# Patient Record
Sex: FEMALE | Race: WHITE | NOT HISPANIC OR LATINO | Employment: FULL TIME | URBAN - METROPOLITAN AREA
[De-identification: names, ages, dates, MRNs, and addresses within clinical notes are randomized per-mention and may not be internally consistent; named-entity substitution may affect disease eponyms.]

---

## 2020-01-29 PROBLEM — R10.2 PELVIC PAIN: Status: ACTIVE | Noted: 2020-01-29

## 2020-01-29 PROBLEM — M62.838 MUSCLE SPASM: Status: ACTIVE | Noted: 2020-01-29

## 2020-01-30 ENCOUNTER — CLINICAL SUPPORT (OUTPATIENT)
Dept: REHABILITATION | Facility: HOSPITAL | Age: 35
End: 2020-01-30
Payer: COMMERCIAL

## 2020-01-30 DIAGNOSIS — M62.838 MUSCLE SPASM: ICD-10-CM

## 2020-01-30 DIAGNOSIS — R10.2 PELVIC PAIN: ICD-10-CM

## 2020-01-30 PROCEDURE — 97140 MANUAL THERAPY 1/> REGIONS: CPT | Mod: PO

## 2020-01-30 PROCEDURE — 97161 PT EVAL LOW COMPLEX 20 MIN: CPT | Mod: PO

## 2020-01-30 PROCEDURE — 97530 THERAPEUTIC ACTIVITIES: CPT | Mod: PO,59

## 2020-01-30 NOTE — PLAN OF CARE
OUTPATIENT PHYSICAL THERAPY EVALUATION        Name: Divya Salazar  Clinic Number: [id number]    Therapy Diagnosis:   Encounter Diagnoses   Name Primary?    Pelvic pain     Muscle spasm      Physician: Aga Arredondo MD    Physician Orders: PT Eval and Treat   Medical Diagnosis: pelvic pain   Evaluation Date: 2020  Authorization: 2020  Plan of Care Certification Period: 2020 to 2020    Today's Date: 2020  Visit #:   Time In: 1400  Time Out: 1500  Total Billable Time: 60 minutes    Precautions: universal      HISTORY      Divya is a 34 y.o. female evaluated on 2020    Physician:  Aga Arredondo MD   Diagnosis:   Encounter Diagnoses   Name Primary?    Pelvic pain     Muscle spasm       Treatment ordered: Physical Therapy  Medical History: No past medical history on file.   Surgical History: No past surgical history on file.   Medications:   No current outpatient medications on file.     No current facility-administered medications for this visit.        Allergies: Review of patient's allergies indicates:  No Known Allergies     OB/GYN History:         SUBJECTIVE     Date of onset: 4-5 years  History of current complaint: Pt reports she was on vacation about 4-5 years ago and she noticed this sensation of needing to have a BM but she was not able.  Then she started noticing that orgasms also gave her the same symptoms of needing to have a BM.  She continues to have abdominal cramping and pain.  She reports the pain wakes her up in the middle of the night.  She reports the pain starts in her LLQ and radiates around to the pubic bone and posteriorly to the SI joint.  She notices the pain the most after an orgasm.  She also reports pain with vaginal exams and intercourse.  She also reports issues with urinary incontinence with coughing or sneezing.  History of complete femoral ACL tear in  that is not repaired.      Activities that cause symptoms: with  cough/sneeze/laugh/yell, sexual activity or vaginal exam  and orgasm    Previous treatment included none reported    Reproductive Symptoms  Sexually active: Yes  Pain: Yes  Dysfunction: Yes     Bladder/Bowel History: urinary incontinence  Frequency of urination:   Daytime: 3-4           Nighttime: 0  Difficulty initiating urine stream: No  Urine stream: strong  Complete emptying: Yes  Bladder leakage: Yes  Frequency of incidents: only with cough/laugh  Amount leaked (urine): teaspoon(s)  Urinary Urgency: No      Frequency of bowel movements: 1 to 2 times a day  Difficulty initiating BM: Yes  Quality/Shape of BM: [place] Stool Chart 4-6 (reports it's usually more flattened)  Colon leakage: No  Bowel Urgency: No    Form of protection: pad (only if she is sick)    Pain:  Location: low back/Left SIJ/Pelvic Floor   Current 2/10, worst 8/10, best 2/10 (with 0 being the lowest and 10 being the highest)  Pain description:Grabbing and Sharp  Aggravating Factors: Prolonged standing and Lifting      Social History  Previous treatment included medical management. No PT this calendar year.      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  Pt has a fiance.      PLOF: Pt was independent with all ADLs without reports of incontinence or pain.    Pts goals: Decrease pelvic pain.  Have pain-free orgasms.        OBJECTIVE     ORTHO SCREEN  Posture: flexed posturing  Pelvic alignment: pelvic obliquity noted  Pubic symphysis: WNL    ABDOMINALS  Palpation: moderate restriction noted in musculature   Abdominal strength: Rectus: 4-/5     TA: 4-/5     Chaperone: refused  Consent signed: Yes    VAGINAL PELVIC FLOOR EXAM    EXTERNAL ASSESSMENT  Introitus: WNL  Skin condition: WNL  Scarring: none   Sensation: WNL   Pain:  none  Pelvic Clock Assessment: negative   Voluntary contraction: visible lift  Quality of contraction: slow rise    Specificity: patient contracts: gluts and abdominal wall    Voluntary relaxation: visible drop  Involuntary contraction: bulge  Bearing down: visible drop      INTERNAL ASSESSMENT  Pain: tender areas noted as follows: levator ani and OI Ori    Sensation: able to localize pressure appropriately  Vaginal vault: WNL   Muscle Bulk: hypertonus   Muscle Power: 2/5  Muscle Endurance: 3 sec  # Reps To Fatigue: 4    # Fast Contractions in 10 seconds: 4     Quality of contraction: WNL   Specificity: WNL   Coordination: tends to hold breath during PFM contration       TREATMENT    Treatment Time In: 230  Treatment Time Out: 300  Total Treatment time separate from Evaluation: 30 minutes    Manual Therapy to develop flexibility and extensibility for 10 minutes including:  Bowel massage performed to help with gut motility. Pt edu in self-bowel massage technique to help with gut motility needed to have a comfortable BM.      Therapeutic Activity Patient participated in dynamic functional therapeutic activities to improve functional performance for 15 minutes. Including: Education as described below.     Education provided:   - Instructed on general anatomy/physiology  - Role of therapy in multi-disciplinary team  - Instructed in purpose of physical therapy and the benefits/risks of treatment  - Instructed in alternative methods of treatment  - Risks of refusing treatment  - POC and goals for therapy  - Instructed on general anatomy/physiology of urinary/bowel system     Also educated in: anatomy/physiology of pelvic floor    Patient/guardian agreed to treatment plan.     No spiritual or educational barriers to learning provided    Written Home Exercises Provided:   Pt was provided with a written copy of exercises to perform at home. Divya demonstrated good  understanding of the education provided.     See EMR under Patient Instructions for exercises provided 1/30/2020.    ASSESSMENT      This is a 34 y.o. female referred to  "outpatient physical therapy and presents with a medical diagnosis of pelvic pain.  Pt  reports that the pain is chronic and continues to worsen which is impacting ADL participation.  She also reports issues with occasional urinary incontinence and constipation.  Examination reveals lumbopelvic dysfunction and pelvic floor coordination deficits and increased muscular tone with soft tissue restrictions. The is expected to benefit from skilled intervention to work towards improving lumbopelvic dysfunction, pelvic floor relaxation and coordination as well as improving overall strength and ROM in order to return towards prior level of function and ADL participation.     Educational/Spiritual/Cultural needs: none  Pt's spiritual, cultural and educational needs considered and pt agreeable to plan of care and goals as stated below:     Abuse/Neglect: no signs  Nutritional Status: WDWN   Fall Risk: No    Anticipated Barriers for therapy: none    Medical Necessity is demonstrated by the following  History  Co-morbidities and personal factors that may impact the plan of care Co-morbidities:   chronic constipation    Personal Factors:   no deficits     low   Examination  Body Structures and Functions, activity limitations and participation restrictions that may impact the plan of care Body Regions/Systems/Functions:  pelvic floor tenderness, perineal descent and decreased pelvic muscle strength     Activity Limitations:  intercourse/vaginal exam/tampon use without pain, incontinence with ADLs and Pain with ADLs    Participation Restrictions:  all ADLs/iADLs uninterrupted by urinary incontinence/urgency/frequency, social activities with friends/family, relationship with spouse/partner, well woman's exam and ADL participation affected by pain         low   Clinical Presentation stable and uncomplicated low   Decision Making/ Complexity Score: low     Short Term Goals: 4 weeks   Pt to perform "the knack" prior to coughing, " laughing or sneezing to decrease risk of incontinence.  Pt to report being able to sneeze without incontinence demonstrating improved pelvic floor strength and coordination to improve confidence in social situations  Pt to demonstrate proper diaphragmatic breathing to help with calming the nervous system in order to decrease pain and to improve abdominal wall musculature extensibility.   Pt to report minimal pain with palpation of abdominal wall due to improvement in soft tissue mobility from moderate to minimal restrictions.  Pt will report minimal to no pain with single digit pelvic assessment and display relaxation during this assessment in order to progress to dilator use.  Pt to demonstrate proper positioning on commode with breathing techniques to decrease strain with BM to enable pt to feel empty after BM.   Pt to demonstrate independence with performing bowel massage to help with gut motility.       Long Term Goals: 12 weeks   Pt to be discharged with home plan for carry over after discharge.    Pt to report elimination of incontinence with ADLs to demonstrate improved pelvic floor muscle strength and coordination.  Pt to report being able to have a comfortable vaginal exam without significant increase in pelvic pain.   Pt to report a decrease in pelvic pain to no more than 3/10 at its worst with use of pelvic floor muscles.            PLAN     Certification Period: 1/30/2020 to 4-    Outpatient Physical Therapy 1-2 time(s) every week(s) for 12 weeks.     Physical therapy will include: therapeutic exercises, therapeutic activity, neuromuscular re-education, gait training, manual therapy, modalities PRN and dry needling to achieve established goals.     At next visit: abdominal wall mobilization, assess hips and low back, sEMG, diaphragmatic breathing, dry needling        Therapist: Valerie Palacios, PT  1/30/2020

## 2020-01-30 NOTE — PATIENT INSTRUCTIONS
"Home Exercise Program: 01/30/2020     ABDOMINAL WALL MOBILIZATION  - Gently massage your abdominal wall muscles in all directions both superficially and deeply.   - Apply as much pressure as you are able to tolerate without more than a slight increase in discomfort.    - While applying pressure you can perform circular motions or you can "scoop and pull" your tissue up for a sustained stretch.      Do for 5  minutes every day.         "

## 2020-02-27 ENCOUNTER — CLINICAL SUPPORT (OUTPATIENT)
Dept: REHABILITATION | Facility: HOSPITAL | Age: 35
End: 2020-02-27
Payer: COMMERCIAL

## 2020-02-27 DIAGNOSIS — M62.838 MUSCLE SPASM: ICD-10-CM

## 2020-02-27 DIAGNOSIS — R10.2 PELVIC PAIN: ICD-10-CM

## 2020-02-27 PROCEDURE — 97140 MANUAL THERAPY 1/> REGIONS: CPT | Mod: PO

## 2020-02-27 PROCEDURE — 97110 THERAPEUTIC EXERCISES: CPT | Mod: PO

## 2020-02-27 NOTE — PROGRESS NOTES
Physical Therapy Daily Treatment Note     Name: Divya Salazar  Clinic Number: [id number]    Therapy Diagnosis:   Encounter Diagnoses   Name Primary?    Pelvic pain     Muscle spasm      Physician: Aga Arredondo MD    Visit Date: 2/27/2020    Physician Orders: PT Eval and Treat  Medical Diagnosis: pelvic pain   Evaluation Date: 1-  Authorization Period Expiration: 12-  Plan of Care Certification Period: 4-  Visit #/Visits authorized: 2/20     Time In: 705  Time Out: 755  Total Billable Time: 50 minutes    Precautions: Standard    Subjective     Pt reports: She has had slightly less pain since her last first visit.  She was compliant with home exercise program.  Response to previous treatment: no issues reported  Functional change: no issues reported    Pain: 4/10  Location: Left lower quadrant and pelvic floor     Objective     Therapeutic Exercise to develop  flexibility for 10 minutes including:   Groin stretch 3x30 sec   Supine HS stretch 3x30 sec Ori  Supine piriformis stretch 3x30 sec Ori      Manual Therapy to develop flexibility and extensibility for 40 minutes including: soft tissue mobilization of abdominal wall musculature and visceral mobilization of large instestines    Intravaginal pelvic floor muscle STM focusing on the left OI and levator ani.        Therapeutic Activity Patient participated in dynamic functional therapeutic activities to improve functional performance for 0 minutes. Including: Education as described below.    Home Exercises Provided and Patient Education Provided     Education provided:   anatomy/physiology of pelvic floor  Discussed progression of plan of care with patient; educated pt in activity modification; reviewed HEP with pt. Pt demonstrated and verbalized understanding of all instruction and was provided with a handout of HEP (see Patient Instructions).    Written Home Exercises Provided: yes.  Exercises were reviewed and Divya was able to demonstrate  "them prior to the end of the session.  Divya demonstrated good  understanding of the education provided.     See EMR under Patient Instructions for exercises provided 2/27/2020.    Assessment     Worked on addressing restrictions in abdominal wall musculature and connective tissue as well as intravaginal pelvic floor muscles focusing on the left OI and levator ani.   Pt will continue to benefit from skilled outpatient physical therapy to address the deficits listed in the problem list box on initial evaluation, provide pt/family education and to maximize pt's level of independence in the home and community environment.       Divya is progressing well towards her goals.   Pt prognosis is Excellent.     Medical necessity demonstrated by: pelvic and abdominal pain with ADLs   Anticipated barriers to physical therapy: none    Progress towards goals:  excellent  Goals:   Short Term Goals: 4 weeks   Pt to perform "the knack" prior to coughing, laughing or sneezing to decrease risk of incontinence.  Pt to report being able to sneeze without incontinence demonstrating improved pelvic floor strength and coordination to improve confidence in social situations  Pt to demonstrate proper diaphragmatic breathing to help with calming the nervous system in order to decrease pain and to improve abdominal wall musculature extensibility.   Pt to report minimal pain with palpation of abdominal wall due to improvement in soft tissue mobility from moderate to minimal restrictions.  Pt will report minimal to no pain with single digit pelvic assessment and display relaxation during this assessment in order to progress to dilator use.  Pt to demonstrate proper positioning on commode with breathing techniques to decrease strain with BM to enable pt to feel empty after BM.   Pt to demonstrate independence with performing bowel massage to help with gut motility.         Long Term Goals: 12 weeks   Pt to be discharged with home plan for carry " over after discharge.    Pt to report elimination of incontinence with ADLs to demonstrate improved pelvic floor muscle strength and coordination.  Pt to report being able to have a comfortable vaginal exam without significant increase in pelvic pain.   Pt to report a decrease in pelvic pain to no more than 3/10 at its worst with use of pelvic floor muscles.      New/Revised goals:  Continue with current established goals at this time.      Pt's spiritual, cultural and educational needs considered and pt agreeable to plan of care and goals.    Plan     Continue with established Plan of Care, working toward established PT goals.    At next visit: abdominal wall mobilization, assess hips and low back, sEMG, diaphragmatic breathing, dry needling    Valerie Palacios, PT   2/27/2020

## 2020-02-27 NOTE — PATIENT INSTRUCTIONS
Home Exercise Program: 02/27/2020    Hip and Pelvic Stretching Program

## 2020-03-05 ENCOUNTER — CLINICAL SUPPORT (OUTPATIENT)
Dept: REHABILITATION | Facility: HOSPITAL | Age: 35
End: 2020-03-05
Payer: COMMERCIAL

## 2020-03-05 DIAGNOSIS — M62.838 MUSCLE SPASM: ICD-10-CM

## 2020-03-05 DIAGNOSIS — R10.2 PELVIC PAIN: ICD-10-CM

## 2020-03-05 PROCEDURE — 97110 THERAPEUTIC EXERCISES: CPT | Mod: PO

## 2020-03-05 PROCEDURE — 97140 MANUAL THERAPY 1/> REGIONS: CPT | Mod: PO

## 2020-03-05 NOTE — PROGRESS NOTES
Physical Therapy Daily Treatment Note     Name: Dviya Salazar  Clinic Number: [id number]    Therapy Diagnosis:   Encounter Diagnoses   Name Primary?    Pelvic pain     Muscle spasm      Physician: Aga Arredondo MD    Visit Date: 3/5/2020    Physician Orders: PT Eval and Treat  Medical Diagnosis: pelvic pain   Evaluation Date: 1-  Authorization Period Expiration: 12-  Plan of Care Certification Period: 4-  Visit #/Visits authorized: 3/20     Time In: 1006  Time Out: 1101  Total Billable Time: 55 minutes    Precautions: Standard    Subjective     Pt reports: Pt reports she has noticed increased cramping vaginally this week as well as low back/sacral pain.  She describes the pain as her familiar pain but more intense.  Pain started a couple of days after her last appointment.  She says the pain might coincide with her ovulation.     She was compliant with home exercise program.  Response to previous treatment: increased pain   Functional change: no issues reported    Pain: 5/10  Location: Left lower quadrant and pelvic floor     Objective     Therapeutic Exercise to develop  flexibility for 23 minutes including:   Groin stretch 3x30 sec   Supine HS stretch 3x30 sec Ori  Supine piriformis stretch 3x30 sec Ori  Child's pose 3x30 sec   LTR 3 min     Manual Therapy to develop flexibility and extensibility for 30 minutes including:  Sacral PA gr III  L2-5 UPA gr III  Pubic clearing x 3 bouts.  Left posterior rotated ilium correction x 3 bouts.  Backwards sacral torsion correction x 3 bouts.       Not performed today:   soft tissue mobilization of abdominal wall musculature and visceral mobilization of large instestines    Intravaginal pelvic floor muscle STM focusing on the left OI and levator ani.        Therapeutic Activity Patient participated in dynamic functional therapeutic activities to improve functional performance for 0 minutes. Including: Education as described below.    Home Exercises  "Provided and Patient Education Provided     Education provided:   anatomy/physiology of pelvic floor  Discussed progression of plan of care with patient; educated pt in activity modification; reviewed HEP with pt. Pt demonstrated and verbalized understanding of all instruction and was provided with a handout of HEP (see Patient Instructions).    Written Home Exercises Provided: yes.  Exercises were reviewed and Divya was able to demonstrate them prior to the end of the session.  Divya demonstrated good  understanding of the education provided.     See EMR under Patient Instructions for exercises provided 2/27/2020.    Assessment     Pt noted to have segmental hypomobility in her lumbar segments along with SI joint dysfunction.  Treatment initiated to improve lumbopelvic joint mechanics to help decrease strain to pelvic structures. Progressed hip and pelvic floor stretching exercises.    Pt will continue to benefit from skilled outpatient physical therapy to address the deficits listed in the problem list box on initial evaluation, provide pt/family education and to maximize pt's level of independence in the home and community environment.       Divya is progressing well towards her goals.   Pt prognosis is Excellent.     Medical necessity demonstrated by: pelvic and abdominal pain with ADLs   Anticipated barriers to physical therapy: none    Progress towards goals:  excellent  Goals:   Short Term Goals: 4 weeks   Pt to perform "the knack" prior to coughing, laughing or sneezing to decrease risk of incontinence.  Pt to report being able to sneeze without incontinence demonstrating improved pelvic floor strength and coordination to improve confidence in social situations  Pt to demonstrate proper diaphragmatic breathing to help with calming the nervous system in order to decrease pain and to improve abdominal wall musculature extensibility.   Pt to report minimal pain with palpation of abdominal wall due to " improvement in soft tissue mobility from moderate to minimal restrictions.  Pt will report minimal to no pain with single digit pelvic assessment and display relaxation during this assessment in order to progress to dilator use.  Pt to demonstrate proper positioning on commode with breathing techniques to decrease strain with BM to enable pt to feel empty after BM.   Pt to demonstrate independence with performing bowel massage to help with gut motility.         Long Term Goals: 12 weeks   Pt to be discharged with home plan for carry over after discharge.    Pt to report elimination of incontinence with ADLs to demonstrate improved pelvic floor muscle strength and coordination.  Pt to report being able to have a comfortable vaginal exam without significant increase in pelvic pain.   Pt to report a decrease in pelvic pain to no more than 3/10 at its worst with use of pelvic floor muscles.      New/Revised goals:  Continue with current established goals at this time.      Pt's spiritual, cultural and educational needs considered and pt agreeable to plan of care and goals.    Plan     Continue with established Plan of Care, working toward established PT goals.    At next visit: abdominal wall mobilization, assess hips and low back, sEMG, diaphragmatic breathing, dry needling    Valerie Palacios, PT   3/5/2020

## 2020-03-12 ENCOUNTER — CLINICAL SUPPORT (OUTPATIENT)
Dept: REHABILITATION | Facility: HOSPITAL | Age: 35
End: 2020-03-12
Payer: COMMERCIAL

## 2020-03-12 DIAGNOSIS — M62.838 MUSCLE SPASM: ICD-10-CM

## 2020-03-12 DIAGNOSIS — R10.2 PELVIC PAIN: ICD-10-CM

## 2020-03-12 PROCEDURE — 97110 THERAPEUTIC EXERCISES: CPT | Mod: PO

## 2020-03-12 PROCEDURE — 97140 MANUAL THERAPY 1/> REGIONS: CPT | Mod: PO

## 2020-03-12 NOTE — PROGRESS NOTES
Physical Therapy Daily Treatment Note     Name: Divya Salazar  Clinic Number: [id number]    Therapy Diagnosis:   Encounter Diagnoses   Name Primary?    Pelvic pain     Muscle spasm      Physician: Aga Arredondo MD    Visit Date: 3/12/2020    Physician Orders: PT Eval and Treat  Medical Diagnosis: pelvic pain   Evaluation Date: 1-  Authorization Period Expiration: 12-  Plan of Care Certification Period: 4-  Visit #/Visits authorized: 4/20     Time In: 1008  Time Out: 1101  Total Billable Time: 53 minutes    Precautions: Standard    Subjective     Pt reports: Pt reports she has noticed increased cramping vaginally this week as well as low back/sacral pain.  She describes the pain as her familiar pain but more intense.  Pain started a couple of days after her last appointment.  She says the pain might coincide with her ovulation.     She was compliant with home exercise program.  Response to previous treatment: increased pain   Functional change: no issues reported    Pain: 5/10  Location: Left lower quadrant and pelvic floor     Objective     Therapeutic Exercise to develop  flexibility for 10 minutes including:   Groin stretch 3x30 sec   Supine HS stretch 3x30 sec Ori  Supine piriformis stretch 3x30 sec Ori  Child's pose 3x30 sec     Manual Therapy to develop flexibility and extensibility for 40 minutes including:  Sacral PA gr III  L2-5 UPA gr III  Pubic clearing x 3 bouts.  Left posterior rotated ilium correction x 3 bouts.  Backwards sacral torsion correction x 3 bouts.   Intravaginal pelvic floor muscle STM focusing on the left OI and levator ani.  Pelvic floor muscle stretching at the introitus also performed.      Not performed today:   soft tissue mobilization of abdominal wall musculature and visceral mobilization of large instestines          Therapeutic Activity Patient participated in dynamic functional therapeutic activities to improve functional performance for 0 minutes.  "Including: Education as described below.    Home Exercises Provided and Patient Education Provided     Education provided:   anatomy/physiology of pelvic floor  Discussed progression of plan of care with patient; educated pt in activity modification; reviewed HEP with pt. Pt demonstrated and verbalized understanding of all instruction and was provided with a handout of HEP (see Patient Instructions).    Written Home Exercises Provided: yes.  Exercises were reviewed and Divya was able to demonstrate them prior to the end of the session.  Divya demonstrated good  understanding of the education provided.     See EMR under Patient Instructions for exercises provided 2/27/2020.    Assessment     Worked on addressing segmental hypomobility in her lumbar segments along with SI joint dysfunction. Continued hip and pelvic floor stretching exercises.  Also worked on addressing restrictions in pelvic floor muscles intravaginally.  Pt reports less pain today with treatment.    Pt will continue to benefit from skilled outpatient physical therapy to address the deficits listed in the problem list box on initial evaluation, provide pt/family education and to maximize pt's level of independence in the home and community environment.       Divya is progressing well towards her goals.   Pt prognosis is Excellent.     Medical necessity demonstrated by: pelvic and abdominal pain with ADLs   Anticipated barriers to physical therapy: none    Progress towards goals:  excellent  Goals:   Short Term Goals: 4 weeks   Pt to perform "the knack" prior to coughing, laughing or sneezing to decrease risk of incontinence.  Pt to report being able to sneeze without incontinence demonstrating improved pelvic floor strength and coordination to improve confidence in social situations  Pt to demonstrate proper diaphragmatic breathing to help with calming the nervous system in order to decrease pain and to improve abdominal wall musculature " extensibility.   Pt to report minimal pain with palpation of abdominal wall due to improvement in soft tissue mobility from moderate to minimal restrictions.  Pt will report minimal to no pain with single digit pelvic assessment and display relaxation during this assessment in order to progress to dilator use.  Pt to demonstrate proper positioning on commode with breathing techniques to decrease strain with BM to enable pt to feel empty after BM.   Pt to demonstrate independence with performing bowel massage to help with gut motility.         Long Term Goals: 12 weeks   Pt to be discharged with home plan for carry over after discharge.    Pt to report elimination of incontinence with ADLs to demonstrate improved pelvic floor muscle strength and coordination.  Pt to report being able to have a comfortable vaginal exam without significant increase in pelvic pain.   Pt to report a decrease in pelvic pain to no more than 3/10 at its worst with use of pelvic floor muscles.      New/Revised goals:  Continue with current established goals at this time.      Pt's spiritual, cultural and educational needs considered and pt agreeable to plan of care and goals.    Plan     Continue with established Plan of Care, working toward established PT goals.    At next visit: abdominal wall mobilization, assess hips and low back, sEMG, diaphragmatic breathing, dry needling    Valerie Palacios, PT   3/12/2020

## 2020-03-18 ENCOUNTER — TELEPHONE (OUTPATIENT)
Dept: REHABILITATION | Facility: HOSPITAL | Age: 35
End: 2020-03-18

## 2020-03-18 NOTE — TELEPHONE ENCOUNTER
Called and left message requesting a call back regarding the temporary suspension of pelvic health services due to COVID-19

## 2020-03-19 ENCOUNTER — TELEPHONE (OUTPATIENT)
Dept: REHABILITATION | Facility: HOSPITAL | Age: 35
End: 2020-03-19

## 2020-03-19 NOTE — TELEPHONE ENCOUNTER
Attempted to contact pt again regarding suspension of pelvic health PT services temporarily.  LVM requesting a call back.

## 2020-03-31 ENCOUNTER — PATIENT MESSAGE (OUTPATIENT)
Dept: REHABILITATION | Facility: HOSPITAL | Age: 35
End: 2020-03-31

## 2020-04-01 ENCOUNTER — PATIENT MESSAGE (OUTPATIENT)
Dept: REHABILITATION | Facility: HOSPITAL | Age: 35
End: 2020-04-01

## 2020-04-01 ENCOUNTER — TELEPHONE (OUTPATIENT)
Dept: REHABILITATION | Facility: HOSPITAL | Age: 35
End: 2020-04-01

## 2020-04-01 NOTE — PATIENT INSTRUCTIONS
"Home Exercise Program: 04/01/2020    DIAPHRAGMATIC BREATHING     The diaphragm is a dome shaped muscle that forms the floor of the rib cage. It is the most efficient muscle for breathing and relaxation, although most people are not used to using the diaphragm. Diaphragmatic or belly breathing is an important technique to learn because it helps settle down or relax the autonomic nervous system. The correct use of diaphragmatic breathing can help to quiet brain activity resulting in the relaxation of all the muscles and organs of the body. This is accomplished by slow rhythmic breathing concentrated in the diaphragm muscle rather than the chest.    How to do proper relaxation breathing:     Start by lying on your back or reclining in a chair in a relaxed position. Place one hand on your chest and the other on your abdomen.   Relax your jaw by placing your tongue on the roof of your mouth and keeping your teeth slightly apart.    Take a deep breath in, letting the abdomen expand and rise while you keep your upper chest, neck and shoulders relaxed.    As you breathe out, allow your abdomen and chest to fall. Exhale completely.   It doesn't matter if you breathe in/out through your nose and/or mouth. Do whichever feels comfortable.   Remember to breathe slowly.  Do not force your breathing. Do not hold your breath.   Repeat for 5 minutes every day.        Home Exercise Program: 04/01/2020    Hip and Pelvic Stretching Program                                   Home Exercise Program: 04/01/2020     ABDOMINAL WALL MOBILIZATION  - Gently massage your abdominal wall muscles in all directions both superficially and deeply.   - Apply as much pressure as you are able to tolerate without more than a slight increase in discomfort.    - While applying pressure you can perform circular motions or you can "scoop and pull" your tissue up for a sustained stretch.      Do for 5 minutes every day.       Bowel massage performed to " help with gut motility.

## 2020-04-14 ENCOUNTER — CLINICAL SUPPORT (OUTPATIENT)
Dept: REHABILITATION | Facility: HOSPITAL | Age: 35
End: 2020-04-14
Payer: COMMERCIAL

## 2020-04-14 DIAGNOSIS — M62.838 MUSCLE SPASM: ICD-10-CM

## 2020-04-14 DIAGNOSIS — R10.2 PELVIC PAIN: ICD-10-CM

## 2020-04-14 PROCEDURE — 97530 THERAPEUTIC ACTIVITIES: CPT | Mod: 95,PO

## 2020-04-14 NOTE — PATIENT INSTRUCTIONS
Use a tennis ball placed against the wall to help release tight muscles in your low back and gluteal area.      Use a raquette ball for an external pelvic floor release.  Place the ball on a softer surface and sit down on it to apply pressure to your muscles.      Perform each for 1-2 minutes.        Self-SI Joint Correct    Thread a dowel griffin between your knees either in a seated position or lying down.  The top leg will push down and the bottom leg will push up.  Hold for 5 secs x 3 reps.  Switch legs and then perform again.

## 2020-04-14 NOTE — PROGRESS NOTES
Physical Therapy Daily Treatment Note     Name: Divya Salazar  Clinic Number: [id number]    Patient unsafe for in-person office visit due to high risk co-morbidities, probability of infection, to minimize exposure, due to pt expressing symptoms, and/or due to government mandated quarantine.  This patient: (1) was informed that telehealth visits are now available congruent with guidelines set by state practice acts & CMS; (2) initiated scheduling of this type of visit; and (3) gave verbal consent to participate in such.  The patient & provider used [employer] using both video & audio synchronous services to complete the visit.      Therapy Diagnosis:   Encounter Diagnoses   Name Primary?    Pelvic pain     Muscle spasm      Physician: Aga Arredondo MD    Visit Date: 4/14/2020  Patient Location:  Visit type: Virtual visit with synchronous audio and video through [employer]    Physician Orders: PT Eval and Treat  Medical Diagnosis: pelvic pain   Evaluation Date: 1-  Authorization Period Expiration: 12-  Plan of Care Certification Period: 4-  Visit #/Visits authorized: 5/20     Time Connection Initiated: 1104  Time Connection Terminated: 1130  Total Billable Time: 26 minutes    Precautions: Standard    Subjective     Pt reports: She has been running (doing couch to 5K)  She reports she is still having pain.  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  The cramping and pain is worse when she is ovulating.  With prolonged standing the pin on the left increases.  She feels like her SI joint might be out of alignment as it clikcs and pops a lot.     She was compliant with home exercise program.  Response to previous treatment: No issues reported  Functional change: no changes reported    Pain: 3/10  Location: Left lower quadrant and pelvic floor     Objective       Therapeutic Exercise to develop  flexibility for 3 minutes including:   Supine piriformis stretch 3x30 sec Ori in  sitting     Not performed today:  Groin stretch 3x30 sec   Supine HS stretch 3x30 sec Ori  Child's pose 3x30 sec     Therapeutic Activity Patient participated in dynamic functional therapeutic activities to improve functional performance for 23 minutes. Including: Education as described below.    Pt edu in self-SI joint mobilization techniques (sitting and supine) to use at home in order  tolerate more functional activities.   Pt also received edu on use of a tennis ball for self-TrP release of posterior pelvic muscles such as gluts and piriformis and use of a raquette ball for release of posterior pelvic floor muscles.   Therapist demonstrated technique and patient provided a return demonstration.  Modifications to technique made as necessary for correct and  technique performance.      Home Exercises Provided and Patient Education Provided     Education provided:   anatomy/physiology of pelvic floor  Discussed progression of plan of care with patient; educated pt in activity modification; reviewed HEP with pt. Pt demonstrated and verbalized understanding of all instruction and was provided with a handout of HEP (see Patient Instructions).    Written Home Exercises Provided: yes.  Exercises were reviewed and Divya was able to demonstrate them prior to the end of the session.  Divya demonstrated good  understanding of the education provided.     See EMR under Patient Instructions for exercises provided 4/14/2020.    Assessment     Patient provided edu on self-TrP of posterior pelvic floor muscles, gluts and piriformis muscles.  She was also edu on self-SIJ mobilizations techniques.  Therapist demonstrated technique and patient provided a return demonstration.  Modifications to technique made as necessary for correct and  technique performance.    Hands-on treatments are not available at this time due to constrains of telehealth visits.  Treatment decisions made based on subjective history data collection.   "  Divya is progressing well towards her goals.   Pt prognosis is Good.     Pt will continue to benefit from skilled outpatient physical therapy to address the deficits listed in the problem list box on initial evaluation, provide pt/family education and to maximize pt's level of independence in the home and community environment.     Pt's spiritual, cultural and educational needs considered and pt agreeable to plan of care and goals.     Anticipated barriers to physical therapy: none     Goals: Short Term Goals: 4 weeks   Pt to perform "the knack" prior to coughing, laughing or sneezing to decrease risk of incontinence.  Pt to report being able to sneeze without incontinence demonstrating improved pelvic floor strength and coordination to improve confidence in social situations  Pt to demonstrate proper diaphragmatic breathing to help with calming the nervous system in order to decrease pain and to improve abdominal wall musculature extensibility.   Pt to demonstrate proper positioning on commode with breathing techniques to decrease strain with BM to enable pt to feel empty after BM.   Pt to demonstrate independence with performing bowel massage to help with gut motility.         Long Term Goals: 12 weeks   Pt to be discharged with home plan for carry over after discharge.    Pt to report elimination of incontinence with ADLs to demonstrate improved pelvic floor muscle strength and coordination.  Pt to report being able to have a comfortable vaginal exam without significant increase in pelvic pain.   Pt to report a decrease in pelvic pain to no more than 3/10 at its worst with use of pelvic floor muscles.      Goals on hold:  Pt to report minimal pain with palpation of abdominal wall due to improvement in soft tissue mobility from moderate to minimal restrictions.  Pt will report minimal to no pain with single digit pelvic assessment and display relaxation during this assessment in order to progress to dilator " use.      Plan     Continue with established Plan of Care, working toward established PT goals.    Valerie Palacios, PT

## 2020-04-22 ENCOUNTER — PATIENT MESSAGE (OUTPATIENT)
Dept: REHABILITATION | Facility: HOSPITAL | Age: 35
End: 2020-04-22

## 2020-04-28 ENCOUNTER — CLINICAL SUPPORT (OUTPATIENT)
Dept: REHABILITATION | Facility: HOSPITAL | Age: 35
End: 2020-04-28
Payer: COMMERCIAL

## 2020-04-28 DIAGNOSIS — R10.2 PELVIC PAIN: ICD-10-CM

## 2020-04-28 DIAGNOSIS — M62.838 MUSCLE SPASM: ICD-10-CM

## 2020-04-28 PROCEDURE — 97140 MANUAL THERAPY 1/> REGIONS: CPT | Mod: PO

## 2020-04-28 PROCEDURE — 97110 THERAPEUTIC EXERCISES: CPT | Mod: PO

## 2020-04-28 NOTE — PATIENT INSTRUCTIONS
Check out the coregeous ball and roll model balls by Netta Denton.         What To Expect After Functional Dry Needling ® Treatments           How will I feel after a session of FDN?     You may feel some soreness immediately after treatment in the area of the body you were treated. This does not always occur but should be expected and is considered normal. It can also take up to a few hours, or even until the next day, to feel an onset of soreness. The soreness may vary from person to person and based on the area of the body that was treated, but it typically feels like you had an intense workout at the gym. Soreness typically lasts 24-48 hours. Make sure to indicate to your provider at a follow-up appointment how long the soreness lasted.   Bruising from the treatment is possible, somehow uncommon, but it is not of concern. Some areas are more likely to bruise than others, including the shoulders, chest, face, and portions of the extremities. Large bruising rarely occurs, but is possible. Use ice to help decrease the bruising and if you feel concern, please call your provider.    It is common to feel tired/fatigued, energized, emotional, giggly, or out of it after treatment. This is a normal response that can last up to an hour or two after treatment. If this lasts beyond a day, contact your provider as a precaution.   There are times when treatment may actually exacerbate your symptoms. This is normal and may indicate that you need to follow up sooner with your practitioner to continue treatment. If this continues past the 24-48 hour window, keep note of it, as this can help your provider adjust your treatment plan if needed based on your report. This does not mean that FDN cannot help your condition.    What should I do after my treatment and what is recommended?    We highly recommend increasing your water intake for the next 24 hours after treatment to help avoid or reduce soreness. We also recommend  soaking in a hot bath or hot tub to help relieve post treatment soreness and to soften the symptoms associated with the treatment you received. After dry needling treatment, you may do the following based on your comfort level. Please note that if it hurts or exacerbates your symptoms, then discontinuing the activity is best.     Work out and/or stretch.   Participate in normal physical activity.   Massage the area.   Use heat or ice as preferred for post treatment soreness.   If you have prescription medicines, continue to take them as prescribed.    What should I avoid after treatment?     Unfamiliar physical activities or sports.   Doing more than you normally do.   Excessive alcohol intake.    If you are feeling light headed, or experience difficulty breathing, chest pain, or any other concerning symptoms after treatment, call us immediately. If you are unable to get a hold of us, please call your physician.

## 2020-04-28 NOTE — PROGRESS NOTES
Physical Therapy Recertification and Daily Treatment Note     Name: Divya Salazar  Clinic Number: [id number]    Therapy Diagnosis:   Encounter Diagnoses   Name Primary?    Pelvic pain     Muscle spasm      Physician: Aga Arredondo MD    Visit Date: 4/28/2020    Physician Orders: PT Eval and Treat  Medical Diagnosis: pelvic pain   Evaluation Date: 1-  Authorization Period Expiration: 12-  Plan of Care Certification Period: 7-  Visit #/Visits authorized: 5/20   Cancelled Visits: 0  No Show Visits: 0    Time In: 1150  Time Out: 1230  Total Billable Time: 40 minutes    Precautions: Standard    Subjective     Pt reports: Pt reports she continues to have pain after orgasm but the pain did not start immediately.  Pain started to slowly increase after 5-10 minutes.    She was compliant with home exercise program.  Response to previous treatment: increased pain   Functional change: no issues reported    Pain: 5/10  Location: Left lower quadrant and pelvic floor     Objective     Therapeutic Exercise to develop  flexibility for 8 minutes including:   LTR 10 sec x 10 reps Ori   Child's pose 3x30 sec     Manual Therapy to develop flexibility and extensibility for 30 minutes including:  Sacral PA gr III  L2-5 UPA gr III    Soft Tissue Palpation Assessment to determine the necessity for Functional Dry Needling.  Discussed the purpose, mechanism, indications, and risks of dry needling with pt. Pt was cleared of all precautions and contraindications, and pt signed consent form for dry needling performance today by a qualified dry needling PT.     Functional Dry Needling performed to L3-5 multifidi and posterior iliac crests using 50 mm needles inserted to a depth of 45 mm.       Patient demonstrated appropriate response to Functional Dry Needling. Patient with improved overall tissue mobility with decreased tissue tension and trigger points upon palpation of treated muscle groups after Functional Dry  Needling.    Instructed pt to avoid ice for next 4-6 hours to allow positive effects to take place from needling. Also encouraged pt to drink extra water today to dilute metabolic bi-product cumulation. Pt verbalized understanding to all instruction.     Patient then educated on response to FDN with handout issued      Not performed today:  Pubic clearing x 3 bouts.  Left posterior rotated ilium correction x 3 bouts.  Backwards sacral torsion correction x 3 bouts.   Intravaginal pelvic floor muscle STM focusing on the left OI and levator ani.  Pelvic floor muscle stretching at the introitus also performed.      Not performed today:   soft tissue mobilization of abdominal wall musculature and visceral mobilization of large instestines          Therapeutic Activity Patient participated in dynamic functional therapeutic activities to improve functional performance for 0 minutes. Including: Education as described below.    Home Exercises Provided and Patient Education Provided     Education provided:   anatomy/physiology of pelvic floor  Discussed progression of plan of care with patient; educated pt in activity modification; reviewed HEP with pt. Pt demonstrated and verbalized understanding of all instruction and was provided with a handout of HEP (see Patient Instructions).    Written Home Exercises Provided: yes.  Exercises were reviewed and Divya was able to demonstrate them prior to the end of the session.  Divya demonstrated good  understanding of the education provided.     See EMR under Patient Instructions for exercises provided 2/27/2020.    Assessment     Soft Tissue Palpation Assessment to determine the necessity for Functional Dry Needling.  Functional Dry Needling performed to L3-5 multifidi and posterior iliac crests using 50 mm needles inserted to a depth of 45 mm.   Worked on addressing segmental hypomobility in her lumbar segments.  Performed lumbar stretching exercises to help with low back pain.  "    Pt continues to require skilled intervention to address her abdominal, pelvic and low back pain that is affecting ADL participation.  Progress has been slower than expected due to restrictions on in-person visits during the COVID-19 pandemic.  Pt will continue to benefit from skilled outpatient physical therapy to address the deficits listed in the problem list box on initial evaluation, provide pt/family education and to maximize pt's level of independence in the home and community environment.       Divya is progressing well towards her goals.   Pt prognosis is Excellent.     Medical necessity demonstrated by: pelvic and abdominal pain with ADLs     Anticipated barriers to physical therapy: Progress may be more limited as pt not able to attend in-person visits regularly due to COVID-19 concerns.     Progress towards goals:  excellent  Goals:   Short Term Goals: 4 weeks   Pt to perform "the knack" prior to coughing, laughing or sneezing to decrease risk of incontinence.  Pt to report being able to sneeze without incontinence demonstrating improved pelvic floor strength and coordination to improve confidence in social situations  Pt to demonstrate proper diaphragmatic breathing to help with calming the nervous system in order to decrease pain and to improve abdominal wall musculature extensibility.   Pt to report minimal pain with palpation of abdominal wall due to improvement in soft tissue mobility from moderate to minimal restrictions.  Pt will report minimal to no pain with single digit pelvic assessment and display relaxation during this assessment in order to progress to dilator use.  Pt to demonstrate proper positioning on commode with breathing techniques to decrease strain with BM to enable pt to feel empty after BM.   Pt to demonstrate independence with performing bowel massage to help with gut motility.         Long Term Goals: 12 weeks   Pt to be discharged with home plan for carry over after " discharge.    Pt to report elimination of incontinence with ADLs to demonstrate improved pelvic floor muscle strength and coordination.  Pt to report being able to have a comfortable vaginal exam without significant increase in pelvic pain.   Pt to report a decrease in pelvic pain to no more than 3/10 at its worst with use of pelvic floor muscles.      New/Revised goals:  Continue with current established goals at this time.      Pt's spiritual, cultural and educational needs considered and pt agreeable to plan of care and goals.    Plan     Continue with established Plan of Care, working toward established PT goals.    Certification Period:  4- to 7-     Outpatient Physical Therapy 1-2 time(s) every week(s) for 12 weeks.      Physical therapy will include: therapeutic exercises, therapeutic activity, neuromuscular re-education, gait training, manual therapy, modalities PRN and dry needling to achieve established goals.     At next visit: abdominal wall mobilization, assess hips and low back, sEMG, diaphragmatic breathing, dry needling    Valerie Palacios, PT   4/28/2020

## 2020-05-05 ENCOUNTER — CLINICAL SUPPORT (OUTPATIENT)
Dept: REHABILITATION | Facility: HOSPITAL | Age: 35
End: 2020-05-05
Payer: MEDICAID

## 2020-05-05 DIAGNOSIS — M62.838 MUSCLE SPASM: ICD-10-CM

## 2020-05-05 DIAGNOSIS — R10.2 PELVIC PAIN: ICD-10-CM

## 2020-05-05 PROCEDURE — 97110 THERAPEUTIC EXERCISES: CPT | Mod: 95,PO

## 2020-05-05 PROCEDURE — 97530 THERAPEUTIC ACTIVITIES: CPT | Mod: 95,PO

## 2020-05-05 NOTE — PROGRESS NOTES
"  Physical Therapy Daily Treatment Note     Name: Divya Salazar  Clinic Number: [id number]    Patient unsafe for in-person office visit due to high risk co-morbidities, probability of infection, to minimize exposure, due to pt expressing symptoms, and/or due to government mandated quarantine.  This patient: (1) was informed that telehealth visits are now available congruent with guidelines set by state practice acts & CMS; (2) initiated scheduling of this type of visit; and (3) gave verbal consent to participate in such.  The patient & provider used [employer] using both video & audio synchronous services to complete the visit.      Therapy Diagnosis:   Encounter Diagnoses   Name Primary?    Pelvic pain     Muscle spasm      Physician: Aga Arredondo MD    Visit Date: 5/5/2020  Patient Location:  Visit type: Virtual visit with synchronous audio and video through [employer]    Physician Orders: PT Eval and Treat  Medical Diagnosis: pelvic pain   Evaluation Date: 1-  Authorization Period Expiration: 12-  Plan of Care Certification Period: 7-  Visit #/Visits authorized: 7/20     Time Connection Initiated: 1402  Time Connection Terminated: 2:28  Total Billable Time:  26 minutes    Precautions: Standard    Subjective     Pt reports:  She reports she was sore for 24 hours after being dry needled.  She used the trigger point ball afterwards to help with muscular tightness.  "I have not been as crampy up by the sacral joint. When I run I feel a deep crampy sensation in my left lower quadrant."  Pt reports anterior hip joint pain with exercise and stretching.    She was compliant with home exercise program.  Response to previous treatment: No issues reported  Functional change: no changes reported    Pain: 3/10  Location: Left lower quadrant and pelvic floor     Objective       Therapeutic Exercise to develop  flexibility for 10 minutes including:   Standing hamstring stretch edu   Tall kneeling hip " flexor stretch edu  Lucian test hip flexor stretch edu      Therapist demonstrated technique and patient provided a return demonstration.  Modifications to technique made as necessary for correct and  technique performance.        Therapeutic Activity Patient participated in dynamic functional therapeutic activities to improve functional performance for 18 minutes. Including: Education as described below.    Edu on use of therawand for internal pelvic floor internal trigger point release.  Used pelvic model and a demo wand to demonstrate.  Reviewed pelvic anatomy during edu on use of therawand.          Home Exercises Provided and Patient Education Provided     Education provided:   anatomy/physiology of pelvic floor  Discussed progression of plan of care with patient; educated pt in activity modification; reviewed HEP with pt. Pt demonstrated and verbalized understanding of all instruction and was provided with a handout of HEP (see Patient Instructions).    Written Home Exercises Provided: yes.  Exercises were reviewed and Divya was able to demonstrate them prior to the end of the session.  Divya demonstrated good  understanding of the education provided.     See EMR under Patient Instructions for exercises provided today.      Assessment      Progressed hip stretching exercises as pt reports increased discomfort in her anterior hip joints during exercise.  Edu provided on use of therawand for internal pelvic floor trigger point release.  Hands-on treatments are not available at this time due to constrains of telehealth visits.  Treatment decisions made based on subjective history data collection.      Divya is progressing well towards her goals.   Pt prognosis is Good.     Pt will continue to benefit from skilled outpatient physical therapy to address the deficits listed in the problem list box on initial evaluation, provide pt/family education and to maximize pt's level of independence in the home and  "community environment.     Pt's spiritual, cultural and educational needs considered and pt agreeable to plan of care and goals.     Anticipated barriers to physical therapy: none     Goals: Short Term Goals: 4 weeks   Pt to perform "the knack" prior to coughing, laughing or sneezing to decrease risk of incontinence.  Pt to report being able to sneeze without incontinence demonstrating improved pelvic floor strength and coordination to improve confidence in social situations  Pt to demonstrate proper diaphragmatic breathing to help with calming the nervous system in order to decrease pain and to improve abdominal wall musculature extensibility.   Pt to demonstrate proper positioning on commode with breathing techniques to decrease strain with BM to enable pt to feel empty after BM.   Pt to demonstrate independence with performing bowel massage to help with gut motility.         Long Term Goals: 12 weeks   Pt to be discharged with home plan for carry over after discharge.    Pt to report elimination of incontinence with ADLs to demonstrate improved pelvic floor muscle strength and coordination.  Pt to report being able to have a comfortable vaginal exam without significant increase in pelvic pain.   Pt to report a decrease in pelvic pain to no more than 3/10 at its worst with use of pelvic floor muscles.      Goals on hold during telehealth visits:  Pt to report minimal pain with palpation of abdominal wall due to improvement in soft tissue mobility from moderate to minimal restrictions.  Pt will report minimal to no pain with single digit pelvic assessment and display relaxation during this assessment in order to progress to dilator use.      Plan   Certification Period: 4- to 7-     Outpatient Physical Therapy 1-2 time(s) every week(s) for 12 weeks.      Physical therapy will include: therapeutic exercises, therapeutic activity, neuromuscular re-education,  to achieve established goals.        Valerie" Cathleen Palacios, PT

## 2020-05-05 NOTE — PATIENT INSTRUCTIONS
[employer]:     Go to [employer]. Select the pelvic wand and add to cart.  Add the promo code LISSY for a $5 discount.      Home Exercise Program: 05/05/2020    Hip and Pelvic Stretching Program             Hold 30 seconds, repeat 3 times.  Pull your knee close enough to your chest to keep your back flat.

## 2020-05-07 ENCOUNTER — PATIENT MESSAGE (OUTPATIENT)
Dept: REHABILITATION | Facility: HOSPITAL | Age: 35
End: 2020-05-07

## 2020-05-18 NOTE — PROGRESS NOTES
"  Physical Therapy Daily Treatment Note     Name: Divya Salazar  Clinic Number: [id number]    Therapy Diagnosis:   Encounter Diagnoses   Name Primary?    Pelvic pain     Muscle spasm      Physician: Aga Arredondo MD    Visit Date: 5/19/2020    Physician Orders: PT Eval and Treat  Medical Diagnosis: pelvic pain   Evaluation Date: 1-  Authorization Period Expiration: 12-  Plan of Care Certification Period: 7-  Visit #/Visits authorized: 7/20   Cancelled Visits: 0  No Show Visits: 0    Time In: 905  Time Out: 1000  Total Billable Time: 55 minutes    Precautions: Standard    Subjective     Pt reports: She has returned back to work. She reports the pain has decreased in her low back.  She continues to have some pain with orgasm although it "seems to be decreasing."       She was compliant with home exercise program.  Response to previous treatment: increased pain   Functional change: no issues reported    Pain: 2/10   Location: Left lower quadrant and pelvic floor     Objective     Therapeutic Exercise to develop  flexibility for 25 minutes including:   LTR 10 sec x 10 reps Ori   Child's pose 3x30 sec   Supine piriformis stretch 3x30 sec ori   Groin stretch 3x30 sec     Manual Therapy to develop flexibility and extensibility for 30 minutes including:  Sacral PA gr III  L2-5 UPA gr III    Soft Tissue Palpation Assessment to determine the necessity for Functional Dry Needling.  Discussed the purpose, mechanism, indications, and risks of dry needling with pt. Pt was cleared of all precautions and contraindications, and pt signed consent form for dry needling performance today by a qualified dry needling PT.     Functional Dry Needling performed to L5 multifidi using 50 mm needles inserted to a depth of 45 mm and left piriformis and glut med using 75mm needles to depth of 65mm.      Patient demonstrated appropriate response to Functional Dry Needling. Patient with improved overall tissue mobility with " decreased tissue tension and trigger points upon palpation of treated muscle groups after Functional Dry Needling.    Instructed pt to avoid ice for next 4-6 hours to allow positive effects to take place from needling. Also encouraged pt to drink extra water today to dilute metabolic bi-product cumulation. Pt verbalized understanding to all instruction.     Patient then educated on response to FDN with handout issued      Not performed today:  Pubic clearing x 3 bouts.  Left posterior rotated ilium correction x 3 bouts.  Backwards sacral torsion correction x 3 bouts.   Intravaginal pelvic floor muscle STM focusing on the left OI and levator ani.  Pelvic floor muscle stretching at the introitus also performed.      Not performed today:   soft tissue mobilization of abdominal wall musculature and visceral mobilization of large instestines          Therapeutic Activity Patient participated in dynamic functional therapeutic activities to improve functional performance for 0 minutes. Including: Education as described below.    Home Exercises Provided and Patient Education Provided     Education provided:   anatomy/physiology of pelvic floor  Discussed progression of plan of care with patient; educated pt in activity modification; reviewed HEP with pt. Pt demonstrated and verbalized understanding of all instruction and was provided with a handout of HEP (see Patient Instructions).    Written Home Exercises Provided: yes.  Exercises were reviewed and Divya was able to demonstrate them prior to the end of the session.  Divya demonstrated good  understanding of the education provided.     See EMR under Patient Instructions for exercises provided 2/27/2020.    Assessment    Functional Dry Needling performed to L5 multifidi using 50 mm needles inserted to a depth of 45 mm and left piriformis and glut med using 75mm needles to depth of 65mm.      Patient demonstrated appropriate response to Functional Dry Needling. Patient  "with improved overall tissue mobility with decreased tissue tension and trigger points upon palpation of treated muscle groups after Functional Dry Needling.   Worked on addressing segmental hypomobility in her lumbar segments.  Performed lumbar stretching exercises to help with low back pain.     Pt continues to require skilled intervention to address her abdominal, pelvic and low back pain that is affecting ADL participation.  Progress has been slower than expected due to restrictions on in-person visits during the COVID-19 pandemic.  Pt will continue to benefit from skilled outpatient physical therapy to address the deficits listed in the problem list box on initial evaluation, provide pt/family education and to maximize pt's level of independence in the home and community environment.       Divya is progressing well towards her goals.   Pt prognosis is Excellent.     Medical necessity demonstrated by: pelvic and abdominal pain with ADLs     Anticipated barriers to physical therapy: Progress may be more limited as pt not able to attend in-person visits regularly due to COVID-19 concerns.     Progress towards goals:  excellent  Goals:   Short Term Goals: 4 weeks   Pt to perform "the knack" prior to coughing, laughing or sneezing to decrease risk of incontinence.  Pt to report being able to sneeze without incontinence demonstrating improved pelvic floor strength and coordination to improve confidence in social situations  Pt to demonstrate proper diaphragmatic breathing to help with calming the nervous system in order to decrease pain and to improve abdominal wall musculature extensibility.   Pt to report minimal pain with palpation of abdominal wall due to improvement in soft tissue mobility from moderate to minimal restrictions.  Pt will report minimal to no pain with single digit pelvic assessment and display relaxation during this assessment in order to progress to dilator use.  Pt to demonstrate proper " positioning on commode with breathing techniques to decrease strain with BM to enable pt to feel empty after BM.   Pt to demonstrate independence with performing bowel massage to help with gut motility.         Long Term Goals: 12 weeks   Pt to be discharged with home plan for carry over after discharge.    Pt to report elimination of incontinence with ADLs to demonstrate improved pelvic floor muscle strength and coordination.  Pt to report being able to have a comfortable vaginal exam without significant increase in pelvic pain.   Pt to report a decrease in pelvic pain to no more than 3/10 at its worst with use of pelvic floor muscles.      New/Revised goals:  Continue with current established goals at this time.      Pt's spiritual, cultural and educational needs considered and pt agreeable to plan of care and goals.    Plan     Continue with established Plan of Care, working toward established PT goals.    Certification Period:  4- to 7-     Outpatient Physical Therapy 1-2 time(s) every week(s) for 12 weeks.      Physical therapy will include: therapeutic exercises, therapeutic activity, neuromuscular re-education, gait training, manual therapy, modalities PRN and dry needling to achieve established goals.     At next visit: abdominal wall mobilization, assess hips and low back, sEMG, diaphragmatic breathing, dry needling    Valerie Palacios, PT   5/18/2020

## 2020-05-19 ENCOUNTER — CLINICAL SUPPORT (OUTPATIENT)
Dept: REHABILITATION | Facility: HOSPITAL | Age: 35
End: 2020-05-19
Payer: MEDICAID

## 2020-05-19 DIAGNOSIS — R10.2 PELVIC PAIN: ICD-10-CM

## 2020-05-19 DIAGNOSIS — M62.838 MUSCLE SPASM: ICD-10-CM

## 2020-05-19 PROCEDURE — 97140 MANUAL THERAPY 1/> REGIONS: CPT | Mod: PO

## 2020-05-19 PROCEDURE — 97110 THERAPEUTIC EXERCISES: CPT | Mod: PO

## 2020-06-02 ENCOUNTER — CLINICAL SUPPORT (OUTPATIENT)
Dept: REHABILITATION | Facility: HOSPITAL | Age: 35
End: 2020-06-02
Payer: MEDICAID

## 2020-06-02 DIAGNOSIS — M62.838 MUSCLE SPASM: ICD-10-CM

## 2020-06-02 DIAGNOSIS — R10.2 PELVIC PAIN: ICD-10-CM

## 2020-06-02 PROCEDURE — 97110 THERAPEUTIC EXERCISES: CPT | Mod: PO

## 2020-06-02 PROCEDURE — 97140 MANUAL THERAPY 1/> REGIONS: CPT | Mod: PO

## 2020-06-02 NOTE — PROGRESS NOTES
"  Physical Therapy Daily Treatment Note     Name: Divya Salazar  Clinic Number: [id number]    Therapy Diagnosis:   Encounter Diagnoses   Name Primary?    Pelvic pain     Muscle spasm      Physician: Aga Arredondo MD    Visit Date: 6/2/2020    Physician Orders: PT Eval and Treat  Medical Diagnosis: pelvic pain   Evaluation Date: 1-  Authorization Period Expiration: 12-  Plan of Care Certification Period: 7-  Visit #/Visits authorized: 7/20   Cancelled Visits: 0  No Show Visits: 0    Time In: 905  Time Out: 1000  Total Billable Time: 55 minutes    Precautions: Standard    Subjective     Pt reports: Pt reports her pain is consistently worse during ovulation.  She has returned back to work. She reports the pain has decreased in her low back.  She continues to have some pain with orgasm although it "seems to be decreasing."       She was compliant with home exercise program.  Response to previous treatment: increased pain   Functional change: no issues reported    Pain: 2/10   Location: Left lower quadrant and pelvic floor     Objective   Neuromuscular Re-education to develop Coordination, Control and Down training for 10 minutes including: diaphragmatic breathing.  Patient was instructed in and practice diaphragmatic breathing as a method to help control pain, decrease anxiety and help w/ sleep. Pt was provided w / handout w/ instructions for practice. Practiced  with mod verbal and tactile instruction.         Therapeutic Exercise to develop  flexibility for 15 minutes including:   LTR 10 sec x 10 reps Ori   Supine piriformis stretch 3x30 sec ori   Groin stretch 3x30 sec     Manual Therapy to develop flexibility and extensibility for 40 minutes including:  Soft Tissue Palpation Assessment to determine the necessity for Functional Dry Needling.  Discussed the purpose, mechanism, indications, and risks of dry needling with pt. Pt was cleared of all precautions and contraindications, and pt signed " consent form for dry needling performance today by a qualified dry needling PT.     Functional Dry Needling performed to left iliac crest laterally  50 mm needles inserted to a depth of 45 mm  2 50 mm needles used at distal rectus abdominus attachment and iliacus using 50 mm needle inserted to a bony backdrop.      Patient demonstrated appropriate response to Functional Dry Needling. Patient with improved overall tissue mobility with decreased tissue tension and trigger points upon palpation of treated muscle groups after Functional Dry Needling.    Instructed pt to avoid ice for next 4-6 hours to allow positive effects to take place from needling. Also encouraged pt to drink extra water today to dilute metabolic bi-product cumulation. Pt verbalized understanding to all instruction.     Patient then educated on response to FDN with handout issued    Intravaginal pelvic floor muscle STM focusing on the left OI and levator ani.  Not performed today:  Pubic clearing x 3 bouts.  Left posterior rotated ilium correction x 3 bouts.  Backwards sacral torsion correction x 3 bouts.   Sacral PA gr III  L2-5 UPA gr III     soft tissue mobilization of abdominal wall musculature and visceral mobilization of large instestines          Therapeutic Activity Patient participated in dynamic functional therapeutic activities to improve functional performance for 0 minutes. Including: Education as described below.    Home Exercises Provided and Patient Education Provided     Education provided:   anatomy/physiology of pelvic floor  Discussed progression of plan of care with patient; educated pt in activity modification; reviewed HEP with pt. Pt demonstrated and verbalized understanding of all instruction and was provided with a handout of HEP (see Patient Instructions).    Written Home Exercises Provided: yes.  Exercises were reviewed and Divya was able to demonstrate them prior to the end of the session.  Divya demonstrated good   "understanding of the education provided.     See EMR under Patient Instructions for exercises provided 2/27/2020.    Assessment    Functional Dry Needling performed  Patient demonstrated appropriate response to Functional Dry Needling. Patient with improved overall tissue mobility with decreased tissue tension and trigger points upon palpation of treated muscle groups after Functional Dry Needling. Resumed intravaginal pelvic floor treatment today focusing on the left OI and levator ani muscles.  Able to replicate pt's pain today with dry needling and internal pelvic floor treatments.          Pt continues to require skilled intervention to address her abdominal, pelvic and low back pain that is affecting ADL participation.  Progress has been slower than expected due to restrictions on in-person visits during the COVID-19 pandemic.  Pt will continue to benefit from skilled outpatient physical therapy to address the deficits listed in the problem list box on initial evaluation, provide pt/family education and to maximize pt's level of independence in the home and community environment.       Divya is progressing well towards her goals.   Pt prognosis is Excellent.     Medical necessity demonstrated by: pelvic and abdominal pain with ADLs     Anticipated barriers to physical therapy: Progress may be more limited as pt not able to attend in-person visits regularly due to COVID-19 concerns.     Progress towards goals:  excellent  Goals:   Short Term Goals: 4 weeks   Pt to perform "the knack" prior to coughing, laughing or sneezing to decrease risk of incontinence.  Pt to report being able to sneeze without incontinence demonstrating improved pelvic floor strength and coordination to improve confidence in social situations  Pt to demonstrate proper diaphragmatic breathing to help with calming the nervous system in order to decrease pain and to improve abdominal wall musculature extensibility.   Pt to report minimal pain " with palpation of abdominal wall due to improvement in soft tissue mobility from moderate to minimal restrictions.  Pt will report minimal to no pain with single digit pelvic assessment and display relaxation during this assessment in order to progress to dilator use.  Pt to demonstrate proper positioning on commode with breathing techniques to decrease strain with BM to enable pt to feel empty after BM.   Pt to demonstrate independence with performing bowel massage to help with gut motility.         Long Term Goals: 12 weeks   Pt to be discharged with home plan for carry over after discharge.    Pt to report elimination of incontinence with ADLs to demonstrate improved pelvic floor muscle strength and coordination.  Pt to report being able to have a comfortable vaginal exam without significant increase in pelvic pain.   Pt to report a decrease in pelvic pain to no more than 3/10 at its worst with use of pelvic floor muscles.      New/Revised goals:  Continue with current established goals at this time.      Pt's spiritual, cultural and educational needs considered and pt agreeable to plan of care and goals.    Plan     Continue with established Plan of Care, working toward established PT goals.    Certification Period:  4- to 7-     Outpatient Physical Therapy 1-2 time(s) every week(s) for 12 weeks.      Physical therapy will include: therapeutic exercises, therapeutic activity, neuromuscular re-education, gait training, manual therapy, modalities PRN and dry needling to achieve established goals.     At next visit: abdominal wall mobilization, assess hips and low back, sEMG, diaphragmatic breathing, dry needling    Valerie Plaacios, PT   6/2/2020

## 2020-06-02 NOTE — PATIENT INSTRUCTIONS
Home Exercise Program: 06/02/2020    DIAPHRAGMATIC BREATHING     The diaphragm is a dome shaped muscle that forms the floor of the rib cage. It is the most efficient muscle for breathing and relaxation, although most people are not used to using the diaphragm. Diaphragmatic or belly breathing is an important technique to learn because it helps settle down or relax the autonomic nervous system. The correct use of diaphragmatic breathing can help to quiet brain activity resulting in the relaxation of all the muscles and organs of the body. This is accomplished by slow rhythmic breathing concentrated in the diaphragm muscle rather than the chest.    How to do proper relaxation breathing:     Start by lying on your back or reclining in a chair in a relaxed position. Place one hand on your chest and the other on your abdomen.   Relax your jaw by placing your tongue on the roof of your mouth and keeping your teeth slightly apart.    Take a deep breath in, letting the abdomen expand and rise while you keep your upper chest, neck and shoulders relaxed.    As you breathe out, allow your abdomen and chest to fall. Exhale completely.   It doesn't matter if you breathe in/out through your nose and/or mouth. Do whichever feels comfortable.   Remember to breathe slowly.  Do not force your breathing. Do not hold your breath.   Repeat for 2-5 minutes every day.

## 2020-06-08 NOTE — PROGRESS NOTES
Physical Therapy Daily Treatment Note     Name: Divya Salazar  Clinic Number: [id number]    Therapy Diagnosis:   Encounter Diagnoses   Name Primary?    Pelvic pain     Muscle spasm      Physician: Aga Arredondo MD    Visit Date: 6/9/2020    Physician Orders: PT Eval and Treat  Medical Diagnosis: pelvic pain   Evaluation Date: 1-  Authorization Period Expiration: 12-  Plan of Care Certification Period: 7-  Visit #/Visits authorized: 8/20   Cancelled Visits: 0  No Show Visits: 0    Time In: 1240  Time Out: 1330  Total Billable Time: 50 minutes    Precautions: Standard    Subjective     Pt reports: Pt reports her cramping pain has worsened a bit over the past week.  It's been waking her up at night.  The pain will be pass within 30 minutes if she sits up or tries to have a BM.    She was compliant with home exercise program.  Response to previous treatment: increased pain   Functional change: no issues reported    Pain: 4/10   Location: Left lower quadrant and pelvic floor     Objective   Neuromuscular Re-education to develop Coordination, Control and Down training for 00 minutes including: diaphragmatic breathing.  Patient was instructed in and practice diaphragmatic breathing as a method to help control pain, decrease anxiety and help w/ sleep. Pt was provided w / handout w/ instructions for practice. Practiced  with mod verbal and tactile instruction.       Therapeutic Exercise to develop  flexibility for 8 minutes including:   Supine piriformis stretch 3x30 sec hector     Child's pose 3x30 sec     Manual Therapy to develop flexibility and extensibility for 40 minutes including:  Soft Tissue Palpation Assessment to determine the necessity for Functional Dry Needling.  Discussed the purpose, mechanism, indications, and risks of dry needling with pt. Pt was cleared of all precautions and contraindications, and pt signed consent form for dry needling performance today by a qualified dry needling  PT.     Functional Dry Needling performed to left piriformis and gluteal  muscle using 75 mm needles inserted to a depth of 70 mm.    Patient demonstrated appropriate response to Functional Dry Needling. Patient with improved overall tissue mobility with decreased tissue tension and trigger points upon palpation of treated muscle groups after Functional Dry Needling.    Instructed pt to avoid ice for next 4-6 hours to allow positive effects to take place from needling. Also encouraged pt to drink extra water today to dilute metabolic bi-product cumulation. Pt verbalized understanding to all instruction.       Intravaginal pelvic floor muscle STM focusing on the left OI and levator ani.    Not performed today:  Pubic clearing x 3 bouts.  Left posterior rotated ilium correction x 3 bouts.  Backwards sacral torsion correction x 3 bouts.   Sacral PA gr III  L2-5 UPA gr III     soft tissue mobilization of abdominal wall musculature and visceral mobilization of large instestines          Therapeutic Activity Patient participated in dynamic functional therapeutic activities to improve functional performance for 0 minutes. Including: Education as described below.    Home Exercises Provided and Patient Education Provided     Education provided:   anatomy/physiology of pelvic floor  Discussed progression of plan of care with patient; educated pt in activity modification; reviewed HEP with pt. Pt demonstrated and verbalized understanding of all instruction and was provided with a handout of HEP (see Patient Instructions).    Written Home Exercises Provided: yes.  Exercises were reviewed and Diyva was able to demonstrate them prior to the end of the session.  Divya demonstrated good  understanding of the education provided.     See EMR under Patient Instructions for exercises provided 2/27/2020.    Assessment   Functional Dry Needling performed  Patient demonstrated appropriate response to Functional Dry Needling. Patient  "with improved overall tissue mobility with decreased tissue tension and trigger points upon palpation of treated muscle groups after Functional Dry Needling. Continued intravaginal pelvic floor treatment today focusing on the left OI and levator ani muscles.  Able to replicate pt's pain today with dry needling and internal pelvic floor treatments.          Pt continues to require skilled intervention to address her abdominal, pelvic and low back pain that is affecting ADL participation.  Progress has been slower than expected due to restrictions on in-person visits during the COVID-19 pandemic.  Pt will continue to benefit from skilled outpatient physical therapy to address the deficits listed in the problem list box on initial evaluation, provide pt/family education and to maximize pt's level of independence in the home and community environment.       Divya is progressing well towards her goals.   Pt prognosis is Excellent.     Medical necessity demonstrated by: pelvic and abdominal pain with ADLs     Anticipated barriers to physical therapy: Progress may be more limited as pt not able to attend in-person visits regularly due to COVID-19 concerns.     Progress towards goals:  excellent  Goals:   Short Term Goals: 4 weeks   Pt to perform "the knack" prior to coughing, laughing or sneezing to decrease risk of incontinence.  Pt to report being able to sneeze without incontinence demonstrating improved pelvic floor strength and coordination to improve confidence in social situations  Pt to demonstrate proper diaphragmatic breathing to help with calming the nervous system in order to decrease pain and to improve abdominal wall musculature extensibility.   Pt to report minimal pain with palpation of abdominal wall due to improvement in soft tissue mobility from moderate to minimal restrictions.  Pt will report minimal to no pain with single digit pelvic assessment and display relaxation during this assessment in order " to progress to dilator use.  Pt to demonstrate proper positioning on commode with breathing techniques to decrease strain with BM to enable pt to feel empty after BM.   Pt to demonstrate independence with performing bowel massage to help with gut motility.         Long Term Goals: 12 weeks   Pt to be discharged with home plan for carry over after discharge.    Pt to report elimination of incontinence with ADLs to demonstrate improved pelvic floor muscle strength and coordination.  Pt to report being able to have a comfortable vaginal exam without significant increase in pelvic pain.   Pt to report a decrease in pelvic pain to no more than 3/10 at its worst with use of pelvic floor muscles.      New/Revised goals:  Continue with current established goals at this time.      Pt's spiritual, cultural and educational needs considered and pt agreeable to plan of care and goals.    Plan     Continue with established Plan of Care, working toward established PT goals.    Certification Period:  4- to 7-     Outpatient Physical Therapy 1-2 time(s) every week(s) for 12 weeks.      Physical therapy will include: therapeutic exercises, therapeutic activity, neuromuscular re-education, gait training, manual therapy, modalities PRN and dry needling to achieve established goals.     At next visit: abdominal wall mobilization, assess hips and low back, sEMG, diaphragmatic breathing, dry needling    Valerie Palacios, PT   6/8/2020

## 2020-06-09 ENCOUNTER — CLINICAL SUPPORT (OUTPATIENT)
Dept: REHABILITATION | Facility: HOSPITAL | Age: 35
End: 2020-06-09
Payer: MEDICAID

## 2020-06-09 DIAGNOSIS — M62.838 MUSCLE SPASM: ICD-10-CM

## 2020-06-09 DIAGNOSIS — R10.2 PELVIC PAIN: ICD-10-CM

## 2020-06-09 PROCEDURE — 97110 THERAPEUTIC EXERCISES: CPT | Mod: PO

## 2020-06-09 PROCEDURE — 97140 MANUAL THERAPY 1/> REGIONS: CPT | Mod: PO

## 2020-11-20 ENCOUNTER — CLINICAL SUPPORT (OUTPATIENT)
Dept: REHABILITATION | Facility: HOSPITAL | Age: 35
End: 2020-11-20
Payer: MEDICAID

## 2020-11-20 DIAGNOSIS — M62.838 MUSCLE SPASM: ICD-10-CM

## 2020-11-20 DIAGNOSIS — R10.2 PELVIC PAIN: Primary | ICD-10-CM

## 2020-11-20 PROCEDURE — 97162 PT EVAL MOD COMPLEX 30 MIN: CPT | Mod: PO

## 2020-11-20 PROCEDURE — 97112 NEUROMUSCULAR REEDUCATION: CPT | Mod: PO

## 2020-11-20 NOTE — PATIENT INSTRUCTIONS
Home Program: 11/20/2020    1. When voiding, tilt your pelvis forward a little to ensure that your bladder empties fully.  Belly breathing while voiding will help this too.      2. During intercourse- work to find a position that you can keep your left hip extended and right hip flexed.  Keep sleeping with your left hip straight.      3. Work on Diaphragmatic breathing for at least 5 minutes every day.  We want to make a connection between the diaphragm and the pelvic floor (pelvic floor relaxes when you inhale).

## 2020-11-20 NOTE — PROGRESS NOTES
[facility]  Pelvic Health Physical Therapy Reassessment    Date: 2020   Name: Divya Salazar  Clinic Number: [id number]  Therapy Diagnosis:   Encounter Diagnoses   Name Primary?   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      History of current condition - Divya reports: that she had a lap procedure for endometriosis.  She reports that the pain that is deep in her abdomen is still there.  She is getting an ortho workup for a possible L hip labral tear.  Has to have 4 weeks of PT before getting an MRI.  She reports clicking in her L hip from time to time.  Hip flexion increases the pain.  She notes that if she sleeps with her hip extended her symptoms are decreased.  Her pain radiates down inner thigh, and wraps around the posterior hip.  She has had dry needling in the past for this with some relief.  Started hip PT at [facility] this week.  She also reports stress urinary leakage, also thin caliber stools and rectal discomfort relieved by stool passage.  Golfball in the rectum sensation on occasion.     OB/GYN History: ;   Sexually active? Yes  Pain with vaginal exams, intercourse or tampon use? Yes- penetration and orgasm are not painful in themselves but her symptoms start 2-5 minutes after climax.      Bladder/Bowel History: urinary incontinence and constipation/straining for movement   Frequency of urination:   Daytime: at work, twice; at home 3-4.             Nighttime: 1 (cramping wakes her)   Difficulty initiating  urine stream: No   Urine stream: strong   Complete emptying: Yes   Bladder leakage: Yes   Frequency of incidents: about twice per week   Amount leaked (urine): few drops   Urinary Urgency: No, Able to delay the urge for at least 30 minute(s).   Frequency of bowel movements: once a day   Difficulty initiating BM: No   Quality/Shape of BM: skinny but formed stools   Does Patient Feel Empty after BM? Yes   Fiber Supplements or Laxative Use? Yes- has tried fiber supplements in the past   Colon leakage: No   Form of protection: none   Number of pads required in 24 hours: 0    Pain:  Location: L hip, groin, and buttock   Current 2/10, worst 10/10, best 2/10   Description: Aching  Aggravating Factors/Activities that cause symptoms: orgasm, hip flexion   Easing Factors: lying down and rest     Medical History: Divya  has no past medical history on file.     Surgical History: Divya Salazar  has no past surgical history on file.    Medications: Divya currently has no medications in their medication list.    Allergies: Review of patient's allergies indicates:  No Known Allergies     Prior Therapy/Previous treatment included: pelvic PT with Valerie Connolly, currently in PT for her hip  Social History:  lives with their spouse    Current exercise: was running until surgery; doesn't have gym membership; has PT home program  Occupation: Pt works as a  and job-related duties include standing and occasional putting off voiding for about an hour.  Prior Level of Function: pain free sex  Current Level of Function:  Pain with intercourse and ADL's    Types of fluid intake: water, coffee and tea  Diet: TAD   Habitus: overweight  Abuse/Neglect: No     Pts goals: to be able to perform ADL's without leakage, and to have intercourse/orgasm without pain    OBJECTIVE     See EMR under MEDIA for written consent provided 11/20/2020  Chaperone: declined    ORTHO SCREEN  Posture in sitting: slouched  and sits asymmetrically with  weight shifted off of left ischial tuberosity   Posture in standing: WNL  Pelvic alignment: no sign of deviations noted in supine     ABDOMINAL WALL ASSESSMENT  Palpation: L psoas and iliacus tender to palpation  Abdominal strength: Rectus abdominus: 3/5     Transverse abdominus: poorly isolated but palpable contraction  Scarring: portal scars healed  Diastasis: absent       BREATHING MECHANICS ASSESSMENT   Thorax Assessment During Quiet Respiration: Decreased excursion of abdominal wall   Thorax Assessment During Deep Respiration: Decreased excursion of abdominal wall     VAGINAL PELVIC FLOOR EXAM    EXTERNAL ASSESSMENT  Introitus: WNL  Skin condition: WNL  Scarring: none   Sensation: WNL   Pain: none    Voluntary contraction: visible lift  Voluntary relaxation: visible drop  Involuntary contraction: visible lift  Bearing down: bulge with anterior vaginal wall descent  Perineal descent: absent      INTERNAL ASSESSMENT  Pain: tender areas noted as follows: L OI tender with resisted palpation   Sensation: able to localized pressure appropriately   Vaginal vault: WNL   Muscle Bulk: mild hypertonus   Muscle Power: 2+/5  Muscle Endurance: 4 sec      Quality of contraction: decreased hold and slow relaxation   Specificity: patient contracts: gluts   Coordination: tends to hold breath during PFM contraction   Prolapse check: anterior vaginal wall weakness noted      RECTAL PELVIC FLOOR EXAM- deferred to next session      TREATMENT     Treatment Time In: 9:55  Treatment Time Out: 10:05  Total Treatment time (time-based codes) separate from Evaluation: 10 minutes    Neuromuscular Re-education to develop Down training for 10 minutes including:   diaphragmatic breathing    Patient Education provided:   general anatomy/physiology of urinary/ bowel  system, benefits of treatment, risks of treatment and alternative methods of treatment were discussed with the pt. Additionally, posture/body mechanices, intercourse positions and  double voiding techniques were reviewed.     Home Exercises provided:  Written Home Exercises provided: yes.  Exercises were reviewed and Divya was able to demonstrate them prior to the end of the session.    Divya demonstrated good  understanding of the education provided.     See EMR under Patient Instructions for exercises provided 11/20/2020.    Assessment     Divya is a 35 y.o. female referred to outpatient Physical Therapy with a medical diagnosis of L hip pain; levator syndrome. Pt presents with poor knowledge of body mechanics and posture, pelvic floor tenderness, decreased pelvic muscle strength, decreased endurance of the pelvic muscles, increased tension of the pelvic muscles and poor coordination of pelvic floor muscles during ADL's leading to urinary or fecal leakage.       Pt prognosis is Good.   Pt will benefit from skilled outpatient Physical Therapy to address the deficits stated above and in the chart below, provide pt/family education, and to maximize pt's level of independence.     Plan of care discussed with patient: Yes  Pt's spiritual, cultural and educational needs considered and patient is agreeable to the plan of care and goals as stated below:     Anticipated Barriers for therapy: none    Medical Necessity is demonstrated by the following:    History  Co-morbidities and personal factors that may impact the plan of care Co-morbidities   endometriosis    Personal Factors  no deficits     moderate   Examination  Body structures and functions, activity limitations and participation restrictions that may impact the plan of care Body Regions/Systems/Functions:  poor knowledge of body mechanics and posture, pelvic floor tenderness, decreased pelvic muscle strength, decreased endurance of the pelvic muscles and increased tension of the pelvic muscles     Activity Limitations:  intercourse/vaginal exam/tampon use without pain and incontinence with ADLs    Participation Restrictions:  all  ADLs/iADLs uninterrupted by urinary incontinence/urgency/frequency, relationship with spouse/partner, Sleep restrictions and exercise restrictions due to pain     Activity limitations:   Learning and applying knowledge  no deficits    General Tasks and Commands  no deficits    Communication  no deficits    Mobility  no deficits    Self care  no deficits    Domestic Life  no deficits    Interactions/Relationships  no deficits    Life Areas  no deficits    Community and Social Life  no deficits       moderate   Clinical Presentation evolving clinical presentation with changing clinical characteristics moderate   Decision Making/ Complexity Score: moderate       Goals:  Long Term Goals: 12 weeks   Pt will report improved ability to cough/sneeze/laugh/yell with little (drops) to no urinary leakage 7/7 days per week.   Pt will urinate without crede/Valsalva to prevent adverse effects to adjacent structures.  Pt will be independent with double voiding techniques 100% of the time to ensure full bladder emptying and decrease pt's risk of infection.   Pt will report successfully having intercourse with < or = 2/10 pain for an improvement in activity tolerance.   Pt/family will be independent with HEP for continued self-management of symptoms.  Sleep not disturbed by hip pain    Plan     Plan of care Certification: 11/20/2020 to 2/20/2020.    Outpatient Physical Therapy 1 times per 1 week(s)  for 12 weeks to include the following interventions: therapeutic exercises, neuromuscular re-education, manual therapy, modalities PRN, patient/family education, dry needling and self care/home management    Glenna Millan, PT, BCB-PMD

## 2020-12-10 ENCOUNTER — CLINICAL SUPPORT (OUTPATIENT)
Dept: REHABILITATION | Facility: HOSPITAL | Age: 35
End: 2020-12-10
Payer: MEDICAID

## 2020-12-10 DIAGNOSIS — R10.2 PELVIC PAIN: Primary | ICD-10-CM

## 2020-12-10 DIAGNOSIS — M62.838 MUSCLE SPASM: ICD-10-CM

## 2020-12-10 PROCEDURE — 97112 NEUROMUSCULAR REEDUCATION: CPT

## 2020-12-10 PROCEDURE — 97140 MANUAL THERAPY 1/> REGIONS: CPT

## 2020-12-10 NOTE — PROGRESS NOTES
Pelvic Health Physical Therapy   Treatment Note     Name: Divya Salazar  Clinic Number: [id number]    Therapy Diagnosis:   Encounter Diagnoses   Name Primary?   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  She reports that she is getting PT once per week for her hip pain- she notes that she feels like there is glass in her hip.  She is walking with a limp today.    She was compliant with home exercise program.  Response to previous treatment: none noted yet  Functional change: I with HEP    Pain: 7/10  Location: left buttocks , groin  and hip      Objective     Divya participated in neuromuscular re-education activities to develop Coordination for 15 minutes including: Kegels with assist of SEMG  We worked in supine with external lead wires.  She demonstrated normal baseline resting, good WR rise, and good holding in 2 and 10 sec Kegels, with no verbal cues to refrain from breath holding.  Derecruitment was complete and timely.      Pt received the following manual therapy techniques: to develop connective tissue mobility and muscle lengthening for 45 minutes including: trigger point/myofascial release of L OI both internally and externally and passive stretching of EAS, L levator ani, and anococcygeal raphe (intrarectally with pt's verbal consent)  Pt was noted to utilize DB very well throughout manual therapy today to eccentrically lengthen the pelvic floor muscles.  We discussed use of   for MFR to her abdominal fascial restrictions (advised her to be very light and careful with this if she wants to try it prior to next session).      Home Exercises Provided and Patient Education Provided     Education provided:   - posture/body mechanices  Discussed progression of plan of care with patient; educated pt in activity modification; reviewed HEP with pt. Pt demonstrated and verbalized understanding of all instruction and was provided with a handout of HEP (see Patient Instructions).    Written Home Exercises Provided: yes.  Exercises were reviewed and Divya was able to demonstrate them prior to the end of the session.  Divya demonstrated good  understanding of the education provided.     See EMR under Patient Instructions for exercises provided 12/10/2020.    Assessment     Will look at rectal dilator work (with and without BF), stick rolling, and SCS to abdominal and anterior lumbar tender points at next session.  OI on L not as accessible rectally as I thought- may work in supine hooklying again as well.  She walked out with less of a limp, and was able to sit symmetrically after treatment today.      Divya is progressing well towards her goals.   Pt prognosis is Good.     Pt will continue to benefit from skilled outpatient physical therapy to address the deficits listed in the problem list box on initial evaluation, provide pt/family education and to maximize pt's level of independence in the home and community environment.     Pt's spiritual, cultural and educational needs considered and pt agreeable to plan of care and goals.     Anticipated barriers to physical therapy: none    Goals: Pt will report improved ability to cough/sneeze/laugh/yell with little (drops) to no urinary leakage 7/7 days per week.   Pt will urinate without crede/Valsalva to prevent adverse effects to adjacent structures.  Pt will be independent with double voiding techniques 100% of the time to ensure full  bladder emptying and decrease pt's risk of infection.   Pt will report successfully having intercourse with < or = 2/10 pain for an improvement in activity tolerance.   Pt/family will be independent with HEP for continued self-management of symptoms.  Sleep not disturbed by hip pain  ALL PROGRESSING    Plan     Outpatient Physical Therapy 1 times per 1 week(s)  for 12 weeks to include the following interventions: therapeutic exercises, neuromuscular re-education, manual therapy, modalities PRN, patient/family education, dry needling and self care/home management    Glenna Millan, PT, BCB-PMD

## 2020-12-10 NOTE — PATIENT INSTRUCTIONS
Home Program: 12/10/2020    Keep diaphragmatic breathing.  You drop your pelvic floor well with this strategy.      For now- keep sleeping with your L hip straight (extended).  We will look at positional release activities next session.      Try stick rolling for your belly (I will have a  to [facility])    [email or web address] for rectal dilator.

## 2021-03-03 ENCOUNTER — OFFICE VISIT (OUTPATIENT)
Dept: ORTHOPEDICS | Facility: CLINIC | Age: 36
End: 2021-03-03
Payer: MEDICAID

## 2021-03-03 ENCOUNTER — HOSPITAL ENCOUNTER (OUTPATIENT)
Dept: RADIOLOGY | Facility: HOSPITAL | Age: 36
Discharge: HOME | End: 2021-03-03
Attending: ORTHOPAEDIC SURGERY
Payer: MEDICAID

## 2021-03-03 VITALS
BODY MASS INDEX: 38.2 KG/M2 | DIASTOLIC BLOOD PRESSURE: 84 MMHG | WEIGHT: 257.94 LBS | SYSTOLIC BLOOD PRESSURE: 138 MMHG | HEIGHT: 69 IN | HEART RATE: 80 BPM

## 2021-03-03 DIAGNOSIS — M25.559 HIP PAIN: ICD-10-CM

## 2021-03-03 DIAGNOSIS — T14.90XA INJURY: ICD-10-CM

## 2021-03-03 DIAGNOSIS — R10.2 PELVIC PAIN: ICD-10-CM

## 2021-03-03 DIAGNOSIS — G89.29 HIP PAIN, CHRONIC, LEFT: ICD-10-CM

## 2021-03-03 DIAGNOSIS — S73.192A TEAR OF LEFT ACETABULAR LABRUM, INITIAL ENCOUNTER: Primary | ICD-10-CM

## 2021-03-03 DIAGNOSIS — S83.512A RUPTURE OF ANTERIOR CRUCIATE LIGAMENT OF LEFT KNEE, INITIAL ENCOUNTER: ICD-10-CM

## 2021-03-03 DIAGNOSIS — Z01.818 PRE-OP TESTING: ICD-10-CM

## 2021-03-03 DIAGNOSIS — M25.552 HIP PAIN, CHRONIC, LEFT: ICD-10-CM

## 2021-03-03 PROCEDURE — 73502 X-RAY EXAM HIP UNI 2-3 VIEWS: CPT | Mod: TC,FY,LT

## 2021-03-03 PROCEDURE — 99213 OFFICE O/P EST LOW 20 MIN: CPT | Mod: PBBFAC,25,PN | Performed by: ORTHOPAEDIC SURGERY

## 2021-03-03 PROCEDURE — 99204 PR OFFICE/OUTPT VISIT, NEW, LEVL IV, 45-59 MIN: ICD-10-PCS | Mod: S$PBB,,, | Performed by: ORTHOPAEDIC SURGERY

## 2021-03-03 PROCEDURE — 99999 PR PBB SHADOW E&M-EST. PATIENT-LVL III: ICD-10-PCS | Mod: PBBFAC,,, | Performed by: ORTHOPAEDIC SURGERY

## 2021-03-03 PROCEDURE — 73502 X-RAY EXAM HIP UNI 2-3 VIEWS: CPT | Mod: 26,LT,, | Performed by: RADIOLOGY

## 2021-03-03 PROCEDURE — 73502 XR HIP 2 VIEW LEFT: ICD-10-PCS | Mod: 26,LT,, | Performed by: RADIOLOGY

## 2021-03-03 PROCEDURE — 99999 PR PBB SHADOW E&M-EST. PATIENT-LVL III: CPT | Mod: PBBFAC,,, | Performed by: ORTHOPAEDIC SURGERY

## 2021-03-03 PROCEDURE — 99204 OFFICE O/P NEW MOD 45 MIN: CPT | Mod: S$PBB,,, | Performed by: ORTHOPAEDIC SURGERY

## 2021-03-03 RX ORDER — MUPIROCIN 20 MG/G
OINTMENT TOPICAL
Status: CANCELLED | OUTPATIENT
Start: 2021-03-03

## 2021-03-03 RX ORDER — GABAPENTIN 100 MG/1
100 CAPSULE ORAL 3 TIMES DAILY
COMMUNITY
Start: 2021-03-02

## 2021-03-03 RX ORDER — CEFAZOLIN SODIUM 2 G/50ML
2 SOLUTION INTRAVENOUS
Status: CANCELLED | OUTPATIENT
Start: 2021-03-03

## 2021-03-03 RX ORDER — SODIUM CHLORIDE 9 MG/ML
INJECTION, SOLUTION INTRAVENOUS CONTINUOUS
Status: CANCELLED | OUTPATIENT
Start: 2021-03-03

## 2021-03-11 ENCOUNTER — ANESTHESIA EVENT (OUTPATIENT)
Dept: SURGERY | Facility: HOSPITAL | Age: 36
End: 2021-03-11
Payer: MEDICAID

## 2021-03-11 ENCOUNTER — HOSPITAL ENCOUNTER (OUTPATIENT)
Dept: PREADMISSION TESTING | Facility: HOSPITAL | Age: 36
Discharge: HOME | End: 2021-03-11
Attending: ORTHOPAEDIC SURGERY
Payer: MEDICAID

## 2021-03-11 VITALS
RESPIRATION RATE: 16 BRPM | DIASTOLIC BLOOD PRESSURE: 87 MMHG | SYSTOLIC BLOOD PRESSURE: 168 MMHG | WEIGHT: 255 LBS | HEART RATE: 75 BPM | HEIGHT: 69 IN | BODY MASS INDEX: 37.77 KG/M2 | OXYGEN SATURATION: 98 %

## 2021-03-11 RX ORDER — SCOLOPAMINE TRANSDERMAL SYSTEM 1 MG/1
1 PATCH, EXTENDED RELEASE TRANSDERMAL
Status: CANCELLED | OUTPATIENT
Start: 2021-03-11

## 2021-03-11 RX ORDER — LIDOCAINE HYDROCHLORIDE 10 MG/ML
1 INJECTION, SOLUTION EPIDURAL; INFILTRATION; INTRACAUDAL; PERINEURAL ONCE
Status: CANCELLED | OUTPATIENT
Start: 2021-03-11 | End: 2021-03-11

## 2021-03-11 RX ORDER — SODIUM CHLORIDE, SODIUM LACTATE, POTASSIUM CHLORIDE, CALCIUM CHLORIDE 600; 310; 30; 20 MG/100ML; MG/100ML; MG/100ML; MG/100ML
INJECTION, SOLUTION INTRAVENOUS CONTINUOUS
Status: CANCELLED | OUTPATIENT
Start: 2021-03-11

## 2021-03-15 ENCOUNTER — LAB VISIT (OUTPATIENT)
Dept: INTERNAL MEDICINE | Facility: CLINIC | Age: 36
End: 2021-03-15
Payer: MEDICAID

## 2021-03-15 DIAGNOSIS — Z01.818 PRE-OP TESTING: ICD-10-CM

## 2021-03-15 PROCEDURE — U0003 INFECTIOUS AGENT DETECTION BY NUCLEIC ACID (DNA OR RNA); SEVERE ACUTE RESPIRATORY SYNDROME CORONAVIRUS 2 (SARS-COV-2) (CORONAVIRUS DISEASE [COVID-19]), AMPLIFIED PROBE TECHNIQUE, MAKING USE OF HIGH THROUGHPUT TECHNOLOGIES AS DESCRIBED BY CMS-2020-01-R: HCPCS | Performed by: ORTHOPAEDIC SURGERY

## 2021-03-15 PROCEDURE — U0005 INFEC AGEN DETEC AMPLI PROBE: HCPCS | Performed by: ORTHOPAEDIC SURGERY

## 2021-03-16 LAB — SARS-COV-2 RNA RESP QL NAA+PROBE: NOT DETECTED

## 2021-03-18 ENCOUNTER — ANESTHESIA (OUTPATIENT)
Dept: SURGERY | Facility: HOSPITAL | Age: 36
End: 2021-03-18
Payer: MEDICAID

## 2021-03-18 ENCOUNTER — HOSPITAL ENCOUNTER (OUTPATIENT)
Facility: HOSPITAL | Age: 36
Discharge: HOME | End: 2021-03-18
Attending: ORTHOPAEDIC SURGERY | Admitting: ORTHOPAEDIC SURGERY
Payer: MEDICAID

## 2021-03-18 VITALS
DIASTOLIC BLOOD PRESSURE: 70 MMHG | SYSTOLIC BLOOD PRESSURE: 137 MMHG | OXYGEN SATURATION: 96 % | TEMPERATURE: 98 F | RESPIRATION RATE: 17 BRPM | HEIGHT: 69 IN | BODY MASS INDEX: 37.77 KG/M2 | HEART RATE: 84 BPM | WEIGHT: 255 LBS

## 2021-03-18 DIAGNOSIS — S73.192A TEAR OF LEFT ACETABULAR LABRUM, INITIAL ENCOUNTER: ICD-10-CM

## 2021-03-18 DIAGNOSIS — S73.199A LABRAL TEAR OF HIP JOINT: ICD-10-CM

## 2021-03-18 LAB
B-HCG UR QL: NEGATIVE
CTP QC/QA: YES

## 2021-03-18 PROCEDURE — 01202 ANES ARTHROSCOPIC PX HIP JT: CPT | Performed by: ORTHOPAEDIC SURGERY

## 2021-03-18 PROCEDURE — 71000033 HC RECOVERY, INTIAL HOUR: Performed by: ORTHOPAEDIC SURGERY

## 2021-03-18 PROCEDURE — 63600175 PHARM REV CODE 636 W HCPCS: Performed by: ORTHOPAEDIC SURGERY

## 2021-03-18 PROCEDURE — 25000003 PHARM REV CODE 250: Performed by: STUDENT IN AN ORGANIZED HEALTH CARE EDUCATION/TRAINING PROGRAM

## 2021-03-18 PROCEDURE — 71000015 HC POSTOP RECOV 1ST HR: Performed by: ORTHOPAEDIC SURGERY

## 2021-03-18 PROCEDURE — 25000003 PHARM REV CODE 250: Performed by: NURSE PRACTITIONER

## 2021-03-18 PROCEDURE — 81025 URINE PREGNANCY TEST: CPT | Performed by: ORTHOPAEDIC SURGERY

## 2021-03-18 PROCEDURE — 29862 HIP ARTHR0 W/DEBRIDEMENT: CPT | Mod: LT,,, | Performed by: ORTHOPAEDIC SURGERY

## 2021-03-18 PROCEDURE — 37000009 HC ANESTHESIA EA ADD 15 MINS: Performed by: ORTHOPAEDIC SURGERY

## 2021-03-18 PROCEDURE — 71000016 HC POSTOP RECOV ADDL HR: Performed by: ORTHOPAEDIC SURGERY

## 2021-03-18 PROCEDURE — 29862 PR HIP SCOPE/REMV BODY,PLASTY/RESECTN: ICD-10-PCS | Mod: LT,,, | Performed by: ORTHOPAEDIC SURGERY

## 2021-03-18 PROCEDURE — 27201423 OPTIME MED/SURG SUP & DEVICES STERILE SUPPLY: Performed by: ORTHOPAEDIC SURGERY

## 2021-03-18 PROCEDURE — 36000710: Performed by: ORTHOPAEDIC SURGERY

## 2021-03-18 PROCEDURE — 25000003 PHARM REV CODE 250: Performed by: ORTHOPAEDIC SURGERY

## 2021-03-18 PROCEDURE — 63600175 PHARM REV CODE 636 W HCPCS: Performed by: STUDENT IN AN ORGANIZED HEALTH CARE EDUCATION/TRAINING PROGRAM

## 2021-03-18 PROCEDURE — 36000711: Performed by: ORTHOPAEDIC SURGERY

## 2021-03-18 PROCEDURE — 37000008 HC ANESTHESIA 1ST 15 MINUTES: Performed by: ORTHOPAEDIC SURGERY

## 2021-03-18 RX ORDER — HYDROMORPHONE HYDROCHLORIDE 2 MG/ML
0.5 INJECTION, SOLUTION INTRAMUSCULAR; INTRAVENOUS; SUBCUTANEOUS EVERY 5 MIN PRN
Status: DISCONTINUED | OUTPATIENT
Start: 2021-03-18 | End: 2021-03-18 | Stop reason: HOSPADM

## 2021-03-18 RX ORDER — OXYCODONE AND ACETAMINOPHEN 10; 325 MG/1; MG/1
1 TABLET ORAL EVERY 6 HOURS PRN
Status: DISCONTINUED | OUTPATIENT
Start: 2021-03-18 | End: 2021-03-18 | Stop reason: HOSPADM

## 2021-03-18 RX ORDER — DEXAMETHASONE SODIUM PHOSPHATE 4 MG/ML
INJECTION, SOLUTION INTRA-ARTICULAR; INTRALESIONAL; INTRAMUSCULAR; INTRAVENOUS; SOFT TISSUE
Status: DISCONTINUED | OUTPATIENT
Start: 2021-03-18 | End: 2021-03-18

## 2021-03-18 RX ORDER — ONDANSETRON 2 MG/ML
4 INJECTION INTRAMUSCULAR; INTRAVENOUS ONCE AS NEEDED
Status: DISCONTINUED | OUTPATIENT
Start: 2021-03-18 | End: 2021-03-18 | Stop reason: HOSPADM

## 2021-03-18 RX ORDER — LIDOCAINE HYDROCHLORIDE 10 MG/ML
1 INJECTION, SOLUTION EPIDURAL; INFILTRATION; INTRACAUDAL; PERINEURAL ONCE
Status: DISCONTINUED | OUTPATIENT
Start: 2021-03-18 | End: 2021-03-18 | Stop reason: HOSPADM

## 2021-03-18 RX ORDER — SCOLOPAMINE TRANSDERMAL SYSTEM 1 MG/1
1 PATCH, EXTENDED RELEASE TRANSDERMAL
Status: DISCONTINUED | OUTPATIENT
Start: 2021-03-18 | End: 2021-03-18 | Stop reason: HOSPADM

## 2021-03-18 RX ORDER — LABETALOL HYDROCHLORIDE 5 MG/ML
INJECTION, SOLUTION INTRAVENOUS
Status: DISCONTINUED
Start: 2021-03-18 | End: 2021-03-18 | Stop reason: HOSPADM

## 2021-03-18 RX ORDER — PROPOFOL 10 MG/ML
VIAL (ML) INTRAVENOUS
Status: DISCONTINUED | OUTPATIENT
Start: 2021-03-18 | End: 2021-03-18

## 2021-03-18 RX ORDER — HYDROCODONE BITARTRATE AND ACETAMINOPHEN 5; 325 MG/1; MG/1
1 TABLET ORAL EVERY 6 HOURS PRN
Qty: 20 TABLET | Refills: 0 | Status: ON HOLD | OUTPATIENT
Start: 2021-03-18 | End: 2021-07-15 | Stop reason: HOSPADM

## 2021-03-18 RX ORDER — SUCCINYLCHOLINE CHLORIDE 20 MG/ML
INJECTION INTRAMUSCULAR; INTRAVENOUS
Status: DISCONTINUED | OUTPATIENT
Start: 2021-03-18 | End: 2021-03-18

## 2021-03-18 RX ORDER — SODIUM CHLORIDE 9 MG/ML
INJECTION, SOLUTION INTRAVENOUS CONTINUOUS
Status: DISCONTINUED | OUTPATIENT
Start: 2021-03-18 | End: 2021-03-18 | Stop reason: HOSPADM

## 2021-03-18 RX ORDER — ROCURONIUM BROMIDE 10 MG/ML
INJECTION, SOLUTION INTRAVENOUS
Status: DISCONTINUED | OUTPATIENT
Start: 2021-03-18 | End: 2021-03-18

## 2021-03-18 RX ORDER — EPINEPHRINE 1 MG/ML
INJECTION, SOLUTION INTRACARDIAC; INTRAMUSCULAR; INTRAVENOUS; SUBCUTANEOUS
Status: DISCONTINUED | OUTPATIENT
Start: 2021-03-18 | End: 2021-03-18 | Stop reason: HOSPADM

## 2021-03-18 RX ORDER — PHENYLEPHRINE HYDROCHLORIDE 10 MG/ML
INJECTION INTRAVENOUS
Status: DISCONTINUED | OUTPATIENT
Start: 2021-03-18 | End: 2021-03-18

## 2021-03-18 RX ORDER — SODIUM CHLORIDE 0.9 % (FLUSH) 0.9 %
3 SYRINGE (ML) INJECTION EVERY 6 HOURS PRN
Status: DISCONTINUED | OUTPATIENT
Start: 2021-03-18 | End: 2021-03-18 | Stop reason: HOSPADM

## 2021-03-18 RX ORDER — SODIUM CHLORIDE, SODIUM LACTATE, POTASSIUM CHLORIDE, CALCIUM CHLORIDE 600; 310; 30; 20 MG/100ML; MG/100ML; MG/100ML; MG/100ML
INJECTION, SOLUTION INTRAVENOUS CONTINUOUS PRN
Status: DISCONTINUED | OUTPATIENT
Start: 2021-03-18 | End: 2021-03-18

## 2021-03-18 RX ORDER — HYDROMORPHONE HYDROCHLORIDE 2 MG/ML
0.2 INJECTION, SOLUTION INTRAMUSCULAR; INTRAVENOUS; SUBCUTANEOUS EVERY 5 MIN PRN
Status: DISCONTINUED | OUTPATIENT
Start: 2021-03-18 | End: 2021-03-18 | Stop reason: HOSPADM

## 2021-03-18 RX ORDER — FENTANYL CITRATE 50 UG/ML
INJECTION, SOLUTION INTRAMUSCULAR; INTRAVENOUS
Status: DISCONTINUED | OUTPATIENT
Start: 2021-03-18 | End: 2021-03-18

## 2021-03-18 RX ORDER — MIDAZOLAM HYDROCHLORIDE 1 MG/ML
INJECTION INTRAMUSCULAR; INTRAVENOUS
Status: DISCONTINUED | OUTPATIENT
Start: 2021-03-18 | End: 2021-03-18

## 2021-03-18 RX ORDER — CEFAZOLIN SODIUM 2 G/50ML
2 SOLUTION INTRAVENOUS
Status: DISCONTINUED | OUTPATIENT
Start: 2021-03-18 | End: 2021-03-18 | Stop reason: HOSPADM

## 2021-03-18 RX ORDER — HYDROCODONE BITARTRATE AND ACETAMINOPHEN 10; 325 MG/1; MG/1
1 TABLET ORAL ONCE AS NEEDED
Status: DISCONTINUED | OUTPATIENT
Start: 2021-03-18 | End: 2021-03-18 | Stop reason: HOSPADM

## 2021-03-18 RX ORDER — ONDANSETRON 2 MG/ML
INJECTION INTRAMUSCULAR; INTRAVENOUS
Status: DISCONTINUED | OUTPATIENT
Start: 2021-03-18 | End: 2021-03-18

## 2021-03-18 RX ORDER — SODIUM CHLORIDE, SODIUM LACTATE, POTASSIUM CHLORIDE, CALCIUM CHLORIDE 600; 310; 30; 20 MG/100ML; MG/100ML; MG/100ML; MG/100ML
INJECTION, SOLUTION INTRAVENOUS CONTINUOUS
Status: DISCONTINUED | OUTPATIENT
Start: 2021-03-18 | End: 2021-03-18 | Stop reason: HOSPADM

## 2021-03-18 RX ORDER — LIDOCAINE HYDROCHLORIDE 20 MG/ML
INJECTION INTRAVENOUS
Status: DISCONTINUED | OUTPATIENT
Start: 2021-03-18 | End: 2021-03-18

## 2021-03-18 RX ORDER — ONDANSETRON HYDROCHLORIDE 8 MG/1
8 TABLET, FILM COATED ORAL 2 TIMES DAILY
Qty: 10 TABLET | Refills: 0 | Status: ON HOLD | OUTPATIENT
Start: 2021-03-18 | End: 2021-07-15 | Stop reason: HOSPADM

## 2021-03-18 RX ORDER — NEOSTIGMINE METHYLSULFATE 1 MG/ML
INJECTION, SOLUTION INTRAVENOUS
Status: DISCONTINUED | OUTPATIENT
Start: 2021-03-18 | End: 2021-03-18

## 2021-03-18 RX ORDER — LABETALOL HYDROCHLORIDE 5 MG/ML
5 INJECTION, SOLUTION INTRAVENOUS ONCE
Status: COMPLETED | OUTPATIENT
Start: 2021-03-18 | End: 2021-03-18

## 2021-03-18 RX ORDER — HYDROMORPHONE HYDROCHLORIDE 2 MG/ML
INJECTION, SOLUTION INTRAMUSCULAR; INTRAVENOUS; SUBCUTANEOUS
Status: DISCONTINUED | OUTPATIENT
Start: 2021-03-18 | End: 2021-03-18

## 2021-03-18 RX ADMIN — HYDROMORPHONE HYDROCHLORIDE 0.8 MG: 2 INJECTION INTRAMUSCULAR; INTRAVENOUS; SUBCUTANEOUS at 09:03

## 2021-03-18 RX ADMIN — ROCURONIUM BROMIDE 25 MG: 10 INJECTION, SOLUTION INTRAVENOUS at 08:03

## 2021-03-18 RX ADMIN — SODIUM CHLORIDE, SODIUM LACTATE, POTASSIUM CHLORIDE, AND CALCIUM CHLORIDE: .6; .31; .03; .02 INJECTION, SOLUTION INTRAVENOUS at 07:03

## 2021-03-18 RX ADMIN — HYDROMORPHONE HYDROCHLORIDE 0.2 MG: 2 INJECTION INTRAMUSCULAR; INTRAVENOUS; SUBCUTANEOUS at 08:03

## 2021-03-18 RX ADMIN — HYDROMORPHONE HYDROCHLORIDE 0.5 MG: 2 INJECTION, SOLUTION INTRAMUSCULAR; INTRAVENOUS; SUBCUTANEOUS at 10:03

## 2021-03-18 RX ADMIN — PHENYLEPHRINE HYDROCHLORIDE 200 MCG: 10 INJECTION INTRAVENOUS at 07:03

## 2021-03-18 RX ADMIN — HYDROMORPHONE HYDROCHLORIDE 1 MG: 2 INJECTION INTRAMUSCULAR; INTRAVENOUS; SUBCUTANEOUS at 08:03

## 2021-03-18 RX ADMIN — DEXAMETHASONE SODIUM PHOSPHATE 8 MG: 4 INJECTION, SOLUTION INTRA-ARTICULAR; INTRALESIONAL; INTRAMUSCULAR; INTRAVENOUS; SOFT TISSUE at 07:03

## 2021-03-18 RX ADMIN — FENTANYL CITRATE 100 MCG: 50 INJECTION, SOLUTION INTRAMUSCULAR; INTRAVENOUS at 07:03

## 2021-03-18 RX ADMIN — SUCCINYLCHOLINE CHLORIDE 120 MG: 20 INJECTION, SOLUTION INTRAMUSCULAR; INTRAVENOUS at 07:03

## 2021-03-18 RX ADMIN — OXYCODONE HYDROCHLORIDE AND ACETAMINOPHEN 1 TABLET: 10; 325 TABLET ORAL at 11:03

## 2021-03-18 RX ADMIN — ROCURONIUM BROMIDE 5 MG: 10 INJECTION, SOLUTION INTRAVENOUS at 07:03

## 2021-03-18 RX ADMIN — PROPOFOL 150 MG: 10 INJECTION, EMULSION INTRAVENOUS at 07:03

## 2021-03-18 RX ADMIN — LIDOCAINE HYDROCHLORIDE 100 MG: 20 INJECTION, SOLUTION INTRAVENOUS at 07:03

## 2021-03-18 RX ADMIN — LABETALOL HYDROCHLORIDE 5 MG: 5 INJECTION, SOLUTION INTRAVENOUS at 10:03

## 2021-03-18 RX ADMIN — MIDAZOLAM HYDROCHLORIDE 2 MG: 1 INJECTION, SOLUTION INTRAMUSCULAR; INTRAVENOUS at 07:03

## 2021-03-18 RX ADMIN — PROPOFOL 50 MG: 10 INJECTION, EMULSION INTRAVENOUS at 07:03

## 2021-03-18 RX ADMIN — ROCURONIUM BROMIDE 20 MG: 10 INJECTION, SOLUTION INTRAVENOUS at 09:03

## 2021-03-18 RX ADMIN — SCOPALAMINE 1 PATCH: 1 PATCH, EXTENDED RELEASE TRANSDERMAL at 06:03

## 2021-03-18 RX ADMIN — ONDANSETRON 4 MG: 2 INJECTION, SOLUTION INTRAMUSCULAR; INTRAVENOUS at 09:03

## 2021-03-18 RX ADMIN — SUCCINYLCHOLINE CHLORIDE 20 MG: 20 INJECTION, SOLUTION INTRAMUSCULAR; INTRAVENOUS at 07:03

## 2021-03-18 RX ADMIN — NEOSTIGMINE METHYLSULFATE 5 MG: 1 INJECTION INTRAVENOUS at 09:03

## 2021-03-18 RX ADMIN — PHENYLEPHRINE HYDROCHLORIDE 100 MCG: 10 INJECTION INTRAVENOUS at 08:03

## 2021-03-18 RX ADMIN — GLYCOPYRROLATE 0.8 MG: 0.2 INJECTION, SOLUTION INTRAMUSCULAR; INTRAVITREAL at 09:03

## 2021-03-29 ENCOUNTER — PATIENT MESSAGE (OUTPATIENT)
Dept: ORTHOPEDICS | Facility: CLINIC | Age: 36
End: 2021-03-29

## 2021-03-29 DIAGNOSIS — S73.192A TEAR OF LEFT ACETABULAR LABRUM, INITIAL ENCOUNTER: Primary | ICD-10-CM

## 2021-04-06 ENCOUNTER — OFFICE VISIT (OUTPATIENT)
Dept: ORTHOPEDICS | Facility: CLINIC | Age: 36
End: 2021-04-06
Payer: MEDICAID

## 2021-04-06 VITALS
SYSTOLIC BLOOD PRESSURE: 150 MMHG | HEART RATE: 66 BPM | BODY MASS INDEX: 37.93 KG/M2 | WEIGHT: 256.06 LBS | HEIGHT: 69 IN | DIASTOLIC BLOOD PRESSURE: 82 MMHG

## 2021-04-06 DIAGNOSIS — Z98.890 S/P HIP ARTHROSCOPY: Primary | ICD-10-CM

## 2021-04-06 PROCEDURE — 99024 PR POST-OP FOLLOW-UP VISIT: ICD-10-PCS | Mod: ,,, | Performed by: ORTHOPAEDIC SURGERY

## 2021-04-06 PROCEDURE — 99213 OFFICE O/P EST LOW 20 MIN: CPT | Mod: PBBFAC,PN | Performed by: ORTHOPAEDIC SURGERY

## 2021-04-06 PROCEDURE — 99024 POSTOP FOLLOW-UP VISIT: CPT | Mod: ,,, | Performed by: ORTHOPAEDIC SURGERY

## 2021-04-06 PROCEDURE — 99999 PR PBB SHADOW E&M-EST. PATIENT-LVL III: ICD-10-PCS | Mod: PBBFAC,,, | Performed by: ORTHOPAEDIC SURGERY

## 2021-04-06 PROCEDURE — 99999 PR PBB SHADOW E&M-EST. PATIENT-LVL III: CPT | Mod: PBBFAC,,, | Performed by: ORTHOPAEDIC SURGERY

## 2021-04-16 ENCOUNTER — PATIENT MESSAGE (OUTPATIENT)
Dept: RESEARCH | Facility: HOSPITAL | Age: 36
End: 2021-04-16

## 2021-05-07 ENCOUNTER — HOSPITAL ENCOUNTER (OUTPATIENT)
Dept: RADIOLOGY | Facility: HOSPITAL | Age: 36
Discharge: HOME | End: 2021-05-07
Attending: ORTHOPAEDIC SURGERY
Payer: MEDICAID

## 2021-05-07 ENCOUNTER — OFFICE VISIT (OUTPATIENT)
Dept: ORTHOPEDICS | Facility: CLINIC | Age: 36
End: 2021-05-07
Payer: MEDICAID

## 2021-05-07 VITALS
WEIGHT: 254.94 LBS | BODY MASS INDEX: 37.76 KG/M2 | DIASTOLIC BLOOD PRESSURE: 79 MMHG | HEART RATE: 73 BPM | SYSTOLIC BLOOD PRESSURE: 151 MMHG | HEIGHT: 69 IN

## 2021-05-07 DIAGNOSIS — M25.571 CHRONIC PAIN OF RIGHT ANKLE: ICD-10-CM

## 2021-05-07 DIAGNOSIS — G89.29 CHRONIC PAIN OF RIGHT ANKLE: ICD-10-CM

## 2021-05-07 DIAGNOSIS — S83.512D RUPTURE OF ANTERIOR CRUCIATE LIGAMENT OF LEFT KNEE, SUBSEQUENT ENCOUNTER: Primary | ICD-10-CM

## 2021-05-07 DIAGNOSIS — S83.512D RUPTURE OF ANTERIOR CRUCIATE LIGAMENT OF LEFT KNEE, SUBSEQUENT ENCOUNTER: ICD-10-CM

## 2021-05-07 PROCEDURE — 99213 OFFICE O/P EST LOW 20 MIN: CPT | Mod: PBBFAC,25,PN | Performed by: ORTHOPAEDIC SURGERY

## 2021-05-07 PROCEDURE — 73564 X-RAY EXAM KNEE 4 OR MORE: CPT | Mod: 26,LT,, | Performed by: RADIOLOGY

## 2021-05-07 PROCEDURE — 73610 X-RAY EXAM OF ANKLE: CPT | Mod: 26,RT,, | Performed by: RADIOLOGY

## 2021-05-07 PROCEDURE — 99999 PR PBB SHADOW E&M-EST. PATIENT-LVL III: CPT | Mod: PBBFAC,,, | Performed by: ORTHOPAEDIC SURGERY

## 2021-05-07 PROCEDURE — 73610 X-RAY EXAM OF ANKLE: CPT | Mod: TC,FY,RT

## 2021-05-07 PROCEDURE — 73564 X-RAY EXAM KNEE 4 OR MORE: CPT | Mod: TC,FY,LT

## 2021-05-07 PROCEDURE — 99214 PR OFFICE/OUTPT VISIT, EST, LEVL IV, 30-39 MIN: ICD-10-PCS | Mod: 24,S$PBB,, | Performed by: ORTHOPAEDIC SURGERY

## 2021-05-07 PROCEDURE — 99999 PR PBB SHADOW E&M-EST. PATIENT-LVL III: ICD-10-PCS | Mod: PBBFAC,,, | Performed by: ORTHOPAEDIC SURGERY

## 2021-05-07 PROCEDURE — 73564 XR KNEE COMP 4 OR MORE VIEWS LEFT: ICD-10-PCS | Mod: 26,LT,, | Performed by: RADIOLOGY

## 2021-05-07 PROCEDURE — 73610 XR ANKLE COMPLETE 3 VIEW RIGHT: ICD-10-PCS | Mod: 26,RT,, | Performed by: RADIOLOGY

## 2021-05-07 PROCEDURE — 99214 OFFICE O/P EST MOD 30 MIN: CPT | Mod: 24,S$PBB,, | Performed by: ORTHOPAEDIC SURGERY

## 2021-05-24 DIAGNOSIS — M23.52 CHRONIC INSTABILITY OF LEFT KNEE: ICD-10-CM

## 2021-05-26 ENCOUNTER — TELEPHONE (OUTPATIENT)
Dept: ORTHOPEDICS | Facility: CLINIC | Age: 36
End: 2021-05-26

## 2021-05-28 ENCOUNTER — DOCUMENTATION ONLY (OUTPATIENT)
Dept: REHABILITATION | Facility: HOSPITAL | Age: 36
End: 2021-05-28

## 2021-06-03 ENCOUNTER — TELEPHONE (OUTPATIENT)
Dept: ORTHOPEDICS | Facility: CLINIC | Age: 36
End: 2021-06-03

## 2021-06-03 DIAGNOSIS — S83.512A LEFT ACL TEAR: ICD-10-CM

## 2021-06-03 DIAGNOSIS — S83.512D RUPTURE OF ANTERIOR CRUCIATE LIGAMENT OF LEFT KNEE, SUBSEQUENT ENCOUNTER: Primary | ICD-10-CM

## 2021-06-03 RX ORDER — SODIUM CHLORIDE 9 MG/ML
INJECTION, SOLUTION INTRAVENOUS CONTINUOUS
Status: CANCELLED | OUTPATIENT
Start: 2021-06-03

## 2021-06-03 RX ORDER — MUPIROCIN 20 MG/G
OINTMENT TOPICAL
Status: CANCELLED | OUTPATIENT
Start: 2021-06-03

## 2021-07-09 ENCOUNTER — ANESTHESIA EVENT (OUTPATIENT)
Dept: SURGERY | Facility: HOSPITAL | Age: 36
End: 2021-07-09
Payer: MEDICAID

## 2021-07-09 ENCOUNTER — HOSPITAL ENCOUNTER (OUTPATIENT)
Dept: PREADMISSION TESTING | Facility: HOSPITAL | Age: 36
Discharge: HOME | End: 2021-07-09
Attending: ORTHOPAEDIC SURGERY
Payer: MEDICAID

## 2021-07-09 VITALS
HEART RATE: 72 BPM | SYSTOLIC BLOOD PRESSURE: 146 MMHG | HEIGHT: 69 IN | BODY MASS INDEX: 38.2 KG/M2 | WEIGHT: 257.94 LBS | DIASTOLIC BLOOD PRESSURE: 74 MMHG | OXYGEN SATURATION: 98 % | RESPIRATION RATE: 18 BRPM

## 2021-07-09 DIAGNOSIS — Z01.818 PREOP TESTING: Primary | ICD-10-CM

## 2021-07-09 RX ORDER — SCOLOPAMINE TRANSDERMAL SYSTEM 1 MG/1
1 PATCH, EXTENDED RELEASE TRANSDERMAL
Status: CANCELLED | OUTPATIENT
Start: 2021-07-09

## 2021-07-09 RX ORDER — LIDOCAINE HYDROCHLORIDE 10 MG/ML
1 INJECTION, SOLUTION EPIDURAL; INFILTRATION; INTRACAUDAL; PERINEURAL ONCE
Status: CANCELLED | OUTPATIENT
Start: 2021-07-09 | End: 2021-07-09

## 2021-07-09 RX ORDER — SODIUM CHLORIDE, SODIUM LACTATE, POTASSIUM CHLORIDE, CALCIUM CHLORIDE 600; 310; 30; 20 MG/100ML; MG/100ML; MG/100ML; MG/100ML
INJECTION, SOLUTION INTRAVENOUS CONTINUOUS
Status: CANCELLED | OUTPATIENT
Start: 2021-07-09

## 2021-07-15 ENCOUNTER — HOSPITAL ENCOUNTER (OUTPATIENT)
Facility: HOSPITAL | Age: 36
Discharge: HOME | End: 2021-07-15
Attending: ORTHOPAEDIC SURGERY | Admitting: ORTHOPAEDIC SURGERY
Payer: MEDICAID

## 2021-07-15 ENCOUNTER — ANESTHESIA (OUTPATIENT)
Dept: SURGERY | Facility: HOSPITAL | Age: 36
End: 2021-07-15
Payer: MEDICAID

## 2021-07-15 ENCOUNTER — PATIENT MESSAGE (OUTPATIENT)
Dept: ORTHOPEDICS | Facility: CLINIC | Age: 36
End: 2021-07-15

## 2021-07-15 VITALS
HEART RATE: 89 BPM | TEMPERATURE: 99 F | HEIGHT: 65 IN | SYSTOLIC BLOOD PRESSURE: 136 MMHG | RESPIRATION RATE: 16 BRPM | WEIGHT: 255 LBS | DIASTOLIC BLOOD PRESSURE: 73 MMHG | BODY MASS INDEX: 42.49 KG/M2 | OXYGEN SATURATION: 94 %

## 2021-07-15 DIAGNOSIS — S83.512A CHRONIC RUPTURE OF ACL OF LEFT KNEE: ICD-10-CM

## 2021-07-15 DIAGNOSIS — S83.512A LEFT ACL TEAR: ICD-10-CM

## 2021-07-15 DIAGNOSIS — S83.512D RUPTURE OF ANTERIOR CRUCIATE LIGAMENT OF LEFT KNEE, SUBSEQUENT ENCOUNTER: ICD-10-CM

## 2021-07-15 PROCEDURE — 37000008 HC ANESTHESIA 1ST 15 MINUTES: Performed by: ORTHOPAEDIC SURGERY

## 2021-07-15 PROCEDURE — 71000039 HC RECOVERY, EACH ADD'L HOUR: Performed by: ORTHOPAEDIC SURGERY

## 2021-07-15 PROCEDURE — 64447 NJX AA&/STRD FEMORAL NRV IMG: CPT | Mod: 59,LT | Performed by: STUDENT IN AN ORGANIZED HEALTH CARE EDUCATION/TRAINING PROGRAM

## 2021-07-15 PROCEDURE — 71000015 HC POSTOP RECOV 1ST HR: Performed by: ORTHOPAEDIC SURGERY

## 2021-07-15 PROCEDURE — 36000710: Performed by: ORTHOPAEDIC SURGERY

## 2021-07-15 PROCEDURE — 37000009 HC ANESTHESIA EA ADD 15 MINS: Performed by: ORTHOPAEDIC SURGERY

## 2021-07-15 PROCEDURE — 36415 COLL VENOUS BLD VENIPUNCTURE: CPT | Performed by: ORTHOPAEDIC SURGERY

## 2021-07-15 PROCEDURE — C1713 ANCHOR/SCREW BN/BN,TIS/BN: HCPCS | Performed by: ORTHOPAEDIC SURGERY

## 2021-07-15 PROCEDURE — 25000003 PHARM REV CODE 250: Performed by: NURSE PRACTITIONER

## 2021-07-15 PROCEDURE — 25000003 PHARM REV CODE 250: Performed by: STUDENT IN AN ORGANIZED HEALTH CARE EDUCATION/TRAINING PROGRAM

## 2021-07-15 PROCEDURE — 71000016 HC POSTOP RECOV ADDL HR: Performed by: ORTHOPAEDIC SURGERY

## 2021-07-15 PROCEDURE — 25000003 PHARM REV CODE 250: Performed by: ORTHOPAEDIC SURGERY

## 2021-07-15 PROCEDURE — 97161 PT EVAL LOW COMPLEX 20 MIN: CPT

## 2021-07-15 PROCEDURE — 29888 ARTHRS AID ACL RPR/AGMNTJ: CPT | Mod: LT,,, | Performed by: ORTHOPAEDIC SURGERY

## 2021-07-15 PROCEDURE — 29888 PR KNEE SCOPE,AID ANT CRUCIATE REPAIR: ICD-10-PCS | Mod: LT,,, | Performed by: ORTHOPAEDIC SURGERY

## 2021-07-15 PROCEDURE — 36000711: Performed by: ORTHOPAEDIC SURGERY

## 2021-07-15 PROCEDURE — 63600175 PHARM REV CODE 636 W HCPCS: Performed by: NURSE PRACTITIONER

## 2021-07-15 PROCEDURE — 01400 ANES OPN/ARTHRS KNEE JT NOS: CPT | Performed by: ORTHOPAEDIC SURGERY

## 2021-07-15 PROCEDURE — 63600175 PHARM REV CODE 636 W HCPCS: Performed by: ORTHOPAEDIC SURGERY

## 2021-07-15 PROCEDURE — 63600175 PHARM REV CODE 636 W HCPCS: Performed by: STUDENT IN AN ORGANIZED HEALTH CARE EDUCATION/TRAINING PROGRAM

## 2021-07-15 PROCEDURE — 97116 GAIT TRAINING THERAPY: CPT

## 2021-07-15 PROCEDURE — 76942 ECHO GUIDE FOR BIOPSY: CPT | Performed by: STUDENT IN AN ORGANIZED HEALTH CARE EDUCATION/TRAINING PROGRAM

## 2021-07-15 PROCEDURE — 63600175 PHARM REV CODE 636 W HCPCS: Performed by: ANESTHESIOLOGY

## 2021-07-15 PROCEDURE — C1762 CONN TISS, HUMAN(INC FASCIA): HCPCS | Performed by: ORTHOPAEDIC SURGERY

## 2021-07-15 PROCEDURE — 27201423 OPTIME MED/SURG SUP & DEVICES STERILE SUPPLY: Performed by: ORTHOPAEDIC SURGERY

## 2021-07-15 PROCEDURE — 71000033 HC RECOVERY, INTIAL HOUR: Performed by: ORTHOPAEDIC SURGERY

## 2021-07-15 DEVICE — SCREW FASTTHREAD INTFR 10X30MM: Type: IMPLANTABLE DEVICE | Site: KNEE | Status: FUNCTIONAL

## 2021-07-15 DEVICE — TENDON SEMITENDINOSIS <26CM: Type: IMPLANTABLE DEVICE | Site: KNEE | Status: FUNCTIONAL

## 2021-07-15 DEVICE — IMPLANTABLE DEVICE: Type: IMPLANTABLE DEVICE | Site: KNEE | Status: FUNCTIONAL

## 2021-07-15 RX ORDER — SUCCINYLCHOLINE CHLORIDE 20 MG/ML
INJECTION INTRAMUSCULAR; INTRAVENOUS
Status: DISCONTINUED | OUTPATIENT
Start: 2021-07-15 | End: 2021-07-15

## 2021-07-15 RX ORDER — NEOSTIGMINE METHYLSULFATE 1 MG/ML
INJECTION, SOLUTION INTRAVENOUS
Status: DISCONTINUED | OUTPATIENT
Start: 2021-07-15 | End: 2021-07-15

## 2021-07-15 RX ORDER — CEFAZOLIN SODIUM 1 G/3ML
INJECTION, POWDER, FOR SOLUTION INTRAMUSCULAR; INTRAVENOUS
Status: DISCONTINUED | OUTPATIENT
Start: 2021-07-15 | End: 2021-07-15

## 2021-07-15 RX ORDER — MUPIROCIN 20 MG/G
OINTMENT TOPICAL
Status: DISCONTINUED | OUTPATIENT
Start: 2021-07-15 | End: 2021-07-15 | Stop reason: HOSPADM

## 2021-07-15 RX ORDER — EPINEPHRINE 1 MG/ML
INJECTION, SOLUTION INTRACARDIAC; INTRAMUSCULAR; INTRAVENOUS; SUBCUTANEOUS
Status: DISCONTINUED | OUTPATIENT
Start: 2021-07-15 | End: 2021-07-15 | Stop reason: HOSPADM

## 2021-07-15 RX ORDER — VECURONIUM BROMIDE FOR INJECTION 1 MG/ML
INJECTION, POWDER, LYOPHILIZED, FOR SOLUTION INTRAVENOUS
Status: DISCONTINUED | OUTPATIENT
Start: 2021-07-15 | End: 2021-07-15

## 2021-07-15 RX ORDER — FENTANYL CITRATE 50 UG/ML
INJECTION, SOLUTION INTRAMUSCULAR; INTRAVENOUS
Status: DISCONTINUED | OUTPATIENT
Start: 2021-07-15 | End: 2021-07-15

## 2021-07-15 RX ORDER — ACETAMINOPHEN 10 MG/ML
INJECTION, SOLUTION INTRAVENOUS
Status: DISCONTINUED | OUTPATIENT
Start: 2021-07-15 | End: 2021-07-15

## 2021-07-15 RX ORDER — SODIUM CHLORIDE 9 MG/ML
INJECTION, SOLUTION INTRAVENOUS CONTINUOUS
Status: DISCONTINUED | OUTPATIENT
Start: 2021-07-15 | End: 2021-07-15 | Stop reason: HOSPADM

## 2021-07-15 RX ORDER — NALOXONE HCL 0.4 MG/ML
VIAL (ML) INJECTION
Status: DISCONTINUED | OUTPATIENT
Start: 2021-07-15 | End: 2021-07-15

## 2021-07-15 RX ORDER — BUPIVACAINE HYDROCHLORIDE AND EPINEPHRINE 2.5; 5 MG/ML; UG/ML
30 INJECTION, SOLUTION EPIDURAL; INFILTRATION; INTRACAUDAL; PERINEURAL ONCE
Status: DISCONTINUED | OUTPATIENT
Start: 2021-07-15 | End: 2021-07-15 | Stop reason: HOSPADM

## 2021-07-15 RX ORDER — ONDANSETRON HYDROCHLORIDE 8 MG/1
8 TABLET, FILM COATED ORAL EVERY 8 HOURS PRN
Qty: 20 TABLET | Refills: 0 | Status: SHIPPED | OUTPATIENT
Start: 2021-07-15 | End: 2022-02-04

## 2021-07-15 RX ORDER — CEFAZOLIN SODIUM 2 G/50ML
2 SOLUTION INTRAVENOUS
Status: DISCONTINUED | OUTPATIENT
Start: 2021-07-15 | End: 2021-07-15 | Stop reason: HOSPADM

## 2021-07-15 RX ORDER — HYDROMORPHONE HYDROCHLORIDE 2 MG/ML
INJECTION, SOLUTION INTRAMUSCULAR; INTRAVENOUS; SUBCUTANEOUS
Status: DISCONTINUED | OUTPATIENT
Start: 2021-07-15 | End: 2021-07-15

## 2021-07-15 RX ORDER — CEPHALEXIN 500 MG/1
500 CAPSULE ORAL EVERY 8 HOURS
Qty: 2 CAPSULE | Refills: 0 | Status: SHIPPED | OUTPATIENT
Start: 2021-07-15 | End: 2022-02-04

## 2021-07-15 RX ORDER — BUPIVACAINE HYDROCHLORIDE AND EPINEPHRINE 2.5; 5 MG/ML; UG/ML
INJECTION, SOLUTION EPIDURAL; INFILTRATION; INTRACAUDAL; PERINEURAL
Status: DISCONTINUED | OUTPATIENT
Start: 2021-07-15 | End: 2021-07-15 | Stop reason: HOSPADM

## 2021-07-15 RX ORDER — ROPIVACAINE HYDROCHLORIDE 2 MG/ML
INJECTION, SOLUTION EPIDURAL; INFILTRATION; PERINEURAL
Status: COMPLETED | OUTPATIENT
Start: 2021-07-15 | End: 2021-07-15

## 2021-07-15 RX ORDER — ONDANSETRON 2 MG/ML
INJECTION INTRAMUSCULAR; INTRAVENOUS
Status: DISCONTINUED | OUTPATIENT
Start: 2021-07-15 | End: 2021-07-15

## 2021-07-15 RX ORDER — SODIUM CHLORIDE, SODIUM LACTATE, POTASSIUM CHLORIDE, CALCIUM CHLORIDE 600; 310; 30; 20 MG/100ML; MG/100ML; MG/100ML; MG/100ML
INJECTION, SOLUTION INTRAVENOUS CONTINUOUS PRN
Status: DISCONTINUED | OUTPATIENT
Start: 2021-07-15 | End: 2021-07-15

## 2021-07-15 RX ORDER — SODIUM CHLORIDE, SODIUM LACTATE, POTASSIUM CHLORIDE, CALCIUM CHLORIDE 600; 310; 30; 20 MG/100ML; MG/100ML; MG/100ML; MG/100ML
INJECTION, SOLUTION INTRAVENOUS CONTINUOUS
Status: DISCONTINUED | OUTPATIENT
Start: 2021-07-15 | End: 2021-07-15 | Stop reason: HOSPADM

## 2021-07-15 RX ORDER — OXYCODONE HYDROCHLORIDE 5 MG/1
5 TABLET ORAL
Status: DISCONTINUED | OUTPATIENT
Start: 2021-07-15 | End: 2021-07-15 | Stop reason: HOSPADM

## 2021-07-15 RX ORDER — DEXAMETHASONE SODIUM PHOSPHATE 4 MG/ML
INJECTION, SOLUTION INTRA-ARTICULAR; INTRALESIONAL; INTRAMUSCULAR; INTRAVENOUS; SOFT TISSUE
Status: DISCONTINUED | OUTPATIENT
Start: 2021-07-15 | End: 2021-07-15

## 2021-07-15 RX ORDER — LIDOCAINE HYDROCHLORIDE 10 MG/ML
1 INJECTION, SOLUTION EPIDURAL; INFILTRATION; INTRACAUDAL; PERINEURAL ONCE
Status: DISCONTINUED | OUTPATIENT
Start: 2021-07-15 | End: 2021-07-15 | Stop reason: HOSPADM

## 2021-07-15 RX ORDER — HYDROCODONE BITARTRATE AND ACETAMINOPHEN 5; 325 MG/1; MG/1
1 TABLET ORAL EVERY 6 HOURS PRN
Qty: 28 TABLET | Refills: 0 | Status: SHIPPED | OUTPATIENT
Start: 2021-07-15 | End: 2022-02-04

## 2021-07-15 RX ORDER — PROPOFOL 10 MG/ML
VIAL (ML) INTRAVENOUS
Status: DISCONTINUED | OUTPATIENT
Start: 2021-07-15 | End: 2021-07-15

## 2021-07-15 RX ORDER — MIDAZOLAM HYDROCHLORIDE 1 MG/ML
INJECTION INTRAMUSCULAR; INTRAVENOUS
Status: DISCONTINUED | OUTPATIENT
Start: 2021-07-15 | End: 2021-07-15

## 2021-07-15 RX ORDER — HYDROMORPHONE HYDROCHLORIDE 2 MG/ML
0.5 INJECTION, SOLUTION INTRAMUSCULAR; INTRAVENOUS; SUBCUTANEOUS EVERY 5 MIN PRN
Status: DISCONTINUED | OUTPATIENT
Start: 2021-07-15 | End: 2021-07-15 | Stop reason: HOSPADM

## 2021-07-15 RX ORDER — LIDOCAINE HYDROCHLORIDE 20 MG/ML
INJECTION, SOLUTION EPIDURAL; INFILTRATION; INTRACAUDAL; PERINEURAL
Status: DISCONTINUED | OUTPATIENT
Start: 2021-07-15 | End: 2021-07-15

## 2021-07-15 RX ORDER — ONDANSETRON 2 MG/ML
4 INJECTION INTRAMUSCULAR; INTRAVENOUS DAILY PRN
Status: DISCONTINUED | OUTPATIENT
Start: 2021-07-15 | End: 2021-07-15 | Stop reason: HOSPADM

## 2021-07-15 RX ORDER — ROCURONIUM BROMIDE 10 MG/ML
INJECTION, SOLUTION INTRAVENOUS
Status: DISCONTINUED | OUTPATIENT
Start: 2021-07-15 | End: 2021-07-15

## 2021-07-15 RX ORDER — SCOLOPAMINE TRANSDERMAL SYSTEM 1 MG/1
1 PATCH, EXTENDED RELEASE TRANSDERMAL
Status: DISCONTINUED | OUTPATIENT
Start: 2021-07-15 | End: 2021-07-15 | Stop reason: HOSPADM

## 2021-07-15 RX ADMIN — HYDROMORPHONE HYDROCHLORIDE 0.6 MG: 2 INJECTION INTRAMUSCULAR; INTRAVENOUS; SUBCUTANEOUS at 08:07

## 2021-07-15 RX ADMIN — VECURONIUM BROMIDE 2 MG: 10 INJECTION, POWDER, LYOPHILIZED, FOR SOLUTION INTRAVENOUS at 08:07

## 2021-07-15 RX ADMIN — MIDAZOLAM HYDROCHLORIDE 5 MG: 1 INJECTION, SOLUTION INTRAMUSCULAR; INTRAVENOUS at 06:07

## 2021-07-15 RX ADMIN — HYDROMORPHONE HYDROCHLORIDE 0.4 MG: 2 INJECTION INTRAMUSCULAR; INTRAVENOUS; SUBCUTANEOUS at 09:07

## 2021-07-15 RX ADMIN — CEFAZOLIN 2 G: 330 INJECTION, POWDER, FOR SOLUTION INTRAMUSCULAR; INTRAVENOUS at 07:07

## 2021-07-15 RX ADMIN — ONDANSETRON 4 MG: 2 INJECTION, SOLUTION INTRAMUSCULAR; INTRAVENOUS at 09:07

## 2021-07-15 RX ADMIN — LIDOCAINE HYDROCHLORIDE 80 MG: 20 INJECTION, SOLUTION EPIDURAL; INFILTRATION; INTRACAUDAL; PERINEURAL at 07:07

## 2021-07-15 RX ADMIN — SCOPALAMINE 1 PATCH: 1 PATCH, EXTENDED RELEASE TRANSDERMAL at 05:07

## 2021-07-15 RX ADMIN — DEXAMETHASONE SODIUM PHOSPHATE 8 MG: 4 INJECTION, SOLUTION INTRA-ARTICULAR; INTRALESIONAL; INTRAMUSCULAR; INTRAVENOUS; SOFT TISSUE at 07:07

## 2021-07-15 RX ADMIN — SUCCINYLCHOLINE CHLORIDE 120 MG: 20 INJECTION, SOLUTION INTRAMUSCULAR; INTRAVENOUS at 07:07

## 2021-07-15 RX ADMIN — NALOXONE HYDROCHLORIDE 40 MCG: 0.4 INJECTION, SOLUTION INTRAMUSCULAR; INTRAVENOUS; SUBCUTANEOUS at 10:07

## 2021-07-15 RX ADMIN — NEOSTIGMINE METHYLSULFATE 5 MG: 1 INJECTION INTRAVENOUS at 09:07

## 2021-07-15 RX ADMIN — FENTANYL CITRATE 100 MCG: 50 INJECTION, SOLUTION INTRAMUSCULAR; INTRAVENOUS at 07:07

## 2021-07-15 RX ADMIN — VECURONIUM BROMIDE 2 MG: 10 INJECTION, POWDER, LYOPHILIZED, FOR SOLUTION INTRAVENOUS at 07:07

## 2021-07-15 RX ADMIN — FENTANYL CITRATE 50 MCG: 50 INJECTION, SOLUTION INTRAMUSCULAR; INTRAVENOUS at 07:07

## 2021-07-15 RX ADMIN — GLYCOPYRROLATE 0.8 MG: 0.2 INJECTION, SOLUTION INTRAMUSCULAR; INTRAVITREAL at 09:07

## 2021-07-15 RX ADMIN — ROCURONIUM BROMIDE 5 MG: 10 INJECTION, SOLUTION INTRAVENOUS at 07:07

## 2021-07-15 RX ADMIN — HYDROMORPHONE HYDROCHLORIDE 0.6 MG: 2 INJECTION INTRAMUSCULAR; INTRAVENOUS; SUBCUTANEOUS at 09:07

## 2021-07-15 RX ADMIN — PROPOFOL 140 MG: 10 INJECTION, EMULSION INTRAVENOUS at 07:07

## 2021-07-15 RX ADMIN — SUGAMMADEX 200 MG: 100 INJECTION, SOLUTION INTRAVENOUS at 10:07

## 2021-07-15 RX ADMIN — ROPIVACAINE HYDROCHLORIDE 30 ML: 2 INJECTION, SOLUTION EPIDURAL; INFILTRATION at 06:07

## 2021-07-15 RX ADMIN — SODIUM CHLORIDE, SODIUM LACTATE, POTASSIUM CHLORIDE, AND CALCIUM CHLORIDE: .6; .31; .03; .02 INJECTION, SOLUTION INTRAVENOUS at 07:07

## 2021-07-15 RX ADMIN — ACETAMINOPHEN 1000 MG: 10 INJECTION, SOLUTION INTRAVENOUS at 07:07

## 2021-07-15 RX ADMIN — MUPIROCIN: 20 OINTMENT TOPICAL at 05:07

## 2021-07-15 RX ADMIN — MIDAZOLAM HYDROCHLORIDE 2 MG: 1 INJECTION, SOLUTION INTRAMUSCULAR; INTRAVENOUS at 07:07

## 2021-07-15 RX ADMIN — SODIUM CHLORIDE, SODIUM LACTATE, POTASSIUM CHLORIDE, AND CALCIUM CHLORIDE: .6; .31; .03; .02 INJECTION, SOLUTION INTRAVENOUS at 05:07

## 2021-07-15 RX ADMIN — VECURONIUM BROMIDE 2 MG: 10 INJECTION, POWDER, LYOPHILIZED, FOR SOLUTION INTRAVENOUS at 09:07

## 2021-07-15 RX ADMIN — ROCURONIUM BROMIDE 45 MG: 10 INJECTION, SOLUTION INTRAVENOUS at 07:07

## 2021-07-15 RX ADMIN — OXYCODONE HYDROCHLORIDE 5 MG: 5 TABLET ORAL at 11:07

## 2021-07-19 ENCOUNTER — CLINICAL SUPPORT (OUTPATIENT)
Dept: REHABILITATION | Facility: HOSPITAL | Age: 36
End: 2021-07-19
Attending: ORTHOPAEDIC SURGERY
Payer: MEDICAID

## 2021-07-19 DIAGNOSIS — R53.1 DECREASED STRENGTH: ICD-10-CM

## 2021-07-19 DIAGNOSIS — S83.512D RUPTURE OF ANTERIOR CRUCIATE LIGAMENT OF LEFT KNEE, SUBSEQUENT ENCOUNTER: ICD-10-CM

## 2021-07-19 DIAGNOSIS — M25.662 DECREASED RANGE OF MOTION (ROM) OF LEFT KNEE: ICD-10-CM

## 2021-07-19 PROBLEM — M62.838 MUSCLE SPASM: Status: RESOLVED | Noted: 2020-01-29 | Resolved: 2021-07-19

## 2021-07-19 PROBLEM — R10.2 PELVIC PAIN: Status: RESOLVED | Noted: 2020-01-29 | Resolved: 2021-07-19

## 2021-07-19 PROCEDURE — 97162 PT EVAL MOD COMPLEX 30 MIN: CPT | Mod: PN

## 2021-07-21 ENCOUNTER — CLINICAL SUPPORT (OUTPATIENT)
Dept: REHABILITATION | Facility: HOSPITAL | Age: 36
End: 2021-07-21
Payer: MEDICAID

## 2021-07-21 DIAGNOSIS — M25.662 DECREASED RANGE OF MOTION (ROM) OF LEFT KNEE: ICD-10-CM

## 2021-07-21 DIAGNOSIS — R53.1 DECREASED STRENGTH: ICD-10-CM

## 2021-07-21 PROCEDURE — 97110 THERAPEUTIC EXERCISES: CPT | Mod: PN

## 2021-07-21 PROCEDURE — 97140 MANUAL THERAPY 1/> REGIONS: CPT | Mod: PN

## 2021-07-23 ENCOUNTER — CLINICAL SUPPORT (OUTPATIENT)
Dept: REHABILITATION | Facility: HOSPITAL | Age: 36
End: 2021-07-23
Payer: MEDICAID

## 2021-07-23 DIAGNOSIS — M25.662 DECREASED RANGE OF MOTION (ROM) OF LEFT KNEE: ICD-10-CM

## 2021-07-23 DIAGNOSIS — R53.1 DECREASED STRENGTH: ICD-10-CM

## 2021-07-23 PROCEDURE — 97110 THERAPEUTIC EXERCISES: CPT | Mod: PN

## 2021-07-26 ENCOUNTER — CLINICAL SUPPORT (OUTPATIENT)
Dept: REHABILITATION | Facility: HOSPITAL | Age: 36
End: 2021-07-26
Payer: MEDICAID

## 2021-07-26 DIAGNOSIS — M25.662 DECREASED RANGE OF MOTION (ROM) OF LEFT KNEE: ICD-10-CM

## 2021-07-26 DIAGNOSIS — R53.1 DECREASED STRENGTH: ICD-10-CM

## 2021-07-26 PROCEDURE — 97110 THERAPEUTIC EXERCISES: CPT | Mod: PN | Performed by: PHYSICAL THERAPIST

## 2021-07-28 ENCOUNTER — CLINICAL SUPPORT (OUTPATIENT)
Dept: REHABILITATION | Facility: HOSPITAL | Age: 36
End: 2021-07-28
Payer: MEDICAID

## 2021-07-28 DIAGNOSIS — R53.1 DECREASED STRENGTH: ICD-10-CM

## 2021-07-28 DIAGNOSIS — M25.662 DECREASED RANGE OF MOTION (ROM) OF LEFT KNEE: ICD-10-CM

## 2021-07-28 PROCEDURE — 97110 THERAPEUTIC EXERCISES: CPT | Mod: PN,CQ

## 2021-07-30 ENCOUNTER — CLINICAL SUPPORT (OUTPATIENT)
Dept: REHABILITATION | Facility: HOSPITAL | Age: 36
End: 2021-07-30
Payer: MEDICAID

## 2021-07-30 DIAGNOSIS — R53.1 DECREASED STRENGTH: ICD-10-CM

## 2021-07-30 DIAGNOSIS — M25.662 DECREASED RANGE OF MOTION (ROM) OF LEFT KNEE: ICD-10-CM

## 2021-07-30 PROCEDURE — 97110 THERAPEUTIC EXERCISES: CPT | Mod: PN

## 2021-08-02 ENCOUNTER — PATIENT MESSAGE (OUTPATIENT)
Dept: ORTHOPEDICS | Facility: CLINIC | Age: 36
End: 2021-08-02

## 2021-08-03 ENCOUNTER — OFFICE VISIT (OUTPATIENT)
Dept: ORTHOPEDICS | Facility: CLINIC | Age: 36
End: 2021-08-03
Payer: MEDICAID

## 2021-08-03 VITALS
SYSTOLIC BLOOD PRESSURE: 142 MMHG | WEIGHT: 261 LBS | HEART RATE: 67 BPM | DIASTOLIC BLOOD PRESSURE: 88 MMHG | BODY MASS INDEX: 38.66 KG/M2 | HEIGHT: 69 IN

## 2021-08-03 DIAGNOSIS — Z98.890 S/P ACL RECONSTRUCTION: Primary | ICD-10-CM

## 2021-08-03 PROCEDURE — 99213 OFFICE O/P EST LOW 20 MIN: CPT | Mod: PBBFAC,PN | Performed by: ORTHOPAEDIC SURGERY

## 2021-08-03 PROCEDURE — 99024 POSTOP FOLLOW-UP VISIT: CPT | Mod: ,,, | Performed by: ORTHOPAEDIC SURGERY

## 2021-08-03 PROCEDURE — 99999 PR PBB SHADOW E&M-EST. PATIENT-LVL III: CPT | Mod: PBBFAC,,, | Performed by: ORTHOPAEDIC SURGERY

## 2021-08-03 PROCEDURE — 99999 PR PBB SHADOW E&M-EST. PATIENT-LVL III: ICD-10-PCS | Mod: PBBFAC,,, | Performed by: ORTHOPAEDIC SURGERY

## 2021-08-03 PROCEDURE — 99024 PR POST-OP FOLLOW-UP VISIT: ICD-10-PCS | Mod: ,,, | Performed by: ORTHOPAEDIC SURGERY

## 2021-08-04 ENCOUNTER — CLINICAL SUPPORT (OUTPATIENT)
Dept: REHABILITATION | Facility: HOSPITAL | Age: 36
End: 2021-08-04
Payer: MEDICAID

## 2021-08-04 DIAGNOSIS — M25.662 DECREASED RANGE OF MOTION (ROM) OF LEFT KNEE: ICD-10-CM

## 2021-08-04 DIAGNOSIS — R53.1 DECREASED STRENGTH: ICD-10-CM

## 2021-08-04 PROCEDURE — 97110 THERAPEUTIC EXERCISES: CPT | Mod: PN | Performed by: PHYSICAL THERAPIST

## 2021-08-06 ENCOUNTER — CLINICAL SUPPORT (OUTPATIENT)
Dept: REHABILITATION | Facility: HOSPITAL | Age: 36
End: 2021-08-06
Payer: MEDICAID

## 2021-08-06 DIAGNOSIS — M25.662 DECREASED RANGE OF MOTION (ROM) OF LEFT KNEE: ICD-10-CM

## 2021-08-06 DIAGNOSIS — R53.1 DECREASED STRENGTH: ICD-10-CM

## 2021-08-06 PROCEDURE — 97110 THERAPEUTIC EXERCISES: CPT | Mod: PN,CQ

## 2021-08-11 ENCOUNTER — CLINICAL SUPPORT (OUTPATIENT)
Dept: REHABILITATION | Facility: HOSPITAL | Age: 36
End: 2021-08-11
Payer: MEDICAID

## 2021-08-11 DIAGNOSIS — R53.1 DECREASED STRENGTH: ICD-10-CM

## 2021-08-11 DIAGNOSIS — M25.662 DECREASED RANGE OF MOTION (ROM) OF LEFT KNEE: ICD-10-CM

## 2021-08-11 PROCEDURE — 97110 THERAPEUTIC EXERCISES: CPT | Mod: PN

## 2021-08-13 ENCOUNTER — PATIENT MESSAGE (OUTPATIENT)
Dept: REHABILITATION | Facility: HOSPITAL | Age: 36
End: 2021-08-13

## 2021-08-13 ENCOUNTER — TELEPHONE (OUTPATIENT)
Dept: REHABILITATION | Facility: HOSPITAL | Age: 36
End: 2021-08-13

## 2021-08-13 ENCOUNTER — CLINICAL SUPPORT (OUTPATIENT)
Dept: REHABILITATION | Facility: HOSPITAL | Age: 36
End: 2021-08-13
Payer: MEDICAID

## 2021-08-13 DIAGNOSIS — R53.1 DECREASED STRENGTH: ICD-10-CM

## 2021-08-13 DIAGNOSIS — M25.662 DECREASED RANGE OF MOTION (ROM) OF LEFT KNEE: ICD-10-CM

## 2021-08-13 PROCEDURE — 97110 THERAPEUTIC EXERCISES: CPT | Mod: PN | Performed by: PHYSICAL THERAPIST

## 2021-08-16 ENCOUNTER — CLINICAL SUPPORT (OUTPATIENT)
Dept: REHABILITATION | Facility: HOSPITAL | Age: 36
End: 2021-08-16
Payer: MEDICAID

## 2021-08-16 DIAGNOSIS — R53.1 DECREASED STRENGTH: ICD-10-CM

## 2021-08-16 DIAGNOSIS — M25.662 DECREASED RANGE OF MOTION (ROM) OF LEFT KNEE: ICD-10-CM

## 2021-08-16 PROCEDURE — 97110 THERAPEUTIC EXERCISES: CPT | Mod: PN | Performed by: PHYSICAL THERAPIST

## 2021-08-18 ENCOUNTER — CLINICAL SUPPORT (OUTPATIENT)
Dept: REHABILITATION | Facility: HOSPITAL | Age: 36
End: 2021-08-18
Payer: MEDICAID

## 2021-08-18 DIAGNOSIS — R53.1 DECREASED STRENGTH: ICD-10-CM

## 2021-08-18 DIAGNOSIS — M25.662 DECREASED RANGE OF MOTION (ROM) OF LEFT KNEE: ICD-10-CM

## 2021-08-18 PROCEDURE — 97110 THERAPEUTIC EXERCISES: CPT | Mod: PN

## 2021-08-23 ENCOUNTER — CLINICAL SUPPORT (OUTPATIENT)
Dept: REHABILITATION | Facility: HOSPITAL | Age: 36
End: 2021-08-23
Payer: MEDICAID

## 2021-08-23 DIAGNOSIS — R53.1 DECREASED STRENGTH: ICD-10-CM

## 2021-08-23 DIAGNOSIS — M25.662 DECREASED RANGE OF MOTION (ROM) OF LEFT KNEE: ICD-10-CM

## 2021-08-23 PROCEDURE — 97110 THERAPEUTIC EXERCISES: CPT | Mod: PN

## 2021-09-01 ENCOUNTER — PATIENT MESSAGE (OUTPATIENT)
Dept: ORTHOPEDICS | Facility: CLINIC | Age: 36
End: 2021-09-01

## 2021-09-07 ENCOUNTER — OFFICE VISIT (OUTPATIENT)
Dept: ORTHOPEDICS | Facility: CLINIC | Age: 36
End: 2021-09-07
Payer: MEDICAID

## 2021-09-07 VITALS
WEIGHT: 267.19 LBS | SYSTOLIC BLOOD PRESSURE: 137 MMHG | DIASTOLIC BLOOD PRESSURE: 88 MMHG | HEART RATE: 78 BPM | HEIGHT: 69 IN | BODY MASS INDEX: 39.58 KG/M2

## 2021-09-07 DIAGNOSIS — S83.512D RUPTURE OF ANTERIOR CRUCIATE LIGAMENT OF LEFT KNEE, SUBSEQUENT ENCOUNTER: Primary | ICD-10-CM

## 2021-09-07 PROCEDURE — 99024 PR POST-OP FOLLOW-UP VISIT: ICD-10-PCS | Mod: ,,, | Performed by: ORTHOPAEDIC SURGERY

## 2021-09-07 PROCEDURE — 99999 PR PBB SHADOW E&M-EST. PATIENT-LVL III: CPT | Mod: PBBFAC,,, | Performed by: ORTHOPAEDIC SURGERY

## 2021-09-07 PROCEDURE — 99024 POSTOP FOLLOW-UP VISIT: CPT | Mod: ,,, | Performed by: ORTHOPAEDIC SURGERY

## 2021-09-07 PROCEDURE — 99213 OFFICE O/P EST LOW 20 MIN: CPT | Mod: PBBFAC,PN | Performed by: ORTHOPAEDIC SURGERY

## 2021-09-07 PROCEDURE — 99999 PR PBB SHADOW E&M-EST. PATIENT-LVL III: ICD-10-PCS | Mod: PBBFAC,,, | Performed by: ORTHOPAEDIC SURGERY

## 2021-09-07 RX ORDER — TRIAMCINOLONE ACETONIDE 0.25 MG/G
CREAM TOPICAL 2 TIMES DAILY
COMMUNITY
Start: 2021-04-07

## 2021-09-07 RX ORDER — METRONIDAZOLE 500 MG/1
TABLET ORAL
COMMUNITY
Start: 2021-05-19 | End: 2022-02-04

## 2021-09-07 RX ORDER — OXYCODONE HYDROCHLORIDE 5 MG/1
TABLET ORAL
COMMUNITY
Start: 2021-06-21 | End: 2022-02-04

## 2021-09-07 RX ORDER — ALPRAZOLAM 0.25 MG/1
0.25 TABLET ORAL DAILY PRN
COMMUNITY
Start: 2021-06-01

## 2021-09-07 RX ORDER — DULOXETIN HYDROCHLORIDE 30 MG/1
CAPSULE, DELAYED RELEASE ORAL
COMMUNITY
Start: 2021-08-23

## 2021-09-07 RX ORDER — CYCLOBENZAPRINE HCL 5 MG
5 TABLET ORAL DAILY PRN
COMMUNITY
Start: 2021-06-01

## 2021-09-07 RX ORDER — SERTRALINE HYDROCHLORIDE 50 MG/1
TABLET, FILM COATED ORAL
COMMUNITY
Start: 2021-08-18

## 2021-09-07 RX ORDER — DOXYCYCLINE 100 MG/1
CAPSULE ORAL
COMMUNITY
Start: 2021-06-21 | End: 2022-02-04

## 2021-09-07 RX ORDER — FLUCONAZOLE 150 MG/1
150 TABLET ORAL
COMMUNITY
Start: 2021-05-05 | End: 2022-02-04

## 2021-09-07 RX ORDER — IBUPROFEN 800 MG/1
TABLET ORAL
COMMUNITY
Start: 2021-06-21 | End: 2022-02-04

## 2021-09-10 ENCOUNTER — TELEPHONE (OUTPATIENT)
Dept: REHABILITATION | Facility: HOSPITAL | Age: 36
End: 2021-09-10

## 2021-09-13 ENCOUNTER — CLINICAL SUPPORT (OUTPATIENT)
Dept: REHABILITATION | Facility: HOSPITAL | Age: 36
End: 2021-09-13
Payer: MEDICAID

## 2021-09-13 DIAGNOSIS — M25.662 DECREASED RANGE OF MOTION (ROM) OF LEFT KNEE: ICD-10-CM

## 2021-09-13 DIAGNOSIS — R53.1 DECREASED STRENGTH: ICD-10-CM

## 2021-09-13 PROCEDURE — 97110 THERAPEUTIC EXERCISES: CPT | Mod: PN,CQ

## 2021-09-15 ENCOUNTER — CLINICAL SUPPORT (OUTPATIENT)
Dept: REHABILITATION | Facility: HOSPITAL | Age: 36
End: 2021-09-15
Payer: MEDICAID

## 2021-09-15 DIAGNOSIS — M25.662 DECREASED RANGE OF MOTION (ROM) OF LEFT KNEE: ICD-10-CM

## 2021-09-15 DIAGNOSIS — R53.1 DECREASED STRENGTH: ICD-10-CM

## 2021-09-15 PROCEDURE — 97110 THERAPEUTIC EXERCISES: CPT | Mod: PN | Performed by: PHYSICAL THERAPIST

## 2021-09-22 ENCOUNTER — CLINICAL SUPPORT (OUTPATIENT)
Dept: REHABILITATION | Facility: HOSPITAL | Age: 36
End: 2021-09-22
Payer: MEDICAID

## 2021-09-22 DIAGNOSIS — M25.662 DECREASED RANGE OF MOTION (ROM) OF LEFT KNEE: ICD-10-CM

## 2021-09-22 DIAGNOSIS — R53.1 DECREASED STRENGTH: ICD-10-CM

## 2021-09-22 PROCEDURE — 97110 THERAPEUTIC EXERCISES: CPT | Mod: PN | Performed by: PHYSICAL THERAPIST

## 2021-09-29 ENCOUNTER — CLINICAL SUPPORT (OUTPATIENT)
Dept: REHABILITATION | Facility: HOSPITAL | Age: 36
End: 2021-09-29
Payer: MEDICAID

## 2021-09-29 ENCOUNTER — DOCUMENTATION ONLY (OUTPATIENT)
Dept: REHABILITATION | Facility: HOSPITAL | Age: 36
End: 2021-09-29

## 2021-09-29 DIAGNOSIS — M25.662 DECREASED RANGE OF MOTION (ROM) OF LEFT KNEE: ICD-10-CM

## 2021-09-29 DIAGNOSIS — R53.1 DECREASED STRENGTH: ICD-10-CM

## 2021-09-29 DIAGNOSIS — M25.662 DECREASED RANGE OF MOTION (ROM) OF LEFT KNEE: Primary | ICD-10-CM

## 2021-09-29 PROCEDURE — 97110 THERAPEUTIC EXERCISES: CPT | Mod: PN | Performed by: PHYSICAL THERAPIST

## 2021-10-04 ENCOUNTER — CLINICAL SUPPORT (OUTPATIENT)
Dept: REHABILITATION | Facility: HOSPITAL | Age: 36
End: 2021-10-04
Payer: MEDICAID

## 2021-10-04 DIAGNOSIS — R53.1 DECREASED STRENGTH: ICD-10-CM

## 2021-10-04 DIAGNOSIS — M25.662 DECREASED RANGE OF MOTION (ROM) OF LEFT KNEE: ICD-10-CM

## 2021-10-04 PROCEDURE — 97110 THERAPEUTIC EXERCISES: CPT | Mod: PN,CQ

## 2021-10-11 ENCOUNTER — CLINICAL SUPPORT (OUTPATIENT)
Dept: REHABILITATION | Facility: HOSPITAL | Age: 36
End: 2021-10-11
Payer: MEDICAID

## 2021-10-11 DIAGNOSIS — M25.662 DECREASED RANGE OF MOTION (ROM) OF LEFT KNEE: ICD-10-CM

## 2021-10-11 DIAGNOSIS — R53.1 DECREASED STRENGTH: ICD-10-CM

## 2021-10-11 PROCEDURE — 97110 THERAPEUTIC EXERCISES: CPT | Mod: PN | Performed by: PHYSICAL THERAPIST

## 2021-11-03 ENCOUNTER — OFFICE VISIT (OUTPATIENT)
Dept: ORTHOPEDICS | Facility: CLINIC | Age: 36
End: 2021-11-03
Payer: MEDICAID

## 2021-11-03 DIAGNOSIS — Z98.890 S/P ACL RECONSTRUCTION: Primary | ICD-10-CM

## 2021-11-03 DIAGNOSIS — G89.29 CHRONIC RIGHT SHOULDER PAIN: ICD-10-CM

## 2021-11-03 DIAGNOSIS — M25.511 CHRONIC RIGHT SHOULDER PAIN: ICD-10-CM

## 2021-11-03 PROCEDURE — 99213 PR OFFICE/OUTPT VISIT, EST, LEVL III, 20-29 MIN: ICD-10-PCS | Mod: S$PBB,,, | Performed by: ORTHOPAEDIC SURGERY

## 2021-11-03 PROCEDURE — 99999 PR PBB SHADOW E&M-EST. PATIENT-LVL II: ICD-10-PCS | Mod: PBBFAC,,, | Performed by: ORTHOPAEDIC SURGERY

## 2021-11-03 PROCEDURE — 99999 PR PBB SHADOW E&M-EST. PATIENT-LVL II: CPT | Mod: PBBFAC,,, | Performed by: ORTHOPAEDIC SURGERY

## 2021-11-03 PROCEDURE — 99213 OFFICE O/P EST LOW 20 MIN: CPT | Mod: S$PBB,,, | Performed by: ORTHOPAEDIC SURGERY

## 2021-11-03 PROCEDURE — 99212 OFFICE O/P EST SF 10 MIN: CPT | Mod: PBBFAC,PN | Performed by: ORTHOPAEDIC SURGERY

## 2021-11-09 ENCOUNTER — CLINICAL SUPPORT (OUTPATIENT)
Dept: REHABILITATION | Facility: HOSPITAL | Age: 36
End: 2021-11-09
Attending: ORTHOPAEDIC SURGERY
Payer: MEDICAID

## 2021-11-09 DIAGNOSIS — G89.29 CHRONIC RIGHT SHOULDER PAIN: ICD-10-CM

## 2021-11-09 DIAGNOSIS — R53.1 DECREASED STRENGTH: Primary | ICD-10-CM

## 2021-11-09 DIAGNOSIS — M25.511 CHRONIC RIGHT SHOULDER PAIN: ICD-10-CM

## 2021-11-09 DIAGNOSIS — S83.512A CHRONIC RUPTURE OF ACL OF LEFT KNEE: ICD-10-CM

## 2021-11-09 PROCEDURE — 97110 THERAPEUTIC EXERCISES: CPT | Mod: PN

## 2021-11-09 PROCEDURE — 97140 MANUAL THERAPY 1/> REGIONS: CPT | Mod: PN

## 2021-11-09 PROCEDURE — 97161 PT EVAL LOW COMPLEX 20 MIN: CPT | Mod: PN

## 2021-11-30 ENCOUNTER — TELEPHONE (OUTPATIENT)
Dept: REHABILITATION | Facility: HOSPITAL | Age: 36
End: 2021-11-30
Payer: MEDICAID

## 2021-12-02 ENCOUNTER — CLINICAL SUPPORT (OUTPATIENT)
Dept: REHABILITATION | Facility: HOSPITAL | Age: 36
End: 2021-12-02
Payer: MEDICAID

## 2021-12-02 DIAGNOSIS — M25.662 DECREASED RANGE OF MOTION (ROM) OF LEFT KNEE: ICD-10-CM

## 2021-12-02 DIAGNOSIS — R53.1 DECREASED STRENGTH: ICD-10-CM

## 2021-12-02 PROCEDURE — 97110 THERAPEUTIC EXERCISES: CPT | Mod: PN | Performed by: PHYSICAL THERAPIST

## 2021-12-07 ENCOUNTER — CLINICAL SUPPORT (OUTPATIENT)
Dept: REHABILITATION | Facility: HOSPITAL | Age: 36
End: 2021-12-07
Payer: MEDICAID

## 2021-12-07 DIAGNOSIS — M25.662 DECREASED RANGE OF MOTION (ROM) OF LEFT KNEE: ICD-10-CM

## 2021-12-07 DIAGNOSIS — R53.1 DECREASED STRENGTH: ICD-10-CM

## 2021-12-07 PROCEDURE — 97110 THERAPEUTIC EXERCISES: CPT | Mod: PN | Performed by: PHYSICAL THERAPIST

## 2021-12-14 ENCOUNTER — CLINICAL SUPPORT (OUTPATIENT)
Dept: REHABILITATION | Facility: HOSPITAL | Age: 36
End: 2021-12-14
Payer: MEDICAID

## 2021-12-14 DIAGNOSIS — M25.662 DECREASED RANGE OF MOTION (ROM) OF LEFT KNEE: ICD-10-CM

## 2021-12-14 DIAGNOSIS — R53.1 DECREASED STRENGTH: ICD-10-CM

## 2021-12-14 PROCEDURE — 97110 THERAPEUTIC EXERCISES: CPT | Mod: PN | Performed by: PHYSICAL THERAPIST

## 2021-12-22 ENCOUNTER — CLINICAL SUPPORT (OUTPATIENT)
Dept: REHABILITATION | Facility: HOSPITAL | Age: 36
End: 2021-12-22
Payer: MEDICAID

## 2021-12-22 DIAGNOSIS — R53.1 DECREASED STRENGTH: ICD-10-CM

## 2021-12-22 DIAGNOSIS — M25.662 DECREASED RANGE OF MOTION (ROM) OF LEFT KNEE: ICD-10-CM

## 2021-12-22 PROCEDURE — 97110 THERAPEUTIC EXERCISES: CPT | Mod: PN

## 2022-01-04 ENCOUNTER — PATIENT MESSAGE (OUTPATIENT)
Dept: ADMINISTRATIVE | Facility: OTHER | Age: 37
End: 2022-01-04
Payer: MEDICAID

## 2022-01-11 ENCOUNTER — CLINICAL SUPPORT (OUTPATIENT)
Dept: REHABILITATION | Facility: HOSPITAL | Age: 37
End: 2022-01-11
Payer: MEDICAID

## 2022-01-11 DIAGNOSIS — M25.662 DECREASED RANGE OF MOTION (ROM) OF LEFT KNEE: ICD-10-CM

## 2022-01-11 DIAGNOSIS — R53.1 DECREASED STRENGTH: ICD-10-CM

## 2022-01-11 PROCEDURE — 97110 THERAPEUTIC EXERCISES: CPT | Mod: PN | Performed by: PHYSICAL THERAPIST

## 2022-01-11 NOTE — PROGRESS NOTES
Physical Therapy Daily Treatment Note     Name: Divya Salazar  Clinic Number: [id number]    Therapy Diagnosis:   Encounter Diagnoses   Name Primary?    Decreased range of motion (ROM) of left knee     Decreased strength      Physician: Diego Jacinto MD    Visit Date: 2022    Physician Orders: PT Eval and Treat  Medical Diagnosis from Referral: S83.512D (ICD-10-CM) - Rupture of anterior cruciate ligament of left knee, subsequent encounter  Evaluation Date: 2021  Authorization Period Expiration: 2021  Plan of Care Expiration: 22  Visit # / Visits authorized:  (24 total)  FOTO:  Performed 21 (NEXT)  PTA Visit: 0/6    Time In: 1:46 pm  Time Out: 2:30 pm  Total Billable Time: 44 minutes (TE-3)     Precautions: Standard, psoriasis    Per Dr. Jacinto, brace not needed    Subjective     Pt reports: she thinks she had COVID, but is better now. Knee has been feeling great. She's been doing strength training at the fitness center 3 days per week.   She was compliant with home exercise program.  Response to previous treatment: soreness  Functional change: much better descending stairs. Leg tired at end of long shift    Pain: 0/10  Location: L knee (incision)    Objective       Divya received therapeutic exercises to develop strength, endurance, ROM, flexibility and posture for 44 minutes including:    Standing/machines:  Butt kickers (hamstring): 25 ft x 2  Anklin ft x 2  Ankling with march: 25 ft x 2  Frankensteins: 25 ft x 2    Treadmill (interval):  2.5 mph walk, 4.5 mph jo min warmup, 2:1 ratio jog to walk x 2, then 1 min cool down.    Lateral shufflin ft x 4  Backwards joggin ft x 4  Carioca: 20 ft x 4  Skippin ft x 4  Double leg hops: 20 ft x 2  Rapid DL hops - 2x20  Alternating runners hops - 20x each  Alternating runners double hops - 20x each  Squat jumps - 1x10      Not done today:  Sport cord resisted joggin min each 4 way blue cord  Sport  cord lateral boundinx each direction, blue cord  Sport cord forward/backward boundinx each leg, blue cord  Sport cord resisted partial lunge to 1 le min fwd and to L and R  Sport cord resisted side mini L squat: 2x10      Home Exercises Provided and Patient Education Provided   Education provided:   - correct body mechanics for knee health  - course of therapy, prognosis  - importance of HEP  - wearing brace in weight bearing at all times    Written Home Exercises Provided: continue prior HEP.  Exercises were reviewed and Divya was able to demonstrate them prior to the end of the session.  Divya demonstrated good  understanding of the education provided.     See EMR under Media for exercises provided 2021 and 9/15/21    Assessment   Divya returned to PT after having to cancelling visits d/t COVID concerns. Sessions will now be concentrating on return to run and plyometrics as she is doing strength training as an HEP at the fitness center. Even weight shifting present w/ exercises today. Cues needed for soft landing. Rest breaks needed d/t fatigue.     Divya is progressing well towards her goals.   Pt prognosis is Excellent.     Pt will continue to benefit from skilled outpatient physical therapy to address the deficits listed in the problem list box on initial evaluation, provide pt/family education and to maximize pt's level of independence in the home and community environment.   Pt's spiritual, cultural and educational needs considered and pt agreeable to plan of care and goals.    Anticipated barriers to physical therapy: None    Goals:   GOALS: Short Term Goals:  4 weeks  1. Report decreased L knee pain </=0/10 at rest to increase tolerance for ADLs and increased QoL. - MET  2. Increase L knee AROM to -1 to 110 degrees in order to be able to perform ADLs without difficulty. - progressing  3. Increase strength by 1/3 MMT grade in BLE  to increase tolerance for ADL and work activities. -  MET  4. Pt to tolerate HEP to improve ROM and independence with ADL's. - MET     Long Term Goals: 8 weeks  1. Report decreased L knee pain </= 1/10 with full weight bearing and walking activities to increase tolerance for ADLs and increased QoL. - MET  2. Patient goal: to get back to work. - MET  3. Increase L quad strength via MicroFET to >/= 28 kg to increase tolerance for ADL and work activities: PROGRESSING; NOT MET  4. Pt will report at </= 24% impaired on KNEE FOTO to demonstrate increase in LE function with every day tasks.  - progressing  5. Pt will negotiate 8 steps and ambulate community distances at >/= Mod I for increased functional mobility. - MET   6. Pt will demonstrate negative step down and lateral step down tests to indicate safe return to running and jumping PROGRESSING; NOT MET    Plan   Continue to progress per protocol  Initiate return to run    Progress per protocol for 7/15/2021 L knee arthroscopy ACL repair: Arthroscopically aided anterior cruciate ligament repair/augmentation or reconstruction with hamstring autograft & allograft    Diego Olivas, PT

## 2022-02-01 ENCOUNTER — CLINICAL SUPPORT (OUTPATIENT)
Dept: REHABILITATION | Facility: HOSPITAL | Age: 37
End: 2022-02-01
Payer: MEDICAID

## 2022-02-01 DIAGNOSIS — M25.662 DECREASED RANGE OF MOTION (ROM) OF LEFT KNEE: ICD-10-CM

## 2022-02-01 DIAGNOSIS — R53.1 DECREASED STRENGTH: ICD-10-CM

## 2022-02-01 PROCEDURE — 97110 THERAPEUTIC EXERCISES: CPT | Mod: PN | Performed by: PHYSICAL THERAPIST
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
## 2022-02-01 NOTE — PROGRESS NOTES
Physical Therapy Daily Treatment Note     Name: Divya Salazar  Clinic Number: [id number]    Therapy Diagnosis:   No diagnosis found.  Physician: Diego Jacinto MD    Visit Date: 2/1/2022    Physician Orders: PT Eval and Treat  Medical Diagnosis from Referral: S83.512D (ICD-10-CM) - Rupture of anterior cruciate ligament of left knee, subsequent encounter  Evaluation Date: 7/19/2021  Authorization Period Expiration: 12/31/2021  Plan of Care Expiration: 1/27/22  Visit # / Visits authorized: 6/16 (25 total)  FOTO: Performed 2/1/22  PTA Visit: 0/6    Time In: 11:45 am  Time Out: 12:20 pm  Total Billable Time: 32 minutes (TE-2)     Precautions: Standard, psoriasis    Per Dr. Jacinto, brace not needed    Subjective     Pt reports: see updated POC   She was compliant with home exercise program.  Response to previous treatment: soreness  Functional change: much better descending stairs. Leg tired at end of long shift    Pain: 0/10  Location: L knee (incision)    Objective         Divya received therapeutic exercises to develop strength, endurance, ROM, flexibility and posture for 32 minutes including:    See re-assessment and FOTO  And Education    Home Exercises Provided and Patient Education Provided   Education provided:   - correct body mechanics for knee health  - course of therapy, prognosis  - importance of HEP  - concentration on closed chain strength    Written Home Exercises Provided: continue prior HEP.  Exercises were reviewed and Divya was able to demonstrate them prior to the end of the session.  Divya demonstrated good  understanding of the education provided.     See EMR under Media for exercises provided 7/19/2021 and 9/15/21    Assessment   See updated POC    Divya is progressing well towards her goals.   Pt prognosis is Excellent.     Pt will continue to benefit from skilled outpatient physical therapy to address the deficits listed in the problem list box on initial evaluation, provide  pt/family education and to maximize pt's level of independence in the home and community environment.   Pt's spiritual, cultural and educational needs considered and pt agreeable to plan of care and goals.    Anticipated barriers to physical therapy: None    Goals:   GOALS: Short Term Goals:  4 weeks  1. Report decreased L knee pain </=0/10 at rest to increase tolerance for ADLs and increased QoL. - MET  2. Increase L knee AROM to -1 to 110 degrees in order to be able to perform ADLs without difficulty. - MET  3. Increase strength by 1/3 MMT grade in BLE  to increase tolerance for ADL and work activities. - MET  4. Pt to tolerate HEP to improve ROM and independence with ADL's. - MET     Long Term Goals: 8 weeks  1. Report decreased L knee pain </= 1/10 with full weight bearing and walking activities to increase tolerance for ADLs and increased QoL. - MET  2. Patient goal: to get back to work. - MET  3. Increase L quad strength via MicroFET to >/= 28 kg to increase tolerance for ADL and work activities: MET  4. Pt will report at </= 24% impaired on KNEE FOTO to demonstrate increase in LE function with every day tasks.  - MET  5. Pt will negotiate 8 steps and ambulate community distances at >/= Mod I for increased functional mobility. - MET   6. Pt will demonstrate negative step down and lateral step down tests to indicate safe return to running and jumping METMET    Plan   Await rec from Dr. Jacinto, but pt appropriate for jake Olivas, PT

## 2022-02-04 ENCOUNTER — HOSPITAL ENCOUNTER (OUTPATIENT)
Dept: RADIOLOGY | Facility: HOSPITAL | Age: 37
Discharge: HOME | End: 2022-02-04
Attending: ORTHOPAEDIC SURGERY
Payer: MEDICAID

## 2022-02-04 ENCOUNTER — OFFICE VISIT (OUTPATIENT)
Dept: ORTHOPEDICS | Facility: CLINIC | Age: 37
End: 2022-02-04
Payer: MEDICAID

## 2022-02-04 VITALS — WEIGHT: 270.38 LBS | BODY MASS INDEX: 40.05 KG/M2 | HEIGHT: 69 IN

## 2022-02-04 DIAGNOSIS — M79.672 LEFT FOOT PAIN: ICD-10-CM

## 2022-02-04 DIAGNOSIS — M79.672 LEFT FOOT PAIN: Primary | ICD-10-CM

## 2022-02-04 DIAGNOSIS — S73.192D TEAR OF LEFT ACETABULAR LABRUM, SUBSEQUENT ENCOUNTER: ICD-10-CM

## 2022-02-04 PROCEDURE — 99999 PR PBB SHADOW E&M-EST. PATIENT-LVL III: CPT | Mod: PBBFAC,,, | Performed by: ORTHOPAEDIC SURGERY

## 2022-02-04 PROCEDURE — 73630 X-RAY EXAM OF FOOT: CPT | Mod: TC,FY,LT

## 2022-02-04 PROCEDURE — 1159F MED LIST DOCD IN RCRD: CPT | Mod: CPTII,,, | Performed by: ORTHOPAEDIC SURGERY

## 2022-02-04 PROCEDURE — 99214 PR OFFICE/OUTPT VISIT, EST, LEVL IV, 30-39 MIN: ICD-10-PCS | Mod: S$PBB,,, | Performed by: ORTHOPAEDIC SURGERY

## 2022-02-04 PROCEDURE — 99999 PR PBB SHADOW E&M-EST. PATIENT-LVL III: ICD-10-PCS | Mod: PBBFAC,,, | Performed by: ORTHOPAEDIC SURGERY

## 2022-02-04 PROCEDURE — 99213 OFFICE O/P EST LOW 20 MIN: CPT | Mod: PBBFAC,PN | Performed by: ORTHOPAEDIC SURGERY

## 2022-02-04 PROCEDURE — 1160F PR REVIEW ALL MEDS BY PRESCRIBER/CLIN PHARMACIST DOCUMENTED: ICD-10-PCS | Mod: CPTII,,, | Performed by: ORTHOPAEDIC SURGERY

## 2022-02-04 PROCEDURE — 99214 OFFICE O/P EST MOD 30 MIN: CPT | Mod: S$PBB,,, | Performed by: ORTHOPAEDIC SURGERY

## 2022-02-04 PROCEDURE — 1159F PR MEDICATION LIST DOCUMENTED IN MEDICAL RECORD: ICD-10-PCS | Mod: CPTII,,, | Performed by: ORTHOPAEDIC SURGERY

## 2022-02-04 PROCEDURE — 3008F PR BODY MASS INDEX (BMI) DOCUMENTED: ICD-10-PCS | Mod: CPTII,,, | Performed by: ORTHOPAEDIC SURGERY

## 2022-02-04 PROCEDURE — 3008F BODY MASS INDEX DOCD: CPT | Mod: CPTII,,, | Performed by: ORTHOPAEDIC SURGERY

## 2022-02-04 PROCEDURE — 73630 X-RAY EXAM OF FOOT: CPT | Mod: 26,LT,, | Performed by: RADIOLOGY

## 2022-02-04 PROCEDURE — 73630 XR FOOT COMPLETE 3 VIEW LEFT: ICD-10-PCS | Mod: 26,LT,, | Performed by: RADIOLOGY

## 2022-02-04 PROCEDURE — 1160F RVW MEDS BY RX/DR IN RCRD: CPT | Mod: CPTII,,, | Performed by: ORTHOPAEDIC SURGERY

## 2022-02-04 RX ORDER — BUPROPION HYDROCHLORIDE 150 MG/1
150 TABLET ORAL DAILY
COMMUNITY
Start: 2022-01-06

## 2022-02-12 NOTE — PROGRESS NOTES
Patient ID:   Divya Salazar is a 36 y.o. female.    Chief Complaint:   1. Left hip pain  2. Left foot pain    HPI:   The patient is a 36 year old female who is returning today reporting left hip pain and left foot pain.     She underwent left hip arthroscopy with labral debridement in March 2021. She seemed to get better after the surgery but recently reports that the pain has returned. The pain is in the groin area. She gets radiation of pain into the medial thigh. The pain is worse when she is sitting for a prolonged time and with weightbearing activities. She denies any current relieving factors.     She is also reporting left foot pain.         Medications:    Current Outpatient Medications:     ALPRAZolam (XANAX) 0.25 MG tablet, Take 0.25 mg by mouth daily as needed., Disp: , Rfl:     buPROPion (WELLBUTRIN XL) 150 MG TB24 tablet, Take 150 mg by mouth once daily., Disp: , Rfl:     cyclobenzaprine (FLEXERIL) 5 MG tablet, Take 5 mg by mouth daily as needed., Disp: , Rfl:     DULoxetine (CYMBALTA) 30 MG capsule, Take by mouth., Disp: , Rfl:     gabapentin (NEURONTIN) 100 MG capsule, Take 100 mg by mouth 3 (three) times daily., Disp: , Rfl:     sertraline (ZOLOFT) 50 MG tablet, , Disp: , Rfl:     triamcinolone acetonide 0.025% (KENALOG) 0.025 % cream, Apply topically 2 (two) times daily., Disp: , Rfl:     Allergies:  Review of patient's allergies indicates:  No Known Allergies    Past Medical History:  History reviewed. No pertinent past medical history.     Past Surgical History:  Past Surgical History:   Procedure Laterality Date    ARTHROSCOPY, HIP Left 3/18/2021    Procedure: ARTHROSCOPY, HIP, WITH LABRUM REPAIR;  Surgeon: Diego Jacinto MD;  Location: [facility] OR;  Service: Orthopedics;  Laterality: Left;  C-arm  Rakesh w/ Josias and Yogi notified 3/8/21 ESTEBAN Cameron confirmed 3/17/21 [place]    FULGURATION OF ENDOMETRIOSIS      KNEE ARTHROSCOPY W/ ACL RECONSTRUCTION Left 7/15/2021    Procedure:  "RECONSTRUCTION, KNEE, ACL, ARTHROSCOPIC with hamstring autograft;  Surgeon: Diego Jacinto MD;  Location: [facility];  Service: Orthopedics;  Laterality: Left;  Pre-op adductor canal block    Arthrex tight rope, interference screws, tendon strippers, c-arm  hamstring autograft Sindi notified [facility] 7/7/21- Confirmed [facility]       Social History:  Social History     Occupational History    Not on file   Tobacco Use    Smoking status: Former Smoker     Start date: 2012    Smokeless tobacco: Never Used   Substance and Sexual Activity    Alcohol use: Yes     Comment: occasional    Drug use: No    Sexual activity: Yes     Partners: Male       Family History:  Family History   Problem Relation Age of Onset    Cancer Neg Hx         ROS:  Review of Systems   Musculoskeletal: Positive for joint pain, muscle cramps and myalgias.   Neurological: Negative for numbness and paresthesias.   All other systems reviewed and are negative.      Vitals:  Ht 5' 9" (1.753 m)   Wt 122.7 kg (270 lb 6.3 oz)   BMI 39.93 kg/m²     Physical Examination:  Comprehensive Orthopaedic Musculoskeletal Exam    General        Constitutional: appears stated age, severely obese, well-developed and well-nourished    Scleral icterus: no    Labored breathing: no    Psychiatric: normal mood and affect and no acute distress    Neurological: alert and oriented x3    Skin: intact    Lymphadenopathy: none     Ortho Exam   Left hip pain:  No LLD  Positive Stinchfield  Positive FADDIR  ROM well-preserved  Abductor and iliopsoas strength 5/5  Nontender along the greater trochanter    Left foot exam:  No deformity  Intact TA/EHL/FHL/GS/PTT/Per    Imaging:  I have ordered and independently reviewed the following imaging studies performed at [facility] today  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 Left foot pain    2. Tear of left acetabular labrum, subsequent encounter        Plan:  I have recommended MRI exam of the left hip to evaluate " the labrum. She is now one year out from hip arthroscopy and still having difficulty. She will be contacted with the MRI results.     Orders Placed This Encounter    MRI Hip Without Contrast Left    X-Ray Foot Complete Left     No follow-ups on file.

## 2022-04-29 ENCOUNTER — CLINICAL SUPPORT (OUTPATIENT)
Dept: REHABILITATION | Facility: OTHER | Age: 37
End: 2022-04-29
Attending: OBSTETRICS & GYNECOLOGY
Payer: MEDICAID

## 2022-04-29 DIAGNOSIS — G89.29 CHRONIC LEFT-SIDED LOW BACK PAIN WITHOUT SCIATICA: ICD-10-CM

## 2022-04-29 DIAGNOSIS — R29.898 DECREASED STRENGTH OF TRUNK AND BACK: ICD-10-CM

## 2022-04-29 DIAGNOSIS — M54.50 CHRONIC LEFT-SIDED LOW BACK PAIN WITHOUT SCIATICA: ICD-10-CM

## 2022-04-29 PROBLEM — M54.9 BACK PAIN: Status: ACTIVE | Noted: 2022-04-29

## 2022-04-29 PROCEDURE — 97110 THERAPEUTIC EXERCISES: CPT | Performed by: PHYSICAL THERAPIST

## 2022-04-29 PROCEDURE — 97162 PT EVAL MOD COMPLEX 30 MIN: CPT | Performed by: PHYSICAL THERAPIST

## 2022-04-29 NOTE — PATIENT INSTRUCTIONS
Abdominal massage (2-5 minutes). Gently massage over the lower abdomen with soap/moisturizer in the shower, or with lotion. It should be no more than mildly uncomfortable. Start with just gentle circles, and progress to gentle pinch & lift.      Supine transverse abdominus (TrA) brace, x20 reps  - Inhale to prepare, relax the belly and pelvic floor  - As you exhale, gently engage the transverse abdominis by drawing the belly button down to the spine.  - Inhale again to rest  *Don't hold your breath       Supine bent knee fall out (BKFO) - 20 reps  - Inhale to prepare, relax the belly and pelvic floor  - As you exhale, gently engage the transverse abdominis by drawing the belly button down to the spine  - Holding the hips level and the belly button down, gently drop one hip to the side (but only as far as you can control)  - Inhale again to rest, then alternate legs  *Don't hold your breath       DIAPHRAGMATIC BREATHING   The diaphragm is a dome shaped muscle that forms the floor of the rib cage. It is the most efficient muscle for breathing and relaxation, although most people are not used to using the diaphragm for breath.   Diaphragmatic or belly breathing is an important technique to learn because it helps settle down or relax the autonomic nervous system, as well as gently stretch the pelvic floor.   The correct use of diaphragmatic breathing can help to quiet brain activity resulting in the relaxation of all the muscles and organs of the body.   This is accomplished by slow rhythmic breathing concentrated in the diaphragm muscle rather than the chest.    How to do proper relaxation breathing:    Start by lying on your back or reclining in a chair in a relaxed position. Place one hand on your chest and the other on your abdomen.  Relax your jaw by placing your tongue on the floor of your mouth and keeping your teeth slightly apart.   Take a deep breath in, letting the abdomen expand and rise while you keep your  upper chest, neck and shoulders relaxed.   As you breathe out, allow your abdomen and chest to fall. Exhale completely.  It doesn't matter if you breathe in/out through your nose and/or mouth. Do whichever feels comfortable.  Remember to breathe slowly.  Do not force your breathing. Do not hold your breath.      Happy Baby Pose + diaphragmatic breathing and pelvic floor drop  Perform 1-2 minutes post-exercise, daily  - Lying face-up on the ground/bed, reach your hands between your knees to hold onto your ankles (or shins or thighs, if that is more comfortable)  - Inhale as you stretch your belly up to the ceiling and feel the pelvic floor relax away from you, down towards the heels           Child's Pose + diaphragmatic breathing and pelvic floor drop  Perform 1-2 minutes post-exercise, daily  - Place your feet together and knees wide as you sit back on your heels. Let your upper body and forehead rest on the ground.  *You may feel more comfortable with a pillow on top of the heels, or a pillow under the chest  - Inhale as you stretch your belly down to the ground and feel the pelvic floor relax behind you, down towards the heels          Butterfly stretch + diaphragmatic breathing and pelvic floor drop  Perform 1-2 minutes post-exercise, daily  - Sit with your heels together and knees resting at your side. You may feel more comfortable with a pillow under each knee.  - Inhale as you stretch your belly out away from you and feel the pelvic floor relax down to the floor.

## 2022-04-29 NOTE — PLAN OF CARE
[facility]   Pelvic Health Physical Therapy Initial Evaluation     Date: 2022   Name: Divya Salazar  Clinic Number: [id number]    Therapy Diagnosis:   Encounter Diagnoses   Name Primary?    Decreased strength of trunk and back     Chronic left-sided low back pain without sciatica     Pelvic pain Yes     Referring Provider: Aga Arredondo MD    Referring Provider Orders: PT Eval and Treat  Medical Diagnosis from Referral: Pelvic floor dysfunction in female [M62.89]  Evaluation Date: 2022  Authorization Period Expiration: 2022  Plan of Care Expiration: 2022  Progress Note Due: 2022  Visit # / Visits authorized: /pending  FOTO: 1/3 (2022)    Precautions: standard    Time In: 11:00  Time Out: 12:05  Total Appointment Time (timed & untimed codes): 65 minutes    SUBJECTIVE     Date of onset: 8 years ago  History of current condition: Divya reports long history of pelvic and L hip pain that sometimes radiates around to the lower back. She notes urinary leakage with coughing, as well as intermittent urinary urgency. She has a history of constipation, but that is relatively well-controlled right now. Her biggest complaint is L-sided pelvic pain with orgasm. She has undergone several laparoscopic removals of endometriosis tissue and recently received botox injections to the L levator ani, obturator internus, and piriformis.    OB/GYN HISTORY -  and endometriosis    BLADDER HISTORY   Difficulty initiating urine stream: Yes - sometimes   Complete emptying: unknown   Post-void dribbling: No   Urinary Urgency: Yes   Bladder leakage: Yes - with coughing   Frequency of incidents: occasional   Amount leaked (urine): few drops   Form of protection: none    BOWEL HISTORY   Frequency of bowel movements: once every 1-2 days   Difficulty initiating BM: Yes - sometimes   Colon leakage: No    SEXUAL/PELVIC PAIN HISTORY  · Sexually active? Yes   · Pain with  vaginal exams, intercourse or tampon use? Yes - with penetration and with orgasm  · Pain with wearing certain clothes, sitting on certain surfaces? No  · Tailbone Injury? No     Pain:  Location: L pelvic floor, L anterior hip/abdominal wall, L lower back  Current: mild, worst: severe, best: mild  Description: aching, tight, sharp  Aggravating Factors/Activities that cause symptoms: prolonged standing, penetration, orgasm  Easing Factors: rest, stretching, pelvic floor therapy    Imaging: no recent imaging pertinent to the pelvic floor    Prior Therapy: physical therapy following recent ACL reconstruction, as well as several rounds of pelvic floor physical therapy  Social History: lives in a house with steps to enter, with   Current exercise: walking  Occupation: Patient works as a , and job-related duties include prolonged standing/walking and some lifting  Prior Level of Function: moderate to severe pelvic and hip pain that limits ability to perform ADLs and functional mobility  Current Level of Function: moderate to severe pelvic and hip pain that limits ability to perform ADLs and functional mobility    Habitus: overweight  Abuse/Neglect: No     Patients goals: reduce pain, be able to return to recreational exercise     Medical History: Divya  has no past medical history on file.     Surgical History: Divya Salazar  has a past surgical history that includes Fulguration of endometriosis; arthroscopy, hip (Left, 3/18/2021); and Knee arthroscopy w/ ACL reconstruction (Left, 7/15/2021).    Medications: Divya has a current medication list which includes the following prescription(s): alprazolam, bupropion, cyclobenzaprine, duloxetine, gabapentin, sertraline, and triamcinolone acetonide 0.025%.    Allergies: Review of patient's allergies indicates:  No Known Allergies     OBJECTIVE     See EMR under MEDIA for written consent provided 4/29/2022  Chaperone: declined    ORTHO SCREEN  Posture in sitting:  slouched   Posture in standing: anterior pelvic tilt  Standing load transfer: mild unsteadiness with single-leg balance  Lumbar spine: pain with central PA mobilizations to L1-L3. Lumbar range of motion within functional limits, with end-range back pain  Palpation: tenderness to palpation of bilateral lumbar paraspinals, quadratus lumborum, glutes, deep rotators, as well as L pectineus, iliacus, iliopsoas    Hip Strength 4/29/2022   R hip flexion 4+/5   R hip external rotation 4/5   L hip flexion 4+/5   L hip external rotation 4-/5     Hip Range of Motion 4/29/2022   R internal rotation 15   L internal rotation 25     ABDOMINAL WALL ASSESSMENT  Palpation: tender, increased tension  and thickened   Pelvic Girdle Stability: Pt demonstrates moderately impaired ability to stabilize pelvis with Active Straight Leg Raise Test. Able to improve slightly with verbal/manual cues for transverse abdominus activation. Tends to demonstrate oblique-dominant abdominal wall contraction, but is able to perform transverse abdominis contraction with verbal and tactile cues  Scarring: small, well-healed laparoscopic scars  Pelvic Floor Muscle and Transverse Abdominus Synergy: present  Diastasis: present, 1-inch width, present with supine head lift    BREATHING MECHANICS ASSESSMENT   Thorax Assessment During Quiet Respiration: WNL excursion of ribcage and Decreased excursion of abdominal wall   Thorax Assessment During Deep Respiration: WNL excursion of ribcage and WNL excursion of abdominal wall    VAGINAL PELVIC FLOOR EXAM - external assessment  Introitus: WNL  Skin condition: WNL  Scarring: none   Sensation: numbness noted   Pain: none  Voluntary contraction: visible lift + glute activation  Voluntary relaxation: visible drop  Bearing down: bulge (small amplitude)    VAGINAL PELVIC FLOOR EXAM - internal assessment  Pain: tender areas noted as follows: bilateral periurethral muscles (with reproduction of urinary urgency), levator ani  (L significantly more tender), as well as L introitus  Sensation: able to localized pressure appropriately   Vaginal vault: stenotic   Muscle Bulk: hypertonus   Muscle Power: 3/5  Muscle Endurance: 6 seconds  # Reps To Fatigue: 4    Fast Contractions in 10 seconds: 4     Quality of contraction: slow rise, slow relaxation and incomplete relaxation   Specificity: patient contracts: glutes   Coordination: WNL   Prolapse check: not assessed    Limitation/Restriction for FOTO Pelvic Floor Surveys    Therapist reviewed FOTO scores for Divya Salazar on 4/29/2022.   FOTO documents entered into [employer] - see Media section.    Limitation Score:   PFDI Urinary: 4% impairment  PFDI Pain: 21% impairment  Urinary Problems: 34% impairment     TREATMENT     Total Treatment time (time-based codes) separate from the evaluation: 8 minutes     Therapeutic exercises to develop strength, endurance and core stabilization for 8 minutes - HEP building, diaphragmatic breathing, hip-opening stretches, transverse abdominis brace, bent knee fall out, abdominal wall massage    PATIENT EDUCATION AND HOME EXERCISES     Education provided: general anatomy/physiology of urinary & bowel system, benefits of treatment, and alternative methods of treatment were discussed with the patient. Additionally, Divya was provided education on pt prognosis, PT plan of care, pelvic floor anatomy & function, relationship between TrA & PFM, relationship between hips & PFM, relationship between pelvic floor dysfunction & lower back pain, etiology of stress urinary incontinence, the knack for management of stress urinary incontinence, timeline for muscle changes with consistent strength training, diaphragmatic breathing for pelvic floor muscle relaxation, hip-opening stretches for pelvic floor relaxation, use of therawand for pelvic floor muscle dysfunction and etiology & PT management of diastasis recti    Written Home Exercises provided: yes  Exercises were  "reviewed and Divya was able to demonstrate them prior to the end of the session. Divya demonstrated good understanding of the education provided. See EMR under "Patient Instructions" for exercises and education provided during session.    ASSESSMENT     Divya is a 36 y.o. female referred to outpatient Physical Therapy with a medical diagnosis of pelvic floor dysfunction. Pt presents with deficits to pelvic floor muscle strength, endurance, and coordination, as well as hip weakness, functional core weakness, pelvic girdle stability deficits, myofascial pain and diastasis recti. Symptoms appear consistent with dysfunction in the pelvic floor muscle, abdominal wall, and lumbar spine.     Patient prognosis is good  Divya will benefit from skilled outpatient physical therapy to address the deficits stated above and in the chart below, provide patient/family education, and to maximize the patient's level of independence.     Plan of care discussed with patient: yes  Patient's spiritual, cultural and educational needs considered, and the patient is agreeable to the plan of care and goals as stated below:     Anticipated Barriers for therapy: chronicity of condition    Medical Necessity is demonstrated by the following:  History  Co-morbidities and personal factors that may impact the plan of care Co-morbidities   History of ACL reconstruction, obesity, chronic pain, endometriosis    Personal Factors  lifestyle (stressful job)     moderate   Examination  Body structures and functions, activity limitations and participation restrictions that may impact the plan of care Body Regions/Systems/Functions:  poor trunk stability, pelvic floor tenderness, decreased pelvic muscle strength, decreased endurance of the pelvic muscles, increased tension of the pelvic muscles and poor coordination of pelvic floor muscles during ADL's leading to urinary or fecal leakage     Activity Limitations:  urgency , bearing down for BM, " initiating a BM, intercourse/vaginal exam/tampon use without pain, incontinence with ADLs and Pain with ADLs    Participation Restrictions:  all ADLs/iADLs uninterrupted by urinary incontinence/urgency/frequency, all ADLs/iADLs uninterrupted by discomfort associated with chronic constipation, social activities with friends/family, well woman's exam, relationship with spouse/partner, ADL participation affected by pain, regularly having a comfortable BM, work duties, exercise restrictions due to pain  and exercise restrictions due to incontinence     Activity limitations:   Learning and applying knowledge  no deficits    General Tasks and Commands  no deficits    Communication  no deficits    Mobility  lifting and carrying objects  walking  standing    Self care  no deficits    Domestic Life  doing house work (cleaning house, washing dishes, laundry)    Interactions/Relationships  intimate relationships    Life Areas  employment    Community and Social Life  community life  recreation and leisure       moderate   Clinical Presentation stable and uncomplicated low   Decision Making/ Complexity Score: moderate       Short Term Goals: 6 weeks   Pt to report 50% improvement in tenderness to palpation of pelvic floor to demonstrate improved trigger points and overactivity Established   Pt to report only rare incidences of urinary incontinence with coughing to demonstrate improved pelvic floor muscle coordination Established   Pt to report 50% improvement in pain with penetration (gynecological exam or intercourse) and orgasm to demonstrate improved pelvic floor muscle overactivity Established   Pt to be independent with introductory home exercise program Established     Long Term Goals: 12 weeks  Pt to report minimal/no tenderness to palpation of pelvic floor to demonstrate improved trigger points and overactivity Established   Pt to demonstrate an improved score in the FOTO Pelvic Pain (PFDI) survey to no more than 8%  impaired to demonstrate improving pelvic floor muscle tenderness, coordination. Established   Pt to deny pelvic pain with penetration (gynecological exam or intercourse) and orgasm to demonstrate improved pelvic floor muscle overactivity Established   Pt to increase pelvic floor strength to 4/5 to demonstrate improved strength needed for continence with ADLs. Established   Pt to be independent with advanced home exercise program Established   Pt to demonstrate bilateral hip strength of 5/5 for improved pelvic girdle support with mobility. Established   Pt to report minimal/no back pain with ADLs and functional mobility to demonstrate improved lumbopelvic muscle support, coordination Established       PLAN     Plan of Care certification: 4/29/2022 to 7/29/2022    Outpatient Physical Therapy - 1 time per week for 12 weeks to include the following interventions: Therapeutic Exercise, Manual Therapy, Therapeutic Activity, Neuromuscular Re-education, Electrical Stimulation Unattended, Self-Care, Patient Education    Jess Ureña, PT, DPT        I CERTIFY THE NEED FOR THESE SERVICES FURNISHED UNDER THIS PLAN OF TREATMENT AND WHILE UNDER MY CARE   Physician's comments:     Physician's Signature: ___________________________________________________

## 2022-05-09 ENCOUNTER — CLINICAL SUPPORT (OUTPATIENT)
Dept: REHABILITATION | Facility: OTHER | Age: 37
End: 2022-05-09
Attending: OBSTETRICS & GYNECOLOGY
Payer: MEDICAID

## 2022-05-09 DIAGNOSIS — M54.9 BACK PAIN, UNSPECIFIED BACK LOCATION, UNSPECIFIED BACK PAIN LATERALITY, UNSPECIFIED CHRONICITY: ICD-10-CM

## 2022-05-09 DIAGNOSIS — R29.898 DECREASED STRENGTH OF TRUNK AND BACK: ICD-10-CM

## 2022-05-09 DIAGNOSIS — R10.2 PELVIC PAIN: Primary | ICD-10-CM

## 2022-05-09 PROCEDURE — 97110 THERAPEUTIC EXERCISES: CPT | Performed by: PHYSICAL THERAPIST

## 2022-05-09 NOTE — PROGRESS NOTES
Pelvic Health Physical Therapy   Treatment Note     Name: Divya Jacinto [facility]  Clinic Number: [id number]    Therapy Diagnosis:   Encounter Diagnoses   Name Primary?    Pelvic pain Yes    Decreased strength of trunk and back     Back pain, unspecified back location, unspecified back pain laterality, unspecified chronicity      Referring Provider: Aga Arredondo MD    Visit Date: 5/9/2022    Referring Provider Orders: PT Eval and Treat  Medical Diagnosis from Referral: Pelvic floor dysfunction in female [M62.89]  Evaluation Date: 4/29/2022  Authorization Period Expiration: 12/31/2022  Plan of Care Expiration: 7/29/2022  Progress Note Due: 6/29/2022  Visit # / Visits authorized: 2/12  FOTO: 1/3 (4/29/2022)    Cancelled Visits: -  No Show Visits: -    Time In: 13:15  Time Out: 14:25  Total Billable Time: 70 minutes    Precautions: Standard    Subjective     Divya Jacinto reports significantly increased pain today after prolonged standing/activity for work this weekend.    She was compliant with home exercise program.  Response to previous treatment: no adverse effect  Functional change: slightly reduced urinary urgency    Pain: moderate  Location: L abdominal wall, L hip, pelvic floor    Objective     Divya Jacinto received the following interventions during the treatment session -  (TrA = transverse abdominis, PFM = pelvic floor muscle)    Therapeutic exercises to develop strength, endurance and core stabilization for 53 minutes -  [] Diaphragmatic breathing, 2 minutes - verbal and manual cues from PT  [x] Diaphragmatic breathing + TrA brace on exhale, x10 - verbal and manual cues from PT  [] Diaphragmatic breathing + PFM squeeze on exhale, x10 - verbal and manual cues from PT  [x] Lower trunk rotations and double-knees to chest with legs on theraball, x20 each  [x] Prone multifidi activation + hamstring curls (R&L), 2x10  [x] TrA brace + supine BKFO (R&L), x20  [] TrA brace + supine hip adduction isometric with ball,  x20  [x] TrA brace + supine march (R&L), x20  [] TrA brace + supine clam with green band, x20  [] Side-lying clam (R&L), 2x10  [] Side-lying reverse clam (R&L), 2x10  [] Side-lying hip abduction (R&L), 2x10  [x] Cat/cow with coordinated breath, 2 minutes  [] Happy baby stretch + diaphragmatic breathing, pelvic drop, 2 minutes  [] Child's pose + diaphragmatic breathing, pelvic drop, 2 minutes  [] Butterfly hip stretch + diaphragmatic breathing, pelvic drop, 2 minutes  [x] HEP review, patient education - performed during manual techniques    Manual therapy techniques: to develop flexibility, desensitization, and extensibility -  Pt consented to pelvic floor muscle assessment and treatment in prior visit -see EMR  [] Obturator release at ischial tuberosities + active release with resisted contralateral hip external rotation (B)  [] Manual release to pelvic floor muscles externally: levator ani, superficial transverse perineal, perineal body  [] Manual release to pelvic floor muscles internally/vaginally: levator ani, superficial transverse perineal, perineal body  [x] Manual release and myofascial decompression/cupping to lower abdominal wall  [x] Dry needling with electrical stimulation to L rectus abdominis and L multifidi of T12-L4 - no adverse effect, written consent provided 5/9/22 - see EMR      Home Exercises and Patient Education     Patient Education: progression of plan of care, plan for next session, pt prognosis, pelvic floor anatomy & function, relationship between TrA & PFM, relationship between hips & PFM, relationship between pelvic floor dysfunction & lower back pain, urge suppression, etiology of urinary urgency, abdominal wall massage for management of urinary urgency, diaphragmatic breathing for pelvic floor muscle relaxation, hip-opening stretches for pelvic floor relaxation and lumbar myotomes    Home Exercise Program: diaphragmatic breathing, hip-opening stretches, transverse abdominis brace,  bent knee fall out  Home Exercise Program Updates: none  Exercises were reviewed, and Divya was able to demonstrate them prior to the end of the session, as needed. Divya demonstrated good  understanding of the education provided.     Assessment     Pt tolerated treatment session well, with mild increased symptoms with exercise/treatment and improving exercise technique within session. Needling the upper lumbar spine reproduced L anterior hip/thigh pain, which suggests L1/L2 segmental dysfunction. Pt reported reduced symptoms at the conclusion of today's visit. Pt's functional mobility and ability to perform ADLs still limited by pain and weakness. She requires skilled therapy for continued patient education and progressive resistance exercise.    Divya Jacinto is progressing well towards her goals.   Pt prognosis: good    Pt will continue to benefit from skilled outpatient physical therapy to address the deficits listed in the problem list box on initial evaluation, provide pt/family education and to maximize pt's level of independence in the home and community environment.     Pt's spiritual, cultural and educational needs considered and pt agreeable to plan of care and goals.  Anticipated barriers to physical therapy: none    Short Term Goals: 6 weeks   Pt to report 50% improvement in tenderness to palpation of pelvic floor to demonstrate improved trigger points and overactivity Established   Pt to report only rare incidences of urinary incontinence with coughing to demonstrate improved pelvic floor muscle coordination Established   Pt to report 50% improvement in pain with penetration (gynecological exam or intercourse) and orgasm to demonstrate improved pelvic floor muscle overactivity Established   Pt to be independent with introductory home exercise program Established      Long Term Goals: 12 weeks  Pt to report minimal/no tenderness to palpation of pelvic floor to demonstrate improved trigger points and  overactivity Established   Pt to demonstrate an improved score in the FOTO Pelvic Pain (PFDI) survey to no more than 8% impaired to demonstrate improving pelvic floor muscle tenderness, coordination. Established   Pt to deny pelvic pain with penetration (gynecological exam or intercourse) and orgasm to demonstrate improved pelvic floor muscle overactivity Established   Pt to increase pelvic floor strength to 4/5 to demonstrate improved strength needed for continence with ADLs. Established   Pt to be independent with advanced home exercise program Established   Pt to demonstrate bilateral hip strength of 5/5 for improved pelvic girdle support with mobility. Established   Pt to report minimal/no back pain with ADLs and functional mobility to demonstrate improved lumbopelvic muscle support, coordination Established        Plan     Continue per Plan of Care      Jess Ureña, PT, DPT

## 2022-05-16 NOTE — PROGRESS NOTES
Pelvic Health Physical Therapy   Treatment Note     Name: Divya Jacinto [facility]  Clinic Number: [id number]    Therapy Diagnosis:   Encounter Diagnoses   Name Primary?    Pelvic pain Yes    Decreased strength of trunk and back     Back pain, unspecified back location, unspecified back pain laterality, unspecified chronicity      Referring Provider: Arnulfo    Visit Date: 5/18/2022    Referring Provider Orders: PT Eval and Treat  Medical Diagnosis from Referral: Pelvic floor dysfunction in female [M62.89]  Evaluation Date: 4/29/2022  Authorization Period Expiration: 12/31/2022  Plan of Care Expiration: 7/29/2022  Progress Note Due: 6/29/2022  Visit # / Visits authorized: 3/12  FOTO: 1/3 (4/29/2022)    Cancelled Visits: -  No Show Visits: -    Time In: 13:03  Time Out: 14:05  Total Billable Time: 62 minutes    Precautions: Standard    Subjective     Divya Jacinto reports severely increased back and abdominal pain after last visit, as well as more difficulty with bowel movements. Her symptoms have since subsided, and she is just feeling sore today. She was able to tolerate penetration and orgasm without significant pain this weekend, but only with her 's penis angled towards the L.    She was compliant with home exercise program - stretching, not strengthening  Response to previous treatment: no adverse effect  Functional change: slightly reduced urinary urgency    Pain: moderate  Location: L abdominal wall, L hip, pelvic floor    Objective     Divya Jacinto received the following interventions during the treatment session -  (TrA = transverse abdominis, PFM = pelvic floor muscle)    Therapeutic exercises to develop strength, endurance and core stabilization for 53 minutes -  [x] Diaphragmatic breathing + pelvic drop, 6 minutes - verbal and manual cues from PT, performed during manual techniques  [x] Diaphragmatic breathing + TrA brace on exhale, x15 - verbal and manual cues from PT  [x] Diaphragmatic breathing + PFM  squeeze on exhale, x15 - verbal and manual cues from PT  [] Lower trunk rotations and double-knees to chest with legs on theraball, x20 each  [] Prone multifidi activation + hamstring curls (R&L), 2x10  [] TrA brace + supine BKFO (R&L), x20  [] TrA brace + supine hip adduction isometric with ball, x20  [] TrA brace + supine march (R&L), x20  [] TrA brace + supine clam with green band, x20  [x] Standing pallof press with blue band (R&L), x12  [x] TrA brace + side-lying clam (R&L), x20  [] Side-lying reverse clam (R&L), 2x10  [] Side-lying hip abduction (R&L), 2x10  [x] Cat/cow with coordinated breath, 2 minutes  [] Happy baby stretch + diaphragmatic breathing, pelvic drop, 2 minutes  [] Child's pose + diaphragmatic breathing, pelvic drop, 2 minutes  [] Butterfly hip stretch + diaphragmatic breathing, pelvic drop, 2 minutes  [x] HEP review, patient education - performed during manual techniques     Manual therapy techniques: to develop flexibility, desensitization, and extensibility -  Pt consented to pelvic floor muscle assessment and treatment in prior visit -see EMR  [] Obturator release at ischial tuberosities + active release with resisted contralateral hip external rotation (B)  [] Manual release to pelvic floor muscles externally: levator ani   [x] Manual release to pelvic floor muscles internally/vaginally:levator ani, obturator internus, and piriformis - mild/moderate tenderness that improved within a few minutes of release. PT provided cues for diaphragmatic breathing, visualization, and relaxation.  [x] Manual release and myofascial decompression/cupping to lower abdominal wall  [x] Dry needling with electrical stimulation to L multifidi of T12-L5 and L piriformis - no adverse effect, written consent provided 5/9/22 - see EMR      Home Exercises and Patient Education     Patient Education: progression of plan of care, plan for next session, pt prognosis, pelvic floor anatomy & function, relationship between  TrA & PFM, relationship between hips & PFM, relationship between pelvic floor dysfunction & lower back pain, urge suppression, etiology of urinary urgency, abdominal wall massage for management of urinary urgency, diaphragmatic breathing for pelvic floor muscle relaxation, hip-opening stretches for pelvic floor relaxation and lumbar myotomes/dermatomes    Home Exercise Program: diaphragmatic breathing, hip-opening stretches, transverse abdominis brace, bent knee fall out  Home Exercise Program Updates: none  Exercises were reviewed, and Divya was able to demonstrate them prior to the end of the session, as needed. Divya demonstrated good  understanding of the education provided.     Assessment     Pt tolerated treatment session well, with mild increased symptoms with exercise/treatment and improving exercise technique within session. Overall reduced PFM tension and tenderness today. Pt reported reduced symptoms at the conclusion of today's visit. Pt's functional mobility and ability to perform ADLs still limited by pain and weakness. She requires skilled therapy for continued patient education and progressive resistance exercise.    Divya Jacinto is progressing well towards her goals.   Pt prognosis: good    Pt will continue to benefit from skilled outpatient physical therapy to address the deficits listed in the problem list box on initial evaluation, provide pt/family education and to maximize pt's level of independence in the home and community environment.     Pt's spiritual, cultural and educational needs considered and pt agreeable to plan of care and goals.  Anticipated barriers to physical therapy: none      Short Term Goals: 6 weeks   Pt to report 50% improvement in tenderness to palpation of pelvic floor to demonstrate improved trigger points and overactivity Partially Met   Pt to report only rare incidences of urinary incontinence with coughing to demonstrate improved pelvic floor muscle coordination Not  Met   Pt to report 50% improvement in pain with penetration (gynecological exam or intercourse) and orgasm to demonstrate improved pelvic floor muscle overactivity Partially Met   Pt to be independent with introductory home exercise program Partially Met      Long Term Goals: 12 weeks  Pt to report minimal/no tenderness to palpation of pelvic floor to demonstrate improved trigger points and overactivity Not Met   Pt to demonstrate an improved score in the FOTO Pelvic Pain (PFDI) survey to no more than 8% impaired to demonstrate improving pelvic floor muscle tenderness, coordination. Not Met   Pt to deny pelvic pain with penetration (gynecological exam or intercourse) and orgasm to demonstrate improved pelvic floor muscle overactivity Not Met   Pt to increase pelvic floor strength to 4/5 to demonstrate improved strength needed for continence with ADLs. Not Met   Pt to be independent with advanced home exercise program Not Met   Pt to demonstrate bilateral hip strength of 5/5 for improved pelvic girdle support with mobility. Not Met   Pt to report minimal/no back pain with ADLs and functional mobility to demonstrate improved lumbopelvic muscle support, coordination Not Met        Plan     Continue per Plan of Care.  Pt encouraged to perform strengthening exercises this week.      Jess Ureña, PT, DPT

## 2022-05-18 ENCOUNTER — CLINICAL SUPPORT (OUTPATIENT)
Dept: REHABILITATION | Facility: OTHER | Age: 37
End: 2022-05-18
Attending: OBSTETRICS & GYNECOLOGY
Payer: MEDICAID

## 2022-05-18 DIAGNOSIS — M54.9 BACK PAIN, UNSPECIFIED BACK LOCATION, UNSPECIFIED BACK PAIN LATERALITY, UNSPECIFIED CHRONICITY: ICD-10-CM

## 2022-05-18 DIAGNOSIS — R29.898 DECREASED STRENGTH OF TRUNK AND BACK: ICD-10-CM

## 2022-05-18 DIAGNOSIS — R10.2 PELVIC PAIN: Primary | ICD-10-CM

## 2022-05-18 PROCEDURE — 97110 THERAPEUTIC EXERCISES: CPT | Performed by: PHYSICAL THERAPIST

## 2022-05-19 NOTE — PROGRESS NOTES
Pelvic Health Physical Therapy   Treatment Note     Name: Divya Jacinto [facility]  Clinic Number: [id number]    Therapy Diagnosis:   Encounter Diagnoses   Name Primary?    Pelvic pain Yes    Decreased strength of trunk and back     Back pain, unspecified back location, unspecified back pain laterality, unspecified chronicity      Referring Provider: Arnulfo    Visit Date: 5/25/2022    Referring Provider Orders: PT Eval and Treat  Medical Diagnosis from Referral: Pelvic floor dysfunction in female [M62.89]  Evaluation Date: 4/29/2022  Authorization Period Expiration: 12/31/2022  Plan of Care Expiration: 7/29/2022  Progress Note Due: 6/29/2022  Visit # / Visits authorized: 4/12  FOTO: 1/3 (4/29/2022)    Cancelled Visits: -  No Show Visits: -    Time In: 11:00  Time Out: 12:05  Total Billable Time: 65 minutes    Precautions: Standard    Subjective     Divya Jacinto reports increased symptoms this week after a stressful week at work and the death of a friend.     She was compliant with home exercise program - stretching, not strengthening  Response to previous treatment: no adverse effect  Functional change: slightly reduced urinary urgency    Pain: moderate  Location: L abdominal wall, L hip, pelvic floor    Objective     Divya Jacinto received the following interventions during the treatment session -  (TrA = transverse abdominis, PFM = pelvic floor muscle)    Therapeutic exercises to develop strength, endurance and core stabilization for 53 minutes -  [x] Diaphragmatic breathing + pelvic drop, 6 minutes - verbal and manual cues from PT, performed during manual techniques  [x] Diaphragmatic breathing + TrA brace on exhale, x20 - verbal and manual cues from PT  [x] Diaphragmatic breathing + PFM squeeze on exhale, x20 with 50% effort - verbal and manual cues from PT  [] Lower trunk rotations and double-knees to chest with legs on theraball, x20 each  [] Prone multifidi activation + hamstring curls (R&L), 2x10  [] TrA brace +  supine BKFO (R&L), x20  [] TrA brace + supine hip adduction isometric with ball, x20  [] TrA brace + supine straight leg raise (R&L), x5  [] TrA brace + supine clam with green band, x20  [] Standing pallof press with blue band (R&L), x12  [] TrA brace + side-lying clam (R&L), x20  [] Side-lying reverse clam (R&L), 2x10   [x] Side-lying hip abduction (R&L), 2x8  [x] Cat/cow with coordinated breath, 2 minutes  [x] Happy baby stretch + diaphragmatic breathing, pelvic drop, 1 minute  [] Child's pose + diaphragmatic breathing, pelvic drop, 2 minutes  [] Butterfly hip stretch + diaphragmatic breathing, pelvic drop, 2 minutes  [x] HEP review, patient education - performed during manual techniques     Manual therapy techniques: to develop flexibility, desensitization, and extensibility -  Pt consented to pelvic floor muscle assessment and treatment in prior visit -see EMR  [] Obturator release at ischial tuberosities + active release with resisted contralateral hip external rotation (B)  [] Manual release to pelvic floor muscles externally: levator ani   [x] Manual release to pelvic floor muscles internally/vaginally:levator ani, obturator internus, and piriformis - mild/moderate tenderness that improved within a few minutes of release. PT provided cues for diaphragmatic breathing, visualization, and relaxation.  [x] Manual release and myofascial decompression/cupping to lower abdominal wall  [x] Dry needling with electrical stimulation to L multifidi of L1-L5 and L piriformis, obturator internus, gluteus minimus, and gluteus medius - no adverse effect, written consent provided 5/9/22 - see EMR      Home Exercises and Patient Education     Patient Education: progression of plan of care, plan for next session, pt prognosis, pelvic floor anatomy & function, relationship between TrA & PFM, relationship between hips & PFM, relationship between pelvic floor dysfunction & lower back pain, urge suppression, etiology of urinary  urgency, abdominal wall massage for management of urinary urgency, diaphragmatic breathing for pelvic floor muscle relaxation, hip-opening stretches for pelvic floor relaxation and lumbar myotomes/dermatomes    Home Exercise Program: diaphragmatic breathing, hip-opening stretches, transverse abdominis brace, bent knee fall out  Home Exercise Program Updates: none  Exercises were reviewed, and Divya was able to demonstrate them prior to the end of the session, as needed. Divya demonstrated good  understanding of the education provided.     Assessment     Pt tolerated treatment session well, with mild increased symptoms with exercise/treatment and improving exercise technique within session. Overall reduced PFM tension and tenderness today. Pt reported reduced symptoms at the conclusion of today's visit. Pt's functional mobility and ability to perform ADLs still limited by pain and weakness. She requires skilled therapy for continued patient education and progressive resistance exercise.    Divya Jacinto is progressing well towards her goals.   Pt prognosis: good    Pt will continue to benefit from skilled outpatient physical therapy to address the deficits listed in the problem list box on initial evaluation, provide pt/family education and to maximize pt's level of independence in the home and community environment.     Pt's spiritual, cultural and educational needs considered and pt agreeable to plan of care and goals.  Anticipated barriers to physical therapy: none      Short Term Goals: 6 weeks   Pt to report 50% improvement in tenderness to palpation of pelvic floor to demonstrate improved trigger points and overactivity Partially Met   Pt to report only rare incidences of urinary incontinence with coughing to demonstrate improved pelvic floor muscle coordination Not Met   Pt to report 50% improvement in pain with penetration (gynecological exam or intercourse) and orgasm to demonstrate improved pelvic floor  muscle overactivity Partially Met   Pt to be independent with introductory home exercise program Partially Met      Long Term Goals: 12 weeks  Pt to report minimal/no tenderness to palpation of pelvic floor to demonstrate improved trigger points and overactivity Not Met   Pt to demonstrate an improved score in the FOTO Pelvic Pain (PFDI) survey to no more than 8% impaired to demonstrate improving pelvic floor muscle tenderness, coordination. Not Met   Pt to deny pelvic pain with penetration (gynecological exam or intercourse) and orgasm to demonstrate improved pelvic floor muscle overactivity Not Met   Pt to increase pelvic floor strength to 4/5 to demonstrate improved strength needed for continence with ADLs. Not Met   Pt to be independent with advanced home exercise program Not Met   Pt to demonstrate bilateral hip strength of 5/5 for improved pelvic girdle support with mobility. Not Met   Pt to report minimal/no back pain with ADLs and functional mobility to demonstrate improved lumbopelvic muscle support, coordination Not Met        Plan     Continue per Plan of Care.  Pt encouraged to perform strengthening exercises this week.      Jess Ureña, PT, DPT

## 2022-05-25 ENCOUNTER — CLINICAL SUPPORT (OUTPATIENT)
Dept: REHABILITATION | Facility: OTHER | Age: 37
End: 2022-05-25
Attending: OBSTETRICS & GYNECOLOGY
Payer: MEDICAID

## 2022-05-25 DIAGNOSIS — R10.2 PELVIC PAIN: Primary | ICD-10-CM

## 2022-05-25 DIAGNOSIS — M54.9 BACK PAIN, UNSPECIFIED BACK LOCATION, UNSPECIFIED BACK PAIN LATERALITY, UNSPECIFIED CHRONICITY: ICD-10-CM

## 2022-05-25 DIAGNOSIS — R29.898 DECREASED STRENGTH OF TRUNK AND BACK: ICD-10-CM

## 2022-05-25 PROCEDURE — 97110 THERAPEUTIC EXERCISES: CPT | Performed by: PHYSICAL THERAPIST

## 2022-05-26 NOTE — PROGRESS NOTES
"Pelvic Health Physical Therapy   Treatment Note     Name: Divya Jacinto [facility]  Clinic Number: [id number]    Therapy Diagnosis:   Encounter Diagnoses   Name Primary?    Pelvic pain Yes    Decreased strength of trunk and back     Chronic left-sided low back pain without sciatica      Referring Provider: Arnulfo    Visit Date: 6/1/2022    Referring Provider Orders: PT Eval and Treat  Medical Diagnosis from Referral: Pelvic floor dysfunction in female [M62.89]  Evaluation Date: 4/29/2022  Authorization Period Expiration: 7/9/2022  Plan of Care Expiration: 7/29/2022  Progress Note Due: 6/29/2022  Visit # / Visits authorized: 5/12  FOTO: 1/3 (4/29/2022)    Cancelled Visits: -  No Show Visits: -    Time In: 11:00  Time Out: 12:00  Total Billable Time: 60 minutes    Precautions: Standard    Subjective     Divya Jacinto reports continued increased symptoms, but it's better than last week. She reports a "marginal" difference in her symptoms since starting therapy, usually dependent on stress.    She was compliant with home exercise program - more consistent strengthening this week  Response to previous treatment: no adverse effect  Functional change: slightly reduced urinary urgency, slightly improved pain    Pain: moderate  Location: L abdominal wall, L hip, pelvic floor    Objective     Divya Jacinto received the following interventions during the treatment session -  (TrA = transverse abdominis, PFM = pelvic floor muscle)    Therapeutic exercises to develop strength, endurance and core stabilization for 53 minutes -  [x] Diaphragmatic breathing + pelvic drop, 6 minutes - verbal and manual cues from PT, performed during manual techniques  [] Diaphragmatic breathing + TrA brace on exhale, x20 - verbal and manual cues from PT  [] Diaphragmatic breathing + PFM squeeze on exhale, x20 with 50% effort - verbal and manual cues from PT  [] Lower trunk rotations and double-knees to chest with legs on theraball, x20 each  [x] Short arc " quads for gentle multifidi activation (R&L), x20 - performed during manual interventions to abdominal wall  [x] TrA brace + bridging with belt around knees, 3x5  [] TrA brace + supine hip adduction isometric with ball, x20  [x] TrA brace + supine march (R&L), x20  [x] TrA brace + supine clam with blue band, x30  [] Standing pallof press with blue band (R&L), x12  [] TrA brace + side-lying clam (R&L), x20  [] Side-lying reverse clam (R&L), 2x10   [x] TrA brace + side-lying hip abduction (R&L), 2x8  [] Cat/cow with coordinated breath, 2 minutes  [] Happy baby stretch + diaphragmatic breathing, pelvic drop, 1 minute  [] Child's pose + diaphragmatic breathing, pelvic drop, 2 minutes  [x] Butterfly hip stretch + diaphragmatic breathing, pelvic drop, 2 minutes  [x] Pelvic floor relaxation guided meditation - performed during e-stim for dry needling  [x] HEP review, patient education - performed during manual techniques     Manual therapy techniques: to develop flexibility, desensitization, and extensibility -  Pt consented to pelvic floor muscle assessment and treatment in prior visit -see EMR  [] Obturator release at ischial tuberosities + active release with resisted contralateral hip external rotation (B)  [] Manual release to pelvic floor muscles externally: levator ani   [] Manual release to pelvic floor muscles internally/vaginally: levator ani, obturator internus, and piriformis - mild/moderate tenderness that improved within a few minutes of release. PT provided cues for diaphragmatic breathing, visualization, and relaxation.  [x] Manual release and myofascial decompression/cupping to lower abdominal wall  [x] Dry needling with electrical stimulation to L multifidi of L2, L3, L5 & S1, L obturator internus, tensor fascia latae, gluteus minimus, and gluteus medius - no adverse effect, written consent provided 5/9/22 - see EMR   [x] Lateral distraction to L hip with belt      Home Exercises and Patient Education      Patient Education: progression of plan of care, plan for next session, pt prognosis, pelvic floor anatomy & function, relationship between TrA & PFM, relationship between hips & PFM, relationship between pelvic floor dysfunction & lower back pain, urge suppression, etiology of urinary urgency, abdominal wall massage for management of urinary urgency, diaphragmatic breathing for pelvic floor muscle relaxation, hip-opening stretches for pelvic floor relaxation and lumbar myotomes/dermatomes    Home Exercise Program: diaphragmatic breathing, hip-opening stretches, transverse abdominis brace, bent knee fall out  Home Exercise Program Updates: none  Exercises were reviewed, and Divya Jacinto was able to demonstrate them prior to the end of the session, as needed. Divya demonstrated good  understanding of the education provided.     Assessment     Pt tolerated treatment session well, with mild increased symptoms with exercise/treatment and improving exercise technique within session. Pt reported soreness at the conclusion of today's visit. Pt's functional mobility and ability to perform ADLs still limited by pain and weakness. She requires skilled therapy for continued patient education and progressive resistance exercise.    Divya Jacinto is progressing well towards her goals.   Pt prognosis: good    Pt will continue to benefit from skilled outpatient physical therapy to address the deficits listed in the problem list box on initial evaluation, provide pt/family education and to maximize pt's level of independence in the home and community environment.     Pt's spiritual, cultural and educational needs considered and pt agreeable to plan of care and goals.  Anticipated barriers to physical therapy: none      Short Term Goals: 6 weeks   Pt to report 50% improvement in tenderness to palpation of pelvic floor to demonstrate improved trigger points and overactivity Partially Met   Pt to report only rare incidences of  urinary incontinence with coughing to demonstrate improved pelvic floor muscle coordination Not Met   Pt to report 50% improvement in pain with penetration (gynecological exam or intercourse) and orgasm to demonstrate improved pelvic floor muscle overactivity Partially Met   Pt to be independent with introductory home exercise program Partially Met      Long Term Goals: 12 weeks  Pt to report minimal/no tenderness to palpation of pelvic floor to demonstrate improved trigger points and overactivity Not Met   Pt to demonstrate an improved score in the FOTO Pelvic Pain (PFDI) survey to no more than 8% impaired to demonstrate improving pelvic floor muscle tenderness, coordination. Not Met   Pt to deny pelvic pain with penetration (gynecological exam or intercourse) and orgasm to demonstrate improved pelvic floor muscle overactivity Not Met   Pt to increase pelvic floor strength to 4/5 to demonstrate improved strength needed for continence with ADLs. Not Met   Pt to be independent with advanced home exercise program Not Met   Pt to demonstrate bilateral hip strength of 5/5 for improved pelvic girdle support with mobility. Not Met   Pt to report minimal/no back pain with ADLs and functional mobility to demonstrate improved lumbopelvic muscle support, coordination Not Met        Plan     Continue per Plan of Care.      Jess Ureña, PT, DPT

## 2022-06-01 ENCOUNTER — CLINICAL SUPPORT (OUTPATIENT)
Dept: REHABILITATION | Facility: OTHER | Age: 37
End: 2022-06-01
Attending: OBSTETRICS & GYNECOLOGY
Payer: MEDICAID

## 2022-06-01 DIAGNOSIS — R10.2 PELVIC PAIN: Primary | ICD-10-CM

## 2022-06-01 DIAGNOSIS — R29.898 DECREASED STRENGTH OF TRUNK AND BACK: ICD-10-CM

## 2022-06-01 DIAGNOSIS — G89.29 CHRONIC LEFT-SIDED LOW BACK PAIN WITHOUT SCIATICA: ICD-10-CM

## 2022-06-01 DIAGNOSIS — M54.50 CHRONIC LEFT-SIDED LOW BACK PAIN WITHOUT SCIATICA: ICD-10-CM

## 2022-06-01 PROCEDURE — 97110 THERAPEUTIC EXERCISES: CPT | Performed by: PHYSICAL THERAPIST

## 2022-06-07 NOTE — PROGRESS NOTES
Pelvic Health Physical Therapy   Treatment Note     Name: Divya Jacinto [facility]  Clinic Number: [id number]    Therapy Diagnosis:   Encounter Diagnoses   Name Primary?    Pelvic pain Yes    Decreased strength of trunk and back     Back pain, unspecified back location, unspecified back pain laterality, unspecified chronicity      Referring Provider: Arnulfo    Visit Date: 6/8/2022    Referring Provider Orders: PT Eval and Treat  Medical Diagnosis from Referral: Pelvic floor dysfunction in female [M62.89]  Evaluation Date: 4/29/2022  Authorization Period Expiration: 7/9/2022  Plan of Care Expiration: 7/29/2022  Progress Note Due: 6/29/2022  Visit # / Visits authorized: 6/12  FOTO: 1/3 (4/29/2022)    Cancelled Visits: -  No Show Visits: -    Time In: 14:00  Time Out: 15:00  Total Billable Time: 60 minutes    Precautions: Standard    Subjective     Divya Jacinto reports having symptom flare-up on the way to therapy. She notes significant back, L hip, and pelvic pain. Pain increased with recent bowel movement. Urinary urgency hasn't been too bad. She's continued to feel sore after needling and wants to defer today.  Next appointment with referring provider in July.    She was somewhat compliant with home exercise program - performed once this last week, which pt attributes to depression (starting anti-depressants again that she stopped recently)  Response to previous treatment: no adverse effect  Functional change: improved urinary urgency, fluctuating improved symptoms    Pain: moderate/severe  Location: L abdominal wall, L hip, pelvic floor    Objective     Divya Jacinto received the following interventions during the treatment session -  (TrA = transverse abdominis, PFM = pelvic floor muscle)    Therapeutic exercises to develop strength, endurance and core stabilization for 53 minutes -  [x] Diaphragmatic breathing + pelvic drop, 6 minutes - verbal and manual cues from PT, performed during manual techniques  [x] Diaphragmatic  breathing + TrA brace on exhale, x20 - verbal and manual cues from PT  [] Diaphragmatic breathing + PFM squeeze on exhale, x20 with 50% effort - verbal and manual cues from PT  [x] Lower trunk rotations, x20  [] Short arc quads for gentle multifidi activation (R&L), x20 - performed during manual interventions to abdominal wall  [x] TrA brace + bridging with belt around knees, x15  [x] Lucian test stretch + march (L) - x15 - with trigger point release to iliopsoas  [x] Heel slides (L) - x15 - with trigger point release to iliopsoas  [] TrA brace + supine hip adduction isometric with ball, x20  [x] TrA brace + supine march (R&L), x20  [x] TrA brace + supine clam with blue band, x30  [] Standing pallof press with blue band (R&L), x12   [] TrA brace + side-lying clam (R&L), x20  [] Side-lying reverse clam (R&L), 2x10   [] TrA brace + side-lying hip abduction (R&L), 2x8  [] Cat/cow with coordinated breath, 2 minutes  [] Happy baby stretch + diaphragmatic breathing, pelvic drop, 1 minute  [] Child's pose + diaphragmatic breathing, pelvic drop, 2 minutes  [x] Butterfly hip stretch + diaphragmatic breathing, pelvic drop, 2 minutes  [] Pelvic floor relaxation guided meditation - performed during e-stim for dry needling  [x] HEP review, patient education - performed during manual techniques     Manual therapy techniques: to develop flexibility, desensitization, and extensibility -  Pt consented to pelvic floor muscle assessment and treatment in prior visit -see EMR  [] Obturator release at ischial tuberosities + active release with resisted contralateral hip external rotation (B)  [] Manual release to pelvic floor muscles externally: levator ani   [] Manual release to pelvic floor muscles internally/vaginally: levator ani, obturator internus, and piriformis - mild/moderate tenderness that improved within a few minutes of release. PT provided cues for diaphragmatic breathing, visualization, and relaxation.  [x] Manual release  and myofascial decompression/cupping to lower abdominal wall  [] Dry needling with electrical stimulation to L multifidi of L2, L3, L5 & S1, L obturator internus, tensor fascia latae, gluteus minimus, and gluteus medius - no adverse effect, written consent provided 5/9/22 - see EMR   [x] Lateral and long-arm distraction to L hip with belt  [x] Trigger point release to L iliopsoas - produced significant symptom-relief during pressure    Home Exercises and Patient Education     Patient Education: progression of plan of care, plan for next session, pt prognosis, pelvic floor anatomy & function, relationship between TrA & PFM, relationship between hips & PFM, relationship between pelvic floor dysfunction & lower back pain, urge suppression, etiology of urinary urgency, abdominal wall massage for management of urinary urgency, diaphragmatic breathing for pelvic floor muscle relaxation, hip-opening stretches for pelvic floor relaxation and lumbar myotomes/dermatomes    Home Exercise Program: diaphragmatic breathing, hip-opening stretches, transverse abdominis brace, bent knee fall out  Home Exercise Program Updates: none  Exercises were reviewed, and Divya Jacinto was able to demonstrate them prior to the end of the session, as needed. Divya demonstrated good  understanding of the education provided.     Assessment     Pt tolerated treatment session well, with improved pain levels after arriving amidst symptom flare-up. Resolution of symptoms with deep pressure to L iliopsoas points to hip flexor origin on symptoms, but this may also be related to mid-lumbar segmental dysfunction as it is innervated by L2-4. Pt's functional mobility and ability to perform ADLs still limited by pain and weakness. She requires skilled therapy for continued patient education and progressive resistance exercise.    Divya Jacinto is progressing well towards her goals.   Pt prognosis: good    Pt will continue to benefit from skilled outpatient  physical therapy to address the deficits listed in the problem list box on initial evaluation, provide pt/family education and to maximize pt's level of independence in the home and community environment.     Pt's spiritual, cultural and educational needs considered and pt agreeable to plan of care and goals.  Anticipated barriers to physical therapy: none      Short Term Goals: 6 weeks   Pt to report 50% improvement in tenderness to palpation of pelvic floor to demonstrate improved trigger points and overactivity Partially Met   Pt to report only rare incidences of urinary incontinence with coughing to demonstrate improved pelvic floor muscle coordination Not Met   Pt to report 50% improvement in pain with penetration (gynecological exam or intercourse) and orgasm to demonstrate improved pelvic floor muscle overactivity Partially Met   Pt to be independent with introductory home exercise program Partially Met      Long Term Goals: 12 weeks  Pt to report minimal/no tenderness to palpation of pelvic floor to demonstrate improved trigger points and overactivity Not Met   Pt to demonstrate an improved score in the FOTO Pelvic Pain (PFDI) survey to no more than 8% impaired to demonstrate improving pelvic floor muscle tenderness, coordination. Not Met   Pt to deny pelvic pain with penetration (gynecological exam or intercourse) and orgasm to demonstrate improved pelvic floor muscle overactivity Partially Met   Pt to increase pelvic floor strength to 4/5 to demonstrate improved strength needed for continence with ADLs. Not Met   Pt to be independent with advanced home exercise program Not Met   Pt to demonstrate bilateral hip strength of 5/5 for improved pelvic girdle support with mobility. Not Met   Pt to report minimal/no back pain with ADLs and functional mobility to demonstrate improved lumbopelvic muscle support, coordination Not Met        Plan     Continue per Plan of Care.      Jess Ureña, PT, DPT

## 2022-06-07 NOTE — PROGRESS NOTES
Pelvic Health Physical Therapy   Treatment Note     Name: Divya Jacinto [facility]  Clinic Number: [id number]    Therapy Diagnosis:   Encounter Diagnoses   Name Primary?    Pelvic pain Yes    Decreased strength of trunk and back     Chronic left-sided low back pain without sciatica      Referring Provider: Arnulfo    Visit Date: 6/13/2022    Referring Provider Orders: PT Eval and Treat  Medical Diagnosis from Referral: Pelvic floor dysfunction in female [M62.89]  Evaluation Date: 4/29/2022  Authorization Period Expiration: 7/9/2022  Plan of Care Expiration: 7/29/2022  Progress Note Due: 6/29/2022  Visit # / Visits authorized: 6/12  FOTO: 1/3 (4/29/2022)    Cancelled Visits: -  No Show Visits: -    Time In: 13:10  Time Out: 14:00  Total Billable Time: 50 minutes    Precautions: Standard    Subjective     Divya Jacinto reports continued increased pain this past week. She reports pain that radiates from her back, around to the front of her hip that makes it uncomfortable to perform movement and difficult to concentrate. She is planning to make an appointment with a spine specialist.   Next appointment with referring provider in July.    She was not compliant with home exercise program - having too much pain this week for strengthening  Response to previous treatment: no adverse effect  Functional change: fluctuating improvements in urinary urgency and back/hip/pelvic pain    Pain: moderate/severe  Location: L abdominal wall, L hip, pelvic floor    Objective     Divya Jacinto received the following interventions during the treatment session -  (TrA = transverse abdominis, PFM = pelvic floor muscle)    Therapeutic exercises to develop strength, endurance and core stabilization for 40 minutes -  [x] Diaphragmatic breathing + pelvic drop, 6 minutes - verbal and manual cues from PT, performed during manual techniques  [] Diaphragmatic breathing + TrA brace on exhale, x20 - verbal and manual cues from PT  [] Diaphragmatic breathing +  PFM squeeze on exhale, x20 with 50% effort - verbal and manual cues from PT  [x] Lower trunk rotations and double-knees to chest with legs on ball, x20 each  [] Short arc quads for gentle multifidi activation (R&L), x20 - performed during manual interventions to abdominal wall  [x] TrA brace + bridging with belt around knees, 2x5  [] Lucian test stretch + march (L) - x15 - with trigger point release to iliopsoas  [] Heel slides (L) - x15 - with trigger point release to iliopsoas  [] TrA brace + supine hip adduction isometric with ball, x20  [] TrA brace + supine march (R&L), x20  [x] TrA brace + supine clam with blue band, x30  [x] TrA brace + straight leg raise (R&L), 2x4  [] Standing pallof press with blue band (R&L), x12   [] TrA brace + side-lying clam (R&L), x20  [] Side-lying reverse clam (R&L), 2x10   [] TrA brace + side-lying hip abduction (R&L), 2x8  [] Cat/cow with coordinated breath, 2 minutes  [] Happy baby stretch + diaphragmatic breathing, pelvic drop, 1 minute  [] Child's pose + diaphragmatic breathing, pelvic drop, 2 minutes  [x] Butterfly hip stretch + diaphragmatic breathing, pelvic drop, 2 minutes  [] Pelvic floor relaxation guided meditation - performed during e-stim for dry needling  [x] HEP review, patient education - performed during manual techniques     Manual therapy techniques: to develop flexibility, desensitization, and extensibility -  Pt consented to pelvic floor muscle assessment and treatment in prior visit -see EMR  [] Obturator release at ischial tuberosities + active release with resisted contralateral hip external rotation (B)  [] Manual release to pelvic floor muscles externally: levator ani   [] Manual release to pelvic floor muscles internally/vaginally: levator ani, obturator internus, and piriformis - mild/moderate tenderness that improved within a few minutes of release. PT provided cues for diaphragmatic breathing, visualization, and relaxation.  [] Manual release and  myofascial decompression/cupping to lower abdominal wall  [x] Dry needling with electrical stimulation to L multifidi of L2, L3, L quadratus lumborum - no adverse effect, written consent provided 5/9/22 - see EMR   [] Lateral and long-arm distraction to L hip with belt  [] Trigger point release to L iliopsoas - produced significant symptom-relief during pressure  [x] Taping to bilateral lumbar paraspinals and across L3      Home Exercises and Patient Education     Patient Education: progression of plan of care, plan for next session, pt prognosis, pelvic floor anatomy & function, relationship between TrA & PFM, relationship between hips & PFM, relationship between pelvic floor dysfunction & lower back pain, urge suppression, etiology of urinary urgency, abdominal wall massage for management of urinary urgency, diaphragmatic breathing for pelvic floor muscle relaxation, hip-opening stretches for pelvic floor relaxation and lumbar myotomes/dermatomes    Home Exercise Program: diaphragmatic breathing, hip-opening stretches, transverse abdominis brace, bent knee fall out  Home Exercise Program Updates: none  Exercises were reviewed, and Divya Jacinto was able to demonstrate them prior to the end of the session, as needed. Divya demonstrated good  understanding of the education provided.      Assessment     Pt tolerated treatment session fairly well, good tolerance for exercise despite increased symptoms. Pt still experiencing increased symptoms that are not responding significantly well to physical therapy interventions, but she may improve again after resolution of this most recent flare-up, which has been protracted. Lumbar segmental dysfunction still appears to be driving pelvic and hip pain. Pt's functional mobility and ability to perform ADLs still limited by pain and weakness. She requires skilled therapy for continued patient education and progressive resistance exercise.    Divya Jacinto is progressing fairly  towards her goals.   Pt prognosis: good    Pt will continue to benefit from skilled outpatient physical therapy to address the deficits listed in the problem list box on initial evaluation, provide pt/family education and to maximize pt's level of independence in the home and community environment.     Pt's spiritual, cultural and educational needs considered and pt agreeable to plan of care and goals.  Anticipated barriers to physical therapy: none      Short Term Goals: 6 weeks   Pt to report 50% improvement in tenderness to palpation of pelvic floor to demonstrate improved trigger points and overactivity Partially Met   Pt to report only rare incidences of urinary incontinence with coughing to demonstrate improved pelvic floor muscle coordination Not Met   Pt to report 50% improvement in pain with penetration (gynecological exam or intercourse) and orgasm to demonstrate improved pelvic floor muscle overactivity Partially Met   Pt to be independent with introductory home exercise program Partially Met      Long Term Goals: 12 weeks  Pt to report minimal/no tenderness to palpation of pelvic floor to demonstrate improved trigger points and overactivity Not Met   Pt to demonstrate an improved score in the FOTO Pelvic Pain (PFDI) survey to no more than 8% impaired to demonstrate improving pelvic floor muscle tenderness, coordination. Not Met   Pt to deny pelvic pain with penetration (gynecological exam or intercourse) and orgasm to demonstrate improved pelvic floor muscle overactivity Partially Met   Pt to increase pelvic floor strength to 4/5 to demonstrate improved strength needed for continence with ADLs. Not Met   Pt to be independent with advanced home exercise program Not Met   Pt to demonstrate bilateral hip strength of 5/5 for improved pelvic girdle support with mobility. Not Met   Pt to report minimal/no back pain with ADLs and functional mobility to demonstrate improved lumbopelvic muscle support,  coordination Not Met        Plan     Continue per Plan of Care.      Jess Ureña, PT, DPT

## 2022-06-08 ENCOUNTER — CLINICAL SUPPORT (OUTPATIENT)
Dept: REHABILITATION | Facility: OTHER | Age: 37
End: 2022-06-08
Attending: OBSTETRICS & GYNECOLOGY
Payer: MEDICAID

## 2022-06-08 DIAGNOSIS — M54.9 BACK PAIN, UNSPECIFIED BACK LOCATION, UNSPECIFIED BACK PAIN LATERALITY, UNSPECIFIED CHRONICITY: ICD-10-CM

## 2022-06-08 DIAGNOSIS — R10.2 PELVIC PAIN: Primary | ICD-10-CM

## 2022-06-08 DIAGNOSIS — R29.898 DECREASED STRENGTH OF TRUNK AND BACK: ICD-10-CM

## 2022-06-08 PROCEDURE — 97110 THERAPEUTIC EXERCISES: CPT | Performed by: PHYSICAL THERAPIST

## 2022-06-13 ENCOUNTER — CLINICAL SUPPORT (OUTPATIENT)
Dept: REHABILITATION | Facility: OTHER | Age: 37
End: 2022-06-13
Attending: OBSTETRICS & GYNECOLOGY
Payer: MEDICAID

## 2022-06-13 DIAGNOSIS — M54.50 CHRONIC LEFT-SIDED LOW BACK PAIN WITHOUT SCIATICA: ICD-10-CM

## 2022-06-13 DIAGNOSIS — R29.898 DECREASED STRENGTH OF TRUNK AND BACK: ICD-10-CM

## 2022-06-13 DIAGNOSIS — R10.2 PELVIC PAIN: Primary | ICD-10-CM

## 2022-06-13 DIAGNOSIS — G89.29 CHRONIC LEFT-SIDED LOW BACK PAIN WITHOUT SCIATICA: ICD-10-CM

## 2022-06-13 PROCEDURE — 97110 THERAPEUTIC EXERCISES: CPT | Performed by: PHYSICAL THERAPIST

## 2022-06-15 NOTE — PROGRESS NOTES
Pelvic Health Physical Therapy   Treatment Note     Name: Divya Salazar  Clinic Number: [id number]    Therapy Diagnosis:   Encounter Diagnoses   Name Primary?    Pelvic pain Yes    Decreased strength of trunk and back     Chronic left-sided low back pain without sciatica      Referring Provider: Arnulfo    Visit Date: 6/20/2022    Referring Provider Orders: PT Eval and Treat  Medical Diagnosis from Referral: Pelvic floor dysfunction in female [M62.89]  Evaluation Date: 4/29/2022  Authorization Period Expiration: 7/9/2022  Plan of Care Expiration: 7/29/2022  Visit # / Visits authorized: 8/9 (eval + 8)  FOTO: 2/3 (4/29/2022, 6/20/2022)    Cancelled Visits: -  No Show Visits: -    Time In: 13:00  Time Out: 14:00  Total Billable Time: 60 minutes    Precautions: Standard    Subjective     Divya Jacinto reports mild pain this week, much improved after flare-up from last two sessions subsided.   Next appointment with referring provider in July.    She was compliant with home exercise program  Response to previous treatment: no adverse effect  Functional change: fluctuating improvements in urinary urgency and back/hip/pelvic pain    Pain: mild  Location: L abdominal wall, L hip, pelvic floor    Objective     Limitation/Restriction for FOTO Pelvic Floor Surveys     Therapist reviewed FOTO scores for Divya Salazar on 6/20/2022.   FOTO documents entered into [employer] - see Media section.     Limitation Scores   4/29/22  PFDI Urinary: 4% impairment  PFDI Pain: 21% impairment  Urinary Problems: 34% impairment    6/20/22  PFDI Urinary: 0% impairment  PFDI Pain: 33% impairment  Urinary Problems: 34% impairment        Divya Jacinto received the following interventions during the treatment session -  (TrA = transverse abdominis, PFM = pelvic floor muscle)    Therapeutic exercises to develop strength, endurance and core stabilization for 53 minutes -  [x] Diaphragmatic breathing + pelvic drop, 6 minutes - verbal and manual cues from  PT, performed during manual techniques  [] Diaphragmatic breathing + TrA brace on exhale, x20 - verbal and manual cues from PT  [] Diaphragmatic breathing + PFM squeeze on exhale, x20 with 50% effort - verbal and manual cues from PT  [] Lower trunk rotations and double-knees to chest with legs on ball, x20 each  [] Short arc quads for gentle multifidi activation (R&L), x20 - performed during manual interventions to abdominal wall  [x] TrA brace + bridging with belt around knees, 3x4  [] Lucian test stretch + march (L) - x15 - with trigger point release to iliopsoas  [] Heel slides (L) - x15 - with trigger point release to iliopsoas  [] TrA brace + supine hip adduction isometric with ball, x20  [x] TrA brace + supine march (R&L), x20  [] TrA brace + supine clam with blue band, x30  [x] TrA brace + straight leg raise (R&L), 2x5  [] Standing pallof press with blue band (R&L), x12   [] TrA brace + side-lying clam (R&L), x20  [] Side-lying reverse clam (R&L), 2x10   [x] TrA brace + side-lying hip abduction (R&L), 2x8  [x] Cat/cow with coordinated breath, 2 minutes  [x] TrA brace + quadruped hip extension (R&L), x8  [x] TrA brace + standing shoulder extension with green band (B), 2x10  [x] TrA brace + wall push up, 2x10  [] Happy baby stretch + diaphragmatic breathing, pelvic drop, 1 minute  [x] Hooklying figure-4 stretch + diaphragmatic breathing, pelvic drop (R&L), 1 minute  [] Child's pose + diaphragmatic breathing, pelvic drop, 2 minutes  [] Butterfly hip stretch + diaphragmatic breathing, pelvic drop, 2 minutes  [] Pelvic floor relaxation guided meditation - performed during e-stim for dry needling  [x] HEP review, patient education - performed during manual techniques     Manual therapy techniques: to develop flexibility, desensitization, and extensibility -  Pt consented to pelvic floor muscle assessment and treatment in prior visit -see EMR  [] Obturator release at ischial tuberosities + active release with  resisted contralateral hip external rotation (B)  [] Manual release to pelvic floor muscles externally: levator ani   [] Manual release to pelvic floor muscles internally/vaginally: levator ani, obturator internus, and piriformis - mild/moderate tenderness that improved within a few minutes of release. PT provided cues for diaphragmatic breathing, visualization, and relaxation.  [] Manual release and myofascial decompression/cupping to lower abdominal wall  [x] Dry needling with electrical stimulation to L multifidi of L2, L3, L quadratus lumborum - no adverse effect, written consent provided 5/9/22 - see EMR   [] Lateral and long-arm distraction to L hip with belt  [] Trigger point release to L iliopsoas - produced significant symptom-relief during pressure  [x] Taping to bilateral lumbar paraspinals and across L3      Home Exercises and Patient Education     Patient Education: progression of plan of care, plan for next session, pt prognosis, pelvic floor anatomy & function, relationship between TrA & PFM, relationship between hips & PFM, relationship between pelvic floor dysfunction & lower back pain, urge suppression, etiology of urinary urgency, abdominal wall massage for management of urinary urgency, diaphragmatic breathing for pelvic floor muscle relaxation, hip-opening stretches for pelvic floor relaxation and lumbar myotomes/dermatomes    Home Exercise Program: diaphragmatic breathing, hip-opening stretches, transverse abdominis brace, bent knee fall out  Home Exercise Program Updates: none  Exercises were reviewed, and Divya Jacinto was able to demonstrate them prior to the end of the session, as needed. Divya demonstrated good  understanding of the education provided.      Assessment     Pt tolerated treatment session well, with increased tolerance for exercise compared to the last two visits. Unable to tolerate higher reps on exercises due to pain with fatigue in the lower back and L anterior hip -  suggesting endurance deficits in the core and hips. Pt also reported lower abdominal muscle fatigue with new standing core exercises with use of arms. Lumbar segmental dysfunction still appears to be driving pelvic and hip pain. Pt's functional mobility and ability to perform ADLs still limited by pain and weakness. She requires skilled therapy for continued patient education and progressive resistance exercise.    Divya Jacinto is progressing fairly towards her goals.   Pt prognosis: good    Pt will continue to benefit from skilled outpatient physical therapy to address the deficits listed in the problem list box on initial evaluation, provide pt/family education and to maximize pt's level of independence in the home and community environment.     Pt's spiritual, cultural and educational needs considered and pt agreeable to plan of care and goals.  Anticipated barriers to physical therapy: none      Short Term Goals: 6 weeks   Pt to report 50% improvement in tenderness to palpation of pelvic floor to demonstrate improved trigger points and overactivity Partially Met   Pt to report only rare incidences of urinary incontinence with coughing to demonstrate improved pelvic floor muscle coordination Not Met   Pt to report 50% improvement in pain with penetration (gynecological exam or intercourse) and orgasm to demonstrate improved pelvic floor muscle overactivity Partially Met   Pt to be independent with introductory home exercise program Partially Met      Long Term Goals: 12 weeks  Pt to report minimal/no tenderness to palpation of pelvic floor to demonstrate improved trigger points and overactivity Not Met   Pt to demonstrate an improved score in the FOTO Pelvic Pain (PFDI) survey to no more than 8% impaired to demonstrate improving pelvic floor muscle tenderness, coordination. Not Met   Pt to deny pelvic pain with penetration (gynecological exam or intercourse) and orgasm to demonstrate improved pelvic floor muscle  overactivity Partially Met   Pt to increase pelvic floor strength to 4/5 to demonstrate improved strength needed for continence with ADLs. Not Met   Pt to be independent with advanced home exercise program Not Met   Pt to demonstrate bilateral hip strength of 5/5 for improved pelvic girdle support with mobility. Not Met   Pt to report minimal/no back pain with ADLs and functional mobility to demonstrate improved lumbopelvic muscle support, coordination Not Met        Plan     Continue per Plan of Care.      Jess Ureña, PT, DPT

## 2022-06-20 ENCOUNTER — CLINICAL SUPPORT (OUTPATIENT)
Dept: REHABILITATION | Facility: OTHER | Age: 37
End: 2022-06-20
Attending: OBSTETRICS & GYNECOLOGY
Payer: MEDICAID

## 2022-06-20 DIAGNOSIS — G89.29 CHRONIC LEFT-SIDED LOW BACK PAIN WITHOUT SCIATICA: ICD-10-CM

## 2022-06-20 DIAGNOSIS — R29.898 DECREASED STRENGTH OF TRUNK AND BACK: ICD-10-CM

## 2022-06-20 DIAGNOSIS — R10.2 PELVIC PAIN: Primary | ICD-10-CM

## 2022-06-20 DIAGNOSIS — M54.50 CHRONIC LEFT-SIDED LOW BACK PAIN WITHOUT SCIATICA: ICD-10-CM

## 2022-06-20 PROCEDURE — 97110 THERAPEUTIC EXERCISES: CPT | Performed by: PHYSICAL THERAPIST

## 2022-06-23 NOTE — PROGRESS NOTES
Pelvic Health Physical Therapy   Treatment Note     Name: Divya Jacinto [facility]  Clinic Number: [id number]    Therapy Diagnosis:   Encounter Diagnoses   Name Primary?    Pelvic pain Yes    Decreased strength of trunk and back     Chronic left-sided low back pain without sciatica      Referring Provider: Arnulfo    Visit Date: 6/27/2022    Referring Provider Orders: PT Eval and Treat  Medical Diagnosis from Referral: Pelvic floor dysfunction in female [M62.89]  Evaluation Date: 4/29/2022  Authorization Period Expiration: 7/9/2022  Plan of Care Expiration: 7/29/2022  Visit # / Visits authorized: 9/9 (eval + 8)  FOTO: 2/3 (4/29/2022, 6/20/2022)    Cancelled Visits: -  No Show Visits: -    Time In: 14:05  Time Out: 15:00  Total Billable Time: 55 minutes    Precautions: Standard    Subjective     Divya Jacinto reports milder pain this last week. 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 She was able to have an orgasm this last week with milder pain than usual (aided by stretching the hip immediately afterwards). She notes overall mild symptom-improvement since starting therapy, but she continues to experience fluctuating levels of pain (including episodes of debilitating pain).  Next appointment with referring provider in July.    She was compliant with home exercise program  Response to previous treatment: no adverse effect  Functional change: fluctuating improvements in urinary urgency and back/hip/pelvic pain    Pain: mild  Location: L abdominal wall, L hip, pelvic floor     Objective     Divya Jacinto received the following interventions during the treatment session -  (TrA = transverse abdominis, PFM = pelvic floor muscle)    Therapeutic exercises to develop strength, endurance and core stabilization for 53 minutes -  [x] Diaphragmatic breathing + pelvic drop, 6 minutes - verbal and manual cues from PT, performed during manual  techniques  [] Diaphragmatic breathing + TrA brace on exhale, x20 - verbal and manual cues from PT  [] Diaphragmatic breathing + PFM squeeze on exhale, x20 with 50% effort - verbal and manual cues from PT  [] Lower trunk rotations and double-knees to chest with legs on ball, x20 each  [] Short arc quads for gentle multifidi activation (R&L), x20 - performed during manual interventions to abdominal wall  [x] TrA brace + bridging with belt around knees, x8  [] Lucian test stretch + march (L) - x15 - with trigger point release to iliopsoas  [] Heel slides (L) - x15 - with trigger point release to iliopsoas  [] TrA brace + supine hip adduction isometric with ball, x20  [x] TrA brace + supine march (R&L), x20  [] TrA brace + supine clam with blue band, x30  [x] TrA brace + straight leg raise (R&L), 2x6  [] Standing pallof press with blue band (R&L), x12   [] TrA brace + side-lying hip abduction (R&L), 2x8  [x] Cat/cow with coordinated breath, 2 minutes  [x] TrA brace + quadruped hip extension (R&L), x10  [x] TrA brace + standing shoulder extension with green band (B), x10  [] TrA brace + wall push up, 2x10  [] Happy baby stretch + diaphragmatic breathing, pelvic drop, 1 minute  [x] Hooklying figure-4 stretch + diaphragmatic breathing, pelvic drop (R&L), 1 minute  [] Child's pose + diaphragmatic breathing, pelvic drop, 2 minutes  [] Butterfly hip stretch + diaphragmatic breathing, pelvic drop, 2 minutes  [] Pelvic floor relaxation guided meditation - performed during e-stim for dry needling  [x] HEP review, patient education - performed during manual techniques     Manual therapy techniques: to develop flexibility, desensitization, and extensibility -  Pt consented to pelvic floor muscle assessment and treatment in prior visit -see EMR  [] Obturator release at ischial tuberosities + active release with resisted contralateral hip external rotation (B)  [] Manual release to pelvic floor muscles externally: levator ani   []  Manual release to pelvic floor muscles internally/vaginally: levator ani, obturator internus, and piriformis - mild/moderate tenderness that improved within a few minutes of release. PT provided cues for diaphragmatic breathing, visualization, and relaxation.  [] Manual release and myofascial decompression/cupping to lower abdominal wall  [x] Dry needling with electrical stimulation to L pectineus, L iliopsoas, L multifidi of L2-3 - no adverse effect, written consent provided 5/9/22 - see EMR   [] Lateral and long-arm distraction to L hip with belt  [] Trigger point release to L iliopsoas - produced significant symptom-relief during pressure  [x] Taping to bilateral lumbar paraspinals and across L3      Home Exercises and Patient Education     Patient Education: progression of plan of care, plan for next session, pt prognosis, pelvic floor anatomy & function, relationship between TrA & PFM, relationship between hips & PFM, relationship between pelvic floor dysfunction & lower back pain, urge suppression, etiology of urinary urgency, abdominal wall massage for management of urinary urgency, diaphragmatic breathing for pelvic floor muscle relaxation, hip-opening stretches for pelvic floor relaxation and lumbar myotomes/dermatomes    Home Exercise Program: diaphragmatic breathing, hip-opening stretches, transverse abdominis brace, bent knee fall out  Updated Home Exercise Program (6/27/22): supine march, side-lying clams, bridging with band/belt around knees  Exercises were reviewed, and Divya Jacinto was able to demonstrate them prior to the end of the session, as needed. Divya Jacinto demonstrated good  understanding of the education provided.      Assessment     Pt tolerated treatment session well, with continued lower symptoms today and last visit, though she has had pain fluctuations as well. Lumbar segmental dysfunction still appears to be driving pelvic and hip pain. PT updated home exercise program to continue to  work on improving functional pelvic girdle stability. Pt's functional mobility and ability to perform ADLs still limited by pain and weakness. She requires skilled therapy for continued patient education and progressive resistance exercise.    Divya Jacinto is progressing fairly towards her goals.   Pt prognosis: good    Pt will continue to benefit from skilled outpatient physical therapy to address the deficits listed in the problem list box on initial evaluation, provide pt/family education and to maximize pt's level of independence in the home and community environment.     Pt's spiritual, cultural and educational needs considered and pt agreeable to plan of care and goals.  Anticipated barriers to physical therapy: none      Short Term Goals: 6 weeks   Pt to report 50% improvement in tenderness to palpation of pelvic floor to demonstrate improved trigger points and overactivity Partially Met   Pt to report only rare incidences of urinary incontinence with coughing to demonstrate improved pelvic floor muscle coordination Met   Pt to report 50% improvement in pain with penetration (gynecological exam or intercourse) and orgasm to demonstrate improved pelvic floor muscle overactivity Partially Met   Pt to be independent with introductory home exercise program Met      Long Term Goals: 12 weeks  Pt to report minimal/no tenderness to palpation of pelvic floor to demonstrate improved trigger points and overactivity Not Met   Pt to demonstrate an improved score in the FOTO Pelvic Pain (PFDI) survey to no more than 8% impaired to demonstrate improving pelvic floor muscle tenderness, coordination. Not Met   Pt to deny pelvic pain with penetration (gynecological exam or intercourse) and orgasm to demonstrate improved pelvic floor muscle overactivity Partially Met   Pt to increase pelvic floor strength to 4/5 to demonstrate improved strength needed for continence with ADLs. Not Met   Pt to be independent with advanced home  exercise program Not Met   Pt to demonstrate bilateral hip strength of 5/5 for improved pelvic girdle support with mobility. Not Met   Pt to report minimal/no back pain with ADLs and functional mobility to demonstrate improved lumbopelvic muscle support, coordination Not Met        Plan     Continue per Plan of Care.      Jess Ureña, PT, DPT

## 2022-06-27 ENCOUNTER — CLINICAL SUPPORT (OUTPATIENT)
Dept: REHABILITATION | Facility: OTHER | Age: 37
End: 2022-06-27
Attending: OBSTETRICS & GYNECOLOGY
Payer: MEDICAID

## 2022-06-27 DIAGNOSIS — G89.29 CHRONIC LEFT-SIDED LOW BACK PAIN WITHOUT SCIATICA: ICD-10-CM

## 2022-06-27 DIAGNOSIS — M54.50 CHRONIC LEFT-SIDED LOW BACK PAIN WITHOUT SCIATICA: ICD-10-CM

## 2022-06-27 DIAGNOSIS — R29.898 DECREASED STRENGTH OF TRUNK AND BACK: ICD-10-CM

## 2022-06-27 DIAGNOSIS — R10.2 PELVIC PAIN: Primary | ICD-10-CM

## 2022-06-27 PROCEDURE — 97110 THERAPEUTIC EXERCISES: CPT | Performed by: PHYSICAL THERAPIST

## 2022-06-27 NOTE — PATIENT INSTRUCTIONS
Supine march, 20 reps  - Inhale to prepare, relax the belly and pelvic floor  - As you exhale, gently engage the transverse abdominis by drawing the belly button down to the spine  - Holding the hips level and the belly button down, gently lift one foot a few inches  - Inhale again to rest, then alternate the legs  *Don't hold your breath      Side-lying clam, 2-3 sets of 10  - Inhale to prepare, relax the belly and pelvic floor  - As you exhale, gently engage the transverse abdominis by drawing the belly button in  - Holding the hips level and the belly button down, lift the top leg a few inches. Hold the hips stacked on top of each other, not rolling back with movement  - Inhale again to rest  *Don't hold your breath      Bridging, 3 sets of 5 (to be performed with belt or band around knees)  - Inhale to prepare, relax the belly and pelvic floor  - As you exhale, gently engage the transverse abdominis by drawing the belly button down to the spine  - Holding the belly button in, lift the hips (only lift as high as it is comfortable) and lower  - Inhale again to rest  *Don't hold your breath

## 2022-06-27 NOTE — PLAN OF CARE
[facility]   Pelvic Health Physical Therapy Reassessment    Date: 6/27/2022   Name: Divya Jacinto [facility]  Clinic Number: [id number]    Therapy Diagnosis:   Encounter Diagnoses   Name Primary?    Pelvic pain Yes    Decreased strength of trunk and back     Chronic left-sided low back pain without sciatica      Referring Provider: Aga Arredondo MD    Referring Provider Orders: PT Eval and Treat  Medical Diagnosis from Referral: Pelvic floor dysfunction in female [M62.89]  Evaluation Date: 4/29/2022  Authorization Period Expiration: 7/9/2022  Plan of Care Expiration: 7/29/2022  Visit # / Visits authorized: 9/9 (eval + 8)  FOTO: 2/3 (4/29/2022, 6/20/2022)      SUBJECTIVE     Divya Jacinto reports milder pain this last week. 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 She was able to have an orgasm this last week with milder pain than usual (aided by stretching the hip immediately afterwards). She notes overall mild symptom-improvement since starting therapy, but she continues to experience fluctuating levels of pain (including episodes of debilitating pain).    Pain  4/29/22: mild to severe  6/27/22: mild    OBJECTIVE     Pelvic floor  4/29/22: Tender areas noted as follows: bilateral periurethral muscles (with reproduction of urinary urgency), levator ani (L significantly more tender), as well as L introitus. Muscle Bulk: hypertonus. Muscle Power: 3/5. Muscle Endurance: 6 seconds. Fast Contractions in 10 seconds: 4. Quality of contraction: slow rise, slow relaxation and incomplete relaxation. Specificity: patient contracts: glutes   6/27/22: Not tested today but prior visits showed overall reduced tenderness, improved specificity.    Pelvic girdle stability  4/29/22: Pt demonstrates moderately impaired ability to stabilize pelvis with Active Straight Leg Raise Test. Able to improve slightly with  verbal/manual cues for transverse abdominus activation. Tends to demonstrate oblique-dominant abdominal wall contraction, but is able to perform transverse abdominis contraction with verbal and tactile cues  6/27/22: Mild pelvic girdle stability deficits. Good transverse abdominis contraction on command, no cuing required.    Hip Strength 4/29/2022 6/27/22   R hip flexion 4+/5 4+/5   R hip external rotation 4/5 4+/5   L hip flexion 4+/5 4+/5   L hip external rotation 4-/5 4/5      Hip Range of Motion 4/29/2022 6/27/22   R internal rotation 15°  30°    L internal rotation 25°  25°       Standing load transfer  4/29/22: Mild unsteadiness with single-leg balance   6/27/22: No unsteadiness    Lumbar spine  4/29/22: Pain with central PA mobilizations to L1-L3. Lumbar range of motion within functional limits, with end-range back pain  6/27/22: Continued pain with central PA mobilizations of upper lumbar spine but less tender overall     Limitation/Restriction for FOTO Pelvic Floor Surveys  FOTO documents entered into [employer] - see Media section.     Limitation Scores   4/29/22  PFDI Urinary: 4% impairment  PFDI Pain: 21% impairment  Urinary Problems: 34% impairment     6/20/22  PFDI Urinary: 0% impairment  PFDI Pain: 33% impairment  Urinary Problems: 34% impairment         ASSESSMENT     Pt has progressed fairly well with physical therapy, demonstrating small and flucuating improvements in pain levels, pelvic floor strength/coordination, hip/core strength, and activity tolerance. Pt continues to experience pain/difficulty with ADLs and functional mobility due to pelvic pain. Lumbar segmental dysfunction (particularly involving the L3 nerve root) appears to be driving symptoms, given concurrent back pain with pelvic pain, hip adductor/flexor myofascial pain, and unilateral symptoms. PT has incorporated lumbopelvic stability training into pelvic PT program, and pt appears to be responding well, albeit slowly, which is to be  expected given chronicity of condition and physical demands on her job (prolonged standing) which predictably exacerbate her symptoms. Pt has met/partially met some goals from the initial evaluation, and the rest of those goals remain appropriate for the plan of care. Pt will benefit from continued skilled therapy to provide targeted manual therapy and progressive resistance exercise.      Short Term Goals: 6 weeks   Pt to report 50% improvement in tenderness to palpation of pelvic floor to demonstrate improved trigger points and overactivity Partially Met   Pt to report only rare incidences of urinary incontinence with coughing to demonstrate improved pelvic floor muscle coordination Met   Pt to report 50% improvement in pain with penetration (gynecological exam or intercourse) and orgasm to demonstrate improved pelvic floor muscle overactivity Partially Met   Pt to be independent with introductory home exercise program Met      Long Term Goals: 12 weeks  Pt to report minimal/no tenderness to palpation of pelvic floor to demonstrate improved trigger points and overactivity Not Met   Pt to demonstrate an improved score in the FOTO Pelvic Pain (PFDI) survey to no more than 8% impaired to demonstrate improving pelvic floor muscle tenderness, coordination. Not Met   Pt to deny pelvic pain with penetration (gynecological exam or intercourse) and orgasm to demonstrate improved pelvic floor muscle overactivity Partially Met   Pt to increase pelvic floor strength to 4/5 to demonstrate improved strength needed for continence with ADLs. Not Met   Pt to be independent with advanced home exercise program Not Met   Pt to demonstrate bilateral hip strength of 5/5 for improved pelvic girdle support with mobility. Not Met   Pt to report minimal/no back pain with ADLs and functional mobility to demonstrate improved lumbopelvic muscle support, coordination Not Met        PLAN     Plan of Care certification: 6/27/2022 to  9/27/2022    Outpatient Physical Therapy - 1 time per week for 12 weeks to include the following interventions: Therapeutic Exercise, Manual Therapy, Therapeutic Activity, Neuromuscular Re-education, Electrical Stimulation Unattended, Self-Care, Patient Education    Jess Ureña, PT, DPT            I CERTIFY THE NEED FOR THESE SERVICES FURNISHED UNDER THIS PLAN OF TREATMENT AND WHILE UNDER MY CARE   Physician's comments:     Physician's Signature: ___________________________________________________

## 2022-06-30 NOTE — PROGRESS NOTES
Pelvic Health Physical Therapy   Treatment Note     Name: Divya Jacinto [facility]  Clinic Number: [id number]    Therapy Diagnosis:   Encounter Diagnoses   Name Primary?    Pelvic pain Yes    Decreased strength of trunk and back     Chronic left-sided low back pain without sciatica      Referring Provider: Arnulfo    Visit Date: 7/6/2022    Referring Provider Orders: PT Eval and Treat  Medical Diagnosis from Referral: Pelvic floor dysfunction in female [M62.89]  Evaluation Date: 4/29/2022  Last Reassessment: 7/6/2022, visit #10  Authorization Period Expiration: 7/9/2022  Plan of Care Expiration: 10/6/2022  Visit # / Visits authorized: 10  FOTO: 2/3 (4/29/2022, 6/20/2022)    Cancelled Visits: -  No Show Visits: -    Time In: 12:05  Time Out: 13:00  Total Billable Time: 55 minutes    Precautions: Standard    Subjective     Divya Jacinto reports increased pain this week, including waking up to radiating L anterior hip pain this morning. She is feeling emotional about how long she has had pain and how little symptom-relief she has gotten despite extensive medical management. In the past she has noted overall mild symptom-improvement since starting therapy, but she continues to experience fluctuating levels of pain (including episodes of debilitating pain); Today, she reports no significant change in symptom intensity or frequency.  Next appointment with referring provider in July.    She was compliant with home exercise program - groin and posterior hip pain in second set of clams, anterior hip soreness after 10+ reps of supine march  Response to previous treatment: no adverse effect   Functional change: fluctuating improvements in urinary urgency and back/hip/pelvic pain    Pain: mild/moderate  Location: L abdominal wall, L hip     Objective     Divya Jacinto received the following interventions during the treatment session -  (TrA = transverse abdominis, PFM = pelvic floor muscle)    Therapeutic exercises to develop strength,  endurance and core stabilization for 53 minutes -  [x] Diaphragmatic breathing + pelvic drop, 6 minutes - verbal and manual cues from PT, performed during manual techniques  [x] Diaphragmatic breathing + TrA brace on exhale, x20 - verbal and manual cues from PT  [x] Diaphragmatic breathing + PFM squeeze on exhale, 3x8 with 50% effort - verbal and manual cues from PT  [] Lower trunk rotations and double-knees to chest with legs on ball, x20 each  [] Short arc quads for gentle multifidi activation (R&L), x20 - performed during manual interventions to abdominal wall  [] Lucian test stretch + march (L) - x15 - with trigger point release to iliopsoas  [] Heel slides (L) - x15 - with trigger point release to iliopsoas  [x] TrA&PFM brace + supine hip adduction isometric with ball, x20 with 3-second hold - increased anterior hip pain after a few reps  [] TrA brace + supine march (R&L), x20  [x] TrA brace + supine clam with blue band, x30  [] TrA brace + straight leg raise (R&L), 2x6  [] Standing pallof press with blue band (R&L), x12   [] TrA brace + side-lying hip abduction (R&L), 2x8  [] Cat/cow with coordinated breath, 2 minutes  [] TrA brace + quadruped hip extension (R&L), x10  [] TrA brace + standing shoulder extension with green band (B), x10  [] TrA brace + wall push up, 2x10  [] Happy baby stretch + diaphragmatic breathing, pelvic drop, 1 minute  [x] Hooklying figure-4 stretch + diaphragmatic breathing, pelvic drop (R&L), 1 minute - unable to perform comfortably  [] Child's pose + diaphragmatic breathing, pelvic drop, 2 minutes  [] Butterfly hip stretch + diaphragmatic breathing, pelvic drop, 2 minutes  [] Pelvic floor relaxation guided meditation - performed during e-stim for dry needling  [x] HEP review, patient education - performed during manual techniques     Manual therapy techniques: to develop flexibility, desensitization, and extensibility -  Pt consented to pelvic floor muscle assessment and treatment in  prior visit -see EMR  [] Obturator release at ischial tuberosities + active release with resisted contralateral hip external rotation (B)  [] Manual release to pelvic floor muscles externally: levator ani   [x] Manual release to pelvic floor muscles internally/vaginally: levator ani - mild increased tone and tenderness that improved within a few minutes of release. PT provided cues for diaphragmatic breathing, visualization, and relaxation.  [x] Manual release and myofascial decompression/cupping to lower abdominal wall  [] Dry needling with electrical stimulation to L pectineus, L iliopsoas, L multifidi of L2-3 - no adverse effect, written consent provided 5/9/22 - see EMR   [] Lateral and long-arm distraction to L hip with belt  [x] Trigger point release to L iliopsoas  [] Taping to bilateral lumbar paraspinals and across L3      Home Exercises and Patient Education     Patient Education: progression of plan of care, plan for next session, pt prognosis, pelvic floor anatomy & function, relationship between TrA & PFM, relationship between hips & PFM, relationship between pelvic floor dysfunction & lower back pain, urge suppression, etiology of urinary urgency, abdominal wall massage for management of urinary urgency, diaphragmatic breathing for pelvic floor muscle relaxation, hip-opening stretches for pelvic floor relaxation and lumbar myotomes/dermatomes    Home Exercise Program (6/27/22): supine march, side-lying clams, bridging with band/belt around knees  Updated Home Exercise Program (7/6/22): supine march, supine clams, bridging with band/belt around knees    Exercises were reviewed, and Divya Jacinto was able to demonstrate them prior to the end of the session, as needed. Divya Jacinto demonstrated good  understanding of the education provided.      Assessment     Pt tolerated treatment session well, with good exercise tolerance despite increased symptoms. Lumbar segmental and abdominal wall dysfunction still  appear to be driving pelvic and hip pain. PT updated home exercise program to continue to work on improving functional pelvic girdle stability. Pt's functional mobility and ability to perform ADLs still limited by pain and weakness. She requires skilled therapy for continued patient education and progressive resistance exercise.    Divya Jacinto is progressing fairly towards her goals.   Pt prognosis: good    Pt will continue to benefit from skilled outpatient physical therapy to address the deficits listed in the problem list box on initial evaluation, provide pt/family education and to maximize pt's level of independence in the home and community environment.     Pt's spiritual, cultural and educational needs considered and pt agreeable to plan of care and goals.  Anticipated barriers to physical therapy: none      Short Term Goals: 6 weeks   Pt to report 50% improvement in tenderness to palpation of pelvic floor to demonstrate improved trigger points and overactivity Met   Pt to report only rare incidences of urinary incontinence with coughing to demonstrate improved pelvic floor muscle coordination Met   Pt to report 50% improvement in pain with penetration (gynecological exam or intercourse) and orgasm to demonstrate improved pelvic floor muscle overactivity Met   Pt to be independent with introductory home exercise program Met      Long Term Goals: 12 weeks  Pt to report minimal/no tenderness to palpation of pelvic floor to demonstrate improved trigger points and overactivity Partially Met   Pt to demonstrate an improved score in the FOTO Pelvic Pain (PFDI) survey to no more than 8% impaired to demonstrate improving pelvic floor muscle tenderness, coordination. Not Met   Pt to deny pelvic pain with penetration (gynecological exam or intercourse) and orgasm to demonstrate improved pelvic floor muscle overactivity Partially Met   Pt to increase pelvic floor strength to 4/5 to demonstrate improved strength needed  for continence with ADLs. Partially Met   Pt to be independent with advanced home exercise program Not Met   Pt to demonstrate bilateral hip strength of 5/5 for improved pelvic girdle support with mobility. Not Met   Pt to report minimal/no back pain with ADLs and functional mobility to demonstrate improved lumbopelvic muscle support, coordination Not Met        Plan     Continue per Plan of Care.      Jess Ureña, PT, DPT

## 2022-07-06 ENCOUNTER — CLINICAL SUPPORT (OUTPATIENT)
Dept: REHABILITATION | Facility: OTHER | Age: 37
End: 2022-07-06
Attending: OBSTETRICS & GYNECOLOGY
Payer: MEDICAID

## 2022-07-06 DIAGNOSIS — R10.2 PELVIC PAIN: Primary | ICD-10-CM

## 2022-07-06 DIAGNOSIS — R29.898 DECREASED STRENGTH OF TRUNK AND BACK: ICD-10-CM

## 2022-07-06 DIAGNOSIS — G89.29 CHRONIC LEFT-SIDED LOW BACK PAIN WITHOUT SCIATICA: ICD-10-CM

## 2022-07-06 DIAGNOSIS — M54.50 CHRONIC LEFT-SIDED LOW BACK PAIN WITHOUT SCIATICA: ICD-10-CM

## 2022-07-06 PROCEDURE — 97110 THERAPEUTIC EXERCISES: CPT | Performed by: PHYSICAL THERAPIST

## 2022-07-06 NOTE — PATIENT INSTRUCTIONS
UPDATED HOME EXERCISE PROGRAM (4x/week, at least)    Supine march, 2x10 reps (not performed consecutively)  - Inhale to prepare, relax the belly and pelvic floor  - As you exhale, gently engage the transverse abdominis by drawing the belly button down to the spine  - Holding the hips level and the belly button down, gently lift one foot a few inches  - Inhale again to rest, then alternate the legs  *Don't hold your breath       Supine clam with band, x20-30 reps with blue band  - Inhale to prepare, relax the belly and pelvic floor  - As you exhale, gently engage the transverse abdominis by drawing the belly button in  - Holding the hips level and the belly button down, push against the band to widen the knees  - Inhale again to rest  *Don't hold your breath             Bridging, 3 sets of 5 (to be performed with belt or band around knees)  - Inhale to prepare, relax the belly and pelvic floor  - As you exhale, gently engage the transverse abdominis by drawing the belly button down to the spine  - Holding the belly button in, lift the hips (only lift as high as it is comfortable) and lower  - Inhale again to rest  *Don't hold your breath

## 2022-07-06 NOTE — PLAN OF CARE
[facility]   Pelvic Health Physical Therapy Reassessment    Date: 7/6/2022   Name: Divya Salazar  Clinic Number: [id number]    Therapy Diagnosis:   Encounter Diagnoses   Name Primary?    Pelvic pain Yes    Decreased strength of trunk and back     Chronic left-sided low back pain without sciatica      Referring Provider: Aga Arredondo MD    Referring Provider Orders: PT Eval and Treat  Medical Diagnosis from Referral: Pelvic floor dysfunction in female [M62.89]  Evaluation Date: 4/29/2022  Last Reassessment: 7/6/2022, visit #10  Authorization Period Expiration: 7/9/2022  Plan of Care Expiration: 10/6/2022  Visit # / Visits authorized: 10  FOTO: 2/3 (4/29/2022, 6/20/2022)    SUBJECTIVE     Divya Jacinto reports increased pain this week, including waking up to radiating L anterior hip pain this morning. She is feeling emotional about how long she has had pain and how little symptom-relief she has gotten despite extensive medical management. In the past she has noted overall mild symptom-improvement since starting therapy, but she continues to experience fluctuating levels of pain (including episodes of debilitating pain); Today, she reports no significant change in symptom intensity or frequency.    OBJECTIVE      Hip Strength 4/29/2022 7/6/2022   R hip flexion 4+/5 4+/5   R hip external rotation 4/5 4+/5   L hip flexion 4+/5 4+/5   L hip external rotation 4-/5 4/5      Hip Range of Motion 4/29/2022 7/6/2022   R internal rotation 15°  25°   L internal rotation 25° 30°      Standing load transfer  4/29/22: Mild unsteadiness with single-leg balance  7/6/22: Minimal/no unsteadiness    Abdominal wall  4/29/22: Tender, increased tension and thickened , 1-inch diastasis recti  7/6/22: Continued tenderness     Pelvic girdle stability  4/29/22: Pt demonstrates moderately impaired ability to stabilize pelvis with Active Straight Leg Raise Test. Able to improve slightly with verbal/manual cues  for transverse abdominus activation. Tends to demonstrate oblique-dominant abdominal wall contraction, but is able to perform transverse abdominis contraction with verbal and tactile cues.  7/6/22: Mild pelvic girdle stability deficits with supine load transfer, definitely improved from evaluation. Good transverse abdominis contraction on command, no verbal cues required.    Pelvic floor  4/29/22: tender areas noted as follows: bilateral periurethral muscles (with reproduction of urinary urgency), levator ani (L significantly more tender), as well as L introitus  Muscle Bulk: hypertonus  Muscle Power: 3/5  Muscle Endurance: 6 seconds  # Reps To Fatigue: 4  Quality of contraction: slow rise, slow relaxation and incomplete relaxation  Specificity: patient co-contracts glutes   7/6/22: mild tenderness in levator ani, no tenderness in periurethral muscles today.   Muscle Power: 3-4/5  Muscle Endurance: 8 seconds  Quality of contraction: slow rise, slow relaxation  Specificity: appropriate      Limitation/Restriction for FOTO Pelvic Floor Surveys  FOTO documents entered into [facility] - see Media section.     Limitation Scores   4/29/22  PFDI Urinary: 4% impairment  PFDI Pain: 21% impairment  Urinary Problems: 34% impairment     6/20/22  PFDI Urinary: 0% impairment  PFDI Pain: 33% impairment  Urinary Problems: 34% impairment        ASSESSMENT     Pt has progressed fairly well with physical therapy, demonstrating improvements in pelvic floor strength/coordination, hip/core strength, hip range of motion, and activity tolerance. Pt continues to experience pain/difficulty with ADLs and functional mobility due to pelvic pain/L hip pain. Manual release to tender L hip muscles does not produce lasting symptom-relief, suggesting hip (and pelvic floor) pain is downstream from a central cause. Lumbar segmental and abdominal wall dysfunction still appear to be driving pelvic and hip pain.   Pt has met/partially met some goals from the  initial evaluation, and the rest of those goals remain appropriate for the plan of care. Pt will benefit from continued skilled therapy to provide targeted manual therapy and progressive resistance exercise.      Short Term Goals: 6 weeks   Pt to report 50% improvement in tenderness to palpation of pelvic floor to demonstrate improved trigger points and overactivity Met   Pt to report only rare incidences of urinary incontinence with coughing to demonstrate improved pelvic floor muscle coordination Met   Pt to report 50% improvement in pain with penetration (gynecological exam or intercourse) and orgasm to demonstrate improved pelvic floor muscle overactivity Met   Pt to be independent with introductory home exercise program Met      Long Term Goals: 12 weeks  Pt to report minimal/no tenderness to palpation of pelvic floor to demonstrate improved trigger points and overactivity Partially Met   Pt to demonstrate an improved score in the FOTO Pelvic Pain (PFDI) survey to no more than 8% impaired to demonstrate improving pelvic floor muscle tenderness, coordination. Not Met   Pt to deny pelvic pain with penetration (gynecological exam or intercourse) and orgasm to demonstrate improved pelvic floor muscle overactivity Partially Met   Pt to increase pelvic floor strength to 4/5 to demonstrate improved strength needed for continence with ADLs. Partially Met   Pt to be independent with advanced home exercise program Not Met   Pt to demonstrate bilateral hip strength of 5/5 for improved pelvic girdle support with mobility. Not Met   Pt to report minimal/no back pain with ADLs and functional mobility to demonstrate improved lumbopelvic muscle support, coordination Not Met        PLAN     Plan of Care certification: 7/6/2022 to 10/6/2022    Outpatient Physical Therapy - 1 time per week for 12 weeks to include the following interventions: Therapeutic Exercise, Manual Therapy, Therapeutic Activity, Neuromuscular Re-education,  Electrical Stimulation Unattended, Self-Care, Patient Education    Jess Ureña, PT, DPT        I CERTIFY THE NEED FOR THESE SERVICES FURNISHED UNDER THIS PLAN OF TREATMENT AND WHILE UNDER MY CARE   Physician's comments:     Physician's Signature: ___________________________________________________

## 2022-07-08 NOTE — PROGRESS NOTES
Pelvic Health Physical Therapy   Treatment Note     Name: Divya Jacinto [facility]  Clinic Number: [id number]    Therapy Diagnosis:   Encounter Diagnoses   Name Primary?    Pelvic pain Yes    Decreased strength of trunk and back     Chronic left-sided low back pain without sciatica      Referring Provider: Arnulfo    Visit Date: 7/11/2022    Referring Provider Orders: PT Eval and Treat  Medical Diagnosis from Referral: Pelvic floor dysfunction in female [M62.89]  Evaluation Date: 4/29/2022  Last Reassessment: 7/6/2022, visit #10  Authorization Period Expiration: 7/9/2022  Plan of Care Expiration: 10/6/2022  Visit # / Visits authorized: 11  FOTO: 2/3 (4/29/2022, 6/20/2022)    Cancelled Visits: -  No Show Visits: -    Time In: 13:00  Time Out: 14:00  Total Billable Time: 60 minutes    Precautions: Standard    Subjective     Divya Jacinto reports more manageable pain this week. She has done a lot of research about athletic pubalgia and believes it aligns with her symptoms following jumping from a trunk several years ago.  Next appointment with referring provider on 7/27/22.    She was compliant with home exercise program - no increased symptoms with updated HEP from last visit.  Response to previous treatment: no adverse effect   Functional change: fluctuating improvements in urinary urgency and back/hip/pelvic pain    Pain: mild  Location: L abdominal wall, L hip     Objective     Divya Jacinto received the following interventions during the treatment session -  (TrA = transverse abdominis, PFM = pelvic floor muscle)    Therapeutic exercises to develop strength, endurance and core stabilization for 53 minutes -  [x] Diaphragmatic breathing + pelvic drop, 6 minutes - verbal and manual cues from PT, performed during manual techniques  [x] Diaphragmatic breathing + TrA brace on exhale, x12 - verbal and manual cues from PT  [] Diaphragmatic breathing + PFM squeeze on exhale, 3x8 with 50% effort - verbal and manual cues from PT  []  Lower trunk rotations and double-knees to chest with legs on ball, x20 each  [x] Supine glute squeezes with 5-second hold, x20  [] Short arc quads for gentle multifidi activation (R&L), x20 - performed during manual interventions to abdominal wall  [x] Lucian test stretch (L) - 2x30 seconds  [x] TrA&PFM brace + supine hip adduction isometric with ball, x10 with 50% effort  [x] TrA brace + supine heel slides (R&L), 2x5  [] TrA brace + supine clam with blue band, x30  [] TrA brace + straight leg raise (R&L), 2x6  [] Standing pallof press with blue band (R&L), x12   [] TrA brace + side-lying hip abduction (R&L), 2x8  [] Cat/cow with coordinated breath, 2 minutes  [] TrA brace + quadruped hip extension (R&L), x10  [x] TrA brace + standing shoulder extension with green band (B), 2x10  [x] TrA brace + wall push up, 2x10  [x] TrA brace + sit to stand from chair with foam pad + blue band around knees, x6  [x] Side-stepping with yellow band around knees, 2 minutes  [] Happy baby stretch + diaphragmatic breathing, pelvic drop, 1 minute  [] Child's pose + diaphragmatic breathing, pelvic drop, 2 minutes  [] Butterfly hip stretch + diaphragmatic breathing, pelvic drop, 2 minutes  [] Pelvic floor relaxation guided meditation - performed during e-stim for dry needling  [x] HEP review, patient education - performed during manual techniques     Manual therapy techniques: to develop flexibility, desensitization, and extensibility -  Pt consented to pelvic floor muscle assessment and treatment in prior visit -see EMR  [] Obturator release at ischial tuberosities + active release with resisted contralateral hip external rotation (B)  [] Manual release to pelvic floor muscles externally: levator ani   [] Manual release to pelvic floor muscles internally/vaginally: levator ani - mild increased tone and tenderness that improved within a few minutes of release. PT provided cues for diaphragmatic breathing, visualization, and  relaxation.  [] Manual release and myofascial decompression/cupping to lower abdominal wall  [x] Dry needling with electrical stimulation to L iliacus, L iliopsoas - no adverse effect, written consent provided 5/9/22 - see EMR   [] Lateral and long-arm distraction to L hip with belt  [] Trigger point release to L iliopsoas  [] Taping to bilateral lumbar paraspinals and across L3      Home Exercises and Patient Education     Patient Education: progression of plan of care, plan for next session, pt prognosis, pelvic floor anatomy & function, relationship between TrA & PFM, relationship between hips & PFM, relationship between pelvic floor dysfunction & lower back pain, urge suppression, etiology of urinary urgency, abdominal wall massage for management of urinary urgency, diaphragmatic breathing for pelvic floor muscle relaxation, hip-opening stretches for pelvic floor relaxation and lumbar myotomes/dermatomes    Home Exercise Program (7/6/22): supine march, supine clams, bridging with band/belt around knees    Exercises were reviewed, and Divya Jacinto was able to demonstrate them prior to the end of the session, as needed. Divya Jacinto demonstrated good  understanding of the education provided.      Assessment     Pt tolerated treatment session well, with good exercise tolerance compared to prior sessions. Needling the left hip flexors somewhat altered pt's sensation of L lower quadrant discomfort with TrA brace, suggesting myofascial dysfunction in those muscles is contributing to pain. Lumbar segmental and abdominal wall dysfunction still appear to be driving pelvic and hip pain. Pt responding better to the newly updated HEP. Pt's functional mobility and ability to perform ADLs still limited by pain and weakness. She requires skilled therapy for continued patient education and progressive resistance exercise.    Divya Jacinto is progressing fairly towards her goals.   Pt prognosis: good    Pt will continue to benefit  from skilled outpatient physical therapy to address the deficits listed in the problem list box on initial evaluation, provide pt/family education and to maximize pt's level of independence in the home and community environment.     Pt's spiritual, cultural and educational needs considered and pt agreeable to plan of care and goals.  Anticipated barriers to physical therapy: none      Short Term Goals: 6 weeks   Pt to report 50% improvement in tenderness to palpation of pelvic floor to demonstrate improved trigger points and overactivity Met   Pt to report only rare incidences of urinary incontinence with coughing to demonstrate improved pelvic floor muscle coordination Met   Pt to report 50% improvement in pain with penetration (gynecological exam or intercourse) and orgasm to demonstrate improved pelvic floor muscle overactivity Met   Pt to be independent with introductory home exercise program Met      Long Term Goals: 12 weeks  Pt to report minimal/no tenderness to palpation of pelvic floor to demonstrate improved trigger points and overactivity Partially Met   Pt to demonstrate an improved score in the FOTO Pelvic Pain (PFDI) survey to no more than 8% impaired to demonstrate improving pelvic floor muscle tenderness, coordination. Not Met   Pt to deny pelvic pain with penetration (gynecological exam or intercourse) and orgasm to demonstrate improved pelvic floor muscle overactivity Partially Met   Pt to increase pelvic floor strength to 4/5 to demonstrate improved strength needed for continence with ADLs. Partially Met   Pt to be independent with advanced home exercise program Not Met   Pt to demonstrate bilateral hip strength of 5/5 for improved pelvic girdle support with mobility. Not Met   Pt to report minimal/no back pain with ADLs and functional mobility to demonstrate improved lumbopelvic muscle support, coordination Not Met        Plan     Continue per Plan of Care.      Jess Ureña, PT, DPT

## 2022-07-11 ENCOUNTER — CLINICAL SUPPORT (OUTPATIENT)
Dept: REHABILITATION | Facility: OTHER | Age: 37
End: 2022-07-11
Attending: OBSTETRICS & GYNECOLOGY
Payer: MEDICAID

## 2022-07-11 DIAGNOSIS — M54.50 CHRONIC LEFT-SIDED LOW BACK PAIN WITHOUT SCIATICA: ICD-10-CM

## 2022-07-11 DIAGNOSIS — G89.29 CHRONIC LEFT-SIDED LOW BACK PAIN WITHOUT SCIATICA: ICD-10-CM

## 2022-07-11 DIAGNOSIS — R10.2 PELVIC PAIN: Primary | ICD-10-CM

## 2022-07-11 DIAGNOSIS — R29.898 DECREASED STRENGTH OF TRUNK AND BACK: ICD-10-CM

## 2022-07-11 PROCEDURE — 97110 THERAPEUTIC EXERCISES: CPT | Performed by: PHYSICAL THERAPIST

## 2022-07-14 NOTE — PROGRESS NOTES
Patient ID:   Divya Salazar is a 37 y.o. female.    Chief Complaint:   1. Left hip pain  2. One year follow-up s/p left ACL reconstruction    HPI:   Mrs. Salazar is returning for evaluation of her left hip and left knee.     She continues to report pain about the left groin area. She reports pain in the left iliac crest, left lower abdomen, left groin, and adductors. Her pain is worse with certain motions. She reports pain especially after she has an orgasm. She has been participating in physical therapy performing pelvic floor and core exercises. Given her persistent pain, several potential diagnoses have been mentioned to her by the therapist including lumbar radiculopathy, athletic pubalgia, adductor strain, and iliopsoas strain.     In regards to the left knee, she is doing well. She denies instability. She denies any significant pain in the knee.     Medications:    Current Outpatient Medications:     ALPRAZolam (XANAX) 0.25 MG tablet, Take 0.25 mg by mouth daily as needed., Disp: , Rfl:     buPROPion (WELLBUTRIN XL) 150 MG TB24 tablet, Take 150 mg by mouth once daily., Disp: , Rfl:     cyclobenzaprine (FLEXERIL) 5 MG tablet, Take 5 mg by mouth daily as needed., Disp: , Rfl:     DULoxetine (CYMBALTA) 30 MG capsule, Take by mouth., Disp: , Rfl:     gabapentin (NEURONTIN) 100 MG capsule, Take 100 mg by mouth 3 (three) times daily., Disp: , Rfl:     hydrOXYzine HCL (ATARAX) 25 MG tablet, Take 25 mg by mouth 2 (two) times daily. as needed for anxiety., Disp: , Rfl:     sertraline (ZOLOFT) 50 MG tablet, , Disp: , Rfl:     triamcinolone acetonide 0.025% (KENALOG) 0.025 % cream, Apply topically 2 (two) times daily., Disp: , Rfl:     Allergies:  Review of patient's allergies indicates:  No Known Allergies    Past Medical History:  History reviewed. No pertinent past medical history.     Past Surgical History:  Past Surgical History:   Procedure Laterality Date    ARTHROSCOPY, HIP Left 3/18/2021     "Procedure: ARTHROSCOPY, HIP, WITH LABRUM REPAIR;  Surgeon: Diego Jacinto MD;  Location: [facility] OR;  Service: Orthopedics;  Laterality: Left;  C-arm  Scient[facility] w/ [employer] notified 3/8/21 [place]  [facility] confirmed 3/17/21 [place]    FULGURATION OF ENDOMETRIOSIS      KNEE ARTHROSCOPY W/ ACL RECONSTRUCTION Left 7/15/2021    Procedure: RECONSTRUCTION, KNEE, ACL, ARTHROSCOPIC with hamstring autograft;  Surgeon: Diego Jacinto MD;  Location: [facility] OR;  Service: Orthopedics;  Laterality: Left;  Pre-op adductor canal block    Arthrex tight rope, interference screws, tendon strippers, c-arm  hamstring autograft Sindi notified [facility] 7/7/21- Confirmed [facility]       Social History:  Social History     Occupational History    Not on file   Tobacco Use    Smoking status: Former Smoker     Start date: 2012    Smokeless tobacco: Never Used   Substance and Sexual Activity    Alcohol use: Yes     Comment: occasional    Drug use: No    Sexual activity: Yes     Partners: Male       Family History:  Family History   Problem Relation Age of Onset    Cancer Neg Hx         ROS:  Review of Systems   Musculoskeletal: Positive for joint pain and myalgias.   Neurological: Negative for numbness and paresthesias.   All other systems reviewed and are negative.      Vitals:  BP (!) 172/90 (BP Location: Left arm, Patient Position: Sitting, BP Method: Large (Automatic))   Pulse 90   Ht 5' 9" (1.753 m)   Wt 120.3 kg (265 lb 3.4 oz)   BMI 39.17 kg/m²     Physical Examination:  Comprehensive Orthopaedic Musculoskeletal Exam    General        Constitutional: appears stated age, severely obese, well-developed and well-nourished    Scleral icterus: no    Labored breathing: no    Psychiatric: normal mood and affect and no acute distress    Neurological: alert and oriented x3    Skin: intact    Lymphadenopathy: none     Ortho Exam   Left lower extremity exam:  No leg length discrepancy.   Tenderness deep on the inner portion of the iliac " crest. Nontender along the pubic symphysis.   Negative Stinchfield. Negative FADDIR and PEDRO.   ROM: Symmetric internal rotation to 5-10 degrees. Adductor strength 5/5 in the adductors and hip flexors. No pain with resisted adduction. Some pain with resisted hip flexion. Light touch intact.     Knee exam: No effusion. ROM 0130. Zero lachmans. Negative pivot shift.     Assessment:  1. Lumbar radiculopathy, chronic      Plan:  I have reviewed the clinical findings with the patient. I am not sure the etiology of her pain. I am recommending MRI of the lumbar spine to rule out a lumbar etiology.     Orders Placed This Encounter    MRI Lumbar Spine Without Contrast     No follow-ups on file.

## 2022-07-15 ENCOUNTER — OFFICE VISIT (OUTPATIENT)
Dept: ORTHOPEDICS | Facility: CLINIC | Age: 37
End: 2022-07-15
Payer: MEDICAID

## 2022-07-15 VITALS
HEIGHT: 69 IN | HEART RATE: 90 BPM | DIASTOLIC BLOOD PRESSURE: 90 MMHG | BODY MASS INDEX: 39.28 KG/M2 | SYSTOLIC BLOOD PRESSURE: 172 MMHG | WEIGHT: 265.19 LBS

## 2022-07-15 DIAGNOSIS — M54.16 LUMBAR RADICULOPATHY, CHRONIC: ICD-10-CM

## 2022-07-15 PROCEDURE — 1160F RVW MEDS BY RX/DR IN RCRD: CPT | Mod: CPTII,,, | Performed by: ORTHOPAEDIC SURGERY

## 2022-07-15 PROCEDURE — 99214 PR OFFICE/OUTPT VISIT, EST, LEVL IV, 30-39 MIN: ICD-10-PCS | Mod: S$PBB,,, | Performed by: ORTHOPAEDIC SURGERY

## 2022-07-15 PROCEDURE — 99213 OFFICE O/P EST LOW 20 MIN: CPT | Mod: PBBFAC,PN | Performed by: ORTHOPAEDIC SURGERY

## 2022-07-15 PROCEDURE — 1159F PR MEDICATION LIST DOCUMENTED IN MEDICAL RECORD: ICD-10-PCS | Mod: CPTII,,, | Performed by: ORTHOPAEDIC SURGERY

## 2022-07-15 PROCEDURE — 3080F PR MOST RECENT DIASTOLIC BLOOD PRESSURE >= 90 MM HG: ICD-10-PCS | Mod: CPTII,,, | Performed by: ORTHOPAEDIC SURGERY

## 2022-07-15 PROCEDURE — 1159F MED LIST DOCD IN RCRD: CPT | Mod: CPTII,,, | Performed by: ORTHOPAEDIC SURGERY

## 2022-07-15 PROCEDURE — 3077F SYST BP >= 140 MM HG: CPT | Mod: CPTII,,, | Performed by: ORTHOPAEDIC SURGERY

## 2022-07-15 PROCEDURE — 3008F PR BODY MASS INDEX (BMI) DOCUMENTED: ICD-10-PCS | Mod: CPTII,,, | Performed by: ORTHOPAEDIC SURGERY

## 2022-07-15 PROCEDURE — 3008F BODY MASS INDEX DOCD: CPT | Mod: CPTII,,, | Performed by: ORTHOPAEDIC SURGERY

## 2022-07-15 PROCEDURE — 3080F DIAST BP >= 90 MM HG: CPT | Mod: CPTII,,, | Performed by: ORTHOPAEDIC SURGERY

## 2022-07-15 PROCEDURE — 3077F PR MOST RECENT SYSTOLIC BLOOD PRESSURE >= 140 MM HG: ICD-10-PCS | Mod: CPTII,,, | Performed by: ORTHOPAEDIC SURGERY

## 2022-07-15 PROCEDURE — 99999 PR PBB SHADOW E&M-EST. PATIENT-LVL III: CPT | Mod: PBBFAC,,, | Performed by: ORTHOPAEDIC SURGERY

## 2022-07-15 PROCEDURE — 99999 PR PBB SHADOW E&M-EST. PATIENT-LVL III: ICD-10-PCS | Mod: PBBFAC,,, | Performed by: ORTHOPAEDIC SURGERY

## 2022-07-15 PROCEDURE — 99214 OFFICE O/P EST MOD 30 MIN: CPT | Mod: S$PBB,,, | Performed by: ORTHOPAEDIC SURGERY

## 2022-07-15 PROCEDURE — 1160F PR REVIEW ALL MEDS BY PRESCRIBER/CLIN PHARMACIST DOCUMENTED: ICD-10-PCS | Mod: CPTII,,, | Performed by: ORTHOPAEDIC SURGERY

## 2022-07-15 RX ORDER — HYDROXYZINE HYDROCHLORIDE 25 MG/1
25 TABLET, FILM COATED ORAL 2 TIMES DAILY
COMMUNITY
Start: 2022-06-07

## 2022-08-01 ENCOUNTER — HOSPITAL ENCOUNTER (OUTPATIENT)
Dept: RADIOLOGY | Facility: HOSPITAL | Age: 37
Discharge: HOME | End: 2022-08-01
Attending: ORTHOPAEDIC SURGERY
Payer: MEDICAID

## 2022-08-01 DIAGNOSIS — M54.16 LUMBAR RADICULOPATHY, CHRONIC: ICD-10-CM

## 2022-08-01 PROCEDURE — 72148 MRI LUMBAR SPINE W/O DYE: CPT | Mod: 26,,, | Performed by: RADIOLOGY

## 2022-08-01 PROCEDURE — 72148 MRI LUMBAR SPINE W/O DYE: CPT | Mod: TC

## 2022-08-01 PROCEDURE — 72148 MRI LUMBAR SPINE WITHOUT CONTRAST: ICD-10-PCS | Mod: 26,,, | Performed by: RADIOLOGY

## 2022-08-03 ENCOUNTER — OFFICE VISIT (OUTPATIENT)
Dept: ORTHOPEDICS | Facility: CLINIC | Age: 37
End: 2022-08-03
Payer: MEDICAID

## 2022-08-03 VITALS
BODY MASS INDEX: 39.06 KG/M2 | SYSTOLIC BLOOD PRESSURE: 180 MMHG | HEART RATE: 73 BPM | DIASTOLIC BLOOD PRESSURE: 99 MMHG | HEIGHT: 69 IN | WEIGHT: 263.69 LBS

## 2022-08-03 DIAGNOSIS — Z98.890 S/P HIP ARTHROSCOPY: ICD-10-CM

## 2022-08-03 DIAGNOSIS — R10.2 PELVIC PAIN: ICD-10-CM

## 2022-08-03 DIAGNOSIS — M54.16 LUMBAR RADICULOPATHY, CHRONIC: Primary | ICD-10-CM

## 2022-08-03 PROCEDURE — 1159F MED LIST DOCD IN RCRD: CPT | Mod: CPTII,,, | Performed by: ORTHOPAEDIC SURGERY

## 2022-08-03 PROCEDURE — 4010F ACE/ARB THERAPY RXD/TAKEN: CPT | Mod: CPTII,,, | Performed by: ORTHOPAEDIC SURGERY

## 2022-08-03 PROCEDURE — 99214 OFFICE O/P EST MOD 30 MIN: CPT | Mod: S$PBB,,, | Performed by: ORTHOPAEDIC SURGERY

## 2022-08-03 PROCEDURE — 1159F PR MEDICATION LIST DOCUMENTED IN MEDICAL RECORD: ICD-10-PCS | Mod: CPTII,,, | Performed by: ORTHOPAEDIC SURGERY

## 2022-08-03 PROCEDURE — 99214 PR OFFICE/OUTPT VISIT, EST, LEVL IV, 30-39 MIN: ICD-10-PCS | Mod: S$PBB,,, | Performed by: ORTHOPAEDIC SURGERY

## 2022-08-03 PROCEDURE — 1160F PR REVIEW ALL MEDS BY PRESCRIBER/CLIN PHARMACIST DOCUMENTED: ICD-10-PCS | Mod: CPTII,,, | Performed by: ORTHOPAEDIC SURGERY

## 2022-08-03 PROCEDURE — 3080F PR MOST RECENT DIASTOLIC BLOOD PRESSURE >= 90 MM HG: ICD-10-PCS | Mod: CPTII,,, | Performed by: ORTHOPAEDIC SURGERY

## 2022-08-03 PROCEDURE — 3008F BODY MASS INDEX DOCD: CPT | Mod: CPTII,,, | Performed by: ORTHOPAEDIC SURGERY

## 2022-08-03 PROCEDURE — 99999 PR PBB SHADOW E&M-EST. PATIENT-LVL III: CPT | Mod: PBBFAC,,, | Performed by: ORTHOPAEDIC SURGERY

## 2022-08-03 PROCEDURE — 3008F PR BODY MASS INDEX (BMI) DOCUMENTED: ICD-10-PCS | Mod: CPTII,,, | Performed by: ORTHOPAEDIC SURGERY

## 2022-08-03 PROCEDURE — 99999 PR PBB SHADOW E&M-EST. PATIENT-LVL III: ICD-10-PCS | Mod: PBBFAC,,, | Performed by: ORTHOPAEDIC SURGERY

## 2022-08-03 PROCEDURE — 99213 OFFICE O/P EST LOW 20 MIN: CPT | Mod: PBBFAC,PN | Performed by: ORTHOPAEDIC SURGERY

## 2022-08-03 PROCEDURE — 3077F SYST BP >= 140 MM HG: CPT | Mod: CPTII,,, | Performed by: ORTHOPAEDIC SURGERY

## 2022-08-03 PROCEDURE — 3080F DIAST BP >= 90 MM HG: CPT | Mod: CPTII,,, | Performed by: ORTHOPAEDIC SURGERY

## 2022-08-03 PROCEDURE — 4010F PR ACE/ARB THEARPY RXD/TAKEN: ICD-10-PCS | Mod: CPTII,,, | Performed by: ORTHOPAEDIC SURGERY

## 2022-08-03 PROCEDURE — 3077F PR MOST RECENT SYSTOLIC BLOOD PRESSURE >= 140 MM HG: ICD-10-PCS | Mod: CPTII,,, | Performed by: ORTHOPAEDIC SURGERY

## 2022-08-03 PROCEDURE — 1160F RVW MEDS BY RX/DR IN RCRD: CPT | Mod: CPTII,,, | Performed by: ORTHOPAEDIC SURGERY

## 2022-08-03 NOTE — PROGRESS NOTES
Patient ID:   Divya Salazar is a 37 y.o. female.    Chief Complaint:   Follow-up evaluation for back and left lower extremity pain    HPI:   The patient is returning for evaluation of the back and left lower extremity pain. She recently completed a MRI of the lumbar spine. She is tearful as she discusses continued pain in the left lower lumbar, pelvis, and groin regions. She reports pain after sex and with orgasm.     Medications:    Current Outpatient Medications:     ALPRAZolam (XANAX) 0.25 MG tablet, Take 0.25 mg by mouth daily as needed., Disp: , Rfl:     buPROPion (WELLBUTRIN XL) 150 MG TB24 tablet, Take 150 mg by mouth once daily., Disp: , Rfl:     cyclobenzaprine (FLEXERIL) 5 MG tablet, Take 5 mg by mouth daily as needed., Disp: , Rfl:     DULoxetine (CYMBALTA) 30 MG capsule, Take by mouth., Disp: , Rfl:     gabapentin (NEURONTIN) 100 MG capsule, Take 100 mg by mouth 3 (three) times daily., Disp: , Rfl:     hydrOXYzine HCL (ATARAX) 25 MG tablet, Take 25 mg by mouth 2 (two) times daily. as needed for anxiety., Disp: , Rfl:     sertraline (ZOLOFT) 50 MG tablet, , Disp: , Rfl:     triamcinolone acetonide 0.025% (KENALOG) 0.025 % cream, Apply topically 2 (two) times daily., Disp: , Rfl:     Allergies:  Review of patient's allergies indicates:  No Known Allergies    Past Medical History:  History reviewed. No pertinent past medical history.     Past Surgical History:  Past Surgical History:   Procedure Laterality Date    ARTHROSCOPY, HIP Left 3/18/2021    Procedure: ARTHROSCOPY, HIP, WITH LABRUM REPAIR;  Surgeon: Diego Jacinto MD;  Location: [facility] OR;  Service: Orthopedics;  Laterality: Left;  C-arm  Rakesh w/ [facility] notified 3/8/21 [place]  Rakesh confirmed 3/17/21 [place]    FULGURATION OF ENDOMETRIOSIS      KNEE ARTHROSCOPY W/ ACL RECONSTRUCTION Left 7/15/2021    Procedure: RECONSTRUCTION, KNEE, ACL, ARTHROSCOPIC with hamstring autograft;  Surgeon: Diego Jacinto MD;  Location:  "[facility] OR;  Service: Orthopedics;  Laterality: Left;  Pre-op adductor canal block    Arthrex tight rope, interference screws, tendon strippers, c-arm  hamstring autograft Sindi notified CC 7/7/21- Confirmed [facility]       Social History:  Social History     Occupational History    Not on file   Tobacco Use    Smoking status: Former Smoker     Start date: 2012    Smokeless tobacco: Never Used   Substance and Sexual Activity    Alcohol use: Yes     Comment: occasional    Drug use: No    Sexual activity: Yes     Partners: Male       Family History:  Family History   Problem Relation Age of Onset    Cancer Neg Hx         ROS:  Review of Systems   Musculoskeletal: Positive for joint pain, muscle cramps and myalgias.   All other systems reviewed and are negative.      Vitals:  BP (!) 180/99 (BP Location: Left arm, Patient Position: Sitting, BP Method: Large (Automatic))   Pulse 73   Ht 5' 9" (1.753 m)   Wt 119.6 kg (263 lb 10.7 oz)   BMI 38.94 kg/m²     Physical Examination:  Comprehensive Orthopaedic Musculoskeletal Exam    General        Constitutional: appears stated age, moderately obese, well-developed and well-nourished    Scleral icterus: no    Labored breathing: no    Psychiatric: normal mood and affect and no acute distress    Neurological: alert and oriented x3    Skin: intact    Lymphadenopathy: none     Imaging:    I have independently reviewed the following imaging studies performed at [facility]:    MRI Lumbar Spine Without Contrast  Narrative: EXAMINATION:  MRI LUMBAR SPINE WITHOUT CONTRAST    CLINICAL HISTORY:  Lumbar radiculopathy, symptoms persist with conservative treatment; Radiculopathy, lumbar region    TECHNIQUE:  Multiplanar, multisequence MR images were acquired from the thoracolumbar junction to the sacrum without the administration of contrast.    FINDINGS:  Alignment: Minimal grade 1 anterolisthesis L3 on L4.    Vertebrae: 5 lumbar-type vertebral bodies. No aggressive marrow replacement " process or fracture.    Discs: L3-4 dessication with fatty change (Modic 2) cortical endplate L3-L4.    Cord: Normal. Conus terminates at .    Degenerative findings:    T12-L1: There is no focal disc herniation. No significant central canal narrowing . No significant neural foraminal narrowing.    L1-L2: There is no focal disc herniation. No significant central canal narrowing . No significant neural foraminal narrowing.    L2-L3: There is no focal disc herniation. No significant central canal narrowing . No significant neural foraminal narrowing.    L3-L4: There is no focal disc herniation.Broad-based bulging of disc material.  Minimal anterolisthesis L3 on L4. No significant central canal narrowing .  Mild RIGHT neural foraminal encroachment L3-L4. Neural foraminal narrowing.    L4-L5: There is no focal disc herniation.Mild broad-based bulging of disc material.  No significant central canal narrowing . No significant neural foraminal narrowing.    L5-S1: There is no focal disc herniation. No significant central canal narrowing . No significant neural foraminal narrowing.Annular fissure.    Paraspinal muscles & soft tissues: Unremarkable.  Impression: Degenerative changes L3-L4 inclusive of mild RIGHT neural foraminal encroachment yet no evidence for significant central canal narrowing.    Electronically signed by: Jacques Craft MD  Date:    08/02/2022  Time:    09:59    Assessment:  1. Lumbar radiculopathy, chronic    2. Pelvic pain    3. S/P hip arthroscopy      Plan:  I reviewed the findings with the patient. The etiology of her current pain remains obscure. There is some pathology in the lumbar spine but I am not sure if this explains the pain. I discussed the option of epidural steroid injection. She wishes to proceed with this option. I will place a referral for pain management.        Orders Placed This Encounter    Ambulatory referral/consult to Pain Clinic     No follow-ups on file.

## 2022-08-04 NOTE — PROGRESS NOTES
Pelvic Health Physical Therapy   Treatment Note     Name: Divya Jacinto [facility]  Clinic Number: [id number]    Therapy Diagnosis:   Encounter Diagnoses   Name Primary?    Pelvic pain Yes    Decreased strength of trunk and back     Chronic left-sided low back pain without sciatica      Referring Provider: Arnulfo    Visit Date: 8/5/2022    Referring Provider Orders: PT Eval and Treat  Medical Diagnosis from Referral: Pelvic floor dysfunction in female [M62.89]  Evaluation Date: 4/29/2022  Last Reassessment: 7/6/2022, visit #10  Authorization Period Expiration: 7/9/2022  Plan of Care Expiration: 10/6/2022  Visit # / Visits authorized: 12  FOTO: 2/3 (4/29/2022, 6/20/2022)    Cancelled Visits: -  No Show Visits: -    Time In: 13:00  Time Out: 14:00  Total Billable Time: 60 minutes    Precautions: Standard    Subjective     Divya Jacinto reports continued fluctuating pain in the last week. She underwent a pelvic nerve block last week, which yielded 3 days of less tenderness to palpation of the L anterior hip, but other pain remained unchanged. Recent MRI reveals anterolisthesis and lateral foraminal narrowing at L3/L4. She is feeling frustrated by continued symptoms.    She was compliant with home exercise program  Response to previous treatment: no adverse effect   Functional change: fluctuating improvements in urinary urgency and back/hip/pelvic pain    Pain: moderate  Location: L abdominal wall, L hip     Objective     Divya Jacinto received the following interventions during the treatment session -  (TrA = transverse abdominis, PFM = pelvic floor muscle)    Therapeutic exercises to develop strength, endurance and core stabilization for 53 minutes -  [] Diaphragmatic breathing + pelvic drop, 6 minutes - verbal and manual cues from PT, performed during manual techniques  [] Diaphragmatic breathing + TrA brace on exhale, x12 - verbal and manual cues from PT  [] Diaphragmatic breathing + PFM squeeze on exhale, 3x8 with 50% effort  - verbal and manual cues from PT  [x] Lower trunk rotations and double-knees to chest with legs on ball, x20 each  [x] Seated ball roll out, 1 minute - reduced pain afterwards  [x] Supine single-knee to chest (R&L), 30 seconds  [x] Prone glute squeezes with 5-second hold, x20  [] Short arc quads for gentle multifidi activation (R&L), x20 - performed during manual interventions to abdominal wall  [x] Lucian test stretch (L) - 2x30 seconds  [] TrA&PFM brace + supine hip adduction isometric with ball, x10 with 50% effort  [] TrA brace + supine heel slides (R&L), 2x5   [x] Prone hamstring curls with multifidi pre-activation (R&L), 3 minutes - Rehabilitative Ultrasound Imaging (RUSI) shows that pt does not pre-activate multifidi automatically, but she is able to demonstrate appropriately with verbal/tactile cues  [] TrA brace + supine clam with blue band, x30  [x] TrA brace + straight leg raise (R&L), 2x3  [] Standing pallof press with blue band (R&L), x12   [] TrA brace + side-lying hip abduction (R&L), 2x8  [] Cat/cow with coordinated breath, 2 minutes  [] TrA brace + quadruped hip extension (R&L), x10  [] TrA brace + standing shoulder extension with green band (B), 2x10  [] TrA brace + wall push up, 2x10  [] TrA brace + sit to stand from chair with foam pad + blue band around knees, x6  [] Side-stepping with yellow band around knees, 2 minutes  [] Happy baby stretch + diaphragmatic breathing, pelvic drop, 1 minute  [] Child's pose + diaphragmatic breathing, pelvic drop, 2 minutes  [] Butterfly hip stretch + diaphragmatic breathing, pelvic drop, 2 minutes  [] Pelvic floor relaxation guided meditation - performed during e-stim for dry needling  [x] HEP review, patient education - performed during manual techniques      Manual therapy techniques: to develop flexibility, desensitization, and extensibility -  Pt consented to pelvic floor muscle assessment and treatment in prior visit -see EMR  [] Obturator release at  ischial tuberosities + active release with resisted contralateral hip external rotation (B)  [] Manual release to pelvic floor muscles externally: levator ani   [] Manual release to pelvic floor muscles internally/vaginally: levator ani - mild increased tone and tenderness that improved within a few minutes of release. PT provided cues for diaphragmatic breathing, visualization, and relaxation.  [] Manual release and myofascial decompression/cupping to lower abdominal wall  [x] Dry needling with electrical stimulation to L multifidi of L2-4 - no adverse effect, written consent provided 5/9/22 - see EMR   [] Lateral and long-arm distraction to L hip with belt  [] Trigger point release to L iliopsoas  [] Taping to bilateral lumbar paraspinals and across L3      Home Exercises and Patient Education     Patient Education: progression of plan of care, plan for next session, pt prognosis, pelvic floor anatomy & function, relationship between TrA & PFM, relationship between hips & PFM, relationship between pelvic floor dysfunction & lower back pain, multifidi anatomy & function, lumbar spine anatomy, MRI results, flexion vs. extension directional preferece    Home Exercise Program (7/6/22): supine march, supine clams, bridging with band/belt around knees    Exercises were reviewed, and Divya Jacinto was able to demonstrate them prior to the end of the session, as needed. Divya Jacinto demonstrated good  understanding of the education provided.      Assessment     Pt tolerated treatment session well, with reduced symptoms post-needling and good tolerance for more flexion-based exercise today. Lateral foraminal narrowing and anterolisthesis suggests pt may respond well to flexion, so pt encouraged to perform flexion-based movements when flared up. Lumbar segmental and abdominal wall dysfunction still appear to be driving pelvic and hip pain. Pt responding better to the newly updated HEP. Persistent back symptoms may be due to  lack of automatic multifidi activation with loading, as seen with RUSI today. Pt's functional mobility and ability to perform ADLs still limited by pain and weakness. She requires skilled therapy for continued patient education and progressive resistance exercise.    Divya Jacinto is progressing fairly towards her goals.   Pt prognosis: good    Pt will continue to benefit from skilled outpatient physical therapy to address the deficits listed in the problem list box on initial evaluation, provide pt/family education and to maximize pt's level of independence in the home and community environment.     Pt's spiritual, cultural and educational needs considered and pt agreeable to plan of care and goals.  Anticipated barriers to physical therapy: none      Short Term Goals: 6 weeks   Pt to report 50% improvement in tenderness to palpation of pelvic floor to demonstrate improved trigger points and overactivity Met   Pt to report only rare incidences of urinary incontinence with coughing to demonstrate improved pelvic floor muscle coordination Met   Pt to report 50% improvement in pain with penetration (gynecological exam or intercourse) and orgasm to demonstrate improved pelvic floor muscle overactivity Met   Pt to be independent with introductory home exercise program Met      Long Term Goals: 12 weeks  Pt to report minimal/no tenderness to palpation of pelvic floor to demonstrate improved trigger points and overactivity Partially Met   Pt to demonstrate an improved score in the FOTO Pelvic Pain (PFDI) survey to no more than 8% impaired to demonstrate improving pelvic floor muscle tenderness, coordination. Not Met   Pt to deny pelvic pain with penetration (gynecological exam or intercourse) and orgasm to demonstrate improved pelvic floor muscle overactivity Partially Met   Pt to increase pelvic floor strength to 4/5 to demonstrate improved strength needed for continence with ADLs. Partially Met   Pt to be independent with  advanced home exercise program Not Met   Pt to demonstrate bilateral hip strength of 5/5 for improved pelvic girdle support with mobility. Not Met   Pt to report minimal/no back pain with ADLs and functional mobility to demonstrate improved lumbopelvic muscle support, coordination Not Met        Plan     Continue per Plan of Care.      Jess Ureña, PT, DPT

## 2022-08-05 ENCOUNTER — CLINICAL SUPPORT (OUTPATIENT)
Dept: REHABILITATION | Facility: OTHER | Age: 37
End: 2022-08-05
Attending: OBSTETRICS & GYNECOLOGY
Payer: MEDICAID

## 2022-08-05 DIAGNOSIS — M54.50 CHRONIC LEFT-SIDED LOW BACK PAIN WITHOUT SCIATICA: ICD-10-CM

## 2022-08-05 DIAGNOSIS — G89.29 CHRONIC LEFT-SIDED LOW BACK PAIN WITHOUT SCIATICA: ICD-10-CM

## 2022-08-05 DIAGNOSIS — R10.2 PELVIC PAIN: Primary | ICD-10-CM

## 2022-08-05 DIAGNOSIS — R29.898 DECREASED STRENGTH OF TRUNK AND BACK: ICD-10-CM

## 2022-08-05 PROCEDURE — 97110 THERAPEUTIC EXERCISES: CPT | Performed by: PHYSICAL THERAPIST

## 2022-08-05 NOTE — PROGRESS NOTES
Pelvic Health Physical Therapy   Treatment Note     Name: Divya Jacinto [facility]  Clinic Number: [id number]    Therapy Diagnosis:   Encounter Diagnoses   Name Primary?    Pelvic pain Yes    Decreased strength of trunk and back     Chronic left-sided low back pain without sciatica      Referring Provider: Arnulfo    Visit Date: 8/12/2022    Referring Provider Orders: PT Eval and Treat  Medical Diagnosis from Referral: Pelvic floor dysfunction in female [M62.89]  Evaluation Date: 4/29/2022  Last Reassessment: 7/6/2022, visit #10  Authorization Period Expiration: 7/9/2022  Plan of Care Expiration: 10/6/2022  Visit # / Visits authorized: 13  FOTO: 2/3 (4/29/2022, 6/20/2022)    Cancelled Visits: -  No Show Visits: -    Time In: 15:00  Time Out: 16:05  Total Billable Time: 65 minutes    Precautions: Standard    Subjective     Divya Jacinto reports continued fluctuating pain in the last week. She hasn't had a bowel movement in two days, despite the sensation of needing to go. Back pain from last session reduced to discomfort post-needling. She had an orgasm today that wasn't too painful (just associated with pressure and some discomfort).    She was compliant with home exercise program  Response to previous treatment: no adverse effect   Functional change: fluctuating improvements in urinary urgency and back/hip/pelvic pain    Pain: moderate  Location: L abdominal wall, L hip     Objective     Divya Jacinto received the following interventions during the treatment session -  (TrA = transverse abdominis, PFM = pelvic floor muscle)    Therapeutic exercises to develop strength, endurance and core stabilization for 53 minutes -  [] Diaphragmatic breathing + pelvic drop, 6 minutes - verbal and manual cues from PT, performed during manual techniques  [] Diaphragmatic breathing + TrA brace on exhale, x12 - verbal and manual cues from PT  [] Diaphragmatic breathing + PFM squeeze on exhale, 3x8 with 50% effort - verbal and manual cues from  PT  [] Lower trunk rotations and double-knees to chest with legs on ball, x20 each  [x] Seated ball roll out, 1 minute - reduced pain afterwards  [] Supine single-knee to chest (R&L), 30 seconds  [] Prone glute squeezes with 5-second hold, x20  [] Short arc quads for gentle multifidi activation (R&L), x20 - performed during manual interventions to abdominal wall  [] Lucian test stretch (L) - 2x30 seconds  [] TrA&PFM brace + supine hip adduction isometric with ball, x10 with 50% effort  [] TrA brace + supine heel slides (R&L), 2x5   [x] Prone hamstring curls with multifidi pre-activation (R&L), 3 minutes  [] TrA brace + supine clam with blue band, x30  [x] TrA brace + straight leg raise (R&L), 2x4  [] Standing pallof press with blue band (R&L), x12   [] TrA brace + side-lying hip abduction (R&L), 2x8  [] Cat/cow with coordinated breath, 2 minutes  [] TrA brace + quadruped hip extension (R&L), x10  [] TrA brace + standing shoulder extension with green band (B), 2x10  [] TrA brace + wall push up, 2x10  [] TrA brace + sit to stand from chair with foam pad + blue band around knees, x6  [] Side-stepping with yellow band around knees, 2 minutes  [] Seated on theraball + marches (R&L), 2 minutes  [x] Seated on theraball + row with green band, x20  [x] Seated on theraball + alternating shoulder flexion (2-3#, R&L), 2x10   [] Happy baby stretch + diaphragmatic breathing, pelvic drop, 1 minute  [] Child's pose + diaphragmatic breathing, pelvic drop, 2 minutes  [] Butterfly hip stretch + diaphragmatic breathing, pelvic dr op, 2 minutes  [] Pelvic floor relaxation guided meditation - performed during e-stim for dry needling  [x] HEP review, patient education - performed during manual techniques      Manual therapy techniques: to develop flexibility, desensitization, and extensibility -  Pt consented to pelvic floor muscle assessment and treatment in prior visit -see EMR  [] Obturator release at ischial tuberosities + active  release with resisted contralateral hip external rotation (B)  [] Manual release to pelvic floor muscles externally: levator ani   [] Manual release to pelvic floor muscles internally/vaginally: levator ani - mild increased tone and tenderness that improved within a few minutes of release. PT provided cues for diaphragmatic breathing, visualization, and relaxation.  [] Manual release and myofascial decompression/cupping to lower abdominal wall  [x] Dry needling with electrical stimulation to L multifidi of L2-4 - no adverse effect, written consent provided 5/9/22 - see EMR   [] Lateral and long-arm distraction to L hip with belt  [] Trigger point release to L iliopsoas  [] Taping to bilateral lumbar paraspinals and across L3      Home Exercises and Patient Education     Patient Education: progression of plan of care, plan for next session, pt prognosis, pelvic floor anatomy & function, relationship between TrA & PFM, relationship between hips & PFM, relationship between pelvic floor dysfunction & lower back pain, multifidi anatomy & function, lumbar spine anatomy, MRI results, flexion vs. extension directional preferece    Home Exercise Program (7/6/22): supine march, supine clams, bridging with band/belt around knees    Exercises were reviewed, and Divya Jacinto was able to demonstrate them prior to the end of the session, as needed. Divya Jacinto demonstrated good  understanding of the education provided.      Assessment     Pt tolerated treatment session well, with reduced symptoms post-needling and good tolerance for familiar and new exercise today. Less cuing required for multifidi pre-activation today. Lumbar segmental and abdominal wall dysfunction still appear to be driving pelvic and hip pain. Pt responding better to the newly updated HEP. Pt's functional mobility and ability to perform ADLs still limited by pain and weakness. She requires skilled therapy for continued patient education and progressive  resistance exercise.    Divya Jacinto is progressing fairly towards her goals.   Pt prognosis: good    Pt will continue to benefit from skilled outpatient physical therapy to address the deficits listed in the problem list box on initial evaluation, provide pt/family education and to maximize pt's level of independence in the home and community environment.     Pt's spiritual, cultural and educational needs considered and pt agreeable to plan of care and goals.  Anticipated barriers to physical therapy: none      Short Term Goals: 6 weeks   Pt to report 50% improvement in tenderness to palpation of pelvic floor to demonstrate improved trigger points and overactivity Met   Pt to report only rare incidences of urinary incontinence with coughing to demonstrate improved pelvic floor muscle coordination Met   Pt to report 50% improvement in pain with penetration (gynecological exam or intercourse) and orgasm to demonstrate improved pelvic floor muscle overactivity Met   Pt to be independent with introductory home exercise program Met      Long Term Goals: 12 weeks  Pt to report minimal/no tenderness to palpation of pelvic floor to demonstrate improved trigger points and overactivity Partially Met   Pt to demonstrate an improved score in the FOTO Pelvic Pain (PFDI) survey to no more than 8% impaired to demonstrate improving pelvic floor muscle tenderness, coordination. Not Met   Pt to deny pelvic pain with penetration (gynecological exam or intercourse) and orgasm to demonstrate improved pelvic floor muscle overactivity Partially Met   Pt to increase pelvic floor strength to 4/5 to demonstrate improved strength needed for continence with ADLs. Partially Met   Pt to be independent with advanced home exercise program Not Met   Pt to demonstrate bilateral hip strength of 5/5 for improved pelvic girdle support with mobility. Not Met   Pt to report minimal/no back pain with ADLs and functional mobility to demonstrate improved  lumbopelvic muscle support, coordination Not Met        Plan     Continue per Plan of Care.      Jess Ureña, PT, DPT

## 2022-08-12 ENCOUNTER — CLINICAL SUPPORT (OUTPATIENT)
Dept: REHABILITATION | Facility: OTHER | Age: 37
End: 2022-08-12
Attending: OBSTETRICS & GYNECOLOGY
Payer: MEDICAID

## 2022-08-12 DIAGNOSIS — R10.2 PELVIC PAIN: Primary | ICD-10-CM

## 2022-08-12 DIAGNOSIS — R29.898 DECREASED STRENGTH OF TRUNK AND BACK: ICD-10-CM

## 2022-08-12 DIAGNOSIS — M54.50 CHRONIC LEFT-SIDED LOW BACK PAIN WITHOUT SCIATICA: ICD-10-CM

## 2022-08-12 DIAGNOSIS — G89.29 CHRONIC LEFT-SIDED LOW BACK PAIN WITHOUT SCIATICA: ICD-10-CM

## 2022-08-12 PROCEDURE — 97110 THERAPEUTIC EXERCISES: CPT | Performed by: PHYSICAL THERAPIST

## 2022-08-15 NOTE — PROGRESS NOTES
Pelvic Health Physical Therapy   Treatment Note     Name: Divya Chand  Clinic Number: [id number]    Therapy Diagnosis:   Encounter Diagnoses   Name Primary?    Pelvic pain Yes    Decreased strength of trunk and back     Chronic left-sided low back pain without sciatica      Referring Provider: Arnulfo    Visit Date: 8/17/2022    Referring Provider Orders: PT Eval and Treat  Medical Diagnosis from Referral: Pelvic floor dysfunction in female [M62.89]  Evaluation Date: 4/29/2022  Last Reassessment: 7/6/2022, visit #10  Authorization Period Expiration: 9/30/2022  Plan of Care Expiration: 10/6/2022  Visit # / Visits authorized: 14/21  FOTO: 2/3 (4/29/2022, 6/20/2022)    Cancelled Visits: -  No Show Visits: -    Time In: 13:00  Time Out: 14:00  Total Billable Time: 60 minutes    Precautions: Standard    Subjective     Divya Jacinto reports increased pain the last few days, but it's waning. She met with a pain management doctor this week who thinks her issues are coming from sacroiliac joint dysfunction and L iliohypogastric nerve dysfunction (nerve block on 9/1/22).    She was compliant with home exercise program  Response to previous treatment: no adverse effect   Functional change: fluctuating improvements in urinary urgency and back/hip/pelvic pain    Pain: moderate  Location: L abdominal wall, L hip     Objective     Divya Jacinto received the following interventions during the treatment session -  (TrA = transverse abdominis, PFM = pelvic floor muscle)    Therapeutic exercises to develop strength, endurance and core stabilization for 53 minutes -  [] Diaphragmatic breathing + pelvic drop, 6 minutes - verbal and manual cues from PT, performed during manual techniques  [x] Diaphragmatic breathing + TrA brace on exhale + posterior pelvic tilt, x20 - verbal and manual cues from PT  [] Diaphragmatic breathing + PFM squeeze on exhale, 3x8 with 50% effort - verbal and manual cues from PT  [] Lower trunk rotations and  double-knees to chest with legs on ball, x20 each  [x] Seated ball roll out, 1 minute - reduced pain afterwards  [] Supine single-knee to chest (R&L), 30 seconds  [] Prone glute squeezes with 5-second hold, x20  [] Short arc quads for gentle multifidi activation (R&L), x20 - performed during manual interventions to abdominal wall  [] Lucian test stretch (L) - 2x30 seconds  [] TrA&PFM brace + supine hip adduction isometric with ball, x10 with 50% effort  [] TrA brace + supine heel slides (R&L), 2x5   [x] Prone hamstring curls with multifidi pre-activation (R&L), 3 minutes  [] TrA brace + supine clam with blue band, x30  [] TrA brace + straight leg raise (R&L), 2x4  [x] Open books with green band (R&L), x10  [] Standing pallof press with blue band (R&L), x12   [] TrA brace + side-lying hip abduction (R&L), 2x8  [x] Cat/cow with coordinated breath, 2 minutes   [] TrA brace + quadruped hip extension (R&L), x10  [x] TrA brace + standing shoulder extension with red band (B), 2x20  [x] TrA brace + wall push up, 2x15  [x] Side-stepping with yellow band around knees, 2 minutes  [] Seated on theraball + marches (R&L), 2 minutes  [x] Seated on theraball + row with green band, x20  [x] Seated on theraball + alternating shoulder flexion (2-3#, R&L), 2x8   [x] Ball squats with yellow band around knees, x20  [x] TrA brace + sit to stands from chair + foam pad & yoga blocks + bilateral hand support on mat, x10 - challenging  [] Happy baby stretch + diaphragmatic breathing, pelvic drop, 1 minute  [] Child's pose + diaphragmatic breathing, pelvic drop, 2 minutes  [] Butterfly hip stretch + diaphragmatic breathing, pelvic dr op, 2 minutes  [] Pelvic floor relaxation guided meditation - performed during e-stim for dry needling  [x] HEP review, patient education - performed during manual techniques      Manual therapy techniques: to develop flexibility, desensitization, and extensibility -  Pt consented to pelvic floor muscle assessment  and treatment in prior visit -see EMR  [] Obturator release at ischial tuberosities + active release with resisted contralateral hip external rotation (B)  [] Manual release to pelvic floor muscles externally: levator ani   [] Manual release to pelvic floor muscles internally/vaginally: levator ani - mild increased tone and tenderness that improved within a few minutes of release. PT provided cues for diaphragmatic breathing, visualization, and relaxation.  [] Manual release and myofascial decompression/cupping to lower abdominal wall  [] Dry needling with electrical stimulation to L multifidi of L2-4 - no adverse effect, written consent provided 5/9/22 - see EMR   [] Lateral and long-arm distraction to L hip with belt  [] Trigger point release to L iliopsoas  [] Taping to bilateral lumbar paraspinals and across L3  [x] Sacroiliac muscle-energy technique - slightly reduced symptoms afterwards    Home Exercises and Patient Education     Patient Education: progression of plan of care, plan for next session, pt prognosis, pelvic floor anatomy & function, relationship between TrA & PFM, relationship between hips & PFM, relationship between pelvic floor dysfunction & lower back pain, multifidi anatomy & function, lumbar spine anatomy, MRI results, flexion vs. extension directional preferece    Home Exercise Program (7/6/22): supine march, supine clams, bridging with band/belt around knees    Exercises were reviewed, and iDvya Jacinto was able to demonstrate them prior to the end of the session, as needed. Divya Jacinto demonstrated good  understanding of the education provided.      Assessment     Pt tolerated treatment session well, with reduced symptoms following sacroiliac muscle energy technique and with standing while wearing sacroiliac belt. PT altered exercises today to reduce sacroiliac joint shear, and pt tolerated well. Pt still demonstrates functional glute weakness (found sit to stands pretty challenging), which  is likely impacting L hip/back/abdominal wall pain. Pt's functional mobility and ability to perform ADLs still limited by pain and weakness. She requires skilled therapy for continued patient education and progressive resistance exercise.    Divya Jacinto is progressing fairly towards her goals.   Pt prognosis: good    Pt will continue to benefit from skilled outpatient physical therapy to address the deficits listed in the problem list box on initial evaluation, provide pt/family education and to maximize pt's level of independence in the home and community environment.     Pt's spiritual, cultural and educational needs considered and pt agreeable to plan of care and goals.  Anticipated barriers to physical therapy: none      Short Term Goals: 6 weeks   Pt to report 50% improvement in tenderness to palpation of pelvic floor to demonstrate improved trigger points and overactivity Met   Pt to report only rare incidences of urinary incontinence with coughing to demonstrate improved pelvic floor muscle coordination Met   Pt to report 50% improvement in pain with penetration (gynecological exam or intercourse) and orgasm to demonstrate improved pelvic floor muscle overactivity Met   Pt to be independent with introductory home exercise program Met      Long Term Goals: 12 weeks  Pt to report minimal/no tenderness to palpation of pelvic floor to demonstrate improved trigger points and overactivity Partially Met   Pt to demonstrate an improved score in the FOTO Pelvic Pain (PFDI) survey to no more than 8% impaired to demonstrate improving pelvic floor muscle tenderness, coordination. Not Met   Pt to deny pelvic pain with penetration (gynecological exam or intercourse) and orgasm to demonstrate improved pelvic floor muscle overactivity Partially Met   Pt to increase pelvic floor strength to 4/5 to demonstrate improved strength needed for continence with ADLs. Partially Met   Pt to be independent with advanced home exercise  program Not Met   Pt to demonstrate bilateral hip strength of 5/5 for improved pelvic girdle support with mobility. Not Met   Pt to report minimal/no back pain with ADLs and functional mobility to demonstrate improved lumbopelvic muscle support, coordination Not Met        Plan     Continue per Plan of Care      Jess Ureña, PT, DPT

## 2022-08-16 ENCOUNTER — OFFICE VISIT (OUTPATIENT)
Dept: PAIN MEDICINE | Facility: CLINIC | Age: 37
End: 2022-08-16
Payer: MEDICAID

## 2022-08-16 VITALS
HEART RATE: 80 BPM | BODY MASS INDEX: 38.94 KG/M2 | DIASTOLIC BLOOD PRESSURE: 104 MMHG | WEIGHT: 263.69 LBS | SYSTOLIC BLOOD PRESSURE: 168 MMHG

## 2022-08-16 DIAGNOSIS — M54.16 LUMBAR RADICULOPATHY, CHRONIC: ICD-10-CM

## 2022-08-16 DIAGNOSIS — G57.92 ILIOINGUINAL NEURALGIA OF LEFT SIDE: Primary | ICD-10-CM

## 2022-08-16 PROCEDURE — 4010F PR ACE/ARB THEARPY RXD/TAKEN: ICD-10-PCS | Mod: CPTII,,, | Performed by: PHYSICAL MEDICINE & REHABILITATION

## 2022-08-16 PROCEDURE — 3077F PR MOST RECENT SYSTOLIC BLOOD PRESSURE >= 140 MM HG: ICD-10-PCS | Mod: CPTII,,, | Performed by: PHYSICAL MEDICINE & REHABILITATION

## 2022-08-16 PROCEDURE — 99999 PR PBB SHADOW E&M-EST. PATIENT-LVL III: ICD-10-PCS | Mod: PBBFAC,,, | Performed by: PHYSICAL MEDICINE & REHABILITATION

## 2022-08-16 PROCEDURE — 4010F ACE/ARB THERAPY RXD/TAKEN: CPT | Mod: CPTII,,, | Performed by: PHYSICAL MEDICINE & REHABILITATION

## 2022-08-16 PROCEDURE — 3080F DIAST BP >= 90 MM HG: CPT | Mod: CPTII,,, | Performed by: PHYSICAL MEDICINE & REHABILITATION

## 2022-08-16 PROCEDURE — 99213 OFFICE O/P EST LOW 20 MIN: CPT | Mod: PBBFAC,PO | Performed by: PHYSICAL MEDICINE & REHABILITATION

## 2022-08-16 PROCEDURE — 1160F PR REVIEW ALL MEDS BY PRESCRIBER/CLIN PHARMACIST DOCUMENTED: ICD-10-PCS | Mod: CPTII,,, | Performed by: PHYSICAL MEDICINE & REHABILITATION

## 2022-08-16 PROCEDURE — 99204 OFFICE O/P NEW MOD 45 MIN: CPT | Mod: S$PBB,,, | Performed by: PHYSICAL MEDICINE & REHABILITATION

## 2022-08-16 PROCEDURE — 1159F PR MEDICATION LIST DOCUMENTED IN MEDICAL RECORD: ICD-10-PCS | Mod: CPTII,,, | Performed by: PHYSICAL MEDICINE & REHABILITATION

## 2022-08-16 PROCEDURE — 3008F BODY MASS INDEX DOCD: CPT | Mod: CPTII,,, | Performed by: PHYSICAL MEDICINE & REHABILITATION

## 2022-08-16 PROCEDURE — 1159F MED LIST DOCD IN RCRD: CPT | Mod: CPTII,,, | Performed by: PHYSICAL MEDICINE & REHABILITATION

## 2022-08-16 PROCEDURE — 3008F PR BODY MASS INDEX (BMI) DOCUMENTED: ICD-10-PCS | Mod: CPTII,,, | Performed by: PHYSICAL MEDICINE & REHABILITATION

## 2022-08-16 PROCEDURE — 1160F RVW MEDS BY RX/DR IN RCRD: CPT | Mod: CPTII,,, | Performed by: PHYSICAL MEDICINE & REHABILITATION

## 2022-08-16 PROCEDURE — 99204 PR OFFICE/OUTPT VISIT, NEW, LEVL IV, 45-59 MIN: ICD-10-PCS | Mod: S$PBB,,, | Performed by: PHYSICAL MEDICINE & REHABILITATION

## 2022-08-16 PROCEDURE — 99999 PR PBB SHADOW E&M-EST. PATIENT-LVL III: CPT | Mod: PBBFAC,,, | Performed by: PHYSICAL MEDICINE & REHABILITATION

## 2022-08-16 PROCEDURE — 3080F PR MOST RECENT DIASTOLIC BLOOD PRESSURE >= 90 MM HG: ICD-10-PCS | Mod: CPTII,,, | Performed by: PHYSICAL MEDICINE & REHABILITATION

## 2022-08-16 PROCEDURE — 3077F SYST BP >= 140 MM HG: CPT | Mod: CPTII,,, | Performed by: PHYSICAL MEDICINE & REHABILITATION

## 2022-08-16 NOTE — PROGRESS NOTES
[facility]  New Patient H&P    Referring Provider: Diego Jacinto Md  [address]  [address]  [place] [address]    Chief Complaint: No chief complaint on file.      History of Present Illness: Divya Salazar is a 37 y.o. female referred by Dr. Diego Jacinto for ***.      Onset: 8 years  Location: Left lower back, left Groin  Radiation: n/a  Timing: constant  Quality: Aching, Grabbing, Tight, Deep and Sharp  Exacerbating Factors: walking for more than several minutes  Alleviating Factors: nothing, heat, laying down and rest  Associated Symptoms: denies night fever/night sweats, urinary incontinence, bowel incontinence, significant weight loss, significant motor weakness and loss of sensations    Severity: Currently: 6/10   Typical Range: 6-8/10     Exacerbation: 8/10          Previous Interventions:  - ***    Previous Therapies:  PT/OT: {YES NO:96285} ***  Relevant Surgery: {YES NO:42190} ***  Previous Medications:   - NSAIDS: ***  - Muscle Relaxants: ***   - TCAs: ***  - SNRIs: ***  - Topicals: ***  - Anticonvulsants: ***   - Opioids: ***    Current Pain Medications:  1. ***     Blood Thinners: ***    Full Medication List:    Current Outpatient Medications:     ALPRAZolam (XANAX) 0.25 MG tablet, Take 0.25 mg by mouth daily as needed., Disp: , Rfl:     buPROPion (WELLBUTRIN XL) 150 MG TB24 tablet, Take 150 mg by mouth once daily., Disp: , Rfl:     cyclobenzaprine (FLEXERIL) 5 MG tablet, Take 5 mg by mouth daily as needed., Disp: , Rfl:     DULoxetine (CYMBALTA) 30 MG capsule, Take by mouth., Disp: , Rfl:     gabapentin (NEURONTIN) 100 MG capsule, Take 100 mg by mouth 3 (three) times daily., Disp: , Rfl:     hydrOXYzine HCL (ATARAX) 25 MG tablet, Take 25 mg by mouth 2 (two) times daily. as needed for anxiety., Disp: , Rfl:     sertraline (ZOLOFT) 50 MG tablet, , Disp: , Rfl:     triamcinolone acetonide 0.025% (KENALOG) 0.025 % cream, Apply topically 2 (two) times daily., Disp:  , Rfl:      Review of Systems:  ROS    Allergies:  Patient has no known allergies.     Medical History:   has no past medical history on file.    Surgical History:   has a past surgical history that includes Fulguration of endometriosis; arthroscopy, hip (Left, 3/18/2021); and Knee arthroscopy w/ ACL reconstruction (Left, 7/15/2021).    Family History:  family history is not on file.    Social History:   reports that she has quit smoking. She started smoking about 10 years ago. She has never used smokeless tobacco. She reports current alcohol use. She reports that she does not use drugs.    Physical Exam:  There were no vitals taken for this visit.  GEN: No acute distress. Calm, comfortable  HENT: Normocephalic, atraumatic, moist mucous membranes  EYE: Anicteric sclera, non-injected.   CV: Non-diaphoretic. Regular Rate. Radial Pulses 2+.  RESP: Breathing comfortably. Chest expansion symmetric.  EXT: No clubbing, cyanosis.   SKIN: Warm, & dry to palpation. No visible rashes or lesions of exposed skin.   PSYCH: Pleasant mood and appropriate affect. Recent and remote memory intact.   GAIT: Independent, *** ambulation  ***    Imaging:  - ***    Labs:  BMP  No results found for: NA, K, CL, CO2, BUN, CREATININE, CALCIUM, ANIONGAP, ESTGFRAFRICA, EGFRNONAA  No results found for: ALT, AST, GGT, ALKPHOS, BILITOT  Lab Results   Component Value Date     07/09/2021       Assessment:  Divya Salazar is a 37 y.o. female with the following diagnoses based on history, exam, and imaging:    Problem List Items Addressed This Visit    None     Visit Diagnoses     Lumbar radiculopathy, chronic              This is a pleasant 37 y.o. {lady gentleman:[id number]} presenting with:     - ***  - Comorbidities: ***    Treatment Plan:   - PT/OT/HEP: ***. Discussed benefits of exercise for pain.   - Procedures: ***  - Medications: No changes recommended at this time.  - Imaging: Reviewed. ***  - Labs: Reviewed.  Medications are  appropriately dosed for current hepatorenal function.    Follow Up: RTC ***    Kelly Bazzi M.D.  Interventional Pain Medicine / Physical Medicine & Rehabilitation    Disclaimer: This note was partly generated using dictation software which may occasionally result in transcription errors.

## 2022-08-16 NOTE — PROGRESS NOTES
[facility]  New Patient H&P    Referring Provider: Diego Jacinto Md  [address]  [address]  [place] [address]    Chief Complaint:   Chief Complaint   Patient presents with    Back Pain    Low-back Pain     Left    Groin Pain     Left       History of Present Illness: Divya Salazar is a 37 y.o. female referred by Dr. Diego Jacinto for left hip/abdominal pain.      Onset: When she was 29y/o. Denies any trauma or specific injury.  Location: left lower abdomen  Radiation: groin on the left  Timing: constant but intermittent intensity  Quality: Grabbing and Sharp  Exacerbating Factors: running and after work all day. After sex and orgasm  Alleviating Factors: stretching, rest, passing gas  Associated Symptoms: denies urinary incontinence, bowel incontinence, significant motor weakness and loss of sensations    Severity: Currently: 6/10   Typical Range: 2-8/10     Exacerbation: 8/10     Pain Disability Index  Family/Home Responsibilities:: 3  Recreation:: 8  Social Activity:: 3  Occupation:: 7  Sexual Behavior:: 8  Self Care:: 3  Life-Support Activities:: 1  Pain Disability Index (PDI): 33    Previous Interventions:  - Pudendal nerve block and Botox  -    Previous Therapies:  PT/OT: yes in Pelvic Floor PT  Relevant Surgery: yes, left hip scope, left knee ACL repair, Laparscopic hysteroc 2x  Previous Medications:   - NSAIDS: Tried OTC not helpful  - Muscle Relaxants: Flexeril  - TCAs: None  - SNRIs: Was prescribed Cymbalta but never took it  - Topicals: E-stim with dry needling with PT, Hot baths  - Anticonvulsants: Gabapentin 100mg TID    - Opioids: None    Current Pain Medications:  1. Xanax 0.25mg Daily PRN  2. Flexeril 5mg PRN not that helpful and causes some sedation  3. Cymbalta 30 mg   4. Gabapentin 100 mg TID    Blood Thinners: None    Full Medication List:    Current Outpatient Medications:     ALPRAZolam (XANAX) 0.25 MG tablet, Take 0.25 mg by mouth daily as needed., Disp:  , Rfl:     buPROPion (WELLBUTRIN XL) 150 MG TB24 tablet, Take 150 mg by mouth once daily., Disp: , Rfl:     cyclobenzaprine (FLEXERIL) 5 MG tablet, Take 5 mg by mouth daily as needed., Disp: , Rfl:     DULoxetine (CYMBALTA) 30 MG capsule, Take by mouth., Disp: , Rfl:     hydrOXYzine HCL (ATARAX) 25 MG tablet, Take 25 mg by mouth 2 (two) times daily. as needed for anxiety., Disp: , Rfl:     sertraline (ZOLOFT) 50 MG tablet, , Disp: , Rfl:     triamcinolone acetonide 0.025% (KENALOG) 0.025 % cream, Apply topically 2 (two) times daily., Disp: , Rfl:     gabapentin (NEURONTIN) 100 MG capsule, Take 100 mg by mouth 3 (three) times daily., Disp: , Rfl:      Review of Systems:  ROS    Allergies:  Patient has no known allergies.     Medical History:   has no past medical history on file.    Surgical History:   has a past surgical history that includes Fulguration of endometriosis; arthroscopy, hip (Left, 3/18/2021); and Knee arthroscopy w/ ACL reconstruction (Left, 7/15/2021).    Family History:  family history is not on file.    Social History:   reports that she has quit smoking. She started smoking about 10 years ago. She has never used smokeless tobacco. She reports current alcohol use. She reports that she does not use drugs.    Physical Exam:  BP (!) 168/104   Pulse 80   Wt 119.6 kg (263 lb 10.7 oz)   BMI 38.94 kg/m²   GEN: No acute distress. Calm, comfortable  HENT: Normocephalic, atraumatic, moist mucous membranes  EYE: Anicteric sclera, non-injected.   CV: Non-diaphoretic. Regular Rate. Radial Pulses 2+.  RESP: Breathing comfortably. Chest expansion symmetric.  EXT: No clubbing, cyanosis.   SKIN: Warm, & dry to palpation. No visible rashes or lesions of exposed skin.   PSYCH: Pleasant mood and appropriate affect. Recent and remote memory intact.   GAIT: Independent, normal ambulation  ABDOMEN:  - Inspection: No prior surgical scars  - Palpation: No massess palpable. No distention. No guarding. TTP to LLQ  and around left ASIS.  - Carnett's sign (-)  Lumbar Spine Exam:       Inspection: No erythema, bruising. No surgical incisions      Palpation: (+) TTP of sacroiliac joint on left.       ROM: Not Limited in flexion, extension, lateral bending      (+) Facet loading bilaterally      (-) Straight Leg Raise bilaterally      (+) PEDRO on right causes pain on left      (+) SIJ Compression Test on on left      (+) Gaenslan's Test on left  Hip Exam:      Inspection: No gross deformity or apparent leg length discrepancy      Palpation: (+) TTP of left ASIS, sartorius       ROM: No Limited in internal rotation, external rotation  Neurologic Exam:     Alert. Speech is fluent and appropriate.     Strength: 5/5 throughout bilateral lower extremities     Sensation: Grossly intact to light touch in bilateral lower extremities     Reflexes: 2+ in b/l patella, achilles     Tone: No abnormality appreciated in bilateral lower extremities     No Clonus     Downgoing toes on plantar stimulation     (-) Nuñez bilaterally             Imaging:  - MRI Lumbar spine 8/1/22:  Alignment: Minimal grade 1 anterolisthesis L3 on L4.  Vertebrae: 5 lumbar-type vertebral bodies. No aggressive marrow replacement process or fracture.  Discs: L3-4 dessication with fatty change (Modic 2) cortical endplate L3-L4.  Cord: Normal. Conus terminates at .  Degenerative findings:  T12-L1: There is no focal disc herniation. No significant central canal narrowing . No significant neural foraminal narrowing.  L1-L2: There is no focal disc herniation. No significant central canal narrowing . No significant neural foraminal narrowing.  L2-L3: There is no focal disc herniation. No significant central canal narrowing . No significant neural foraminal narrowing.  L3-L4: There is no focal disc herniation.Broad-based bulging of disc material.  Minimal anterolisthesis L3 on L4. No significant central canal narrowing .  Mild RIGHT neural foraminal encroachment L3-L4.  Neural foraminal narrowing.  L4-L5: There is no focal disc herniation.Mild broad-based bulging of disc material.  No significant central canal narrowing . No significant neural foraminal narrowing.  L5-S1: There is no focal disc herniation. No significant central canal narrowing . No significant neural foraminal narrowing.Annular fissure.   Paraspinal muscles & soft tissues: Unremarkable.    - X-ray hip left 3/3/21:  No evidence for acute fracture line or dislocation of the left hip.  Articular spaces relatively maintained.  Further evaluation as warranted clinically.    Labs:  BMP  No results found for: NA, K, CL, CO2, BUN, CREATININE, CALCIUM, ANIONGAP, ESTGFRAFRICA, EGFRNONAA  No results found for: ALT, AST, GGT, ALKPHOS, BILITOT  Lab Results   Component Value Date     07/09/2021       Assessment:  Divya Salazar is a 37 y.o. female with the following diagnoses based on history, exam, and imaging:    Problem List Items Addressed This Visit    None     Visit Diagnoses     Ilioinguinal neuralgia of left side    -  Primary    Relevant Orders    Nerve block    Lumbar radiculopathy, chronic              This is a pleasant 37 y.o. lady presenting with:     - Chronic left lower quadrant abdominal pain and groin pain: I am not convinced this pain is originating from the lumbar spine considering the main pathology is at L3-4 on the right which does not correlate well with the distribution of her pain. I suspect ilioinguinal/iliohypogastric neuralgia vs genitofemoral neuralgia, or potentially startorius muscle pathology  - Chronic low back pain with SIJ pain on the left.   - Comorbidities: BMI > 35.     Treatment Plan:   - PT/OT/HEP: Discussed benefits of exercise for pain.   - Procedures: Schedule left ilioinguinal nerve block with US guidance  - Medications: I recommended topical voltaren to the area.   - Imaging: Reviewed.   - Labs: Reviewed.  Medications are appropriately dosed for current hepatorenal  function.    Follow Up: RTC for US guided nerve block    Haily Cooper M.D.  Interventional Pain Medicine / Physical Medicine & Rehabilitation    Disclaimer: This note was partly generated using dictation software which may occasionally result in transcription errors.

## 2022-08-17 ENCOUNTER — CLINICAL SUPPORT (OUTPATIENT)
Dept: REHABILITATION | Facility: OTHER | Age: 37
End: 2022-08-17
Attending: OBSTETRICS & GYNECOLOGY
Payer: MEDICAID

## 2022-08-17 DIAGNOSIS — R10.2 PELVIC PAIN: Primary | ICD-10-CM

## 2022-08-17 DIAGNOSIS — M54.50 CHRONIC LEFT-SIDED LOW BACK PAIN WITHOUT SCIATICA: ICD-10-CM

## 2022-08-17 DIAGNOSIS — G89.29 CHRONIC LEFT-SIDED LOW BACK PAIN WITHOUT SCIATICA: ICD-10-CM

## 2022-08-17 DIAGNOSIS — R29.898 DECREASED STRENGTH OF TRUNK AND BACK: ICD-10-CM

## 2022-08-17 PROCEDURE — 97110 THERAPEUTIC EXERCISES: CPT | Performed by: PHYSICAL THERAPIST

## 2022-08-22 ENCOUNTER — TELEPHONE (OUTPATIENT)
Dept: PAIN MEDICINE | Facility: CLINIC | Age: 37
End: 2022-08-22
Payer: MEDICAID

## 2022-08-22 NOTE — TELEPHONE ENCOUNTER
----- Message from Haily Cooper MD sent at 8/18/2022 12:43 PM CDT -----  Could you please make her a 30 min slot for the procedure? You could move the procedure f/u with Mr. Arroyo that day with Jose Miguel to make room or move him to another time slot.    Thank you,  KP

## 2022-08-24 ENCOUNTER — TELEPHONE (OUTPATIENT)
Dept: PAIN MEDICINE | Facility: CLINIC | Age: 37
End: 2022-08-24
Payer: MEDICAID

## 2022-08-24 NOTE — TELEPHONE ENCOUNTER
----- Message from Cristina Vieira LPN sent at 8/24/2022 11:53 AM CDT -----  Contact: [phone]    ----- Message -----  From: Helene Askew  Sent: 8/24/2022  11:49 AM CDT  To: [facility] Johansen Staff    Who Called: PT  Regarding: received a call to reschedule   Would the patient rather a call back or a response via [facility]? Call back  Best Call Back Number: [phone]   Additional Information: n/a

## 2022-08-24 NOTE — TELEPHONE ENCOUNTER
Kept procedure at 145pm on 9/1 due to her auth expiring 9/1 and there weren't any avail appts. Advised pt just to come in for that day.

## 2022-08-26 ENCOUNTER — TELEPHONE (OUTPATIENT)
Dept: PAIN MEDICINE | Facility: CLINIC | Age: 37
End: 2022-08-26
Payer: MEDICAID

## 2022-08-26 ENCOUNTER — PATIENT MESSAGE (OUTPATIENT)
Dept: PAIN MEDICINE | Facility: CLINIC | Age: 37
End: 2022-08-26
Payer: MEDICAID

## 2022-08-26 NOTE — PROGRESS NOTES
Pelvic Health Physical Therapy   Treatment Note     Name: Divya Jacinto [facility]  Clinic Number: [id number]    Therapy Diagnosis:   Encounter Diagnoses   Name Primary?    Pelvic pain Yes    Decreased strength of trunk and back     Chronic left-sided low back pain without sciatica      Referring Provider: Arnulfo    Visit Date: 8/29/2022    Referring Provider Orders: PT Eval and Treat  Medical Diagnosis from Referral: Pelvic floor dysfunction in female [M62.89]  Evaluation Date: 4/29/2022  Last Reassessment: 7/6/2022, visit #10  Authorization Period Expiration: 9/30/2022  Plan of Care Expiration: 10/6/2022  Visit # / Visits authorized: 14/21  FOTO: 2/3 (4/29/2022, 6/20/2022)    Cancelled Visits: -  No Show Visits: -    Time In: 12:00  Time Out: 13:00  Total Billable Time: 60 minutes    Precautions: Standard    Subjective     Divya Jacinto reports increased L anterior hip pain that she attributes to frequent stair ascent while on vacation. The scheduled nerve block was cancelled, and she has to inquire about getting it done at [facility].    She was compliant with home exercise program  Response to previous treatment: no adverse effect   Functional change: fluctuating improvements in urinary urgency and back/hip/pelvic pain    Pain: moderate  Location: L abdominal wall, L hip     Objective     Divya Jacinto received the following interventions during the treatment session -  (TrA = transverse abdominis, PFM = pelvic floor muscle)  Pt consented to pelvic floor muscle assessment and treatment in prior visit - see EMR    Therapeutic exercises to develop strength, endurance and core stabilization for 53 minutes -  [x] Diaphragmatic breathing + pelvic drop, 6 minutes - verbal and manual cues from PT, performed during manual techniques  [] Diaphragmatic breathing + TrA brace on exhale + posterior pelvic tilt, x20 - verbal and manual cues from PT  [] Diaphragmatic breathing + PFM squeeze on exhale, 3x8 with 50% effort - verbal and manual cues  from PT  [] Lower trunk rotations and double-knees to chest with legs on ball, x20 each  [] Seated ball roll out, 1 minute - reduced pain afterwards  [] Supine single-knee to chest (R&L), 30 seconds  [] Prone glute squeezes with 5-second hold, x20  [] Short arc quads for gentle multifidi activation (R&L), x20 - performed during manual interventions to abdominal wall  [] Lucian test stretch (L) - 2x30 seconds  [] TrA&PFM brace + supine hip adduction isometric with ball, x10 with 50% effort  [] TrA brace + supine heel slides (R&L), 2x5   [] Prone hamstring curls with multifidi pre-activation (R&L), 3 minutes  [] TrA brace + supine clam with blue band, x30  [] TrA brace + straight leg raise (R&L), 2x4  [] Open books with green band (R&L), x10  [] Standing pallof press with blue band (R&L), x12   [] TrA brace + side-lying hip abduction (R&L), 2x8  [] Cat/cow with coordinated breath, 2 minutes   [] TrA brace + quadruped hip extension (R&L), x10  [x] TrA brace + standing shoulder extension with green band (B), 2x15  [x] TrA brace + wall push up, x15  [x] TrA brace + toe taps to 6-inch step (R&L), 3x10  [x] Side-stepping with yellow band around knees, 2 minutes  [x] Seated on theraball + pelvic rotations, 2 minutes  [x] Seated on theraball + row with green band, x20  [x] Seated on theraball + alternating shoulder flexion (2-3#, R&L), 2x10   [x] Ball squats with green band around knees, x15 - increased groin pain near the end  [x] Lateral step up/down on yoga block (R&L), x5  [] TrA brace + sit to stands from chair + foam pad & yoga blocks + bilateral hand support on mat, x10 - challenging  [] Happy baby stretch + diaphragmatic breathing, pelvic drop, 1 minute  [] Child's pose + diaphragmatic breathing, pelvic drop, 2 minutes  [] Butterfly hip stretch + diaphragmatic breathing, pelvic drop, 2 minutes  [x] Standing lunge/hip flexor stretch (L), 2x30 seconds  [] Pelvic floor relaxation guided meditation - performed during  e-stim for dry needling  [x] HEP review, patient education - performed during manual techniques      Manual therapy techniques: to develop flexibility, desensitization, and extensibility -  [] Obturator release at ischial tuberosities + active release with resisted contralateral hip external rotation (B)  [] Manual release to pelvic floor muscles externally: levator ani   [] Manual release to pelvic floor muscles internally/vaginally: levator ani - mild increased tone and tenderness that improved within a few minutes of release. PT provided cues for diaphragmatic breathing, visualization, and relaxation.  [] Manual release and myofascial decompression/cupping to lower abdominal wall  [x] Dry needling with electrical stimulation to L rectus abdominis, iliacus, and iliopsoas - no adverse effect, written consent provided 5/9/22 - see EMR   [] Lateral and long-arm distraction to L hip with belt  [] Trigger point release to L iliopsoas  [] Taping to bilateral lumbar paraspinals and across L3  [] Sacroiliac muscle-energy technique - slightly reduced symptoms afterwards    Home Exercises and Patient Education     Patient Education: progression of plan of care, plan for next session, pt prognosis, pelvic floor anatomy & function, relationship between TrA & PFM, relationship between hips & PFM, relationship between pelvic floor dysfunction & lower back pain, multifidi anatomy & function, lumbar spine anatomy, MRI results, flexion vs. extension directional preferece    Home Exercise Program (7/6/22): supine march, supine clams, bridging with band/belt around knees    Exercises were reviewed, and Divya Jacinto was able to demonstrate them prior to the end of the session, as needed. Divya Jacinto demonstrated good  understanding of the education provided.      Assessment     Pt tolerated treatment session well, with reduced symptoms following needling and corrective exercises/stretching. Pt continues to tolerate exercises that  reduce sacroiliac joint shear, and she was able to perform more exercises and more challenging exercises today compared to last visit. Pt's functional mobility and ability to perform ADLs still limited by pain and weakness. She requires skilled therapy for continued patient education and progressive resistance exercise.    Divya Jacinto is progressing fairly towards her goals.   Pt prognosis: good    Pt will continue to benefit from skilled outpatient physical therapy to address the deficits listed in the problem list box on initial evaluation, provide pt/family education and to maximize pt's level of independence in the home and community environment.     Pt's spiritual, cultural and educational needs considered and pt agreeable to plan of care and goals.  Anticipated barriers to physical therapy: none      Short Term Goals: 6 weeks   Pt to report 50% improvement in tenderness to palpation of pelvic floor to demonstrate improved trigger points and overactivity Met   Pt to report only rare incidences of urinary incontinence with coughing to demonstrate improved pelvic floor muscle coordination Met   Pt to report 50% improvement in pain with penetration (gynecological exam or intercourse) and orgasm to demonstrate improved pelvic floor muscle overactivity Met   Pt to be independent with introductory home exercise program Met      Long Term Goals: 12 weeks  Pt to report minimal/no tenderness to palpation of pelvic floor to demonstrate improved trigger points and overactivity Partially Met   Pt to demonstrate an improved score in the FOTO Pelvic Pain (PFDI) survey to no more than 8% impaired to demonstrate improving pelvic floor muscle tenderness, coordination. Not Met   Pt to deny pelvic pain with penetration (gynecological exam or intercourse) and orgasm to demonstrate improved pelvic floor muscle overactivity Partially Met   Pt to increase pelvic floor strength to 4/5 to demonstrate improved strength needed for  continence with ADLs. Partially Met   Pt to be independent with advanced home exercise program Not Met   Pt to demonstrate bilateral hip strength of 5/5 for improved pelvic girdle support with mobility. Not Met   Pt to report minimal/no back pain with ADLs and functional mobility to demonstrate improved lumbopelvic muscle support, coordination Not Met        Plan     Continue per Plan of Care      Jess Ureña, PT, DPT

## 2022-08-26 NOTE — TELEPHONE ENCOUNTER
----- Message from Cristina Vieira LPN sent at 8/26/2022 11:30 AM CDT -----  Contact: Yumiko    ----- Message -----  From: Eulalio Penny  Sent: 8/26/2022  11:13 AM CDT  To: Allison Johansen Staff    .Type:  Patient Returning Call    Who Called:Yumiko (Niels Blue)  Would the patient rather a call back or a response via [facility]? Call  Best Call Back Number: [phone] Fax   Additional Information:    Caller stated that pt needs a PA for procedure  Caller stated that she a question regarding processing PA

## 2022-08-26 NOTE — TELEPHONE ENCOUNTER
Spoke with Yumiko to clarify the code needed for the procedure. She verbalized she'll process the PA and get everything situated.

## 2022-08-29 ENCOUNTER — CLINICAL SUPPORT (OUTPATIENT)
Dept: REHABILITATION | Facility: OTHER | Age: 37
End: 2022-08-29
Payer: MEDICAID

## 2022-08-29 DIAGNOSIS — G89.29 CHRONIC LEFT-SIDED LOW BACK PAIN WITHOUT SCIATICA: ICD-10-CM

## 2022-08-29 DIAGNOSIS — M54.50 CHRONIC LEFT-SIDED LOW BACK PAIN WITHOUT SCIATICA: ICD-10-CM

## 2022-08-29 DIAGNOSIS — R10.2 PELVIC PAIN: Primary | ICD-10-CM

## 2022-08-29 DIAGNOSIS — R29.898 DECREASED STRENGTH OF TRUNK AND BACK: ICD-10-CM

## 2022-08-29 PROCEDURE — 97110 THERAPEUTIC EXERCISES: CPT | Performed by: PHYSICAL THERAPIST

## 2022-08-31 NOTE — PROGRESS NOTES
Pelvic Health Physical Therapy   Treatment Note     Name: Divya Jacinto [facility]  Clinic Number: [id number]    Therapy Diagnosis:   Encounter Diagnoses   Name Primary?    Pelvic pain Yes    Decreased strength of trunk and back     Chronic left-sided low back pain without sciatica      Referring Provider: Arnulfo    Visit Date: 9/7/2022    Referring Provider Orders: PT Eval and Treat  Medical Diagnosis from Referral: Pelvic floor dysfunction in female [M62.89]  Evaluation Date: 4/29/2022  Last Reassessment: 7/6/2022, visit #10  Authorization Period Expiration: 9/30/2022  Plan of Care Expiration: 10/6/2022  Visit # / Visits authorized: 15/21  FOTO: 2/3 (4/29/2022, 6/20/2022)    Cancelled Visits: -  No Show Visits: -    Time In: 14:25  Time Out: 15:30  Total Billable Time: 65 minutes    Precautions: Standard    Subjective     Divya Jacinto reports L inner thigh pain with exercising and stretching. Recent ilioinguinal nerve block reduced some tenderness but did not address inner thigh pain. She thinks her symptoms have improved slightly in the last few months.     She was compliant with home exercise program - also started doing sumo squats, kneeling hip flexor stretch, lunging hip adductor stretch  Response to previous treatment: no adverse effect   Functional change: fluctuating improvements in urinary urgency and back/hip/pelvic pain    Pain: moderate  Location: L inner thigh and hip     Objective     Divya Jacinto received the following interventions during the treatment session -  (TrA = transverse abdominis, PFM = pelvic floor muscle)  Pt consented to pelvic floor muscle assessment and treatment in prior visit - see EMR    Therapeutic exercises to develop strength, endurance and core stabilization for 53 minutes -  [x] Diaphragmatic breathing + pelvic drop, 6 minutes - verbal and manual cues from PT, performed during manual techniques  [] Diaphragmatic breathing + TrA brace on exhale + posterior pelvic tilt, x20 - verbal and  manual cues from PT  [] Diaphragmatic breathing + PFM squeeze on exhale, 3x8 with 50% effort - verbal and manual cues from PT  [] Lower trunk rotations and double-knees to chest with legs on ball, x20 each  [] Seated ball roll out, 1 minute - reduced pain afterwards  [] Supine single-knee to chest (R&L), 30 seconds  [] Prone glute squeezes with 5-second hold, x20  [] Short arc quads for gentle multifidi activation (R&L), x20 - performed during manual interventions to abdominal wall  [] Lucian test stretch (L) - 2x30 seconds  [] TrA&PFM brace + supine hip adduction isometric with ball, x10 with 50% effort  [] TrA brace + supine heel slides (R&L), 2x5   [] Prone hamstring curls with multifidi pre-activation (R&L), 3 minutes  [] TrA brace + supine clam with blue band, x30  [] TrA brace + straight leg raise (R&L), 2x4  [] Open books with green band (R&L), x10  [] Standing pallof press with blue band (R&L), x12   [] TrA brace + side-lying hip abduction (R&L), 2x8  [] Cat/cow with coordinated breath, 2 minutes   [] TrA brace + quadruped hip extension (R&L), x10  [x] TrA brace + standing shoulder extension with green band (B), 2x15  [x] TrA brace + wall push up, 2x15  [x] TrA brace + toe taps to 6-inch step (R&L), 3x10  [x] Side-stepping with red band around knees, 2 minutes  [] Seated on theraball + pelvic rotations, 2 minutes  [x] Seated on theraball + row with green band, x20  [x] Seated on theraball + alternating shoulder flexion (2-3#, R&L), 2x15   [] Ball squats with green band around knees, x15 - increased groin pain near the end  [x] Lateral step up/down on yoga block (R&L), 2x5  [x] TrA brace + sit to stands from thigh-height mat with green band around knees, 2x8  [] Happy baby stretch + diaphragmatic breathing, pelvic drop, 1 minute  [] Child's pose + diaphragmatic breathing, pelvic drop, 2 minutes  [x] Butterfly hip stretch + diaphragmatic breathing, pelvic drop, 2 minutes  [x] Half-kneeling lunge/hip flexor  stretch (L), 1 minute  [x] Supine hamstring stretch with strap (L), 1 minute  [] Pelvic floor relaxation guided meditation - performed during e-stim for dry needling  [x] HEP review, patient education - performed during manual techniques      Manual therapy techniques: to develop flexibility, desensitization, and extensibility -  [] Obturator release at ischial tuberosities + active release with resisted contralateral hip external rotation (B)  [] Manual release to pelvic floor muscles externally: levator ani   [] Manual release to pelvic floor muscles internally/vaginally: levator ani - mild increased tone and tenderness that improved within a few minutes of release. PT provided cues for diaphragmatic breathing, visualization, and relaxation.  [] Manual release and myofascial decompression/cupping to lower abdominal wall  [x] Dry needling with electrical stimulation to L adductors, pectineus, gracilis, and iliopsoas - no adverse effect, written consent provided 5/9/22 - see EMR   [] Lateral and long-arm distraction to L hip with belt  [] Trigger point release to L iliopsoas  [] Taping to bilateral lumbar paraspinals and across L3  [] Sacroiliac muscle-energy technique - slightly reduced symptoms afterwards    Home Exercises and Patient Education     Patient Education: progression of plan of care, plan for next session, pt prognosis, pelvic floor anatomy & function, relationship between TrA & PFM, relationship between hips & PFM, relationship between pelvic floor dysfunction & lower back pain, multifidi anatomy & function, lumbar spine anatomy, MRI results, flexion vs. extension directional preferece    Home Exercise Program (7/6/22): supine march, supine clams, bridging with band/belt around knees    Exercises were reviewed, and Divya Jacinto was able to demonstrate them prior to the end of the session, as needed. Divya Jacinto demonstrated good  understanding of the education provided.      Assessment     Pt tolerated  treatment session well, with reduced symptoms following needling and corrective exercises/stretching. Improved ability to tolerate sit to stand exercises, etc. today following needling. Pt's functional mobility and ability to perform ADLs still limited by pain and weakness. She requires skilled therapy for continued patient education and progressive resistance exercise.    Divya Jacinto is progressing fairly towards her goals.   Pt prognosis: good    Pt will continue to benefit from skilled outpatient physical therapy to address the deficits listed in the problem list box on initial evaluation, provide pt/family education and to maximize pt's level of independence in the home and community environment.     Pt's spiritual, cultural and educational needs considered and pt agreeable to plan of care and goals.  Anticipated barriers to physical therapy: none      Short Term Goals: 6 weeks   Pt to report 50% improvement in tenderness to palpation of pelvic floor to demonstrate improved trigger points and overactivity Met   Pt to report only rare incidences of urinary incontinence with coughing to demonstrate improved pelvic floor muscle coordination Met   Pt to report 50% improvement in pain with penetration (gynecological exam or intercourse) and orgasm to demonstrate improved pelvic floor muscle overactivity Met   Pt to be independent with introductory home exercise program Met      Long Term Goals: 12 weeks  Pt to report minimal/no tenderness to palpation of pelvic floor to demonstrate improved trigger points and overactivity Partially Met   Pt to demonstrate an improved score in the FOTO Pelvic Pain (PFDI) survey to no more than 8% impaired to demonstrate improving pelvic floor muscle tenderness, coordination. Not Met   Pt to deny pelvic pain with penetration (gynecological exam or intercourse) and orgasm to demonstrate improved pelvic floor muscle overactivity Partially Met   Pt to increase pelvic floor strength to 4/5  to demonstrate improved strength needed for continence with ADLs. Partially Met   Pt to be independent with advanced home exercise program Not Met   Pt to demonstrate bilateral hip strength of 5/5 for improved pelvic girdle support with mobility. Not Met   Pt to report minimal/no back pain with ADLs and functional mobility to demonstrate improved lumbopelvic muscle support, coordination Not Met        Plan     Continue per Plan of Care      Jess Ureña, PT, DPT

## 2022-09-07 ENCOUNTER — CLINICAL SUPPORT (OUTPATIENT)
Dept: REHABILITATION | Facility: OTHER | Age: 37
End: 2022-09-07
Attending: OBSTETRICS & GYNECOLOGY
Payer: MEDICAID

## 2022-09-07 DIAGNOSIS — M54.50 CHRONIC LEFT-SIDED LOW BACK PAIN WITHOUT SCIATICA: ICD-10-CM

## 2022-09-07 DIAGNOSIS — R10.2 PELVIC PAIN: Primary | ICD-10-CM

## 2022-09-07 DIAGNOSIS — R29.898 DECREASED STRENGTH OF TRUNK AND BACK: ICD-10-CM

## 2022-09-07 DIAGNOSIS — G89.29 CHRONIC LEFT-SIDED LOW BACK PAIN WITHOUT SCIATICA: ICD-10-CM

## 2022-09-07 PROCEDURE — 97110 THERAPEUTIC EXERCISES: CPT | Performed by: PHYSICAL THERAPIST

## 2022-09-08 NOTE — PROGRESS NOTES
"Pelvic Health Physical Therapy   Treatment Note     Name: Divya Chand  Clinic Number: [id number]    Therapy Diagnosis:   Encounter Diagnoses   Name Primary?    Pelvic pain Yes    Decreased strength of trunk and back     Chronic left-sided low back pain without sciatica      Referring Provider: Arnulfo    Visit Date: 9/14/2022    Referring Provider Orders: PT Eval and Treat  Medical Diagnosis from Referral: Pelvic floor dysfunction in female [M62.89]  Evaluation Date: 4/29/2022  Last Reassessment: 7/6/2022, visit #10  Authorization Period Expiration: 9/30/2022  Plan of Care Expiration: 10/6/2022  Visit # / Visits authorized: 18/21  FOTO: 2/3 (4/29/2022, 6/20/2022)    Cancelled Visits: -  No Show Visits: -    Time In: 14:25  Time Out: 15:35  Total Billable Time: 70 minutes    Precautions: Standard    Subjective     Divya Jacinto reports milder pain this week. She notes that it was easier to activate her hip flexors and adductors with exercise following needling last visit. "I don't have as much pelvic pain as I used to."      She was compliant with home exercise program - plus stretching from osteopath  Response to previous treatment: no adverse effect   Functional change: fluctuating improvements in urinary urgency and back/hip/pelvic pain    Pain: moderate  Location: L inner thigh and hip     Objective     Divya Jacinto received the following interventions during the treatment session -  (TrA = transverse abdominis, PFM = pelvic floor muscle)  Pt consented to pelvic floor muscle assessment and treatment in prior visit - see EMR    Therapeutic exercises to develop strength, endurance and core stabilization for 69 minutes -  [x] Diaphragmatic breathing + pelvic drop, 6 minutes - verbal and manual cues from PT, performed during manual techniques  [] Diaphragmatic breathing + TrA brace on exhale + posterior pelvic tilt, x20 - verbal and manual cues from PT  [] Diaphragmatic breathing + PFM squeeze on exhale, 3x8 with 50% " effort - verbal and manual cues from PT  [] Lower trunk rotations and double-knees to chest with legs on ball, x20 each  [] Seated ball roll out, 1 minute - reduced pain afterwards  [] Supine single-knee to chest (R&L), 30 seconds  [] Prone glute squeezes with 5-second hold, x20  [] Short arc quads for gentle multifidi activation (R&L), x20 - performed during manual interventions to abdominal wall  [] Lucian test stretch (L) - 2x30 seconds  [] TrA&PFM brace + supine hip adduction isometric with ball, x10 with 50% effort  [] TrA brace + supine heel slides (R&L), 2x5   [] Prone hamstring curls with multifidi pre-activation (R&L), 3 minutes  [] TrA brace + supine clam with blue band, x30  [] TrA brace + straight leg raise (R&L), 2x4  [] Open books with green band (R&L), x10  [] Standing pallof press with blue band (R&L), x12   [] TrA brace + side-lying hip abduction (R&L), 2x8  [] Cat/cow with coordinated breath, 2 minutes   [] TrA brace + quadruped hip extension (R&L), x10  [x] TrA brace + standing shoulder extension with green band (B), 2x15  [] TrA brace + wall push up, 2x15  [x] TrA brace + toe taps to 6-inch step (R&L), 4x10  [x] Side-stepping with red band around knees, 2 minutes  [x] Seated on theraball + pelvic rotations, 2 minutes  [x] Seated on theraball + alternating kicks, 1 minute  [x] Seated on theraball + row with green band, x20  [x] Seated on theraball + alternating shoulder flexion (2-3#, R&L), 2x15   [] Ball squats with green band around knees, x15 - increased groin pain near the end  [x] Lateral step up/down on yoga block (R&L), 3x5  [x] TrA brace + sit to stands from thigh-height mat with green band around knees, 2x10  [] Happy baby stretch + diaphragmatic breathing, pelvic drop, 1 minute  [] Child's pose + diaphragmatic breathing, pelvic drop, 2 minutes  [] Butterfly hip stretch + diaphragmatic breathing, pelvic drop, 2 minutes  [] Half-kneeling lunge/hip flexor stretch (L), 1 minute  [] Supine  hamstring stretch with strap (L), 1 minute  [x] Seated figure-4 stretch (R&L), 30 seconds - reduced discomfort compared to last month  [] Pelvic floor relaxation guided meditation - performed during e-stim for dry needling  [x] HEP review, patient education - performed during manual techniques    [x] Assessment of neural tension - Slump test for sciatic neural tension was negative, but L inner thigh discomfort with figure-4 stretch improves with cervical extension/worsened with cervical flexion    Manual therapy techniques: to develop flexibility, desensitization, and extensibility -  [] Obturator release at ischial tuberosities + active release with resisted contralateral hip external rotation (B)  [] Manual release to pelvic floor muscles externally: levator ani   [] Manual release to pelvic floor muscles internally/vaginally: levator ani - mild increased tone and tenderness that improved within a few minutes of release. PT provided cues for diaphragmatic breathing, visualization, and relaxation.  [] Manual release and myofascial decompression/cupping to lower abdominal wall  [x] Dry needling with electrical stimulation to L adductors, pectineus, gracilis, and iliopsoas - no adverse effect, written consent provided 5/9/22, see EMR   [] Lateral and long-arm distraction to L hip with belt  [] Trigger point release to L iliopsoas  [] Taping to bilateral lumbar paraspinals and across L3  [] Sacroiliac muscle-energy technique - slightly reduced symptoms afterwards    Home Exercises and Patient Education     Patient Education: progression of plan of care, plan for next session, pt prognosis, pelvic floor anatomy & function, relationship between TrA & PFM, relationship between hips & PFM, relationship between pelvic floor dysfunction & lower back pain, multifidi anatomy & function, lumbar spine anatomy, MRI results, flexion vs. extension directional preferece    Home Exercise Program (7/6/22): supine march, supine clams,  bridging with band/belt around knees    Exercises were reviewed, and Divya Jacinto was able to demonstrate them prior to the end of the session, as needed. Divya Jacinto demonstrated good  understanding of the education provided.      Assessment     Pt tolerated treatment session well, with reduced symptoms following needling and corrective exercises/stretching. Pt able to perform more reps of familiar exercises today, as part of larger trend of improved exercise tolerance in the last month. Assessment today indicates some neural tension in the R anterior hip may be contributing to persistent symptoms, maybe the obturator nerve, given significant tenderness and pain in the adductors. Pt's functional mobility and ability to perform ADLs still limited by pain and weakness. She requires skilled therapy for continued patient education and progressive resistance exercise.    Divya Jacinto is progressing fairly towards her goals.   Pt prognosis: good    Pt will continue to benefit from skilled outpatient physical therapy to address the deficits listed in the problem list box on initial evaluation, provide pt/family education and to maximize pt's level of independence in the home and community environment.     Pt's spiritual, cultural and educational needs considered and pt agreeable to plan of care and goals.  Anticipated barriers to physical therapy: none      Short Term Goals: 6 weeks   Pt to report 50% improvement in tenderness to palpation of pelvic floor to demonstrate improved trigger points and overactivity Met   Pt to report only rare incidences of urinary incontinence with coughing to demonstrate improved pelvic floor muscle coordination Met   Pt to report 50% improvement in pain with penetration (gynecological exam or intercourse) and orgasm to demonstrate improved pelvic floor muscle overactivity Met   Pt to be independent with introductory home exercise program Met      Long Term Goals: 12 weeks  Pt to report  minimal/no tenderness to palpation of pelvic floor to demonstrate improved trigger points and overactivity Partially Met   Pt to demonstrate an improved score in the FOTO Pelvic Pain (PFDI) survey to no more than 8% impaired to demonstrate improving pelvic floor muscle tenderness, coordination. Not Met   Pt to deny pelvic pain with penetration (gynecological exam or intercourse) and orgasm to demonstrate improved pelvic floor muscle overactivity Partially Met   Pt to increase pelvic floor strength to 4/5 to demonstrate improved strength needed for continence with ADLs. Partially Met   Pt to be independent with advanced home exercise program Not Met   Pt to demonstrate bilateral hip strength of 5/5 for improved pelvic girdle support with mobility. Not Met   Pt to report minimal/no back pain with ADLs and functional mobility to demonstrate improved lumbopelvic muscle support, coordination Not Met        Plan     Continue per Plan of Care      Jess Ureña, PT, DPT

## 2022-09-14 ENCOUNTER — CLINICAL SUPPORT (OUTPATIENT)
Dept: REHABILITATION | Facility: OTHER | Age: 37
End: 2022-09-14
Attending: OBSTETRICS & GYNECOLOGY
Payer: MEDICAID

## 2022-09-14 DIAGNOSIS — M54.50 CHRONIC LEFT-SIDED LOW BACK PAIN WITHOUT SCIATICA: ICD-10-CM

## 2022-09-14 DIAGNOSIS — R29.898 DECREASED STRENGTH OF TRUNK AND BACK: ICD-10-CM

## 2022-09-14 DIAGNOSIS — R10.2 PELVIC PAIN: Primary | ICD-10-CM

## 2022-09-14 DIAGNOSIS — G89.29 CHRONIC LEFT-SIDED LOW BACK PAIN WITHOUT SCIATICA: ICD-10-CM

## 2022-09-14 PROCEDURE — 97110 THERAPEUTIC EXERCISES: CPT | Performed by: PHYSICAL THERAPIST

## 2022-09-15 NOTE — PROGRESS NOTES
Pelvic Health Physical Therapy   Treatment Note     Name: Divya Chand  Clinic Number: [id number]    Therapy Diagnosis:   Encounter Diagnoses   Name Primary?    Pelvic pain Yes    Decreased strength of trunk and back     Chronic left-sided low back pain without sciatica      Referring Provider: Arnulfo    Visit Date: 9/21/2022    Referring Provider Orders: PT Eval and Treat  Medical Diagnosis from Referral: Pelvic floor dysfunction in female [M62.89]  Evaluation Date: 4/29/2022  Last Reassessment: 7/6/2022, visit #10  Authorization Period Expiration: 9/30/2022  Plan of Care Expiration: 10/6/2022  Visit # / Visits authorized: 19/21  FOTO: 2/3 (4/29/2022, 6/20/2022)    Cancelled Visits: -  No Show Visits: -    Time In: 15:45  Time Out: 16:30  Total Billable Time: 45 minutes    Precautions: Standard    Subjective     Divya Jacinto reports continued inner thigh discomfort.      She was compliant with home exercise program - plus stretching from osteopath  Response to previous treatment: no adverse effect   Functional change: fluctuating improvements in urinary urgency and back/hip/pelvic pain    Pain: moderate at worst, minimal pain at rest  Location: L inner thigh and hip     Objective     Divya Jacinto received the following interventions during the treatment session -  (TrA = transverse abdominis, PFM = pelvic floor muscle)  Pt consented to pelvic floor muscle assessment and treatment in prior visit - see EMR    Therapeutic exercises to develop strength, endurance and core stabilization for 38 minutes -  [x] Diaphragmatic breathing + pelvic drop, 6 minutes - verbal and manual cues from PT, performed during manual techniques  [] Diaphragmatic breathing + TrA brace on exhale + posterior pelvic tilt, x20 - verbal and manual cues from PT  [] Diaphragmatic breathing + PFM squeeze on exhale, 3x8 with 50% effort - verbal and manual cues from PT  [] Lower trunk rotations and double-knees to chest with legs on ball, x20  each  [] Seated ball roll out, 1 minute - reduced pain afterwards  [] Supine single-knee to chest (R&L), 30 seconds  [] Prone glute squeezes with 5-second hold, x20  [] Short arc quads for gentle multifidi activation (R&L), x20 - performed during manual interventions to abdominal wall  [] Lucian test stretch (L) - 2x30 seconds  [] TrA&PFM brace + supine hip adduction isometric with ball, x10 with 50% effort  [] TrA brace + supine heel slides (R&L), 2x5   [] Prone hamstring curls with multifidi pre-activation (R&L), 3 minutes  [] TrA brace + supine clam with blue band, x30  [] TrA brace + straight leg raise (R&L), 2x4  [] Open books with green band (R&L), x10  [] Standing pallof press with blue band (R&L), x12   [] TrA brace + side-lying hip abduction (R&L), 2x8  [] Cat/cow with coordinated breath, 2 minutes   [] TrA brace + quadruped hip extension (R&L), x10  [x] TrA brace + standing shoulder extension with green band (B), 2x15  [] TrA brace + wall push up, 2x15  [x] TrA brace + toe taps to 6-inch step (R&L), 3x10  [x] Side-stepping with red band around knees, 2 minutes  [] Seated on theraball + pelvic rotations, 2 minutes  [] Seated on theraball + alternating kicks, 1 minute  [] Seated on theraball + row with green band, x20  [] Seated on theraball + alternating shoulder flexion (2-3#, R&L), 2x15   [x] Lateral step up/down on yoga block (R&L), 3x10  [x] TrA brace + sit to stands from thigh-height mat with green band around knees, 2x10  [] Happy baby stretch + diaphragmatic breathing, pelvic drop, 1 minute  [] Child's pose + diaphragmatic breathing, pelvic drop, 2 minutes  [] Butterfly hip stretch + diaphragmatic breathing, pelvic drop, 2 minutes  [] Half-kneeling lunge/hip flexor stretch (L), 1 minute  [] Supine hamstring stretch with strap (L), 1 minute  [x] Seated figure-4 stretch (R&L), 1 minute - reduced discomfort compared to last month  [] Pelvic floor relaxation guided meditation - performed during e-stim  for dry needling  [x] HEP review, patient education - performed during manual techniques      Manual therapy techniques: to develop flexibility, desensitization, and extensibility -  [] Obturator release at ischial tuberosities + active release with resisted contralateral hip external rotation (B)  [] Manual release to pelvic floor muscles externally: levator ani   [] Manual release to pelvic floor muscles internally/vaginally: levator ani - mild increased tone and tenderness that improved within a few minutes of release. PT provided cues for diaphragmatic breathing, visualization, and relaxation.  [x] Myofascial decompression/cupping to L lower abdominal wall and anterior/medial hip  [] Dry needling with electrical stimulation to L adductors, pectineus, gracilis, and iliopsoas - no adverse effect, written consent provided 5/9/22, see EMR   [] Lateral and long-arm distraction to L hip with belt  [] Trigger point release to L iliopsoas  [] Taping to bilateral lumbar paraspinals and across L3  [x] Sacroiliac muscle-energy technique    Home Exercises and Patient Education     Patient Education: progression of plan of care, plan for next session, pt prognosis, pelvic floor anatomy & function, relationship between TrA & PFM, relationship between hips & PFM, relationship between pelvic floor dysfunction & lower back pain, multifidi anatomy & function, lumbar spine anatomy, MRI results, flexion vs. extension directional preferece    Home Exercise Program (7/6/22): supine march, supine clams, bridging with band/belt around knees    Exercises were reviewed, and Divya Jacinto was able to demonstrate them prior to the end of the session, as needed. Divya Jacinto demonstrated good  understanding of the education provided.      Assessment     Pt tolerated treatment session well, with good tolerance of increased reps of familiar exercises today, as part of larger trend of improved exercise tolerance in the last month. Pt's functional  mobility and ability to perform ADLs still limited by pain and weakness. She requires skilled therapy for continued patient education and progressive resistance exercise.    Divya Jacinto is progressing fairly towards her goals.   Pt prognosis: good    Pt will continue to benefit from skilled outpatient physical therapy to address the deficits listed in the problem list box on initial evaluation, provide pt/family education and to maximize pt's level of independence in the home and community environment.     Pt's spiritual, cultural and educational needs considered and pt agreeable to plan of care and goals.  Anticipated barriers to physical therapy: none      Short Term Goals: 6 weeks   Pt to report 50% improvement in tenderness to palpation of pelvic floor to demonstrate improved trigger points and overactivity Met   Pt to report only rare incidences of urinary incontinence with coughing to demonstrate improved pelvic floor muscle coordination Met   Pt to report 50% improvement in pain with penetration (gynecological exam or intercourse) and orgasm to demonstrate improved pelvic floor muscle overactivity Met   Pt to be independent with introductory home exercise program Met      Long Term Goals: 12 weeks  Pt to report minimal/no tenderness to palpation of pelvic floor to demonstrate improved trigger points and overactivity Partially Met   Pt to demonstrate an improved score in the FOTO Pelvic Pain (PFDI) survey to no more than 8% impaired to demonstrate improving pelvic floor muscle tenderness, coordination. Not Met   Pt to deny pelvic pain with penetration (gynecological exam or intercourse) and orgasm to demonstrate improved pelvic floor muscle overactivity Partially Met   Pt to increase pelvic floor strength to 4/5 to demonstrate improved strength needed for continence with ADLs. Partially Met   Pt to be independent with advanced home exercise program Not Met   Pt to demonstrate bilateral hip strength of 5/5 for  improved pelvic girdle support with mobility. Not Met   Pt to report minimal/no back pain with ADLs and functional mobility to demonstrate improved lumbopelvic muscle support, coordination Not Met        Plan     Continue per Plan of Care      Jess Ureña, PT, DPT

## 2022-09-21 ENCOUNTER — CLINICAL SUPPORT (OUTPATIENT)
Dept: REHABILITATION | Facility: OTHER | Age: 37
End: 2022-09-21
Attending: OBSTETRICS & GYNECOLOGY
Payer: MEDICAID

## 2022-09-21 DIAGNOSIS — R10.2 PELVIC PAIN: Primary | ICD-10-CM

## 2022-09-21 DIAGNOSIS — R29.898 DECREASED STRENGTH OF TRUNK AND BACK: ICD-10-CM

## 2022-09-21 DIAGNOSIS — M54.50 CHRONIC LEFT-SIDED LOW BACK PAIN WITHOUT SCIATICA: ICD-10-CM

## 2022-09-21 DIAGNOSIS — G89.29 CHRONIC LEFT-SIDED LOW BACK PAIN WITHOUT SCIATICA: ICD-10-CM

## 2022-09-21 PROCEDURE — 97110 THERAPEUTIC EXERCISES: CPT | Performed by: PHYSICAL THERAPIST

## 2022-09-26 NOTE — PROGRESS NOTES
Pelvic Health Physical Therapy   Treatment Note     Name: Divya Chand  Clinic Number: [id number]    Therapy Diagnosis:   Encounter Diagnoses   Name Primary?    Pelvic pain Yes    Decreased strength of trunk and back     Chronic left-sided low back pain without sciatica      Referring Provider: Arnulfo    Visit Date: 9/28/2022    Referring Provider Orders: PT Eval and Treat  Medical Diagnosis from Referral: Pelvic floor dysfunction in female [M62.89]  Evaluation Date: 4/29/2022  Last Reassessment: 9/28/2022, visit #19  Authorization Period Expiration: 9/30/2022  Plan of Care Expiration: 10/6/2022  Visit # / Visits authorized: 20/21  FOTO: 2/3 (4/29/2022, 6/20/2022)    Cancelled Visits: -  No Show Visits: -    Time In: 15:35  Time Out: 16:30  Total Billable Time: 55 minutes    Precautions: Standard    Subjective     Divya Jacinto reports continued inner thigh discomfort and fluctuating pressure issues. She notes improved tolerance of piriformis stretching in the last month. No significant relief from L3 steroid injection yet (received on 9/26/22).     She was compliant with home exercise program - plus stretching from osteopath  Response to previous treatment: no adverse effect   Functional change: fluctuating improvements in urinary urgency and back/hip/pelvic pain    Pain: 3/10 (worst - 6/10, post-orgasm)  Location: L inner thigh and hip     Objective     Divya Jacinto received the following interventions during the treatment session -  (TrA = transverse abdominis, PFM = pelvic floor muscle)  Pt consented to pelvic floor muscle assessment and treatment in prior visit - see EMR    Therapeutic exercises to develop strength, endurance and core stabilization for 53 minutes -  [x] Diaphragmatic breathing + pelvic drop, 6 minutes - verbal and manual cues from PT, performed during manual techniques  [] Diaphragmatic breathing + TrA brace on exhale + posterior pelvic tilt, x20 - verbal and manual cues from PT  []  Diaphragmatic breathing + PFM squeeze on exhale, 3x8 with 50% effort - verbal and manual cues from PT  [] Lower trunk rotations and double-knees to chest with legs on ball, x20 each  [] Seated ball roll out, 1 minute - reduced pain afterwards  [] Supine single-knee to chest (R&L), 30 seconds  [] Prone glute squeezes with 5-second hold, x20  [] Short arc quads for gentle multifidi activation (R&L), x20 - performed during manual interventions to abdominal wall  [] Lucian test stretch (L) - 2x30 seconds  [] TrA&PFM brace + supine hip adduction isometric with ball, x10 with 50% effort  [] TrA brace + supine heel slides (R&L), 2x5   [] Prone hamstring curls with multifidi pre-activation (R&L), 3 minutes  [] TrA brace + supine clam with blue band, x30  [] TrA brace + straight leg raise (R&L), 2x4  [] Open books with green band (R&L), x10  [] Standing pallof press with blue band (R&L), x12   [] TrA brace + side-lying hip abduction (R&L), 2x8  [] Cat/cow with coordinated breath, 2 minutes   [] TrA brace + quadruped hip extension (R&L), x10  [x] TrA brace + standing shoulder extension with green band (B), 2x15  [] TrA brace + wall push up, 2x15  [x] TrA brace + toe taps to 6-inch step (R&L), 2x15  [x] Side-stepping with red band around knees, 2 minutes  [] Seated on theraball + pelvic rotations, 2 minutes  [] Seated on theraball + alternating kicks, 1 minute  [] Seated on theraball + row with green band, x20  [] Seated on theraball + alternating shoulder flexion (2-3#, R&L), 2x15   [x] Lateral step up/down on yoga block (R&L), 3x10  [x] TrA brace + sit to stands from thigh-height mat with green band around knees, 2x10  [x] Standing hip flexion + hip external rotation lift (R&L), 3x5  [] Happy baby stretch + diaphragmatic breathing, pelvic drop, 1 minute  [] Child's pose + diaphragmatic breathing, pelvic drop, 2 minutes  [] Butterfly hip stretch + diaphragmatic breathing, pelvic drop, 2 minutes  [] Half-kneeling lunge/hip  flexor stretch (L), 1 minute  [] Supine hamstring stretch with strap (L), 1 minute  [x] Seated figure-4 stretch (R&L), 1 minute - reduced discomfort compared to last month  [] Pelvic floor relaxation guided meditation - performed during e-stim for dry needling  [x] HEP review, patient education - performed during manual techniques      Manual therapy techniques: to develop flexibility, desensitization, and extensibility -  [] Obturator release at ischial tuberosities + active release with resisted contralateral hip external rotation (B)  [] Manual release to pelvic floor muscles externally: levator ani   [] Manual release to pelvic floor muscles internally/vaginally: levator ani - mild increased tone and tenderness that improved within a few minutes of release. PT provided cues for diaphragmatic breathing, visualization, and relaxation.  [] Myofascial decompression/cupping to L lower abdominal wall and anterior/medial hip  [x] Dry needling with electrical stimulation to L adductors, pectineus, gracilis, sartorius, and iliopsoas - no adverse effect, written consent provided 5/9/22, see EMR   [] Lateral and long-arm distraction to L hip with belt  [] Trigger point release to L iliopsoas  [] Taping to bilateral lumbar paraspinals and across L3  [] Sacroiliac muscle-energy technique    Home Exercises and Patient Education     Patient Education: progression of plan of care, plan for next session, pt prognosis, pelvic floor anatomy & function, relationship between TrA & PFM, relationship between hips & PFM, relationship between pelvic floor dysfunction & lower back pain, multifidi anatomy & function, lumbar spine anatomy, MRI results, flexion vs. extension directional preferece    Home Exercise Program (7/6/22): supine march, supine clams, bridging with band/belt around knees    Exercises were reviewed, and Divya Jacinto was able to demonstrate them prior to the end of the session, as needed. Divya Jacinto demonstrated good   understanding of the education provided.      Assessment     Pt tolerated treatment session well, with good tolerance of increased reps of familiar exercises today, as part of larger trend of improved exercise tolerance in the last month. Pt's functional mobility and ability to perform ADLs still limited by pain and weakness. She requires skilled therapy for continued patient education and progressive resistance exercise.    Divya Jacinto is progressing fairly towards her goals.   Pt prognosis: good    Pt will continue to benefit from skilled outpatient physical therapy to address the deficits listed in the problem list box on initial evaluation, provide pt/family education and to maximize pt's level of independence in the home and community environment.     Pt's spiritual, cultural and educational needs considered and pt agreeable to plan of care and goals.  Anticipated barriers to physical therapy: none      Short Term Goals: 6 weeks   Pt to report 50% improvement in tenderness to palpation of pelvic floor to demonstrate improved trigger points and overactivity Met   Pt to report only rare incidences of urinary incontinence with coughing to demonstrate improved pelvic floor muscle coordination Met   Pt to report 50% improvement in pain with penetration (gynecological exam or intercourse) and orgasm to demonstrate improved pelvic floor muscle overactivity Met   Pt to be independent with introductory home exercise program Met      Long Term Goals: 12 weeks  Pt to report minimal/no tenderness to palpation of pelvic floor to demonstrate improved trigger points and overactivity Partially Met   Pt to demonstrate an improved score in the FOTO Pelvic Pain (PFDI) survey to no more than 8% impaired to demonstrate improving pelvic floor muscle tenderness, coordination. Not Met   Pt to deny pelvic pain with penetration (gynecological exam or intercourse) and orgasm to demonstrate improved pelvic floor muscle overactivity  Partially Met   Pt to increase pelvic floor strength to 4/5 to demonstrate improved strength needed for continence with ADLs. Partially Met   Pt to be independent with advanced home exercise program Not Met   Pt to demonstrate bilateral hip strength of 5/5 for improved pelvic girdle support with mobility. Not Met   Pt to report minimal/no back pain with ADLs and functional mobility to demonstrate improved lumbopelvic muscle support, coordination Not Met        Plan     Continue per Plan of Care      Jess Ureña, PT, DPT

## 2022-09-28 ENCOUNTER — CLINICAL SUPPORT (OUTPATIENT)
Dept: REHABILITATION | Facility: OTHER | Age: 37
End: 2022-09-28
Attending: OBSTETRICS & GYNECOLOGY
Payer: MEDICAID

## 2022-09-28 DIAGNOSIS — R10.2 PELVIC PAIN: Primary | ICD-10-CM

## 2022-09-28 DIAGNOSIS — G89.29 CHRONIC LEFT-SIDED LOW BACK PAIN WITHOUT SCIATICA: ICD-10-CM

## 2022-09-28 DIAGNOSIS — R29.898 DECREASED STRENGTH OF TRUNK AND BACK: ICD-10-CM

## 2022-09-28 DIAGNOSIS — M54.50 CHRONIC LEFT-SIDED LOW BACK PAIN WITHOUT SCIATICA: ICD-10-CM

## 2022-09-28 PROCEDURE — 97110 THERAPEUTIC EXERCISES: CPT | Performed by: PHYSICAL THERAPIST

## 2022-09-28 NOTE — PLAN OF CARE
[facility]   Pelvic Health Physical Therapy Reassessment    Date: 9/28/2022   Name: Divya Saalzar  Clinic Number: [id number]    Therapy Diagnosis:   Encounter Diagnoses   Name Primary?    Pelvic pain Yes    Decreased strength of trunk and back     Chronic left-sided low back pain without sciatica      Referring Provider: Aga Arredondo MD    Referring Provider Orders: PT Eval and Treat  Medical Diagnosis from Referral: Pelvic floor dysfunction in female [M62.89]  Evaluation Date: 4/29/2022  Last Reassessment: 9/28/2022, visit #20  Authorization Period Expiration: 9/30/2022  Plan of Care Expiration: 10/6/2022  Visit # / Visits authorized: 20/21  FOTO: 2/3 (4/29/2022, 6/20/2022)    SUBJECTIVE     Divya Jacinto reports continued inner thigh discomfort and fluctuating pressure issues. She notes improved tolerance of piriformis stretching in the last month. No significant relief from L3 steroid injection yet (received on 9/26/22).  She reports rare urinary incontinence (only with severe coughing), which is significantly improved in the last few months.  Bowel movements has been more normal - straining/splinting is rarer, as are pencil-thin bowel movements.    Pain (L inner thigh and hip, lower abdomen)  4/29/22: current - mild, worst - severe, best - mild  7/6/22: mild/moderate  9/28/22: 3/10 (worst - 6/10, post-orgasm)    OBJECTIVE      Hip Strength 4/29/22 7/6/22 9/28/22   R hip flexion 4+/5 4+/5 4+/5   R hip external rotation 4/5 4+/5 4+/5   L hip flexion 4+/5 4+/5 4+/5   L hip external rotation 4-/5 4/5 4/5      Hip Range of Motion 4/29/22 7/6/22 9/28/22   R internal rotation 15°  25° 30°   L internal rotation 25° 30° 30°      Standing load transfer  4/29/22: Mild unsteadiness with single-leg balance  7/6/22: Minimal/no unsteadiness  9/28/22: Good pelvic girdle stability    Abdominal wall  4/29/22: Tender, increased tension and thickened , 1-inch diastasis recti  7/6/22: Continued  tenderness   9/28/22: Continued tenderness, as well as in adductors    Pelvic girdle stability  4/29/22: Pt demonstrates moderately impaired ability to stabilize pelvis with Active Straight Leg Raise Test. Able to improve slightly with verbal/manual cues for transverse abdominus activation. Tends to demonstrate oblique-dominant abdominal wall contraction, but is able to perform transverse abdominis contraction with verbal and tactile cues.  7/6/22: Mild pelvic girdle stability deficits with supine load transfer, definitely improved from evaluation. Good transverse abdominis contraction on command, no verbal cues required.  9/28/22: Not assessed    Pelvic floor  4/29/22: tender areas noted as follows: bilateral periurethral muscles (with reproduction of urinary urgency), levator ani (L significantly more tender), as well as L introitus  Muscle Bulk: hypertonus  Muscle Power: 3/5  Muscle Endurance: 6 seconds  # Reps To Fatigue: 4  Quality of contraction: slow rise, slow relaxation and incomplete relaxation  Specificity: patient co-contracts glutes   7/6/22: mild tenderness in levator ani, no tenderness in periurethral muscles today.   Muscle Power: 3-4/5  Muscle Endurance: 8 seconds  Quality of contraction: slow rise, slow relaxation  Specificity: appropriate  9/28/22: Not assessed      Limitation/Restriction for FOTO Pelvic Floor Surveys  FOTO documents entered into [employer] - see Media section.     Limitation Scores   4/29/22  PFDI Urinary: 4% impairment  PFDI Pain: 21% impairment  Urinary Problems: 34% impairment     6/20/22  PFDI Urinary: 0% impairment  PFDI Pain: 33% impairment  Urinary Problems: 34% impairment        ASSESSMENT     Pt has progressed fairly well with physical therapy, demonstrating overall improvements in pelvic floor strength/coordination, hip/core strength, hip range of motion, and activity tolerance. Pt continues to experience pain/difficulty with ADLs and functional mobility due to abdominal wall  pain/L hip pain. Progressive resistance exercise for the core and hip have improved pt's exercise tolerance and meli have improved her standing tolerance. Pt undergoing ilioinguinal nerve ablation next month, which may reduce symptoms enough for more strengthening and stretching. Pt demonstrates functional strength deficits in the hips and core, evident with movements like sit-to-stand transfer, mat rolling, etc.  Pt has met/partially met some goals from the initial evaluation, and the rest of those goals remain appropriate for the plan of care. Pt will benefit from continued skilled therapy to provide targeted manual therapy and progressive resistance exercise.      Short Term Goals: 6 weeks   Pt to report 50% improvement in tenderness to palpation of pelvic floor to demonstrate improved trigger points and overactivity Met   Pt to report only rare incidences of urinary incontinence with coughing to demonstrate improved pelvic floor muscle coordination Met   Pt to report 50% improvement in pain with penetration (gynecological exam or intercourse) and orgasm to demonstrate improved pelvic floor muscle overactivity Met   Pt to be independent with introductory home exercise program Met      Long Term Goals: 12 weeks  Pt to report minimal/no tenderness to palpation of pelvic floor to demonstrate improved trigger points and overactivity Partially Met   Pt to demonstrate an improved score in the FOTO Pelvic Pain (PFDI) survey to no more than 8% impaired to demonstrate improving pelvic floor muscle tenderness, coordination. Not Met   Pt to deny pelvic pain with penetration (gynecological exam or intercourse) and orgasm to demonstrate improved pelvic floor muscle overactivity Partially Met   Pt to increase pelvic floor strength to 4/5 to demonstrate improved strength needed for continence with ADLs. Partially Met   Pt to be independent with advanced home exercise program Not Met   Pt to demonstrate bilateral hip strength of  5/5 for improved pelvic girdle support with mobility. Not Met   Pt to report minimal/no back pain with ADLs and functional mobility to demonstrate improved lumbopelvic muscle support, coordination Not Met        PLAN     Plan of Care certification: 9/28/2022 to 12/28/2022    Outpatient Physical Therapy - 1 time per week for 12 weeks to include the following interventions: Therapeutic Exercise, Manual Therapy, Therapeutic Activity, Neuromuscular Re-education, Electrical Stimulation Unattended, Self-Care, Patient Education    Jess Ureña, PT, DPT        I CERTIFY THE NEED FOR THESE SERVICES FURNISHED UNDER THIS PLAN OF TREATMENT AND WHILE UNDER MY CARE   Physician's comments:     Physician's Signature: ___________________________________________________

## 2022-10-14 NOTE — PROGRESS NOTES
Pelvic Health Physical Therapy   Treatment Note     Name: Divya Chand  Clinic Number: [id number]    Therapy Diagnosis:   Encounter Diagnoses   Name Primary?    Pelvic pain Yes    Decreased strength of trunk and back     Chronic left-sided low back pain without sciatica      Referring Provider: Arnulfo    Visit Date: 10/19/2022    Referring Provider Orders: PT Eval and Treat  Medical Diagnosis from Referral: Pelvic floor dysfunction in female [M62.89]  Evaluation Date: 4/29/2022  Last Reassessment: 9/28/2022, visit #19  Authorization Period Expiration: 1/3/2023  Plan of Care Expiration: 12/28/2022  Visit # / Visits authorized: 21/32  FOTO: 2/3 (4/29/2022, 6/20/2022)    Cancelled Visits: -  No Show Visits: -    Time In: 17:00  Time Out: 18:00  Total Billable Time: 60 minutes    Precautions: Standard    Subjective     Divya Jacinto reports general lower abdominal/anterior hip soreness since nerve ablation last week, but the lower abdominal/iliac crest pain is better. She notes significant soreness in the L inner thigh, as well as intermittent sharp pain in the R lower back with transferring to stand sometimes.     She was compliant with home exercise program - plus stretching from osteopath  Response to previous treatment: no adverse effect   Functional change: fluctuating improvements in urinary urgency and back/hip/pelvic pain    Pain: 1-3/10 today  Location: L inner thigh and hip     Objective     Divya Jacinto received the following interventions during the treatment session -  (TrA = transverse abdominis, PFM = pelvic floor muscle)  Pt consented to pelvic floor muscle assessment and treatment in prior visit - see EMR    Therapeutic exercises to develop strength, endurance and core stabilization for 55 minutes -  [x] Diaphragmatic breathing + pelvic drop, 6 minutes - verbal and manual cues from PT, performed during manual techniques  [] Diaphragmatic breathing + TrA brace on exhale + posterior pelvic tilt, x20 -  verbal and manual cues from PT  [] Diaphragmatic breathing + PFM squeeze on exhale, 3x8 with 50% effort - verbal and manual cues from PT  [] Lower trunk rotations and double-knees to chest with legs on ball, x20 each  [] Seated ball roll out, 1 minute - reduced pain afterwards  [] Supine single-knee to chest (R&L), 30 seconds  [] Prone glute squeezes with 5-second hold, x20  [] Short arc quads for gentle multifidi activation (R&L), x20 - performed during manual interventions to abdominal wall  [] Lucian test stretch (L) - 2x30 seconds  [x] TrA&PFM brace + seated hip adduction isometric with ball, x20 with 3-second hold  [] TrA brace + supine heel slides (R&L), 2x5   [] Prone hamstring curls with multifidi pre-activation (R&L), 3 minutes  [] TrA brace + supine clam with blue band, x30  [] TrA brace + straight leg raise (R&L), 2x4  [] Open books with green band (R&L), x10  [] Standing pallof press with blue band (R&L), x12   [] TrA brace + side-lying hip abduction (R&L), 2x8  [] Cat/cow with coordinated breath, 2 minutes   [] TrA brace + quadruped hip extension (R&L), x10  [x] Standing hip abduction circles (R&L), 2x10  [] TrA brace + standing shoulder extension with green band (B), 2x15  [] TrA brace + wall push up, 2x15  [x] Side-stepping with orange band around knees, 3 minutes  [] Seated on theraball + pelvic rotations, 2 minutes  [] Seated on theraball + alternating kicks, 1 minute  [] Seated on theraball + row with green band, x20  [] Seated on theraball + alternating shoulder flexion (2-3#, R&L), 2x15   [x] Lateral step up/down on 4-inch step (R&L), 5x8  [x] TrA brace + toe taps to 6-inch step (R&L), 5x8  [x] TrA brace + sit to stands from high mat, 5x8  [x] Standing hip flexion + hip external rotation lift (R&L), 3x8  [] Happy baby stretch + diaphragmatic breathing, pelvic drop, 1 minute  [] Child's pose + diaphragmatic breathing, pelvic drop, 2 minutes  [] Butterfly hip stretch + diaphragmatic breathing,  pelvic drop, 2 minutes  [] Half-kneeling lunge/hip flexor stretch (L), 1 minute  [] Supine hamstring stretch with strap (L), 1 minute  [x] Seated figure-4 stretch (R&L), 1 minute - reduced discomfort compared to last month  [] Pelvic floor relaxation guided meditation - performed during e-stim for dry needling  [x] HEP review, patient education - performed during manual techniques      Manual therapy techniques: to develop flexibility, desensitization, and extensibility -  [] Obturator release at ischial tuberosities + active release with resisted contralateral hip external rotation (B)  [x] Manual release to L adductors and hip flexors - improved tenderness after a few minutes of release  [] Manual release to pelvic floor muscles internally/vaginally: levator ani - mild increased tone and tenderness that improved within a few minutes of release. PT provided cues for diaphragmatic breathing, visualization, and relaxation.  [] Myofascial decompression/cupping to L lower abdominal wall and anterior/medial hip  [x] Dry needling with electrical stimulation to R multifidi of L5-S1, quadratus lumborum - no adverse effect, written consent provided 5/9/22, see EMR   [] Lateral and long-arm distraction to L hip with belt  [] Trigger point release to L iliopsoas  [] Taping to bilateral lumbar paraspinals and across L3  [] Sacroiliac muscle-energy technique    Home Exercises and Patient Education     Patient Education: progression of plan of care, plan for next session, pt prognosis, pelvic floor anatomy & function, relationship between TrA & PFM, relationship between hips & PFM, relationship between pelvic floor dysfunction & lower back pain, multifidi anatomy & function, lumbar spine anatomy, MRI results, flexion vs. extension directional preferece    Home Exercise Program (7/6/22): supine march, supine clams, bridging with band/belt around knees    Exercises were reviewed, and Divya Jacinto was able to demonstrate them prior  to the end of the session, as needed. Divya Jacinto demonstrated good  understanding of the education provided.      Assessment     Pt tolerated treatment session well, with good tolerance of increased reps of familiar exercises today, as part of larger trend of improved exercise tolerance in the last month. Pt's MD suspects adductor and hip flexor tendinopathy, so PT will be incorporating more progressive resistance exercise (including eccentric loading) to those muscle groups. Pt's functional mobility and ability to perform ADLs still limited by pain and weakness. She requires skilled therapy for continued patient education and progressive resistance exercise.    Divya Jacinto is progressing fairly towards her goals.   Pt prognosis: good    Pt will continue to benefit from skilled outpatient physical therapy to address the deficits listed in the problem list box on initial evaluation, provide pt/family education and to maximize pt's level of independence in the home and community environment.     Pt's spiritual, cultural and educational needs considered and pt agreeable to plan of care and goals.  Anticipated barriers to physical therapy: none      Short Term Goals: 6 weeks   Pt to report 50% improvement in tenderness to palpation of pelvic floor to demonstrate improved trigger points and overactivity Met   Pt to report only rare incidences of urinary incontinence with coughing to demonstrate improved pelvic floor muscle coordination Met   Pt to report 50% improvement in pain with penetration (gynecological exam or intercourse) and orgasm to demonstrate improved pelvic floor muscle overactivity Met   Pt to be independent with introductory home exercise program Met      Long Term Goals: 12 weeks  Pt to report minimal/no tenderness to palpation of pelvic floor to demonstrate improved trigger points and overactivity Partially Met   Pt to demonstrate an improved score in the FOTO Pelvic Pain (PFDI) survey to no more than  8% impaired to demonstrate improving pelvic floor muscle tenderness, coordination. Not Met   Pt to deny pelvic pain with penetration (gynecological exam or intercourse) and orgasm to demonstrate improved pelvic floor muscle overactivity Partially Met   Pt to increase pelvic floor strength to 4/5 to demonstrate improved strength needed for continence with ADLs. Partially Met   Pt to be independent with advanced home exercise program Not Met   Pt to demonstrate bilateral hip strength of 5/5 for improved pelvic girdle support with mobility. Not Met   Pt to report minimal/no back pain with ADLs and functional mobility to demonstrate improved lumbopelvic muscle support, coordination Not Met        Plan     Continue per Plan of Care      Jess Ureña, PT, DPT

## 2022-10-19 ENCOUNTER — CLINICAL SUPPORT (OUTPATIENT)
Dept: REHABILITATION | Facility: OTHER | Age: 37
End: 2022-10-19
Payer: MEDICAID

## 2022-10-19 DIAGNOSIS — R10.2 PELVIC PAIN: Primary | ICD-10-CM

## 2022-10-19 DIAGNOSIS — M54.50 CHRONIC LEFT-SIDED LOW BACK PAIN WITHOUT SCIATICA: ICD-10-CM

## 2022-10-19 DIAGNOSIS — G89.29 CHRONIC LEFT-SIDED LOW BACK PAIN WITHOUT SCIATICA: ICD-10-CM

## 2022-10-19 DIAGNOSIS — R29.898 DECREASED STRENGTH OF TRUNK AND BACK: ICD-10-CM

## 2022-10-19 PROCEDURE — 97110 THERAPEUTIC EXERCISES: CPT | Mod: PN | Performed by: PHYSICAL THERAPIST

## 2022-11-07 NOTE — PROGRESS NOTES
Pelvic Health Physical Therapy   Treatment Note     Name: Divya Chand  Clinic Number: [id number]    Therapy Diagnosis:   Encounter Diagnoses   Name Primary?    Pelvic pain Yes    Decreased strength of trunk and back     Chronic left-sided low back pain without sciatica      Referring Provider: Arnulfo    Visit Date: 11/8/2022    Referring Provider Orders: PT Eval and Treat  Medical Diagnosis from Referral: Pelvic floor dysfunction in female [M62.89]  Evaluation Date: 4/29/2022  Last Reassessment: 9/28/2022, visit #19  Authorization Period Expiration: 1/3/2023  Plan of Care Expiration: 12/28/2022  Visit # / Visits authorized: 22/32  FOTO: 2/3 (4/29/2022, 6/20/2022)    Time In: 16:10  Time Out: 17:10  Total Billable Time: 60 minutes    Precautions: Standard    Subjective     Divya Jacinto reports significant increased pain for the last 4 days after replacing the sub-jewel at her rental property. She notes return of L anterior hip pain, as well as sharp/constant pain on the R lower back.     She was somewhat compliant with home exercise program - plus stretching from osteopath  Response to previous treatment: no adverse effect   Functional change: fluctuating improvements in urinary urgency and back/hip/pelvic pain    Pain: 7/10  Location: L anterior hip, R lower back    Objective     Divya Jacinto received the following interventions during the treatment session -  (TrA = transverse abdominis, PFM = pelvic floor muscle)  Pt consented to pelvic floor muscle assessment and treatment in prior visit - see EMR    Therapeutic exercises to develop strength, endurance and core stabilization for 55 minutes -  [x] Diaphragmatic breathing + pelvic drop, 6 minutes - verbal and manual cues from PT, performed during manual techniques  [] Diaphragmatic breathing + TrA brace on exhale + posterior pelvic tilt, x20 - verbal and manual cues from PT  [] Diaphragmatic breathing + PFM squeeze on exhale, 3x8 with 50% effort - verbal and  manual cues from PT  [] Lower trunk rotations and double-knees to chest with legs on ball, x20 each  [] Seated ball roll out, 1 minute - reduced pain afterwards  [] Supine single-knee to chest (R&L), 30 seconds  [x] TrA brace + supine march (R&L), x10  [] Prone glute squeezes with 5-second hold, x20  [] Short arc quads for gentle multifidi activation (R&L), x20 - performed during manual interventions to abdominal wall  [] Lucian test stretch (L) - 2x30 seconds  [x] TrA brace + seated hip adduction isometric with ball, x20 with 2-second hold  [] TrA brace + supine heel slides (R&L), 2x5   [] Prone hamstring curls with multifidi pre-activation (R&L), 3 minutes  [x] TrA brace + supine clam with blue band, x20  [] TrA brace + straight leg raise (R&L), 2x4  [] Open books with green band (R&L), x10  [] Standing pallof press with blue band (R&L), x12   [] TrA brace + side-lying hip abduction (R&L), 2x8  [] Cat/cow with coordinated breath, 2 minutes   [] TrA brace + quadruped hip extension (R&L), x10  [] Standing hip abduction circles (R&L), 2x10  [] TrA brace + standing shoulder extension with green band (B), 2x15  [] TrA brace + wall push up, 2x15  [] Side-stepping with orange band around knees, 3 minutes  [] Seated on theraball + pelvic rotations, 2 minutes  [] Seated on theraball + alternating kicks, 1 minute  [] Seated on theraball + row with green band, x20  [] Seated on theraball + alternating shoulder flexion (2-3#, R&L), 2x15   [] Lateral step up/down on 4-inch step (R&L), 5x8  [x] TrA brace + toe taps to 16-inch step (R&L), x15  [] TrA brace + sit to stands from high mat, 5x8  [] Standing hip flexion + hip external rotation lift (R&L), 3x8  [] Happy baby stretch + diaphragmatic breathing, pelvic drop, 1 minute  [] Child's pose + diaphragmatic breathing, pelvic drop, 2 minutes  [] Butterfly hip stretch + diaphragmatic breathing, pelvic drop, 2 minutes  [] Half-kneeling lunge/hip flexor stretch (L), 1 minute  []  Supine hamstring stretch with strap (L), 1 minute  [x] Hip flexor stretch while prone (L),   [] Seated figure-4 stretch (R&L), 1 minute - reduced discomfort compared to last month  [] Pelvic floor relaxation guided meditation - performed during e-stim for dry needling  [x] HEP review, patient education - performed during manual techniques      Manual therapy techniques: to develop flexibility, desensitization, and extensibility -  [] Obturator release at ischial tuberosities + active release with resisted contralateral hip external rotation (B)  [] Manual release to L adductors and hip flexors - improved tenderness after a few minutes of release  [] Manual release to pelvic floor muscles internally/vaginally: levator ani - mild increased tone and tenderness that improved within a few minutes of release. PT provided cues for diaphragmatic breathing, visualization, and relaxation.  [] Myofascial decompression/cupping to L lower abdominal wall and anterior/medial hip  [x] Dry needling with electrical stimulation to B multifidi of L5-S1, R gluteus lisa, R adductor longus, R adductor chepe, and R pectineus - no adverse effect, written consent provided 5/9/22, see EMR   [] Lateral and long-arm distraction to L hip with belt  [] Trigger point release to L iliopsoas  [] Taping to bilateral lumbar paraspinals and across L3  [] Sacroiliac muscle-energy technique    Home Exercises and Patient Education     Patient Education: progression of plan of care, plan for next session, pt prognosis, pelvic floor anatomy & function, relationship between TrA & PFM, relationship between hips & PFM, relationship between pelvic floor dysfunction & lower back pain, multifidi anatomy & function, lumbar spine anatomy, MRI results, flexion vs. extension directional preferece    Home Exercise Program (7/6/22): supine march, supine clams, bridging with band/belt around knees    Exercises were reviewed, and Divya Jacinto was able to demonstrate  them prior to the end of the session, as needed. Divya Jacinto demonstrated good  understanding of the education provided.      Assessment     Pt tolerated treatment session well, with improved pain after needling and good tolerance for gentler exercises today. Pain flare-up today is consistent with overuse with manual labor. Pt has been progressing well with therapy overall, but she is still vulnerable to pain flare-ups with overactivity/stress. Pt's functional mobility and ability to perform ADLs still limited by pain and weakness. She requires skilled therapy for continued patient education and progressive resistance exercise.    Divya Jacinto is progressing fairly towards her goals.   Pt prognosis: good    Pt will continue to benefit from skilled outpatient physical therapy to address the deficits listed in the problem list box on initial evaluation, provide pt/family education and to maximize pt's level of independence in the home and community environment.     Pt's spiritual, cultural and educational needs considered and pt agreeable to plan of care and goals.  Anticipated barriers to physical therapy: none      Short Term Goals: 6 weeks   Pt to report 50% improvement in tenderness to palpation of pelvic floor to demonstrate improved trigger points and overactivity Met   Pt to report only rare incidences of urinary incontinence with coughing to demonstrate improved pelvic floor muscle coordination Met   Pt to report 50% improvement in pain with penetration (gynecological exam or intercourse) and orgasm to demonstrate improved pelvic floor muscle overactivity Met   Pt to be independent with introductory home exercise program Met      Long Term Goals: 12 weeks  Pt to report minimal/no tenderness to palpation of pelvic floor to demonstrate improved trigger points and overactivity Partially Met   Pt to demonstrate an improved score in the FOTO Pelvic Pain (PFDI) survey to no more than 8% impaired to demonstrate  improving pelvic floor muscle tenderness, coordination. Not Met   Pt to deny pelvic pain with penetration (gynecological exam or intercourse) and orgasm to demonstrate improved pelvic floor muscle overactivity Partially Met   Pt to increase pelvic floor strength to 4/5 to demonstrate improved strength needed for continence with ADLs. Partially Met   Pt to be independent with advanced home exercise program Not Met   Pt to demonstrate bilateral hip strength of 5/5 for improved pelvic girdle support with mobility. Not Met   Pt to report minimal/no back pain with ADLs and functional mobility to demonstrate improved lumbopelvic muscle support, coordination Not Met        Plan     Continue per Plan of Care      Jess Ureña, PT, DPT

## 2022-11-08 ENCOUNTER — CLINICAL SUPPORT (OUTPATIENT)
Dept: REHABILITATION | Facility: OTHER | Age: 37
End: 2022-11-08
Payer: MEDICAID

## 2022-11-08 DIAGNOSIS — M54.50 CHRONIC LEFT-SIDED LOW BACK PAIN WITHOUT SCIATICA: ICD-10-CM

## 2022-11-08 DIAGNOSIS — G89.29 CHRONIC LEFT-SIDED LOW BACK PAIN WITHOUT SCIATICA: ICD-10-CM

## 2022-11-08 DIAGNOSIS — R29.898 DECREASED STRENGTH OF TRUNK AND BACK: ICD-10-CM

## 2022-11-08 DIAGNOSIS — R10.2 PELVIC PAIN: Primary | ICD-10-CM

## 2022-11-08 PROCEDURE — 97110 THERAPEUTIC EXERCISES: CPT | Mod: PN | Performed by: PHYSICAL THERAPIST

## 2022-11-11 NOTE — PROGRESS NOTES
Pelvic Health Physical Therapy   Treatment Note     Name: Divya Chand  Clinic Number: [id number]    Therapy Diagnosis:   Encounter Diagnoses   Name Primary?    Pelvic pain Yes    Decreased strength of trunk and back     Chronic left-sided low back pain without sciatica      Referring Provider: Arnulfo    Visit Date: 11/15/2022    Referring Provider Orders: PT Eval and Treat  Medical Diagnosis from Referral: Pelvic floor dysfunction in female [M62.89]  Evaluation Date: 4/29/2022  Last Reassessment: 9/28/2022, visit #19  Authorization Period Expiration: 1/3/2023  Plan of Care Expiration: 12/28/2022  Visit # / Visits authorized: 23/32  FOTO: 2/3 (4/29/2022, 6/20/2022)    Time In: 16:00  Time Out: 17:00  Total Billable Time: 60 minutes    Precautions: Standard    Subjective     Divya Jacinto reports improved symptoms today compared to last visit, but she still notes intense L inner thigh pain with stretching and certain movements. Her R lower back is still hurting.      She was somewhat compliant with home exercise program - plus stretching from osteopath  Response to previous treatment: no adverse effect   Functional change: fluctuating improvements in urinary urgency and back/hip/pelvic pain    Pain: 6/10  Location: L anterior hip, R lower back    Objective     Divya Jacinto received the following interventions during the treatment session -  (TrA = transverse abdominis, PFM = pelvic floor muscle)  Pt consented to pelvic floor muscle assessment and treatment in prior visit - see EMR    Therapeutic exercises to develop strength, endurance and core stabilization for 55 minutes -  [x] Diaphragmatic breathing + pelvic drop, 6 minutes - verbal and manual cues from PT, performed during manual techniques  [] Diaphragmatic breathing + TrA brace on exhale + posterior pelvic tilt, x20 - verbal and manual cues from PT  [] Diaphragmatic breathing + PFM squeeze on exhale, 3x8 with 50% effort - verbal and manual cues from PT  []  Lower trunk rotations and double-knees to chest with legs on ball, x20 each  [] Seated ball roll out, 1 minute - reduced pain afterwards  [] Supine single-knee to chest (R&L), 30 seconds  [] TrA brace + supine march (R&L), x10  [] Prone glute squeezes with 5-second hold, x20  [] Short arc quads for gentle multifidi activation (R&L), x20 - performed during manual interventions to abdominal wall  [] Lucian test stretch (L) - 2x30 seconds  [] TrA brace + seated hip adduction isometric with ball, x20 with 2-second hold  [] TrA brace + supine heel slides (R&L), 2x5   [] Prone hamstring curls with multifidi pre-activation (R&L), 3 minutes  [] TrA brace + supine clam with blue band, x20  [] TrA brace + straight leg raise (R&L), 2x4  [] Open books with green band (R&L), x10  [] Standing pallof press with blue band (R&L), x12   [] TrA brace + side-lying hip abduction (R&L), 2x8  [] Cat/cow with coordinated breath, 2 minutes   [] TrA brace + quadruped hip extension (R&L), x10  [] Standing hip abduction circles (R&L), 2x10  [] TrA brace + standing shoulder extension with green band (B), 2x15  [] TrA brace + wall push up, 2x15  [x] Side-stepping with pink band around knees, 3 minutes  [] Seated on theraball + pelvic rotations, 2 minutes  [] Seated on theraball + alternating kicks, 1 minute  [] Seated on theraball + row with green band, x20  [] Seated on theraball + alternating shoulder flexion (2-3#, R&L), 2x15   [x] Lateral step up/down on 4-inch step (R&L), 4x8  [x] TrA brace + toe taps to 6-inch step (R&L), 4x15  [x] TrA brace + sit to stands from high mat, 4x8  [x] Standing hip flexion + hip external rotation lift (R&L), 3x8  [] Happy baby stretch + diaphragmatic breathing, pelvic drop, 1 minute  [] Child's pose + diaphragmatic breathing, pelvic drop, 2 minutes  [] Butterfly hip stretch + diaphragmatic breathing, pelvic drop, 2 minutes  [] Half-kneeling lunge/hip flexor stretch (L), 1 minute  [] Supine hamstring stretch  with strap (L), 1 minute  [] Hip flexor stretch while prone (L)  [x] Seated ball roll out, 2 minutes - improved R lower back pain  [] Seated figure-4 stretch (R&L), 1 minute - reduced discomfort compared to last month  [] Pelvic floor relaxation guided meditation - performed during e-stim for dry needling  [x] HEP review, patient education - performed during manual techniques      Manual therapy techniques: to develop flexibility, desensitization, and extensibility -  [] Obturator release at ischial tuberosities + active release with resisted contralateral hip external rotation (B)  [] Manual release to L adductors and hip flexors - improved tenderness after a few minutes of release  [] Manual release to pelvic floor muscles internally/vaginally: levator ani - mild increased tone and tenderness that improved within a few minutes of release. PT provided cues for diaphragmatic breathing, visualization, and relaxation.  [] Myofascial decompression/cupping to L lower abdominal wall and anterior/medial hip  [x] Dry needling with electrical stimulation to R adductor longus, R adductor chepe, gracilis, and R pectineus - no adverse effect, written consent provided 5/9/22, see EMR   [] Lateral and long-arm distraction to L hip with belt  [] Trigger point release to L iliopsoas  [] Taping to bilateral lumbar paraspinals and across L3  [] Sacroiliac muscle-energy technique    Home Exercises and Patient Education     Patient Education: progression of plan of care, plan for next session, pt prognosis, pelvic floor anatomy & function, relationship between TrA & PFM, relationship between hips & PFM, relationship between pelvic floor dysfunction & lower back pain, multifidi anatomy & function, lumbar spine anatomy, MRI results, flexion vs. extension directional preferece    Home Exercise Program (7/6/22): supine march, supine clams, bridging with band/belt around knees    Exercises were reviewed, and Divya Jacinto was able to  demonstrate them prior to the end of the session, as needed. Divya Jacinto demonstrated good  understanding of the education provided.      Assessment     Pt tolerated treatment session well, with improved pain after needling and good tolerance for progressed exercises today. Pt has been progressing well with therapy overall, but she is still vulnerable to pain flare-ups with overactivity/stress. Pt's functional mobility and ability to perform ADLs still limited by pain and weakness. She requires skilled therapy for continued patient education and progressive resistance exercise.    Divya Jacinto is progressing fairly towards her goals.   Pt prognosis: good    Pt will continue to benefit from skilled outpatient physical therapy to address the deficits listed in the problem list box on initial evaluation, provide pt/family education and to maximize pt's level of independence in the home and community environment.     Pt's spiritual, cultural and educational needs considered and pt agreeable to plan of care and goals.  Anticipated barriers to physical therapy: none      Short Term Goals: 6 weeks   Pt to report 50% improvement in tenderness to palpation of pelvic floor to demonstrate improved trigger points and overactivity Met   Pt to report only rare incidences of urinary incontinence with coughing to demonstrate improved pelvic floor muscle coordination Met   Pt to report 50% improvement in pain with penetration (gynecological exam or intercourse) and orgasm to demonstrate improved pelvic floor muscle overactivity Met   Pt to be independent with introductory home exercise program Met      Long Term Goals: 12 weeks  Pt to report minimal/no tenderness to palpation of pelvic floor to demonstrate improved trigger points and overactivity Partially Met   Pt to demonstrate an improved score in the FOTO Pelvic Pain (PFDI) survey to no more than 8% impaired to demonstrate improving pelvic floor muscle tenderness, coordination.  Not Met   Pt to deny pelvic pain with penetration (gynecological exam or intercourse) and orgasm to demonstrate improved pelvic floor muscle overactivity Partially Met   Pt to increase pelvic floor strength to 4/5 to demonstrate improved strength needed for continence with ADLs. Partially Met   Pt to be independent with advanced home exercise program Not Met   Pt to demonstrate bilateral hip strength of 5/5 for improved pelvic girdle support with mobility. Not Met   Pt to report minimal/no back pain with ADLs and functional mobility to demonstrate improved lumbopelvic muscle support, coordination Not Met        Plan     Continue per Plan of Care      Jess Ureña, PT, DPT

## 2022-11-15 ENCOUNTER — CLINICAL SUPPORT (OUTPATIENT)
Dept: REHABILITATION | Facility: OTHER | Age: 37
End: 2022-11-15
Payer: MEDICAID

## 2022-11-15 DIAGNOSIS — R29.898 DECREASED STRENGTH OF TRUNK AND BACK: ICD-10-CM

## 2022-11-15 DIAGNOSIS — M54.50 CHRONIC LEFT-SIDED LOW BACK PAIN WITHOUT SCIATICA: ICD-10-CM

## 2022-11-15 DIAGNOSIS — G89.29 CHRONIC LEFT-SIDED LOW BACK PAIN WITHOUT SCIATICA: ICD-10-CM

## 2022-11-15 DIAGNOSIS — R10.2 PELVIC PAIN: Primary | ICD-10-CM

## 2022-11-15 PROCEDURE — 97110 THERAPEUTIC EXERCISES: CPT | Mod: PN | Performed by: PHYSICAL THERAPIST

## 2022-11-17 NOTE — PROGRESS NOTES
Pelvic Health Physical Therapy   Treatment Note     Name: Divya Chand  Clinic Number: [id number]    Therapy Diagnosis:   Encounter Diagnoses   Name Primary?    Pelvic pain Yes    Decreased strength of trunk and back     Chronic left-sided low back pain without sciatica      Referring Provider: Arnulfo    Visit Date: 11/22/2022    Referring Provider Orders: PT Eval and Treat  Medical Diagnosis from Referral: Pelvic floor dysfunction in female [M62.89]  Evaluation Date: 4/29/2022  Last Reassessment: 9/28/2022, visit #19  Authorization Period Expiration: 1/3/2023  Plan of Care Expiration: 12/28/2022  Visit # / Visits authorized: 24/32  FOTO: 2/3 (4/29/2022, 6/20/2022)    Time In: 16:00  Time Out: 17:00  Total Billable Time: 60 minutes    Precautions: Standard    Subjective     Divya Jacinto reports continued milder L hip pain. She noted increased pain with stressful event at work.     She was somewhat compliant with home exercise program - plus stretching from osteopath  Response to previous treatment: no adverse effect   Functional change: fluctuating improvements in urinary urgency and back/hip/pelvic pain    Pain: mild  Location: L anterior hip, R lower back    Objective     Divya Jacinto received the following interventions during the treatment session -  (TrA = transverse abdominis, PFM = pelvic floor muscle)  Pt consented to pelvic floor muscle assessment and treatment in prior visit - see EMR    Therapeutic exercises to develop strength, endurance and core stabilization for 55 minutes -  [] Diaphragmatic breathing + pelvic drop, 6 minutes - verbal and manual cues from PT, performed during manual techniques  [] Diaphragmatic breathing + TrA brace on exhale + posterior pelvic tilt, x20 - verbal and manual cues from PT  [] Diaphragmatic breathing + PFM squeeze on exhale, 3x8 with 50% effort - verbal and manual cues from PT  [] Lower trunk rotations and double-knees to chest with legs on ball, x20 each  [] Seated  ball roll out, 1 minute - reduced pain afterwards  [] Supine single-knee to chest (R&L), 30 seconds  [] TrA brace + supine march (R&L), x10  [] Prone glute squeezes with 5-second hold, x20  [] Short arc quads for gentle multifidi activation (R&L), x20 - performed during manual interventions to abdominal wall  [] Lucian test stretch (L) - 2x30 seconds  [] TrA brace + seated hip adduction isometric with ball, x20 with 2-second hold  [] TrA brace + supine heel slides (R&L), 2x5   [] Prone hamstring curls with multifidi pre-activation (R&L), 3 minutes  [] TrA brace + supine clam with blue band, x20  [] TrA brace + straight leg raise (R&L), 2x4  [] Open books with green band (R&L), x10  [] Standing pallof press with blue band (R&L), x12   [] TrA brace + side-lying hip abduction (R&L), 2x8  [] Cat/cow with coordinated breath, 2 minutes   [] TrA brace + quadruped hip extension (R&L), x10  [] Standing hip abduction circles (R&L), 2x10  [] TrA brace + standing shoulder extension with green band (B), 2x15  [] TrA brace + wall push up, 2x15  [] Side-stepping with pink band around knees, 3 minutes  [] Seated on theraball + pelvic rotations, 2 minutes  [] Seated on theraball + alternating kicks, 1 minute  [] Seated on theraball + row with green band, x20  [] Seated on theraball + alternating shoulder flexion (2-3#, R&L), 2x15   [x] Lateral step up/down on 6-inch step (R&L), 2x8 - L hip pain started during the second set  [x] TrA brace + toe taps to 8-inch step (R&L), 3x15  [x] TrA brace + sit to stands from chair + 2 foam pads, 2x8  [x] Standing hip flexion + hip external rotation lift (R&L), 2x8  [] Happy baby stretch + diaphragmatic breathing, pelvic drop, 1 minute  [] Child's pose + diaphragmatic breathing, pelvic drop, 2 minutes  [] Butterfly hip stretch + diaphragmatic breathing, pelvic drop, 2 minutes  [] Half-kneeling lunge/hip flexor stretch (L), 1 minute  [] Supine hamstring stretch with strap (L), 1 minute  [] Hip  flexor stretch while prone (L)  [] Seated ball roll out, 2 minutes - improved R lower back pain  [] Seated figure-4 stretch (R&L), 1 minute - reduced discomfort compared to last month  [] Pelvic floor relaxation guided meditation - performed during e-stim for dry needling  [x] HEP review, patient education - performed during manual techniques      Manual therapy techniques: to develop flexibility, desensitization, and extensibility -  [] Obturator release at ischial tuberosities + active release with resisted contralateral hip external rotation (B)  [] Manual release to L adductors and hip flexors - improved tenderness after a few minutes of release  [x] Manual release to L pelvic floor muscles internally/vaginally: levator ani - mild increased tone and tenderness that improved within a few minutes of release. PT provided cues for diaphragmatic breathing, visualization, and relaxation.  [] Myofascial decompression/cupping to L lower abdominal wall and anterior/medial hip  [x] Dry needling with electrical stimulation to R iliacus and iliopsoas - no adverse effect, written consent provided 5/9/22, see EMR.  [] Lateral and long-arm distraction to L hip with belt  [] Trigger point release to L iliopsoas  [] Taping to bilateral lumbar paraspinals and across L3  [] Sacroiliac muscle-energy technique    Home Exercises and Patient Education     Patient Education: progression of plan of care, plan for next session, pt prognosis, pelvic floor anatomy & function, relationship between TrA & PFM, relationship between hips & PFM, relationship between pelvic floor dysfunction & lower back pain, multifidi anatomy & function, lumbar spine anatomy, MRI results, flexion vs. extension directional preferece    Home Exercise Program (7/6/22): supine march, supine clams, bridging with band/belt around knees    Exercises were reviewed, and Divya Jacinto was able to demonstrate them prior to the end of the session, as needed. Divya Jacinto  demonstrated good  understanding of the education provided.      Assessment     Pt tolerated treatment session well, with improved pain after needling and fair tolerance for exercises today (unable to tolerate step ups on higher step). Needling the L iliacus reproduced pt's familiar L hip/pelvic pain, indicating the need for more progressive resistance exercise for the hips. Relationship between stress/anxiety and L hip pain also noteworthy; Pt and PT discussed pt's management of stress, and she reports improved control of everyday stress but still struggles with flare-ups due to unpredictable work situations. Pt has been progressing well with therapy overall, but she is still vulnerable to pain flare-ups with overactivity/stress. Pt's functional mobility and ability to perform ADLs still limited by pain and weakness. She requires skilled therapy for continued patient education and progressive resistance exercise.    Divya Jacinto is progressing fairly towards her goals.   Pt prognosis: good    Pt will continue to benefit from skilled outpatient physical therapy to address the deficits listed in the problem list box on initial evaluation, provide pt/family education and to maximize pt's level of independence in the home and community environment.     Pt's spiritual, cultural and educational needs considered and pt agreeable to plan of care and goals.  Anticipated barriers to physical therapy: none      Short Term Goals: 6 weeks   Pt to report 50% improvement in tenderness to palpation of pelvic floor to demonstrate improved trigger points and overactivity Met   Pt to report only rare incidences of urinary incontinence with coughing to demonstrate improved pelvic floor muscle coordination Met   Pt to report 50% improvement in pain with penetration (gynecological exam or intercourse) and orgasm to demonstrate improved pelvic floor muscle overactivity Met   Pt to be independent with introductory home exercise program Met       Long Term Goals: 12 weeks  Pt to report minimal/no tenderness to palpation of pelvic floor to demonstrate improved trigger points and overactivity Partially Met   Pt to demonstrate an improved score in the FOTO Pelvic Pain (PFDI) survey to no more than 8% impaired to demonstrate improving pelvic floor muscle tenderness, coordination. Not Met   Pt to deny pelvic pain with penetration (gynecological exam or intercourse) and orgasm to demonstrate improved pelvic floor muscle overactivity Partially Met   Pt to increase pelvic floor strength to 4/5 to demonstrate improved strength needed for continence with ADLs. Partially Met   Pt to be independent with advanced home exercise program Not Met   Pt to demonstrate bilateral hip strength of 5/5 for improved pelvic girdle support with mobility. Not Met   Pt to report minimal/no back pain with ADLs and functional mobility to demonstrate improved lumbopelvic muscle support, coordination Not Met        Plan     Continue per Plan of Care      Jess Ureña, PT, DPT

## 2022-11-22 ENCOUNTER — CLINICAL SUPPORT (OUTPATIENT)
Dept: REHABILITATION | Facility: OTHER | Age: 37
End: 2022-11-22
Payer: MEDICAID

## 2022-11-22 DIAGNOSIS — G89.29 CHRONIC LEFT-SIDED LOW BACK PAIN WITHOUT SCIATICA: ICD-10-CM

## 2022-11-22 DIAGNOSIS — R29.898 DECREASED STRENGTH OF TRUNK AND BACK: ICD-10-CM

## 2022-11-22 DIAGNOSIS — R10.2 PELVIC PAIN: Primary | ICD-10-CM

## 2022-11-22 DIAGNOSIS — M54.50 CHRONIC LEFT-SIDED LOW BACK PAIN WITHOUT SCIATICA: ICD-10-CM

## 2022-11-22 PROCEDURE — 97110 THERAPEUTIC EXERCISES: CPT | Mod: PN | Performed by: PHYSICAL THERAPIST

## 2022-11-25 NOTE — PROGRESS NOTES
Pelvic Health Physical Therapy   Treatment Note     Name: Divya Chand  Clinic Number: [id number]    Therapy Diagnosis:   Encounter Diagnoses   Name Primary?    Pelvic pain Yes    Decreased strength of trunk and back     Chronic left-sided low back pain without sciatica      Referring Provider: Arnulfo    Visit Date: 11/29/2022    Referring Provider Orders: PT Eval and Treat  Medical Diagnosis from Referral: Pelvic floor dysfunction in female [M62.89]  Evaluation Date: 4/29/2022  Last Reassessment: 9/28/2022, visit #19  Authorization Period Expiration: 1/3/2023  Plan of Care Expiration: 12/28/2022  Visit # / Visits authorized: 25/32  FOTO: 2/3 (4/29/2022, 6/20/2022)    Time In: 12:15  Time Out: 13:10  Total Billable Time: 55 minutes    Precautions: Standard    Subjective     Divya Jacinto reports improved L hip pain after needling iliacus last week. She's on her period today, which is usually associated with lower pain levels.     She was somewhat compliant with home exercise program - plus stretching from osteopath  Response to previous treatment: no adverse effect   Functional change: fluctuating improvements in urinary urgency and back/hip/pelvic pain    Pain: mild  Location: L anterior hip, R lower back    Objective     Divya Jacinto received the following interventions during the treatment session -  (TrA = transverse abdominis, PFM = pelvic floor muscle)  Pt consented to pelvic floor muscle assessment and treatment in prior visit - see EMR    Therapeutic exercises to develop strength, endurance and core stabilization for 55 minutes -  [] Diaphragmatic breathing + pelvic drop, 6 minutes - verbal and manual cues from PT, performed during manual techniques  [] Diaphragmatic breathing + TrA brace on exhale + posterior pelvic tilt, x20 - verbal and manual cues from PT  [] Diaphragmatic breathing + PFM squeeze on exhale, 3x8 with 50% effort - verbal and manual cues from PT  [] Lower trunk rotations and double-knees to  chest with legs on ball, x20 each  [] Seated ball roll out, 1 minute - reduced pain afterwards  [] Supine single-knee to chest (R&L), 30 seconds  [] TrA brace + supine march (R&L), x10  [] Prone glute squeezes with 5-second hold, x20  [] Short arc quads for gentle multifidi activation (R&L), x20 - performed during manual interventions to abdominal wall  [] Lucian test stretch (L) - 2x30 seconds  [] TrA brace + seated hip adduction isometric with ball, x20 with 2-second hold  [] TrA brace + supine heel slides (R&L), 2x5   [] Prone hamstring curls with multifidi pre-activation (R&L), 3 minutes  [] TrA brace + supine clam with blue band, x20  [] TrA brace + straight leg raise (R&L), 2x4  [] Open books with green band (R&L), x10  [] Standing pallof press with blue band (R&L), x12   [] TrA brace + side-lying hip abduction (R&L), 2x8  [] Cat/cow with coordinated breath, 2 minutes   [] TrA brace + quadruped hip extension (R&L), x10  [] Standing hip abduction circles (R&L), 2x10  [] TrA brace + standing shoulder extension with green band (B), 2x15  [] TrA brace + wall push up, 2x15  [x] Side-stepping with green band around knees, 3 minutes  [] Seated on theraball + pelvic rotations, 2 minutes  [] Seated on theraball + alternating kicks, 1 minute  [] Seated on theraball + row with green band, x20  [] Seated on theraball + alternating shoulder flexion (2-3#, R&L), 2x15   [x] Lateral step up/down on 4-inch step (R&L), 4x10  [x] TrA brace + toe taps to 8-inch step (R&L), 4x15 - too easy now  [x] TrA brace + sit to stands from chair + 2 foam pads, 4x10  [x] Standing hip flexion + hip external rotation lift (R&L), x8 - uncomfortable today  [x] TrA brace + standing straight leg raise (L), 3x10  [] Happy baby stretch + diaphragmatic breathing, pelvic drop, 1 minute  [] Child's pose + diaphragmatic breathing, pelvic drop, 2 minutes  [] Butterfly hip stretch + diaphragmatic breathing, pelvic drop, 2 minutes  [] Half-kneeling  lunge/hip flexor stretch (L), 1 minute  [] Supine hamstring stretch with strap (L), 1 minute  [x] Hip flexor stretch in standing (L), 2x30 seconds  [] Seated ball roll out, 2 minutes - improved R lower back pain  [] Seated figure-4 stretch (R&L), 1 minute - reduced discomfort compared to last month  [] Pelvic floor relaxation guided meditation - performed during e-stim for dry needling  [x] HEP review, patient education - performed during manual techniques      Manual therapy techniques: to develop flexibility, desensitization, and extensibility -  [] Obturator release at ischial tuberosities + active release with resisted contralateral hip external rotation (B)  [] Manual release to L adductors and hip flexors - improved tenderness after a few minutes of release  [] Manual release to L pelvic floor muscles internally/vaginally: levator ani - mild increased tone and tenderness that improved within a few minutes of release. PT provided cues for diaphragmatic breathing, visualization, and relaxation.  [] Myofascial decompression/cupping to L lower abdominal wall and anterior/medial hip  [x] Dry needling with electrical stimulation to R iliacus, pectineus, adductor longus, and iliopsoas - no adverse effect, written consent provided 5/9/22, see EMR.  [] Lateral and long-arm distraction to L hip with belt  [] Trigger point release to L iliopsoas  [] Taping to bilateral lumbar paraspinals and across L3  [] Sacroiliac muscle-energy technique    Home Exercises and Patient Education     Patient Education: progression of plan of care, plan for next session, pt prognosis, pelvic floor anatomy & function, relationship between TrA & PFM, relationship between hips & PFM, relationship between pelvic floor dysfunction & lower back pain, multifidi anatomy & function, lumbar spine anatomy, MRI results, flexion vs. extension directional preferece    Home Exercise Program (7/6/22): supine march, supine clams, bridging with band/belt  around knees    Exercises were reviewed, and Divya Jacinto was able to demonstrate them prior to the end of the session, as needed. Divya Jacinto demonstrated good  understanding of the education provided.      Assessment     Pt tolerated treatment session well, with improved pain after needling and good tolerance for familiar exercises today. Needling the L iliacus reproduced pt's familiar L hip/pelvic pain, indicating the need for more progressive resistance exercise for the hips. Pt has been progressing well with therapy overall, but she is still vulnerable to pain flare-ups with overactivity/stress. Pt's functional mobility and ability to perform ADLs still limited by pain and weakness. She requires skilled therapy for continued patient education and progressive resistance exercise.    Divya Jacinto is progressing fairly towards her goals.   Pt prognosis: good    Pt will continue to benefit from skilled outpatient physical therapy to address the deficits listed in the problem list box on initial evaluation, provide pt/family education and to maximize pt's level of independence in the home and community environment.     Pt's spiritual, cultural and educational needs considered and pt agreeable to plan of care and goals.  Anticipated barriers to physical therapy: none      Short Term Goals: 6 weeks   Pt to report 50% improvement in tenderness to palpation of pelvic floor to demonstrate improved trigger points and overactivity Met   Pt to report only rare incidences of urinary incontinence with coughing to demonstrate improved pelvic floor muscle coordination Met   Pt to report 50% improvement in pain with penetration (gynecological exam or intercourse) and orgasm to demonstrate improved pelvic floor muscle overactivity Met   Pt to be independent with introductory home exercise program Met      Long Term Goals: 12 weeks  Pt to report minimal/no tenderness to palpation of pelvic floor to demonstrate improved trigger  points and overactivity Partially Met   Pt to demonstrate an improved score in the FOTO Pelvic Pain (PFDI) survey to no more than 8% impaired to demonstrate improving pelvic floor muscle tenderness, coordination. Not Met   Pt to deny pelvic pain with penetration (gynecological exam or intercourse) and orgasm to demonstrate improved pelvic floor muscle overactivity Partially Met   Pt to increase pelvic floor strength to 4/5 to demonstrate improved strength needed for continence with ADLs. Partially Met   Pt to be independent with advanced home exercise program Not Met   Pt to demonstrate bilateral hip strength of 5/5 for improved pelvic girdle support with mobility. Not Met   Pt to report minimal/no back pain with ADLs and functional mobility to demonstrate improved lumbopelvic muscle support, coordination Not Met        Plan     Continue per Plan of Care      Jess Ureña, PT, DPT

## 2022-11-29 ENCOUNTER — CLINICAL SUPPORT (OUTPATIENT)
Dept: REHABILITATION | Facility: OTHER | Age: 37
End: 2022-11-29
Payer: MEDICAID

## 2022-11-29 DIAGNOSIS — R10.2 PELVIC PAIN: Primary | ICD-10-CM

## 2022-11-29 DIAGNOSIS — M54.50 CHRONIC LEFT-SIDED LOW BACK PAIN WITHOUT SCIATICA: ICD-10-CM

## 2022-11-29 DIAGNOSIS — G89.29 CHRONIC LEFT-SIDED LOW BACK PAIN WITHOUT SCIATICA: ICD-10-CM

## 2022-11-29 DIAGNOSIS — R29.898 DECREASED STRENGTH OF TRUNK AND BACK: ICD-10-CM

## 2022-11-29 PROCEDURE — 97110 THERAPEUTIC EXERCISES: CPT | Mod: PN | Performed by: PHYSICAL THERAPIST

## 2022-11-29 NOTE — PROGRESS NOTES
Pelvic Health Physical Therapy   Treatment Note     Name: Divya Chand  Clinic Number: [id number]    Therapy Diagnosis:   Encounter Diagnoses   Name Primary?    Pelvic pain Yes    Decreased strength of trunk and back     Chronic left-sided low back pain without sciatica      Referring Provider: Arnulfo    Visit Date: 12/6/2022    Referring Provider Orders: PT Eval and Treat  Medical Diagnosis from Referral: Pelvic floor dysfunction in female [M62.89]  Evaluation Date: 4/29/2022  Last Reassessment: 9/28/2022, visit #19  Authorization Period Expiration: 1/3/2023  Plan of Care Expiration: 12/28/2022  Visit # / Visits authorized: 26/32  FOTO: 2/3 (4/29/2022, 6/20/2022)    Time In: 16:15  Time Out: 17:10  Total Billable Time: 55 minutes    Precautions: Standard    Subjective     Divya Jacinto reports waking up with L hip pain this morning which improved with constructive rest (hooklying position + diaphragmatic breathing).     She was somewhat compliant with home exercise program  Response to previous treatment: no adverse effect   Functional change: fluctuating improvements in urinary urgency and back/hip/pelvic pain    Pain: mild  Location: L anterior hip, R lower back    Objective     Divya Jacinto received the following interventions during the treatment session -  (TrA = transverse abdominis, PFM = pelvic floor muscle)  Pt consented to pelvic floor muscle assessment and treatment in prior visit - see EMR    Therapeutic exercises to develop strength, endurance and core stabilization for 55 minutes -  [] Diaphragmatic breathing + pelvic drop, 6 minutes - verbal and manual cues from PT, performed during manual techniques  [] Diaphragmatic breathing + TrA brace on exhale + posterior pelvic tilt, x20 - verbal and manual cues from PT  [] Diaphragmatic breathing + PFM squeeze on exhale, 3x8 with 50% effort - verbal and manual cues from PT  [] Lower trunk rotations and double-knees to chest with legs on ball, x20 each  []  Seated ball roll out, 1 minute - reduced pain afterwards  [] Supine single-knee to chest (R&L), 30 seconds  [] TrA brace + supine march (R&L), x10  [] Prone glute squeezes with 5-second hold, x20  [] Short arc quads for gentle multifidi activation (R&L), x20 - performed during manual interventions to abdominal wall  [] Lucian test stretch (L) - 2x30 seconds  [] TrA brace + seated hip adduction isometric with ball, x20 with 2-second hold  [] TrA brace + supine heel slides (R&L), 2x5   [] Prone hamstring curls with multifidi pre-activation (R&L), 3 minutes  [] TrA brace + supine clam with blue band, x20  [] TrA brace + straight leg raise (R&L), 2x4  [] Open books with green band (R&L), x10  [] Standing pallof press with blue band (R&L), x12   [] TrA brace + side-lying hip abduction (R&L), 2x8  [] Cat/cow with coordinated breath, 2 minutes   [] TrA brace + quadruped hip extension (R&L), x10  [] Standing hip abduction circles (R&L), 2x10  [] TrA brace + standing shoulder extension with green band (B), 2x15  [] TrA brace + wall push up, 2x15  [x] Side-stepping with green band around knees, 3 minutes  [] Seated on theraball + pelvic rotations, 2 minutes  [] Seated on theraball + alternating kicks, 1 minute  [] Seated on theraball + row with green band, x20  [] Seated on theraball + alternating shoulder flexion (2-3#, R&L), 2x15   [x] Lateral step up/down on 4-inch step (R&L), 4x10  [x] TrA brace + toe taps to 8-inch step (2#, R&L), 4x10  [x] TrA brace + sit to stands from chair + 2 foam pads, 4x10  [] Standing hip flexion + hip external rotation lift (R&L), x8 - uncomfortable today  [x] TrA brace + standing straight leg raise (R&L), 3x10  [] Happy baby stretch + diaphragmatic breathing, pelvic drop, 1 minute  [] Child's pose + diaphragmatic breathing, pelvic drop, 2 minutes  [] Butterfly hip stretch + diaphragmatic breathing, pelvic drop, 2 minutes  [] Half-kneeling lunge/hip flexor stretch (L), 1 minute  [] Supine  hamstring stretch with strap (L), 1 minute  [] Hip flexor stretch in standing (L), 2x30 seconds  [] Seated ball roll out, 2 minutes - improved R lower back pain  [] Seated figure-4 stretch (R&L), 1 minute - reduced discomfort compared to last month  [] Pelvic floor relaxation guided meditation - performed during e-stim for dry needling  [x] HEP review, patient education - performed during manual techniques      Manual therapy techniques: to develop flexibility, desensitization, and extensibility -  [] Obturator release at ischial tuberosities + active release with resisted contralateral hip external rotation (B)  [] Manual release to L adductors and hip flexors - improved tenderness after a few minutes of release  [] Manual release to L pelvic floor muscles internally/vaginally: levator ani - mild increased tone and tenderness that improved within a few minutes of release. PT provided cues for diaphragmatic breathing, visualization, and relaxation.  [] Myofascial decompression/cupping to L lower abdominal wall and anterior/medial hip  [x] Dry needling with electrical stimulation to R psoas major (posterior approach, no twitch), iliacus, and iliopsoas - no adverse effect, written consent provided 5/9/22, see EMR.  [] Lateral and long-arm distraction to L hip with belt  [] Trigger point release to L iliopsoas  [] Taping to bilateral lumbar paraspinals and across L3  [] Sacroiliac muscle-energy technique    Home Exercises and Patient Education     Patient Education: progression of plan of care, plan for next session, pt prognosis, pelvic floor anatomy & function, relationship between TrA & PFM, relationship between hips & PFM, relationship between pelvic floor dysfunction & lower back pain, multifidi anatomy & function, lumbar spine anatomy, MRI results, flexion vs. extension directional preferece    Home Exercise Program (7/6/22): supine march, supine clams, bridging with band/belt around knees    Exercises were  reviewed, and Divya Jacinto was able to demonstrate them prior to the end of the session, as needed. Divya Jacinto demonstrated good  understanding of the education provided.      Assessment     Pt tolerated treatment session well, with improved pain after needling and good tolerance for familiar exercises today (plus progression of toe taps to include ankle weights). Needling the L iliacus reproduced pt's familiar L hip/pelvic pain, indicating the need for more progressive resistance exercise for the hips. Pt has been progressing well with therapy overall, but she is still vulnerable to pain flare-ups with overactivity/stress. Pt's functional mobility and ability to perform ADLs still limited by pain and weakness. She requires skilled therapy for continued patient education and progressive resistance exercise.    Divya Jacinto is progressing fairly towards her goals.   Pt prognosis: good    Pt will continue to benefit from skilled outpatient physical therapy to address the deficits listed in the problem list box on initial evaluation, provide pt/family education and to maximize pt's level of independence in the home and community environment.     Pt's spiritual, cultural and educational needs considered and pt agreeable to plan of care and goals.  Anticipated barriers to physical therapy: none      Short Term Goals: 6 weeks   Pt to report 50% improvement in tenderness to palpation of pelvic floor to demonstrate improved trigger points and overactivity Met   Pt to report only rare incidences of urinary incontinence with coughing to demonstrate improved pelvic floor muscle coordination Met   Pt to report 50% improvement in pain with penetration (gynecological exam or intercourse) and orgasm to demonstrate improved pelvic floor muscle overactivity Met   Pt to be independent with introductory home exercise program Met      Long Term Goals: 12 weeks  Pt to report minimal/no tenderness to palpation of pelvic floor to  demonstrate improved trigger points and overactivity Partially Met   Pt to demonstrate an improved score in the FOTO Pelvic Pain (PFDI) survey to no more than 8% impaired to demonstrate improving pelvic floor muscle tenderness, coordination. Not Met   Pt to deny pelvic pain with penetration (gynecological exam or intercourse) and orgasm to demonstrate improved pelvic floor muscle overactivity Partially Met   Pt to increase pelvic floor strength to 4/5 to demonstrate improved strength needed for continence with ADLs. Partially Met   Pt to be independent with advanced home exercise program Not Met   Pt to demonstrate bilateral hip strength of 5/5 for improved pelvic girdle support with mobility. Not Met   Pt to report minimal/no back pain with ADLs and functional mobility to demonstrate improved lumbopelvic muscle support, coordination Not Met        Plan     Continue per Plan of Care  Planning to perform deep tissue massage to L iliacus next visit      Jess Ureña, PT, DPT

## 2022-12-06 ENCOUNTER — CLINICAL SUPPORT (OUTPATIENT)
Dept: REHABILITATION | Facility: OTHER | Age: 37
End: 2022-12-06
Payer: MEDICAID

## 2022-12-06 DIAGNOSIS — G89.29 CHRONIC LEFT-SIDED LOW BACK PAIN WITHOUT SCIATICA: ICD-10-CM

## 2022-12-06 DIAGNOSIS — M54.50 CHRONIC LEFT-SIDED LOW BACK PAIN WITHOUT SCIATICA: ICD-10-CM

## 2022-12-06 DIAGNOSIS — R10.2 PELVIC PAIN: Primary | ICD-10-CM

## 2022-12-06 DIAGNOSIS — R29.898 DECREASED STRENGTH OF TRUNK AND BACK: ICD-10-CM

## 2022-12-06 PROCEDURE — 97110 THERAPEUTIC EXERCISES: CPT | Mod: PN | Performed by: PHYSICAL THERAPIST

## 2022-12-08 NOTE — PROGRESS NOTES
Pelvic Health Physical Therapy   Treatment Note     Name: Divya Chand  Clinic Number: [id number]    Therapy Diagnosis:   Encounter Diagnoses   Name Primary?    Pelvic pain Yes    Decreased strength of trunk and back     Chronic left-sided low back pain without sciatica      Referring Provider: Arnulfo    Visit Date: 12/13/2022    Referring Provider Orders: PT Eval and Treat  Medical Diagnosis from Referral: Pelvic floor dysfunction in female [M62.89]  Evaluation Date: 4/29/2022  Last Reassessment: 9/28/2022, visit #19  Authorization Period Expiration: 1/3/2023  Plan of Care Expiration: 12/28/2022  Visit # / Visits authorized: 27/32  FOTO: 2/3 (4/29/2022, 6/20/2022)    Time In: 16:15  Time Out: 17:10  Total Billable Time: 55 minutes    Precautions: Standard    Subjective     Divya Jacinto reports milder pain than usual this past week.      She was compliant with home exercise program (also hip thrusts)  Response to previous treatment: no adverse effect   Functional change: fluctuating improvements in urinary urgency and back/hip/pelvic pain    Pain: mild  Location: L anterior hip, R lower back    Objective     Divya Jacinto received the following interventions during the treatment session -  (TrA = transverse abdominis, PFM = pelvic floor muscle)  Pt consented to pelvic floor muscle assessment and treatment in prior visit - see EMR    Therapeutic exercises to develop strength, endurance and core stabilization for 55 minutes -  [] Diaphragmatic breathing + pelvic drop, 6 minutes - verbal and manual cues from PT, performed during manual techniques  [] Diaphragmatic breathing + TrA brace on exhale + posterior pelvic tilt, x20 - verbal and manual cues from PT  [] Diaphragmatic breathing + PFM squeeze on exhale, 3x8 with 50% effort - verbal and manual cues from PT  [] Lower trunk rotations and double-knees to chest with legs on ball, x20 each  [] Seated ball roll out, 1 minute - reduced pain afterwards  [] Supine  single-knee to chest (R&L), 30 seconds  [] TrA brace + supine march (R&L), x10  [] Prone glute squeezes with 5-second hold, x20  [] Short arc quads for gentle multifidi activation (R&L), x20 - performed during manual interventions to abdominal wall  [] Lucian test stretch (L) - 2x30 seconds  [] TrA brace + seated hip adduction isometric with ball, x20 with 2-second hold  [] TrA brace + supine heel slides (R&L), 2x5   [] Prone hamstring curls with multifidi pre-activation (R&L), 3 minutes  [] TrA brace + supine clam with blue band, x20  [] TrA brace + straight leg raise (R&L), 2x4  [] Open books with green band (R&L), x10  [] Standing pallof press with blue band (R&L), x12   [] TrA brace + side-lying hip abduction (R&L), 2x8  [] Cat/cow with coordinated breath, 2 minutes   [] TrA brace + quadruped hip extension (R&L), x10  [] Standing hip abduction circles (R&L), 2x10  [] TrA brace + standing shoulder extension with green band (B), 2x15  [] TrA brace + wall push up, 2x15  [x] Side-stepping with green band around knees, 3 minutes  [] Seated on theraball + pelvic rotations, 2 minutes  [] Seated on theraball + alternating kicks, 1 minute  [] Seated on theraball + row with green band, x20  [] Seated on theraball + alternating shoulder flexion (2-3#, R&L), 2x15   [x] Lateral step up/down on 4-inch step (R&L), 3x10  [x] Lateral step up/down on 5-inch step (R&L), x5 - not uncomfortable  [x] TrA brace + toe taps to 8-inch step (2#, R&L), 3x10  [x] TrA brace + sit to stands from chair + 2 foam pads, 4x10  [x] Standing hip flexion + hip external rotation lift (2#, L), 3x5  [x] TrA brace + standing straight leg raise (2#, R&L), 3x5  [] Happy baby stretch + diaphragmatic breathing, pelvic drop, 1 minute  [] Child's pose + diaphragmatic breathing, pelvic drop, 2 minutes  [] Butterfly hip stretch + diaphragmatic breathing, pelvic drop, 2 minutes  [] Half-kneeling lunge/hip flexor stretch (L), 1 minute  [] Supine hamstring stretch  with strap (L), 1 minute  [] Hip flexor stretch in standing (L), 2x30 seconds  [] Seated ball roll out, 2 minutes - improved R lower back pain  [] Seated figure-4 stretch (R&L), 1 minute - reduced discomfort compared to last month  [] Pelvic floor relaxation guided meditation - performed during e-stim for dry needling  [x] HEP review, patient education    Manual therapy techniques: to develop flexibility, desensitization, and extensibility -  [] Obturator release at ischial tuberosities + active release with resisted contralateral hip external rotation (B)  [x] Manual release to L hip flexors - improved tenderness after a few minutes of release  [] Manual release to L pelvic floor muscles internally/vaginally: levator ani - mild increased tone and tenderness that improved within a few minutes of release. PT provided cues for diaphragmatic breathing, visualization, and relaxation.  [] Myofascial decompression/cupping to L lower abdominal wall and anterior/medial hip  [] Dry needling with electrical stimulation to R psoas major (posterior approach, no twitch), iliacus, and iliopsoas - no adverse effect, written consent provided 5/9/22, see EMR.  [] Lateral and long-arm distraction to L hip with belt  [] Trigger point release to L iliopsoas  [] Taping to bilateral lumbar paraspinals and across L3  [] Sacroiliac muscle-energy technique    Home Exercises and Patient Education     Patient Education: progression of plan of care, plan for next session, pt prognosis, pelvic floor anatomy & function, relationship between TrA & PFM, relationship between hips & PFM, relationship between pelvic floor dysfunction & lower back pain, multifidi anatomy & function, lumbar spine anatomy, MRI results, flexion vs. extension directional preferece    Home Exercise Program (7/6/22): supine march, supine clams, bridging with band/belt around knees    Exercises were reviewed, and Divya Jacinto was able to demonstrate them prior to the end of  the session, as needed. Divya Jacinto demonstrated good  understanding of the education provided.      Assessment     Pt tolerated treatment session well, with improved tenderness following manual release good tolerance for exercise progressions today. Deep massage to the L iliacus reproduced pt's familiar L hip/pelvic pain, indicating the need for more progressive resistance exercise for the hips. Pt has been progressing well with therapy overall, but she is still vulnerable to pain flare-ups with overactivity/stress. Pt's functional mobility and ability to perform ADLs still limited by pain and weakness. She requires skilled therapy for continued patient education and progressive resistance exercise.    Divya Jacinto is progressing fairly towards her goals.   Pt prognosis: good    Pt will continue to benefit from skilled outpatient physical therapy to address the deficits listed in the problem list box on initial evaluation, provide pt/family education and to maximize pt's level of independence in the home and community environment.     Pt's spiritual, cultural and educational needs considered and pt agreeable to plan of care and goals.  Anticipated barriers to physical therapy: none      Short Term Goals: 6 weeks   Pt to report 50% improvement in tenderness to palpation of pelvic floor to demonstrate improved trigger points and overactivity Met   Pt to report only rare incidences of urinary incontinence with coughing to demonstrate improved pelvic floor muscle coordination Met   Pt to report 50% improvement in pain with penetration (gynecological exam or intercourse) and orgasm to demonstrate improved pelvic floor muscle overactivity Met   Pt to be independent with introductory home exercise program Met      Long Term Goals: 12 weeks  Pt to report minimal/no tenderness to palpation of pelvic floor to demonstrate improved trigger points and overactivity Partially Met   Pt to demonstrate an improved score in the FOTO  Pelvic Pain (PFDI) survey to no more than 8% impaired to demonstrate improving pelvic floor muscle tenderness, coordination. Not Met   Pt to deny pelvic pain with penetration (gynecological exam or intercourse) and orgasm to demonstrate improved pelvic floor muscle overactivity Partially Met   Pt to increase pelvic floor strength to 4/5 to demonstrate improved strength needed for continence with ADLs. Partially Met   Pt to be independent with advanced home exercise program Not Met   Pt to demonstrate bilateral hip strength of 5/5 for improved pelvic girdle support with mobility. Not Met   Pt to report minimal/no back pain with ADLs and functional mobility to demonstrate improved lumbopelvic muscle support, coordination Not Met        Plan     Continue per Plan of Care  Planning to perform deep tissue massage to L iliacus next visit      Jess Ureña, PT, DPT

## 2022-12-13 ENCOUNTER — CLINICAL SUPPORT (OUTPATIENT)
Dept: REHABILITATION | Facility: OTHER | Age: 37
End: 2022-12-13
Payer: MEDICAID

## 2022-12-13 DIAGNOSIS — R29.898 DECREASED STRENGTH OF TRUNK AND BACK: ICD-10-CM

## 2022-12-13 DIAGNOSIS — M54.50 CHRONIC LEFT-SIDED LOW BACK PAIN WITHOUT SCIATICA: ICD-10-CM

## 2022-12-13 DIAGNOSIS — R10.2 PELVIC PAIN: Primary | ICD-10-CM

## 2022-12-13 DIAGNOSIS — G89.29 CHRONIC LEFT-SIDED LOW BACK PAIN WITHOUT SCIATICA: ICD-10-CM

## 2022-12-13 PROCEDURE — 97110 THERAPEUTIC EXERCISES: CPT | Mod: PN | Performed by: PHYSICAL THERAPIST

## 2022-12-15 NOTE — PROGRESS NOTES
Pelvic Health Physical Therapy   Treatment Note     Name: Divya Chand  Clinic Number: [id number]    Therapy Diagnosis:   Encounter Diagnoses   Name Primary?    Pelvic pain Yes    Decreased strength of trunk and back     Chronic left-sided low back pain without sciatica      Referring Provider: Arnulfo    Visit Date: 12/20/2022    Referring Provider Orders: PT Eval and Treat  Medical Diagnosis from Referral: Pelvic floor dysfunction in female [M62.89]  Evaluation Date: 4/29/2022  Last Reassessment: 12/20/2022, visit #28  Authorization Period Expiration: 1/3/2023  Plan of Care Expiration: 12/28/2022  Visit # / Visits authorized: 28/32  FOTO: 3 (4/29/2022, 6/20/2022, 12/20/2022)    Time In: 16:10  Time Out: 17:05  Total Billable Time: 55 minutes    Precautions: Standard    Subjective     Divya Jacinto reports her usual L hip pain. She was sore after last session.     She was compliant with home exercise program (also hip thrusts)  Response to previous treatment: no adverse effect   Functional change: fluctuating improvements in urinary urgency and back/hip/pelvic pain    Pain: mild  Location: L anterior hip, R lower back    Objective     Divya Jacinto received the following interventions during the treatment session -  (TrA = transverse abdominis, PFM = pelvic floor muscle)  Pt consented to pelvic floor muscle assessment and treatment in prior visit - see EMR    Therapeutic exercises to develop strength, endurance and core stabilization for 55 minutes -  [] Diaphragmatic breathing + pelvic drop, 6 minutes - verbal and manual cues from PT, performed during manual techniques  [] Diaphragmatic breathing + TrA brace on exhale + posterior pelvic tilt, x20 - verbal and manual cues from PT  [] Diaphragmatic breathing + PFM squeeze on exhale, 3x8 with 50% effort - verbal and manual cues from PT  [] Lower trunk rotations and double-knees to chest with legs on ball, x20 each  [] Seated ball roll out, 1 minute - reduced pain  afterwards  [] Supine single-knee to chest (R&L), 30 seconds  [] TrA brace + supine march (R&L), x10  [] Prone glute squeezes with 5-second hold, x20  [] Short arc quads for gentle multifidi activation (R&L), x20 - performed during manual interventions to abdominal wall  [] Lucian test stretch (L) - 2x30 seconds  [] TrA brace + seated hip adduction isometric with ball, x20 with 2-second hold  [] TrA brace + supine heel slides (R&L), 2x5   [] Prone hamstring curls with multifidi pre-activation (R&L), 3 minutes  [] TrA brace + supine clam with blue band, x20  [] TrA brace + straight leg raise (R&L), 2x4  [] Open books with green band (R&L), x10  [x] Standing pallof press with doubled red band (R&L), x15  [] TrA brace + side-lying hip abduction (R&L), 2x8  [] Cat/cow with coordinated breath, 2 minutes   [] TrA brace + quadruped hip extension (R&L), x10  [] Standing hip abduction circles (R&L), 2x10  [] TrA brace + standing shoulder extension with green band (B), 2x15  [] TrA brace + wall push up, 2x15  [x] Side-stepping with green band around knees, 3 minutes  [] Seated on theraball + pelvic rotations, 2 minutes  [] Seated on theraball + alternating kicks, 1 minute  [] Seated on theraball + row with green band, x20  [] Seated on theraball + alternating shoulder flexion (2-3#, R&L), 2x15   [] Lateral step up/down on 4-inch step (R&L), 3x10  [x] Lateral step up/down on 6-inch step (R&L), 4x5 - not uncomfortable  [x] TrA brace + toe taps to 8-inch step (2#, R&L), 4x15  [x] TrA brace + sit to stands from chair + 2 foam pads, 4x5  [] Standing hip flexion + hip external rotation lift (2#, L), 4x5  [x] TrA brace + standing straight leg raise (2#, R&L), 4x5  [] Happy baby stretch + diaphragmatic breathing, pelvic drop, 1 minute  [] Child's pose + diaphragmatic breathing, pelvic drop, 2 minutes  [] Butterfly hip stretch + diaphragmatic breathing, pelvic drop, 2 minutes  [] Half-kneeling lunge/hip flexor stretch (L), 1  minute  [] Supine hamstring stretch with strap (L), 1 minute  [] Hip flexor stretch in standing (L), 2x30 seconds  [] Seated ball roll out, 2 minutes - improved R lower back pain  [] Seated figure-4 stretch (R&L), 1 minute - reduced discomfort compared to last month  [] Pelvic floor relaxation guided meditation - performed during e-stim for dry needling  [x] HEP review, patient education    Manual therapy techniques: to develop flexibility, desensitization, and extensibility -  [] Obturator release at ischial tuberosities + active release with resisted contralateral hip external rotation (B)  [x] Manual release to L hip flexors - improved tenderness after a few minutes of release  [] Manual release to L pelvic floor muscles internally/vaginally: levator ani - mild increased tone and tenderness that improved within a few minutes of release. PT provided cues for diaphragmatic breathing, visualization, and relaxation.  [] Myofascial decompression/cupping to L lower abdominal wall and anterior/medial hip  [x] Dry needling with electrical stimulation to R iliacus - no adverse effect, written consent provided 5/9/22, see EMR.  [] Lateral and long-arm distraction to L hip with belt  [] Trigger point release to L iliopsoas  [] Taping to bilateral lumbar paraspinals and across L3  [] Sacroiliac muscle-energy technique    Home Exercises and Patient Education     Patient Education: progression of plan of care, plan for next session, pt prognosis, pelvic floor anatomy & function, relationship between TrA & PFM, relationship between hips & PFM, relationship between pelvic floor dysfunction & lower back pain, multifidi anatomy & function, lumbar spine anatomy, MRI results, flexion vs. extension directional preferece    Home Exercise Program (7/6/22): supine march, supine clams, bridging with band/belt around knees  HEP update (12/20/22): Pt encouraged to perform some exercises from therapy at home    Exercises were reviewed, and  Divya Jacinto was able to demonstrate them prior to the end of the session, as needed. Divya Jacinto demonstrated good  understanding of the education provided.      Assessment     Pt tolerated treatment session well, with improved tenderness following manual release good tolerance for exercise progressions today. Deep massage to the L iliacus reproduced pt's familiar L hip/pelvic pain, indicating the need for more progressive resistance exercise for the hips. Pt has been progressing well with therapy overall, but she is still vulnerable to pain flare-ups with overactivity/stress. Pt's functional mobility and ability to perform ADLs still limited by pain and weakness. She requires skilled therapy for continued patient education and progressive resistance exercise.    Divya Jacinto is progressing fairly towards her goals.   Pt prognosis: good    Pt will continue to benefit from skilled outpatient physical therapy to address the deficits listed in the problem list box on initial evaluation, provide pt/family education and to maximize pt's level of independence in the home and community environment.     Pt's spiritual, cultural and educational needs considered and pt agreeable to plan of care and goals.  Anticipated barriers to physical therapy: none      Short Term Goals: 6 weeks   Pt to report 50% improvement in tenderness to palpation of pelvic floor to demonstrate improved trigger points and overactivity Met   Pt to report only rare incidences of urinary incontinence with coughing to demonstrate improved pelvic floor muscle coordination Met   Pt to report 50% improvement in pain with penetration (gynecological exam or intercourse) and orgasm to demonstrate improved pelvic floor muscle overactivity Met   Pt to be independent with introductory home exercise program Met      Long Term Goals: 12 weeks  Pt to report minimal/no tenderness to palpation of pelvic floor to demonstrate improved trigger points and overactivity  Partially Met   Pt to demonstrate an improved score in the FOTO Pelvic Pain (PFDI) survey to no more than 8% impaired to demonstrate improving pelvic floor muscle tenderness, coordination. Not Met   Pt to deny pelvic pain with penetration (gynecological exam or intercourse) and orgasm to demonstrate improved pelvic floor muscle overactivity Partially Met   Pt to increase pelvic floor strength to 4/5 to demonstrate improved strength needed for continence with ADLs. Partially Met   Pt to be independent with advanced home exercise program Not Met   Pt to demonstrate bilateral hip strength of 5/5 for improved pelvic girdle support with mobility. Not Met   Pt to report minimal/no back pain with ADLs and functional mobility to demonstrate improved lumbopelvic muscle support, coordination Not Met        Plan     Continue per Plan of Care      Jess Ureña, PT, DPT

## 2022-12-20 ENCOUNTER — CLINICAL SUPPORT (OUTPATIENT)
Dept: REHABILITATION | Facility: OTHER | Age: 37
End: 2022-12-20
Payer: MEDICAID

## 2022-12-20 DIAGNOSIS — R10.2 PELVIC PAIN: Primary | ICD-10-CM

## 2022-12-20 DIAGNOSIS — R29.898 DECREASED STRENGTH OF TRUNK AND BACK: ICD-10-CM

## 2022-12-20 DIAGNOSIS — M54.50 CHRONIC LEFT-SIDED LOW BACK PAIN WITHOUT SCIATICA: ICD-10-CM

## 2022-12-20 DIAGNOSIS — G89.29 CHRONIC LEFT-SIDED LOW BACK PAIN WITHOUT SCIATICA: ICD-10-CM

## 2022-12-20 PROCEDURE — 97110 THERAPEUTIC EXERCISES: CPT | Mod: PN | Performed by: PHYSICAL THERAPIST

## 2022-12-20 NOTE — PLAN OF CARE
[facility]   Pelvic Health Physical Therapy Reassessment    Date: 12/20/2022   Name: Divya Salazar  Clinic Number: [id number]    Therapy Diagnosis:   Encounter Diagnoses   Name Primary?    Pelvic pain Yes    Decreased strength of trunk and back     Chronic left-sided low back pain without sciatica      Referring Provider: Aga Arredondo MD    Referring Provider Orders: PT Eval and Treat  Medical Diagnosis from Referral: Pelvic floor dysfunction in female [M62.89]  Evaluation Date: 4/29/2022  Last Reassessment: 12/20/2022, visit #28  Authorization Period Expiration: 1/3/2023  Plan of Care Expiration: 12/28/2022  Visit # / Visits authorized: 28/32  FOTO: 3 (4/29/2022, 6/20/2022, 12/20/2022)    SUBJECTIVE     Divya Jacinto reports continued fluctuating hip pain, but she has been reporting milder pain in recent visits, as well as fewer episodes of debilitating pain.     Pain (L inner thigh and hip, lower abdomen)  4/29/22: current - mild, worst - severe, best - mild  7/6/22: mild/moderate  9/28/22: 3/10 (worst - 6/10, post-orgasm)  120/20/22: mild/moderate    OBJECTIVE      Hip Strength 4/29/22 7/6/22 9/28/22   R hip flexion 4+/5 4+/5 4+/5   R hip external rotation 4/5 4+/5 4+/5   L hip flexion 4+/5 4+/5 4+/5   L hip external rotation 4-/5 4/5 4/5      Hip Range of Motion 4/29/22 7/6/22 9/28/22   R internal rotation 15°  25° 30°   L internal rotation 25° 30° 30°      Standing load transfer  4/29/22: Mild unsteadiness with single-leg balance  7/6/22: Minimal/no unsteadiness  9/28/22: Good pelvic girdle stability  12/20/22: Good pelvic girdle stability    Abdominal wall  4/29/22: Tender, increased tension and thickened , 1-inch diastasis recti  7/6/22: Continued tenderness   9/28/22: Continued tenderness, as well as in adductors  12/20/22: Mild/moderate tenderness in L abdominal wall, hip flexors, adductors    Pelvic girdle stability  4/29/22: Pt demonstrates moderately impaired ability to  stabilize pelvis with Active Straight Leg Raise Test. Able to improve slightly with verbal/manual cues for transverse abdominus activation. Tends to demonstrate oblique-dominant abdominal wall contraction, but is able to perform transverse abdominis contraction with verbal and tactile cues.  7/6/22: Mild pelvic girdle stability deficits with supine load transfer, definitely improved from evaluation. Good transverse abdominis contraction on command, no verbal cues required.  9/28/22: Not assessed  12/20/22: Mild pelvic girdle stability deficits    Pelvic floor  4/29/22: tender areas noted as follows: bilateral periurethral muscles (with reproduction of urinary urgency), levator ani (L significantly more tender), as well as L introitus  Muscle Bulk: hypertonus  Muscle Power: 3/5  Muscle Endurance: 6 seconds  # Reps To Fatigue: 4  Quality of contraction: slow rise, slow relaxation and incomplete relaxation  Specificity: patient co-contracts glutes   7/6/22: mild tenderness in levator ani, no tenderness in periurethral muscles today.   Muscle Power: 3-4/5  Muscle Endurance: 8 seconds  Quality of contraction: slow rise, slow relaxation  Specificity: appropriate  9/28/22: Not assessed  12/20/22: Not assessed      Limitation/Restriction for FOTO Pelvic Floor Surveys  FOTO documents entered into [employer] - see Media section.     Limitation Scores   4/29/22  PFDI Urinary: 4% impairment  PFDI Pain: 21% impairment  Urinary Problems: 34% impairment     6/20/22  PFDI Urinary: 0% impairment  PFDI Pain: 33% impairment  Urinary Problems: 34% impairment    12/20/22  PFDI Urinary: 0% impairment  PFDI Pain: 21% impairment  Urinary Problems: 36% impairment        ASSESSMENT     Pt has progressed fairly well with physical therapy, demonstrating overall improvements in pelvic floor strength/coordination, hip/core strength, hip range of motion, and activity tolerance. Pt continues to experience pain/difficulty with ADLs and functional  mobility due to abdominal wall pain/L hip pain. Progressive resistance exercise for the core and hip have improved pt's exercise tolerance and meli have improved her standing tolerance. Deep massage to the L iliacus reproduced pt's familiar L hip/pelvic pain, indicating the need for more progressive resistance exercise for the hips. Pt demonstrates functional strength deficits in the hips and core, evident with movements like sit-to-stand transfer, mat rolling, etc.  Pt has met/partially met some goals from the initial evaluation, and the rest of those goals remain appropriate for the plan of care. Pt will benefit from continued skilled therapy to provide targeted manual therapy and progressive resistance exercise.    Short Term Goals: 6 weeks   Pt to report 50% improvement in tenderness to palpation of pelvic floor to demonstrate improved trigger points and overactivity Met   Pt to report only rare incidences of urinary incontinence with coughing to demonstrate improved pelvic floor muscle coordination Met   Pt to report 50% improvement in pain with penetration (gynecological exam or intercourse) and orgasm to demonstrate improved pelvic floor muscle overactivity Met   Pt to be independent with introductory home exercise program Met      Long Term Goals: 12 weeks  Pt to report minimal/no tenderness to palpation of pelvic floor to demonstrate improved trigger points and overactivity Partially Met   Pt to demonstrate an improved score in the FOTO Pelvic Pain (PFDI) survey to no more than 8% impaired to demonstrate improving pelvic floor muscle tenderness, coordination. Not Met   Pt to deny pelvic pain with penetration (gynecological exam or intercourse) and orgasm to demonstrate improved pelvic floor muscle overactivity Partially Met   Pt to increase pelvic floor strength to 4/5 to demonstrate improved strength needed for continence with ADLs. Partially Met   Pt to be independent with advanced home exercise program  Not Met   Pt to demonstrate bilateral hip strength of 5/5 for improved pelvic girdle support with mobility. Not Met   Pt to report minimal/no back pain with ADLs and functional mobility to demonstrate improved lumbopelvic muscle support, coordination Not Met           PLAN     Plan of Care certification: 12/20/2022 to 3/20/2023    Outpatient Physical Therapy - 1 time per week for 12 weeks to include the following interventions: Therapeutic Exercise, Manual Therapy, Therapeutic Activity, Neuromuscular Re-education, Electrical Stimulation Unattended, Self-Care, Patient Education      Jess Ureña, PT, DPT        I CERTIFY THE NEED FOR THESE SERVICES FURNISHED UNDER THIS PLAN OF TREATMENT AND WHILE UNDER MY CARE   Physician's comments:     Physician's Signature: ___________________________________________________

## 2023-01-04 ENCOUNTER — CLINICAL SUPPORT (OUTPATIENT)
Dept: REHABILITATION | Facility: OTHER | Age: 38
End: 2023-01-04
Payer: MEDICAID

## 2023-01-04 DIAGNOSIS — R29.898 DECREASED STRENGTH OF TRUNK AND BACK: ICD-10-CM

## 2023-01-04 DIAGNOSIS — M54.50 CHRONIC LEFT-SIDED LOW BACK PAIN WITHOUT SCIATICA: ICD-10-CM

## 2023-01-04 DIAGNOSIS — R10.2 PELVIC PAIN: Primary | ICD-10-CM

## 2023-01-04 DIAGNOSIS — G89.29 CHRONIC LEFT-SIDED LOW BACK PAIN WITHOUT SCIATICA: ICD-10-CM

## 2023-01-04 PROCEDURE — 97110 THERAPEUTIC EXERCISES: CPT | Mod: PN | Performed by: PHYSICAL THERAPIST

## 2023-01-04 NOTE — PROGRESS NOTES
Pelvic Health Physical Therapy   Treatment Note     Name: Divya Chand  Clinic Number: [id number]    Therapy Diagnosis:   Encounter Diagnoses   Name Primary?    Pelvic pain Yes    Decreased strength of trunk and back     Chronic left-sided low back pain without sciatica      Referring Provider: Arnulfo    Visit Date: 1/4/2023    Referring Provider Orders: PT Eval and Treat  Medical Diagnosis from Referral: Pelvic floor dysfunction in female [M62.89]  Evaluation Date: 4/29/2022  Last Reassessment: 12/20/2022, visit #28  Authorization Period Expiration: 12/31/2023  Plan of Care Expiration: 3/20/2023  Visit # / Visits authorized: 29/32  (1/20)  FOTO: 3 (4/29/2022, 6/20/2022, 12/20/2022)    Time In: 17:05  Time Out: 18:00  Total Billable Time: 55 minutes    Precautions: Standard    Subjective     Divya Jacinto reports increased L hip flexor pain after prolonged standing for work the past few days.     She was compliant with home exercise program (also hip thrusts)  Response to previous treatment: no adverse effect   Functional change: fluctuating improvements in urinary urgency and back/hip/pelvic pain    Pain: mild  Location: L anterior hip, R lower back    Objective     Divya Jacinto received the following interventions during the treatment session -  (TrA = transverse abdominis, PFM = pelvic floor muscle)  Pt consented to pelvic floor muscle assessment and treatment in prior visit - see EMR    Therapeutic exercises to develop strength, endurance and core stabilization for 53 minutes -  [x] Diaphragmatic breathing + pelvic drop, 2 minutes in hooklying position  [] Diaphragmatic breathing + TrA brace on exhale + posterior pelvic tilt, x20 - verbal and manual cues from PT  [] Diaphragmatic breathing + PFM squeeze on exhale, 3x8 with 50% effort - verbal and manual cues from PT  [] Lower trunk rotations and double-knees to chest with legs on ball, x20 each  [] Seated ball roll out, 1 minute - reduced pain afterwards  []  Supine single-knee to chest (R&L), 30 seconds  [] TrA brace + supine march (R&L), x10  [] Prone glute squeezes with 5-second hold, x20  [] Short arc quads for gentle multifidi activation (R&L), x20 - performed during manual interventions to abdominal wall  [] Lucian test stretch (L) - 2x30 seconds  [] TrA brace + seated hip adduction isometric with ball, x20 with 2-second hold  [] TrA brace + supine heel slides (R&L), 2x5   [] Prone hamstring curls with multifidi pre-activation (R&L), 3 minutes  [] TrA brace + supine clam with blue band, x20  [] TrA brace + straight leg raise (R&L), 2x4  [] Open books with green band (R&L), x10  [] Standing pallof press with doubled red band (R&L), x15  [] TrA brace + side-lying hip abduction (R&L), 2x8  [] Cat/cow with coordinated breath, 2 minutes   [] TrA brace + quadruped hip extension (R&L), x10  [] Standing hip abduction circles (R&L), 2x10  [] TrA brace + standing shoulder extension with green band (B), 2x15  [] TrA brace + wall push up, 2x15  [] Side-stepping with green band around knees, 3 minutes  [] Seated on theraball + pelvic rotations, 2 minutes  [] Seated on theraball + alternating kicks, 1 minute  [] Seated on theraball + row with green band, x20  [] Seated on theraball + alternating shoulder flexion (2-3#, R&L), 2x15   [] Lateral step up/down on 4-inch step (R&L), 3x10  [x] Lateral step up/down on 6-inch step (R&L), 4x5  [x] TrA brace + toe taps to 8-inch step (2#, R&L), 4x15  [x] TrA brace + sit to stands from chair + 2 foam pads, 4x6  [] Standing hip flexion + hip external rotation lift (2#, L), 4x5  [x] TrA brace + standing straight leg raise (2#, R&L), 4x5  [] Happy baby stretch + diaphragmatic breathing, pelvic drop, 1 minute  [] Child's pose + diaphragmatic breathing, pelvic drop, 2 minutes  [] Butterfly hip stretch + diaphragmatic breathing, pelvic drop, 2 minutes  [] Half-kneeling lunge/hip flexor stretch (L), 1 minute  [] Supine hamstring stretch with strap  (L), 1 minute  [] Hip flexor stretch in standing (L), 2x30 seconds  [x] Lucian test hip flexor stretch (L), 2x1 minute  [] Seated ball roll out, 2 minutes - improved R lower back pain  [] Seated figure-4 stretch (R&L), 1 minute - reduced discomfort compared to last month  [] Pelvic floor relaxation guided meditation - performed during e-stim for dry needling  [x] HEP review, patient education    Manual therapy techniques: to develop flexibility, desensitization, and extensibility -  [] Obturator release at ischial tuberosities + active release with resisted contralateral hip external rotation (B)  [] Manual release to L hip flexors - improved tenderness after a few minutes of release  [x] Manual release to L pelvic floor muscles internally/vaginally: levator ani - mild increased tone and tenderness that improved within a few minutes of release. PT provided cues for diaphragmatic breathing, visualization, and relaxation.  [] Myofascial decompression/cupping to L lower abdominal wall and anterior/medial hip  [x] Dry needling with electrical stimulation to R iliacus, iliopsoas, rectus femoris, adductor longus - no adverse effect, written consent provided 5/9/22, see EMR.  [] Lateral and long-arm distraction to L hip with belt  [] Trigger point release to L iliopsoas  [] Taping to bilateral lumbar paraspinals and across L3  [] Sacroiliac muscle-energy technique    Home Exercises and Patient Education     Patient Education: progression of plan of care, plan for next session, pt prognosis, pelvic floor anatomy & function, relationship between TrA & PFM, relationship between hips & PFM, relationship between pelvic floor dysfunction & lower back pain, multifidi anatomy & function, lumbar spine anatomy, MRI results, flexion vs. extension directional preferece    Home Exercise Program (7/6/22): supine march, supine clams, bridging with band/belt around knees  HEP update (12/20/22): Pt encouraged to perform some exercises from  therapy at home    Exercises were reviewed, and Divya Jacinto was able to demonstrate them prior to the end of the session, as needed. Divya Jacinto demonstrated good understanding of the education provided.      Assessment     Pt tolerated treatment session well, with improved tenderness and discomfort with weight-bearing following needling and good tolerance for familiar exercises today. Increased symptoms today consistent with overuse; Severity of symptoms is better than in prior flare-ups, and pt responded more quickly to pain-relieving interventions today, suggesting increased irritation in the L hip flexor. Pt has been progressing well with therapy overall, but she is still vulnerable to pain flare-ups with overactivity/stress. Pt's functional mobility and ability to perform ADLs still limited by pain and weakness. She requires skilled therapy for continued patient education and progressive resistance exercise.    Divya Jacinto is progressing fairly towards her goals.   Pt prognosis: good    Pt will continue to benefit from skilled outpatient physical therapy to address the deficits listed in the problem list box on initial evaluation, provide pt/family education and to maximize pt's level of independence in the home and community environment.     Pt's spiritual, cultural and educational needs considered and pt agreeable to plan of care and goals.  Anticipated barriers to physical therapy: none      Short Term Goals: 6 weeks   Pt to report 50% improvement in tenderness to palpation of pelvic floor to demonstrate improved trigger points and overactivity Met   Pt to report only rare incidences of urinary incontinence with coughing to demonstrate improved pelvic floor muscle coordination Met   Pt to report 50% improvement in pain with penetration (gynecological exam or intercourse) and orgasm to demonstrate improved pelvic floor muscle overactivity Met   Pt to be independent with introductory home exercise program Met       Long Term Goals: 12 weeks  Pt to report minimal/no tenderness to palpation of pelvic floor to demonstrate improved trigger points and overactivity Partially Met   Pt to demonstrate an improved score in the FOTO Pelvic Pain (PFDI) survey to no more than 8% impaired to demonstrate improving pelvic floor muscle tenderness, coordination. Not Met   Pt to deny pelvic pain with penetration (gynecological exam or intercourse) and orgasm to demonstrate improved pelvic floor muscle overactivity Partially Met   Pt to increase pelvic floor strength to 4/5 to demonstrate improved strength needed for continence with ADLs. Partially Met   Pt to be independent with advanced home exercise program Not Met   Pt to demonstrate bilateral hip strength of 5/5 for improved pelvic girdle support with mobility. Not Met   Pt to report minimal/no back pain with ADLs and functional mobility to demonstrate improved lumbopelvic muscle support, coordination Not Met        Plan     Continue per Plan of Care      Jess Ureña, PT, DPT

## 2023-01-10 NOTE — PROGRESS NOTES
Pelvic Health Physical Therapy   Treatment Note     Name: Divya Chand  Clinic Number: [id number]    Therapy Diagnosis:   Encounter Diagnoses   Name Primary?    Pelvic pain Yes    Decreased strength of trunk and back     Chronic left-sided low back pain without sciatica      Referring Provider: Arnulfo    Visit Date: 1/12/2023    Referring Provider Orders: PT Eval and Treat  Medical Diagnosis from Referral: Pelvic floor dysfunction in female [M62.89]  Evaluation Date: 4/29/2022  Last Reassessment: 12/20/2022, visit #28  Authorization Period Expiration: 12/31/2023  Plan of Care Expiration: 3/20/2023  Visit # / Visits authorized: 30/32  (2/20)  FOTO: 3 (4/29/2022, 6/20/2022, 12/20/2022)    Time In: 13:00  Time Out: 14:00  Total Billable Time: 60 minutes    Precautions: Standard    Subjective     Divya Jacinto reports milder L hip pain in recent days. Some days she feels decent. She still has cramping post-orgasm.     She was compliant with home exercise program  Response to previous treatment: no adverse effect   Functional change: fluctuating improvements in urinary urgency and back/hip/pelvic pain    Pain: mild  Location: L anterior hip    Objective     Divya Jacinto received the following interventions during the treatment session -  (TrA = transverse abdominis, PFM = pelvic floor muscle)  Pt consented to pelvic floor muscle assessment and treatment in prior visit - see EMR    Therapeutic exercises to develop strength, endurance and core stabilization for 56 minutes -  [x] Diaphragmatic breathing + pelvic drop, 2 minutes in hooklying position  [] Diaphragmatic breathing + TrA brace on exhale + posterior pelvic tilt, x20 - verbal and manual cues from PT  [] Diaphragmatic breathing + PFM squeeze on exhale, 3x8 with 50% effort - verbal and manual cues from PT  [] Lower trunk rotations and double-knees to chest with legs on ball, x20 each  [] Seated ball roll out, 1 minute - reduced pain afterwards  [] Supine single-knee  to chest (R&L), 30 seconds  [] TrA brace + supine march (R&L), x10  [] Prone glute squeezes with 5-second hold, x20  [] Short arc quads for gentle multifidi activation (R&L), x20 - performed during manual interventions to abdominal wall  [] Lucian test stretch (L) - 2x30 seconds  [] TrA brace + seated hip adduction isometric with ball, x20 with 2-second hold  [] TrA brace + supine heel slides (R&L), 2x5   [] Prone hamstring curls with multifidi pre-activation (R&L), 3 minutes  [] TrA brace + supine clam with blue band, x20  [] TrA brace + straight leg raise (R&L), 2x4  [] Open books with green band (R&L), x10  [] Standing pallof press with doubled red band (R&L), x15  [] TrA brace + side-lying hip abduction (R&L), 2x8  [] Cat/cow with coordinated breath, 2 minutes   [] TrA brace + quadruped hip extension (R&L), x10  [] Standing hip abduction circles (R&L), 2x10  [] TrA brace + standing shoulder extension with green band (B), 2x15  [] TrA brace + wall push up, 2x15  [] Side-stepping with green band around knees, 3 minutes  [] Seated on theraball + pelvic rotations, 2 minutes  [] Seated on theraball + alternating kicks, 1 minute  [] Seated on theraball + row with green band, x20  [] Seated on theraball + alternating shoulder flexion (2-3#, R&L), 2x15   [] Lateral step up/down on 4-inch step (R&L), 3x10  [x] Lateral step up/down on 6-inch step (L), 4x6  [x] TrA brace + toe taps to 8-inch step (2#, R&L), 4x15  [x] TrA brace + sit to stands from chair + 2 foam pads, 4x6  [x] Standing hip flexion + hip external rotation lift (L), 2x8  [x] TrA brace + standing straight leg raise (2#, L), 4x6  [x] Total gym squats with yellow band around knees, 3 minutes  [] Happy baby stretch + diaphragmatic breathing, pelvic drop, 1 minute  [] Child's pose + diaphragmatic breathing, pelvic drop, 2 minutes  [] Butterfly hip stretch + diaphragmatic breathing, pelvic drop, 2 minutes  [] Half-kneeling lunge/hip flexor stretch (L), 1  minute  [] Supine hamstring stretch with strap (L), 1 minute  [x] Hip flexor stretch in standing (L), 2x30 seconds  [x] Lucian test hip flexor stretch (L), 2x1 minute  [] Seated ball roll out, 2 minutes - improved R lower back pain  [x] Seated figure-4 stretch (R&L), 1 minute  [] Pelvic floor relaxation guided meditation - performed during e-stim for dry needling  [x] HEP review, patient education    Manual therapy techniques: to develop flexibility, desensitization, and extensibility -  [] Obturator release at ischial tuberosities + active release with resisted contralateral hip external rotation (B)  [] Manual release to L hip flexors - improved tenderness after a few minutes of release  [] Manual release to L pelvic floor muscles internally/vaginally: levator ani - mild increased tone and tenderness that improved within a few minutes of release. PT provided cues for diaphragmatic breathing, visualization, and relaxation.  [] Myofascial decompression/cupping to L lower abdominal wall and anterior/medial hip  [x] Dry needling with electrical stimulation to R iliopsoas, adductor chepe, pubic ramus, adductor longus - no adverse effect, written consent provided 5/9/22, see EMR.  [] Lateral and long-arm distraction to L hip with belt  [] Trigger point release to L iliopsoas  [] Taping to bilateral lumbar paraspinals and across L3  [] Sacroiliac muscle-energy technique    Home Exercises and Patient Education     Patient Education: progression of plan of care, plan for next session, pt prognosis, pelvic floor anatomy & function, relationship between TrA & PFM, relationship between hips & PFM, relationship between pelvic floor dysfunction & lower back pain, multifidi anatomy & function, lumbar spine anatomy, MRI results, flexion vs. extension directional preferece    Home Exercise Program (7/6/22): supine march, supine clams, bridging with band/belt around knees  HEP update (12/20/22): Pt encouraged to perform some  exercises from therapy at home  HEP update (1/12/23): Standing hip abduction circles, tabletop heel dip, standing hip flexion + external rotation    Exercises were reviewed, and Divya Jacinto was able to demonstrate them prior to the end of the session, as needed. Divya Jacinto demonstrated good understanding of the education provided.      Assessment     Pt tolerated treatment session well, with improved tenderness and discomfort with weight-bearing following needling and good tolerance for familiar and new exercises today. Pt found exercises less challenging today than in prior visits, indicating she is ready to progress to more challenging exercises. Pt has been progressing well with therapy overall, but she is still vulnerable to pain flare-ups with overactivity/stress. Pt's functional mobility and ability to perform ADLs still limited by pain and weakness. She requires skilled therapy for continued patient education and progressive resistance exercise.    Divya Jacinto is progressing fairly towards her goals.   Pt prognosis: good    Pt will continue to benefit from skilled outpatient physical therapy to address the deficits listed in the problem list box on initial evaluation, provide pt/family education and to maximize pt's level of independence in the home and community environment.     Pt's spiritual, cultural and educational needs considered and pt agreeable to plan of care and goals.  Anticipated barriers to physical therapy: none      Short Term Goals: 6 weeks   Pt to report 50% improvement in tenderness to palpation of pelvic floor to demonstrate improved trigger points and overactivity Met   Pt to report only rare incidences of urinary incontinence with coughing to demonstrate improved pelvic floor muscle coordination Met   Pt to report 50% improvement in pain with penetration (gynecological exam or intercourse) and orgasm to demonstrate improved pelvic floor muscle overactivity Met   Pt to be independent  with introductory home exercise program Met      Long Term Goals: 12 weeks  Pt to report minimal/no tenderness to palpation of pelvic floor to demonstrate improved trigger points and overactivity Partially Met   Pt to demonstrate an improved score in the FOTO Pelvic Pain (PFDI) survey to no more than 8% impaired to demonstrate improving pelvic floor muscle tenderness, coordination. Not Met   Pt to deny pelvic pain with penetration (gynecological exam or intercourse) and orgasm to demonstrate improved pelvic floor muscle overactivity Partially Met   Pt to increase pelvic floor strength to 4/5 to demonstrate improved strength needed for continence with ADLs. Partially Met   Pt to be independent with advanced home exercise program Not Met   Pt to demonstrate bilateral hip strength of 5/5 for improved pelvic girdle support with mobility. Not Met   Pt to report minimal/no back pain with ADLs and functional mobility to demonstrate improved lumbopelvic muscle support, coordination Not Met        Plan     Continue per Plan of Care      Jess Ureña, PT, DPT

## 2023-01-12 ENCOUNTER — CLINICAL SUPPORT (OUTPATIENT)
Dept: REHABILITATION | Facility: OTHER | Age: 38
End: 2023-01-12
Payer: MEDICAID

## 2023-01-12 DIAGNOSIS — M54.50 CHRONIC LEFT-SIDED LOW BACK PAIN WITHOUT SCIATICA: ICD-10-CM

## 2023-01-12 DIAGNOSIS — G89.29 CHRONIC LEFT-SIDED LOW BACK PAIN WITHOUT SCIATICA: ICD-10-CM

## 2023-01-12 DIAGNOSIS — R10.2 PELVIC PAIN: Primary | ICD-10-CM

## 2023-01-12 DIAGNOSIS — R29.898 DECREASED STRENGTH OF TRUNK AND BACK: ICD-10-CM

## 2023-01-12 PROCEDURE — 97110 THERAPEUTIC EXERCISES: CPT | Mod: PN | Performed by: PHYSICAL THERAPIST

## 2023-01-30 NOTE — PROGRESS NOTES
Pelvic Health Physical Therapy   Treatment Note     Name: Divya Chand  Clinic Number: [id number]    Therapy Diagnosis:   Encounter Diagnoses   Name Primary?    Pelvic pain Yes    Decreased strength of trunk and back     Chronic left-sided low back pain without sciatica      Referring Provider: Arnulfo    Visit Date: 2/1/2023    Referring Provider Orders: PT Eval and Treat  Medical Diagnosis from Referral: Pelvic floor dysfunction in female [M62.89]  Evaluation Date: 4/29/2022  Last Reassessment: 12/20/2022, visit #28  Authorization Period Expiration: 12/31/2023  Plan of Care Expiration: 3/20/2023  Visit # / Visits authorized: 31 (3/24)  FOTO: 3 (4/29/2022, 6/20/2022, 12/20/2022)    Time In: 15:55  Time Out: 17:00  Total Billable Time: 60 minutes    Precautions: Standard    Subjective     Divya Jacinto reports increased L hip pain with prolonged car rides for vacation last week. She is interested in uphill treadmill walking for exercise.     She was somewhat compliant with home exercise program - was sick for part of last week but did try out the new exercises from last visit  Response to previous treatment: no adverse effect   Functional change: fluctuating improvements in urinary urgency and back/hip/pelvic pain    Pain: mild/moderate  Location: L anterior hip    Objective     Divya Jacinto received the following interventions during the treatment session -  (TrA = transverse abdominis, PFM = pelvic floor muscle)  Pt consented to pelvic floor muscle assessment and treatment in prior visit - see EMR    Therapeutic exercises to develop strength, endurance and core stabilization for 60 minutes -  [x] Diaphragmatic breathing + pelvic drop, 2 minutes in hooklying position  [] Diaphragmatic breathing + TrA brace on exhale + posterior pelvic tilt, x20 - verbal and manual cues from PT  [] Diaphragmatic breathing + PFM squeeze on exhale, 3x8 with 50% effort - verbal and manual cues from PT  [] Lower trunk rotations and  double-knees to chest with legs on ball, x20 each  [] Seated ball roll out, 1 minute - reduced pain afterwards  [] Supine single-knee to chest (R&L), 30 seconds  [] TrA brace + supine march (R&L), x10  [] Prone glute squeezes with 5-second hold, x20  [] Short arc quads for gentle multifidi activation (R&L), x20 - performed during manual interventions to abdominal wall  [] Lucian test stretch (L) - 2x30 seconds  [] TrA brace + seated hip adduction isometric with ball, x20 with 2-second hold  [] TrA brace + supine heel slides (R&L), 2x5   [] Prone hamstring curls with multifidi pre-activation (R&L), 3 minutes  [] TrA brace + supine clam with blue band, x20  [] TrA brace + straight leg raise (R&L), 2x4  [x] Tabletop heel dips, 3x4 - verbal cues required for correct technique  [] Open books with green band (R&L), x10  [] Standing pallof press with doubled red band (R&L), x15  [] TrA brace + side-lying hip abduction (R&L), 2x8  [] Cat/cow with coordinated breath, 2 minutes   [] TrA brace + quadruped hip extension (R&L), x10  [] Standing hip abduction circles (R&L), 2x10  [] TrA brace + standing shoulder extension with green band (B), 2x15  [] TrA brace + wall push up, 2x15  [] Side-stepping with green band around knees, 3 minutes  [] Seated on theraball + pelvic rotations, 2 minutes  [] Seated on theraball + alternating kicks, 1 minute  [] Seated on theraball + row with green band, x20  [] Seated on theraball + alternating shoulder flexion (2-3#, R&L), 2x15   [] Lateral step up/down on 4-inch step (R&L), 3x10  [x] Lateral step up/down on 6-inch step (L), 2x15  [x] TrA brace + toe taps to 8-inch step (2#, R&L), 2x15  [] TrA brace + sit to stands from chair + 2 foam pads, 4x6  [x] Standing hip flexion + hip external rotation lift (2#, L), x8  [x] TrA brace + standing straight leg raise (2#, L), 2x15  [] Total gym squats with yellow band around knees, 3 minutes  [x] Treadmill walkinmph, 10% incline, 5 minutes  [x]  Standing hip/knee extension on Pilates chair with 2 red springs (L), x30  [] Happy baby stretch + diaphragmatic breathing, pelvic drop, 1 minute  [] Child's pose + diaphragmatic breathing, pelvic drop, 2 minutes  [x] Butterfly hip stretch + diaphragmatic breathing, pelvic drop, 2 minutes  [] Half-kneeling lunge/hip flexor stretch (L), 1 minute  [] Supine hamstring stretch with strap (L), 1 minute  [] Hip flexor stretch in standing (L), 2x30 seconds  [x] Lucian test hip flexor stretch (L), 2x1 minute  [] Seated ball roll out, 2 minutes - improved R lower back pain  [x] Seated figure-4 stretch (R&L), 1 minute  [] Pelvic floor relaxation guided meditation - performed during e-stim for dry needling  [x] HEP review, patient education    Manual therapy techniques: to develop flexibility, desensitization, and extensibility -  [] Obturator release at ischial tuberosities + active release with resisted contralateral hip external rotation (B)  [] Manual release to L hip flexors - improved tenderness after a few minutes of release  [] Manual release to L pelvic floor muscles internally/vaginally: levator ani - mild increased tone and tenderness that improved within a few minutes of release. PT provided cues for diaphragmatic breathing, visualization, and relaxation.  [] Myofascial decompression/cupping to L lower abdominal wall and anterior/medial hip  [x] Dry needling with electrical stimulation to R iliacus, iliopsoas, adductor chepe, pectineus, adductor longus - no adverse effect, written consent provided 5/9/22, see EMR.  [] Lateral and long-arm distraction to L hip with belt  [] Trigger point release to L iliopsoas  [] Taping to bilateral lumbar paraspinals and across L3  [] Sacroiliac muscle-energy technique  [x] Manual hip flexor stretch: prone, with hip internal rotation (L), 1 minute    Home Exercises and Patient Education     Patient Education: progression of plan of care, plan for next session, pt prognosis, pelvic  floor anatomy & function, relationship between TrA & PFM, relationship between hips & PFM, relationship between pelvic floor dysfunction & lower back pain, multifidi anatomy & function, lumbar spine anatomy, MRI results, flexion vs. extension directional preferece    Home Exercise Program (7/6/22): supine march, supine clams, bridging with band/belt around knees  HEP update (12/20/22): Pt encouraged to perform some exercises from therapy at home  HEP update (1/12/23): Standing hip abduction circles, tabletop heel dip, standing hip flexion + external rotation    Exercises were reviewed, and Divya Jacinto was able to demonstrate them prior to the end of the session, as needed. Divya Jacinto demonstrated good understanding of the education provided.      Assessment     Pt tolerated treatment session well, with improved tenderness and discomfort with weight-bearing following needling and good tolerance for familiar and new exercises today. Pt tolerated treadmill walking today, and it would be a beneficial adjunct to her HEP for low-load/high-rep hip strengthening. Pt has been progressing well with therapy overall, but she is still vulnerable to pain flare-ups with overactivity/stress. Pt's functional mobility and ability to perform ADLs still limited by pain and weakness. She requires skilled therapy for continued patient education and progressive resistance exercise.    Divya Jacinto is progressing well towards her goals.   Pt prognosis: good    Pt will continue to benefit from skilled outpatient physical therapy to address the deficits listed in the problem list box on initial evaluation, provide pt/family education and to maximize pt's level of independence in the home and community environment.     Pt's spiritual, cultural and educational needs considered and pt agreeable to plan of care and goals.  Anticipated barriers to physical therapy: none      Short Term Goals: 6 weeks   Pt to report 50% improvement in tenderness  to palpation of pelvic floor to demonstrate improved trigger points and overactivity Met   Pt to report only rare incidences of urinary incontinence with coughing to demonstrate improved pelvic floor muscle coordination Met   Pt to report 50% improvement in pain with penetration (gynecological exam or intercourse) and orgasm to demonstrate improved pelvic floor muscle overactivity Met   Pt to be independent with introductory home exercise program Met      Long Term Goals: 12 weeks  Pt to report minimal/no tenderness to palpation of pelvic floor to demonstrate improved trigger points and overactivity Partially Met   Pt to demonstrate an improved score in the FOTO Pelvic Pain (PFDI) survey to no more than 8% impaired to demonstrate improving pelvic floor muscle tenderness, coordination. Not Met   Pt to deny pelvic pain with penetration (gynecological exam or intercourse) and orgasm to demonstrate improved pelvic floor muscle overactivity Partially Met   Pt to increase pelvic floor strength to 4/5 to demonstrate improved strength needed for continence with ADLs. Partially Met   Pt to be independent with advanced home exercise program Not Met   Pt to demonstrate bilateral hip strength of 5/5 for improved pelvic girdle support with mobility. Not Met   Pt to report minimal/no back pain with ADLs and functional mobility to demonstrate improved lumbopelvic muscle support, coordination Not Met        Plan     Continue per Plan of Care      Jess Ureña, PT, DPT

## 2023-02-01 ENCOUNTER — CLINICAL SUPPORT (OUTPATIENT)
Dept: REHABILITATION | Facility: OTHER | Age: 38
End: 2023-02-01
Payer: MEDICAID

## 2023-02-01 DIAGNOSIS — R10.2 PELVIC PAIN: Primary | ICD-10-CM

## 2023-02-01 DIAGNOSIS — R29.898 DECREASED STRENGTH OF TRUNK AND BACK: ICD-10-CM

## 2023-02-01 DIAGNOSIS — G89.29 CHRONIC LEFT-SIDED LOW BACK PAIN WITHOUT SCIATICA: ICD-10-CM

## 2023-02-01 DIAGNOSIS — M54.50 CHRONIC LEFT-SIDED LOW BACK PAIN WITHOUT SCIATICA: ICD-10-CM

## 2023-02-01 PROCEDURE — 97110 THERAPEUTIC EXERCISES: CPT | Mod: PN | Performed by: PHYSICAL THERAPIST

## 2023-02-06 NOTE — PROGRESS NOTES
Pelvic Health Physical Therapy   Treatment Note     Name: Divya Jacinto [facility]  Clinic Number: [id number]    Therapy Diagnosis:   Encounter Diagnoses   Name Primary?    Pelvic pain Yes    Decreased strength of trunk and back     Chronic left-sided low back pain without sciatica      Referring Provider: Arnulfo    Visit Date: 2/8/2023    Referring Provider Orders: PT Eval and Treat  Medical Diagnosis from Referral: Pelvic floor dysfunction in female [M62.89]  Evaluation Date: 4/29/2022  Last Reassessment: 12/20/2022, visit #28  Authorization Period Expiration: 12/31/2023  Plan of Care Expiration: 3/20/2023  Visit # / Visits authorized: 32 (4/24)  FOTO: 3 (4/29/2022, 6/20/2022, 12/20/2022)    Time In: 16:00  Time Out: 17:00  Total Billable Time: 55 minutes    Precautions: Standard    Subjective     Divya Jacinto reports R lower back/sacroiliac pain after lifting her leg to don pants. Her L anterior hip is uncomfortable but not terrible.     She was somewhat compliant with home exercise program - less compliant than usual due to injury on Monday.  Response to previous treatment: no adverse effect   Functional change: fluctuating improvements in urinary urgency and back/hip/pelvic pain    Pain: R lower back/sacroiliac joint - 6/10, L anterior hip - 3/10    Objective     Divya Jacinto received the following interventions during the treatment session -  (TrA = transverse abdominis, PFM = pelvic floor muscle)  Pt consented to pelvic floor muscle assessment and treatment in prior visit - see EMR    Therapeutic exercises to develop strength, endurance and core stabilization for 55 minutes -  [x] Diaphragmatic breathing + pelvic drop, 2 minutes in hooklying position  [] Diaphragmatic breathing + TrA brace on exhale + posterior pelvic tilt, x20 - verbal and manual cues from PT  [] Diaphragmatic breathing + PFM squeeze on exhale, 3x8 with 50% effort - verbal and manual cues from PT  [] Lower trunk rotations and double-knees to chest with  legs on ball, x20 each  [] Seated ball roll out, 1 minute - reduced pain afterwards  [] Supine single-knee to chest (R&L), 30 seconds  [] TrA brace + supine march (R&L), x10  [] Prone glute squeezes with 5-second hold, x20  [] Short arc quads for gentle multifidi activation (R&L), x20 - performed during manual interventions to abdominal wall  [] Lucian test stretch (L) - 2x30 seconds  [x] TrA brace + seated hip adduction isometric with ball, x20 with 2-second hold  [] TrA brace + supine heel slides (R&L), 2x5   [] Prone hamstring curls with multifidi pre-activation (R&L), 3 minutes  [] TrA brace + supine clam with blue band, x20  [] TrA brace + straight leg raise (R&L), 2x4  [] Tabletop heel dips, 3x4 - verbal cues required for correct technique  [] Open books with green band (R&L), x10  [] Standing pallof press with doubled red band (R&L), x15  [] TrA brace + side-lying hip abduction (R&L), 2x8  [] Cat/cow with coordinated breath, 2 minutes   [] TrA brace + quadruped hip extension (R&L), x10  [] Standing hip abduction circles (R&L), 2x10  [] TrA brace + standing shoulder extension with green band (B), 2x15  [] TrA brace + wall push up, 2x15  [] Side-stepping with green band around knees, 3 minutes  [] Seated on theraball + pelvic rotations, 2 minutes  [] Seated on theraball + alternating kicks, 1 minute  [x] Seated on theraball + row with green band, x20  [x] Seated on theraball + alternating shoulder flexion (2#, R&L), x15   [] Lateral step up/down on 4-inch step (R&L), 3x10  [] Lateral step up/down on 6-inch step (L), 2x15  [x] TrA brace + toe taps to 8-inch step (2#, R&L), x20  [] TrA brace + sit to stands from chair + 2 foam pads, 4x6  [x] Standing hip flexion + hip external rotation lift (2#, R&L), x20  [x] TrA brace + standing straight leg raise (2#, R&L), x20  [] Total gym squats with yellow band around knees, 3 minutes  [] Treadmill walkinmph, 10% incline, 5 minutes  [] Standing hip/knee extension on  Pilates chair with 2 red springs (L), x30  [] Happy baby stretch + diaphragmatic breathing, pelvic drop, 1 minute  [] Child's pose + diaphragmatic breathing, pelvic drop, 2 minutes  [] Butterfly hip stretch + diaphragmatic breathing, pelvic drop, 2 minutes  [] Half-kneeling lunge/hip flexor stretch (L), 1 minute  [] Supine hamstring stretch with strap (L), 1 minute  [] Hip flexor stretch in standing (L), 2x30 seconds  [] Lucian test hip flexor stretch (L), 2x1 minute  [] Seated ball roll out, 2 minutes - improved R lower back pain  [] Seated figure-4 stretch (R&L), 1 minute  [] Pelvic floor relaxation guided meditation - performed during e-stim for dry needling  [x] HEP review, patient education    Manual therapy techniques: to develop flexibility, desensitization, and extensibility -  [] Obturator release at ischial tuberosities + active release with resisted contralateral hip external rotation (B)  [] Manual release to L hip flexors - improved tenderness after a few minutes of release  [] Manual release to L pelvic floor muscles internally/vaginally: levator ani - mild increased tone and tenderness that improved within a few minutes of release. PT provided cues for diaphragmatic breathing, visualization, and relaxation.  [] Myofascial decompression/cupping to L lower abdominal wall and anterior/medial hip  [x] Dry needling with electrical stimulation to L multifidi of L5-S1, gluteus lisa - no adverse effect, written consent provided 5/9/22, see EMR.  [] Lateral and long-arm distraction to L hip with belt  [] Trigger point release to L iliopsoas  [x] Taping to bilateral sacroiliac joints  [x] Sacroiliac muscle-energy technique - pt responded best to resisted R hip flexion // L hip extension  [x] Manual hip flexor stretch: prone, with hip internal rotation (L), 1 minute    Home Exercises and Patient Education     Patient Education: progression of plan of care, plan for next session, pt prognosis, pelvic floor  anatomy & function, relationship between TrA & PFM, relationship between hips & PFM, relationship between pelvic floor dysfunction & lower back pain, multifidi anatomy & function, lumbar spine anatomy, MRI results, flexion vs. extension directional preferece    Home Exercise Program (7/6/22): supine march, supine clams, bridging with band/belt around knees  HEP update (12/20/22): Pt encouraged to perform some exercises from therapy at home  HEP update (1/12/23): Standing hip abduction circles, tabletop heel dip, standing hip flexion + external rotation    Exercises were reviewed, and Divya Jacinto was able to demonstrate them prior to the end of the session, as needed. Divya Jacinto demonstrated good understanding of the education provided.      Assessment     Pt tolerated treatment session well, with improved tenderness and discomfort with weight-bearing following needling and good tolerance for familiar exercises today. Lower back/sacroiliac pain flare-up is consistent with pelvic girdle stability deficits, exacerbated by chronic L hip flexor dysfunction. Pt has been progressing well with therapy overall, but she is still vulnerable to pain flare-ups with overactivity/stress. Pt's functional mobility and ability to perform ADLs still limited by pain and weakness. She requires skilled therapy for continued patient education and progressive resistance exercise.    Divya Jacinto is progressing well towards her goals.   Pt prognosis: good    Pt will continue to benefit from skilled outpatient physical therapy to address the deficits listed in the problem list box on initial evaluation, provide pt/family education and to maximize pt's level of independence in the home and community environment.     Pt's spiritual, cultural and educational needs considered and pt agreeable to plan of care and goals.  Anticipated barriers to physical therapy: none      Short Term Goals: 6 weeks   Pt to report 50% improvement in tenderness to  palpation of pelvic floor to demonstrate improved trigger points and overactivity Met   Pt to report only rare incidences of urinary incontinence with coughing to demonstrate improved pelvic floor muscle coordination Met   Pt to report 50% improvement in pain with penetration (gynecological exam or intercourse) and orgasm to demonstrate improved pelvic floor muscle overactivity Met   Pt to be independent with introductory home exercise program Met      Long Term Goals: 12 weeks  Pt to report minimal/no tenderness to palpation of pelvic floor to demonstrate improved trigger points and overactivity Partially Met   Pt to demonstrate an improved score in the FOTO Pelvic Pain (PFDI) survey to no more than 8% impaired to demonstrate improving pelvic floor muscle tenderness, coordination. Not Met   Pt to deny pelvic pain with penetration (gynecological exam or intercourse) and orgasm to demonstrate improved pelvic floor muscle overactivity Partially Met   Pt to increase pelvic floor strength to 4/5 to demonstrate improved strength needed for continence with ADLs. Partially Met   Pt to be independent with advanced home exercise program Not Met   Pt to demonstrate bilateral hip strength of 5/5 for improved pelvic girdle support with mobility. Not Met   Pt to report minimal/no back pain with ADLs and functional mobility to demonstrate improved lumbopelvic muscle support, coordination Not Met        Plan     Continue per Plan of Care  Increasing frequency to 2x/week for more pelvic girdle stability training      Jess Ureña, PT, DPT

## 2023-02-08 ENCOUNTER — DOCUMENTATION ONLY (OUTPATIENT)
Dept: REHABILITATION | Facility: OTHER | Age: 38
End: 2023-02-08

## 2023-02-08 ENCOUNTER — CLINICAL SUPPORT (OUTPATIENT)
Dept: REHABILITATION | Facility: OTHER | Age: 38
End: 2023-02-08
Payer: MEDICAID

## 2023-02-08 DIAGNOSIS — R29.898 DECREASED STRENGTH OF TRUNK AND BACK: ICD-10-CM

## 2023-02-08 DIAGNOSIS — G89.29 CHRONIC LEFT-SIDED LOW BACK PAIN WITHOUT SCIATICA: ICD-10-CM

## 2023-02-08 DIAGNOSIS — R10.2 PELVIC PAIN: Primary | ICD-10-CM

## 2023-02-08 DIAGNOSIS — M54.50 CHRONIC LEFT-SIDED LOW BACK PAIN WITHOUT SCIATICA: ICD-10-CM

## 2023-02-08 PROCEDURE — 97110 THERAPEUTIC EXERCISES: CPT | Mod: PN | Performed by: PHYSICAL THERAPIST

## 2023-02-08 NOTE — PROGRESS NOTES
Increasing frequency to 2x/wk for more pelvic girdle stability training, hip flexor strengthening.    Jess Ureña, PT, DPT

## 2023-02-09 NOTE — PROGRESS NOTES
Pelvic Health Physical Therapy   Treatment Note     Name: Divya Chand  Clinic Number: [id number]    Therapy Diagnosis:   Encounter Diagnoses   Name Primary?    Pelvic pain Yes    Decreased strength of trunk and back     Chronic left-sided low back pain without sciatica      Referring Provider: Arnulfo    Visit Date: 2/13/2023    Referring Provider Orders: PT Eval and Treat  Medical Diagnosis from Referral: Pelvic floor dysfunction in female [M62.89]  Evaluation Date: 4/29/2022  Last Reassessment: 12/20/2022, visit #28  Authorization Period Expiration: 12/31/2023  Plan of Care Expiration: 3/20/2023  Visit # / Visits authorized: 33 (5/24)  FOTO: 3 (4/29/2022, 6/20/2022, 12/20/2022)    Time In: 16:00  Time Out: 17:00  Total Billable Time: 55 minutes    Precautions: Standard    Subjective     Divya Jacinto reports continued R lower back/sacroiliac pain since the original injury last week. Her L anterior hip is uncomfortable but not terrible.     She was somewhat compliant with home exercise program - less compliant than usual due to recent injury  Response to previous treatment: no adverse effect   Functional change: fluctuating improvements in urinary urgency and back/hip/pelvic pain    Pain: R lower back/sacroiliac joint - 6/10, L anterior hip - 3/10    Objective     Divya Jacinto received the following interventions during the treatment session -  (TrA = transverse abdominis, PFM = pelvic floor muscle)  Pt consented to pelvic floor muscle assessment and treatment in prior visit - see EMR    Therapeutic exercises to develop strength, endurance and core stabilization for 55 minutes -  [] TrA brace + seated hip adduction isometric with ball, x20 with 2-second hold  [] TrA brace + supine heel slides (R&L), 2x5   [] Prone hamstring curls with multifidi pre-activation (R&L), 3 minutes  [] TrA brace + supine clam with blue band, x20  [] TrA brace + straight leg raise (R&L), 2x4  [] Tabletop heel dips, 3x4 - verbal cues  required for correct technique  [] Open books with green band (R&L), x10  [] Standing pallof press with doubled red band (R&L), x15  [] TrA brace + side-lying hip abduction (R&L), 2x8  [] Cat/cow with coordinated breath, 2 minutes   [] TrA brace + quadruped hip extension (R&L), x10  [] Standing hip abduction circles (R&L), 2x10  [x] TrA brace + standing shoulder extension with blue band (B), x18  [] TrA brace + wall push up, 2x15  [] Side-stepping with green band around knees, 3 minutes  [] Seated on theraball + pelvic rotations, 2 minutes  [] Seated on theraball + alternating kicks, 1 minute  [x] Seated on theraball + row with green band, x40  [x] Seated on theraball + alternating shoulder flexion (3#, R&L), x15   [] Lateral step up/down on 4-inch step (R&L), 3x10  [x] Lateral step up/down on 6-inch step (L), 2x10  [x] TrA brace + toe taps to 8-inch step (2#, R&L), 2x20  [] TrA brace + sit to stands from chair + 2 foam pads, 4x6  [x] Standing hip flexion + hip external rotation lift (2#, R&L), x10  [x] TrA brace + standing straight leg raise (2#, R&L), 2x10  [] Total gym squats with yellow band around knees, 3 minutes  [] Treadmill walkinmph, 10% incline, 5 minutes  [] Standing hip/knee extension on Pilates chair with 2 red springs (L), x30  [] Happy baby stretch + diaphragmatic breathing, pelvic drop, 1 minute  [] Child's pose + diaphragmatic breathing, pelvic drop, 2 minutes  [] Butterfly hip stretch + diaphragmatic breathing, pelvic drop, 2 minutes  [] Half-kneeling lunge/hip flexor stretch (L), 1 minute  [] Supine hamstring stretch with strap (L), 1 minute  [] Hip flexor stretch in standing (L), 2x30 seconds  [] Lucian test hip flexor stretch (L), 2x1 minute  [] Diaphragmatic breathing + pelvic drop, 2 minutes in hooklying position  [x] Seated ball roll out, 2x2 minutes - improved R lower back pain  [] Seated figure-4 stretch (R&L), 1 minute  [] Pelvic floor relaxation guided meditation - performed during  e-stim for dry needling  [x] HEP review, patient education    Manual therapy techniques: to develop flexibility, desensitization, and extensibility -  [] Obturator release at ischial tuberosities + active release with resisted contralateral hip external rotation (B)  [] Manual release to L hip flexors - improved tenderness after a few minutes of release  [] Manual release to L pelvic floor muscles internally/vaginally: levator ani - mild increased tone and tenderness that improved within a few minutes of release. PT provided cues for diaphragmatic breathing, visualization, and relaxation.  [] Myofascial decompression/cupping to L lower abdominal wall and anterior/medial hip  [x] Dry needling with electrical stimulation to R multifidi of L2&L5, quadratus lumborum - no adverse effect, written consent provided 5/9/22, see EMR.  [] Lateral and long-arm distraction to L hip with belt  [] Trigger point release to L iliopsoas  [] Taping to bilateral sacroiliac joints  [] Sacroiliac muscle-energy technique - pt responded best to resisted R hip flexion // L hip extension  [] Manual hip flexor stretch: prone, with hip internal rotation (L), 1 minute    Home Exercises and Patient Education     Patient Education: progression of plan of care, plan for next session, pt prognosis, pelvic floor anatomy & function, relationship between TrA & PFM, relationship between hips & PFM, relationship between pelvic floor dysfunction & lower back pain, multifidi anatomy & function, lumbar spine anatomy, MRI results, flexion vs. extension directional preferece    Home Exercise Program (7/6/22): supine march, supine clams, bridging with band/belt around knees  HEP update (12/20/22): Pt encouraged to perform some exercises from therapy at home  HEP update (1/12/23): Standing hip abduction circles, tabletop heel dip, standing hip flexion + external rotation    Exercises were reviewed, and Divya Jacinto was able to demonstrate them prior to the end  of the session, as needed. Divya Jacinto demonstrated good understanding of the education provided.      Assessment     Pt tolerated treatment session well, with improved tenderness and discomfort with weight-bearing following needling and good tolerance for familiar exercises today. Lower back/sacroiliac pain flare-up is consistent with pelvic girdle stability deficits, exacerbated by chronic L hip flexor dysfunction. Pt has been progressing well with therapy overall, but she is still vulnerable to pain flare-ups with overactivity/stress. Pt's functional mobility and ability to perform ADLs still limited by pain and weakness. She requires skilled therapy for continued patient education and progressive resistance exercise.    Divya Jacinto is progressing well towards her goals.   Pt prognosis: good    Pt will continue to benefit from skilled outpatient physical therapy to address the deficits listed in the problem list box on initial evaluation, provide pt/family education and to maximize pt's level of independence in the home and community environment.     Pt's spiritual, cultural and educational needs considered and pt agreeable to plan of care and goals.  Anticipated barriers to physical therapy: none      Short Term Goals: 6 weeks   Pt to report 50% improvement in tenderness to palpation of pelvic floor to demonstrate improved trigger points and overactivity Met   Pt to report only rare incidences of urinary incontinence with coughing to demonstrate improved pelvic floor muscle coordination Met   Pt to report 50% improvement in pain with penetration (gynecological exam or intercourse) and orgasm to demonstrate improved pelvic floor muscle overactivity Met   Pt to be independent with introductory home exercise program Met      Long Term Goals: 12 weeks  Pt to report minimal/no tenderness to palpation of pelvic floor to demonstrate improved trigger points and overactivity Partially Met   Pt to demonstrate an improved  score in the FOTO Pelvic Pain (PFDI) survey to no more than 8% impaired to demonstrate improving pelvic floor muscle tenderness, coordination. Not Met   Pt to deny pelvic pain with penetration (gynecological exam or intercourse) and orgasm to demonstrate improved pelvic floor muscle overactivity Partially Met   Pt to increase pelvic floor strength to 4/5 to demonstrate improved strength needed for continence with ADLs. Partially Met   Pt to be independent with advanced home exercise program Not Met   Pt to demonstrate bilateral hip strength of 5/5 for improved pelvic girdle support with mobility. Not Met   Pt to report minimal/no back pain with ADLs and functional mobility to demonstrate improved lumbopelvic muscle support, coordination Not Met        Plan     Continue per Plan of Care  Increasing frequency to 2x/week for more pelvic girdle stability training      Jess Ureña, PT, DPT

## 2023-02-13 ENCOUNTER — CLINICAL SUPPORT (OUTPATIENT)
Dept: REHABILITATION | Facility: OTHER | Age: 38
End: 2023-02-13
Payer: MEDICAID

## 2023-02-13 DIAGNOSIS — M54.50 CHRONIC LEFT-SIDED LOW BACK PAIN WITHOUT SCIATICA: ICD-10-CM

## 2023-02-13 DIAGNOSIS — R10.2 PELVIC PAIN: Primary | ICD-10-CM

## 2023-02-13 DIAGNOSIS — R29.898 DECREASED STRENGTH OF TRUNK AND BACK: ICD-10-CM

## 2023-02-13 DIAGNOSIS — G89.29 CHRONIC LEFT-SIDED LOW BACK PAIN WITHOUT SCIATICA: ICD-10-CM

## 2023-02-13 PROCEDURE — 97110 THERAPEUTIC EXERCISES: CPT | Mod: PN | Performed by: PHYSICAL THERAPIST

## 2023-02-20 PROBLEM — M25.662 DECREASED RANGE OF MOTION (ROM) OF LEFT KNEE: Status: RESOLVED | Noted: 2021-07-19 | Resolved: 2023-02-20

## 2023-02-20 PROBLEM — G89.29 CHRONIC RIGHT SHOULDER PAIN: Status: RESOLVED | Noted: 2021-11-09 | Resolved: 2023-02-20

## 2023-02-20 PROBLEM — R53.1 DECREASED STRENGTH: Status: RESOLVED | Noted: 2021-07-19 | Resolved: 2023-02-20

## 2023-02-20 PROBLEM — M25.511 CHRONIC RIGHT SHOULDER PAIN: Status: RESOLVED | Noted: 2021-11-09 | Resolved: 2023-02-20

## 2023-02-20 NOTE — PROGRESS NOTES
Pelvic Health Physical Therapy   Treatment Note     Name: Divya Jacinto [facility]  Clinic Number: [id number]    Therapy Diagnosis:   Encounter Diagnoses   Name Primary?    Pelvic pain Yes    Decreased strength of trunk and back     Chronic left-sided low back pain without sciatica      Referring Provider: Arnulfo    Visit Date: 2/22/2023    Referring Provider Orders: PT Eval and Treat  Medical Diagnosis from Referral: Pelvic floor dysfunction in female [M62.89]  Evaluation Date: 4/29/2022  Last Reassessment: 12/20/2022, visit #28  Authorization Period Expiration: 12/31/2023  Plan of Care Expiration: 3/20/2023  Visit # / Visits authorized: 34 (6/24)  FOTO: 3 (4/29/2022, 6/20/2022, 12/20/2022)    Time In: 16:00  Time Out: 17:00  Total Billable Time: 53 minutes    Precautions: Standard    Subjective     Divya aJcinto reports improved R lower back/sacroiliac pain starting the day after last session. Her L anterior hip is uncomfortable but not terrible.     She was compliant with home exercise program  Response to previous treatment: no adverse effect   Functional change: fluctuating improvements in urinary urgency and back/hip/pelvic pain    Pain: R lower back/sacroiliac joint - 0/10, L anterior hip - 3/10    Objective     Divya Jacinto received the following interventions during the treatment session -  (TrA = transverse abdominis, PFM = pelvic floor muscle)  Pt consented to pelvic floor muscle assessment and treatment in prior visit - see EMR  All interventions billed as Therapeutic Exercise, per Medicaid guidelines    Therapeutic exercises to develop strength, endurance and core stabilization for 30 minutes -  [] TrA brace + straight leg raise (R&L), 2x4  [] Open books with green band (R&L), x10  [] Standing pallof press with doubled red band (R&L), x15  [] TrA brace + side-lying hip abduction (R&L), 2x8  [] Cat/cow with coordinated breath, 2 minutes   [] TrA brace + quadruped hip extension (R&L), x10  [x] Standing on foam pad +  TrA brace + standing shoulder extension with blue band (B), x20  [x] Standing on foam pad + TrA brace + standing row with blue band (B), x20  [] TrA brace + wall push up, 2x15  [] Side-stepping with green band around knees, 3 minutes  [] Seated on theraball + pelvic rotations, 2 minutes  [] Seated on theraball + alternating kicks, 1 minute  [] Seated on theraball + row with green band, x40  [] Seated on theraball + alternating shoulder flexion (3#, R&L), x15   [] Lateral step up/down on 4-inch step (R&L), 3x10  [x] Lateral step up/down on 6-inch step (L), 2x10  [x] Standing on foam pad + TrA brace + toe taps to 8-inch step (2#, R&L), 2x20  [x] Standing hip flexion + hip external rotation lift (2#, R&L), x10  [x] TrA brace + standing straight leg raise (2#, R&L), 2x10  [] TrA brace + sit to stands from chair + 2 foam pads, 4x6  [] Total gym squats with yellow band around knees, 3 minutes  [] Treadmill walkinmph, 10% incline, 5 minutes  [x] Standing hip/knee extension on Pilates chair with 2 blue springs and 1 red spring (R&L), x20  [] Happy baby stretch + diaphragmatic breathing, pelvic drop, 1 minute  [] Child's pose + diaphragmatic breathing, pelvic drop, 2 minutes  [] Butterfly hip stretch + diaphragmatic breathing, pelvic drop, 2 minutes  [] Half-kneeling lunge/hip flexor stretch (L), 1 minute  [] Supine hamstring stretch with strap (L), 1 minute  [] Hip flexor stretch in standing (L), 2x30 seconds  [] Lucian test hip flexor stretch (L), 2x1 minute  [] Diaphragmatic breathing + pelvic drop, 2 minutes in hooklying position  [x] Seated ball roll out, 2x2 minutes - improved R lower back pain  [] Seated figure-4 stretch (R&L), 1 minute  [] Pelvic floor relaxation guided meditation - performed during e-stim for dry needling  [x] HEP review, patient education    Manual therapy techniques: to develop flexibility, desensitization, and extensibility for 23 minutes  -  [] Obturator release at ischial tuberosities +  active release with resisted contralateral hip external rotation (B)  [] Manual release to L hip flexors - improved tenderness after a few minutes of release  [] Manual release to L pelvic floor muscles internally/vaginally: levator ani - mild increased tone and tenderness that improved within a few minutes of release. PT provided cues for diaphragmatic breathing, visualization, and relaxation.  [] Myofascial decompression/cupping to L lower abdominal wall and anterior/medial hip  [x] Dry needling with electrical stimulation to L iliacus, rectus femoris, and iliopsoas - no adverse effect, written consent provided 5/9/22, see EMR.  [] Lateral and long-arm distraction to L hip with belt  [] Trigger point release to L iliopsoas  [] Taping to bilateral sacroiliac join  [] Sacroiliac muscle-energy technique - pt responded best to resisted R hip flexion // L hip extension  [] Manual hip flexor stretch: prone, with hip internal rotation (L), 1 minute  [x] L iliacus release    Home Exercises and Patient Education     Patient Education: progression of plan of care, plan for next session, pt prognosis, pelvic floor anatomy & function, relationship between TrA & PFM, relationship between hips & PFM, relationship between pelvic floor dysfunction & lower back pain, multifidi anatomy & function, lumbar spine anatomy    HEP update (1/12/23): Standing hip abduction circles, tabletop heel dip, standing hip flexion + external rotation    Exercises were reviewed, and Divya Jacinto was able to demonstrate them prior to the end of the session, as needed. Divya Jacinto demonstrated good understanding of the education provided.      Assessment     Pt tolerated treatment session well, with improved tenderness and discomfort with weight-bearing following needling and good tolerance for familiar and progressed exercises today. Pt has been progressing well with therapy overall, but she is still vulnerable to pain flare-ups with  overactivity/stress. Pt's functional mobility and ability to perform ADLs still limited by pain and weakness. She requires skilled therapy for continued patient education and progressive resistance exercise.    Divya Jacinto is progressing well towards her goals.   Pt prognosis: good    Pt will continue to benefit from skilled outpatient physical therapy to address the deficits listed in the problem list box on initial evaluation, provide pt/family education and to maximize pt's level of independence in the home and community environment.     Pt's spiritual, cultural and educational needs considered and pt agreeable to plan of care and goals.  Anticipated barriers to physical therapy: none      Short Term Goals: 6 weeks   Pt to report 50% improvement in tenderness to palpation of pelvic floor to demonstrate improved trigger points and overactivity Met   Pt to report only rare incidences of urinary incontinence with coughing to demonstrate improved pelvic floor muscle coordination Met   Pt to report 50% improvement in pain with penetration (gynecological exam or intercourse) and orgasm to demonstrate improved pelvic floor muscle overactivity Met   Pt to be independent with introductory home exercise program Met      Long Term Goals: 12 weeks  Pt to report minimal/no tenderness to palpation of pelvic floor to demonstrate improved trigger points and overactivity Partially Met   Pt to demonstrate an improved score in the FOTO Pelvic Pain (PFDI) survey to no more than 8% impaired to demonstrate improving pelvic floor muscle tenderness, coordination. Not Met   Pt to deny pelvic pain with penetration (gynecological exam or intercourse) and orgasm to demonstrate improved pelvic floor muscle overactivity Partially Met   Pt to increase pelvic floor strength to 4/5 to demonstrate improved strength needed for continence with ADLs. Partially Met   Pt to be independent with advanced home exercise program Not Met   Pt to demonstrate  bilateral hip strength of 5/5 for improved pelvic girdle support with mobility. Not Met   Pt to report minimal/no back pain with ADLs and functional mobility to demonstrate improved lumbopelvic muscle support, coordination Not Met        Plan     Continue per Plan of Care  Increasing frequency to 2x/week for more pelvic girdle stability training      Jess Ureña, PT, DPT

## 2023-02-22 ENCOUNTER — CLINICAL SUPPORT (OUTPATIENT)
Dept: REHABILITATION | Facility: OTHER | Age: 38
End: 2023-02-22
Payer: MEDICAID

## 2023-02-22 DIAGNOSIS — M54.50 CHRONIC LEFT-SIDED LOW BACK PAIN WITHOUT SCIATICA: ICD-10-CM

## 2023-02-22 DIAGNOSIS — R29.898 DECREASED STRENGTH OF TRUNK AND BACK: ICD-10-CM

## 2023-02-22 DIAGNOSIS — G89.29 CHRONIC LEFT-SIDED LOW BACK PAIN WITHOUT SCIATICA: ICD-10-CM

## 2023-02-22 DIAGNOSIS — R10.2 PELVIC PAIN: Primary | ICD-10-CM

## 2023-02-22 PROCEDURE — 97110 THERAPEUTIC EXERCISES: CPT | Mod: PN | Performed by: PHYSICAL THERAPIST

## 2023-02-23 ENCOUNTER — CLINICAL SUPPORT (OUTPATIENT)
Dept: REHABILITATION | Facility: OTHER | Age: 38
End: 2023-02-23
Payer: MEDICAID

## 2023-02-23 DIAGNOSIS — R10.2 PELVIC PAIN: Primary | ICD-10-CM

## 2023-02-23 DIAGNOSIS — R29.898 DECREASED STRENGTH OF TRUNK AND BACK: ICD-10-CM

## 2023-02-23 PROCEDURE — 97110 THERAPEUTIC EXERCISES: CPT | Mod: PN,CQ

## 2023-02-23 NOTE — PROGRESS NOTES
Pelvic Health Physical Therapy   Treatment Note     Name: Divya Chand  Clinic Number: [id number]    Therapy Diagnosis:   Encounter Diagnoses   Name Primary?    Pelvic pain Yes    Decreased strength of trunk and back      Referring Provider: Arnulfo    Visit Date: 2/23/2023    Referring Provider Orders: PT Eval and Treat  Medical Diagnosis from Referral: Pelvic floor dysfunction in female [M62.89]  Evaluation Date: 4/29/2022  Last Reassessment: 12/20/2022, visit #28  Authorization Period Expiration: 12/31/2023  Plan of Care Expiration: 3/20/2023  Visit # / Visits authorized: 35 (8/24)  FOTO: 3 (4/29/2022, 6/20/2022, 12/20/2022)    Time In: 1420 ( late arrival )  Time Out: 1500  Total Billable Time: 40 minutes    Precautions: Standard    Subjective     Divya Jacinto reports she is feeling some pain on the front of her L hip. States she is seeing some improvement with PT however pain continues to persist.      She was compliant with home exercise program  Response to previous treatment: felt fine, no issues   Functional change: fluctuating improvements in urinary urgency and back/hip/pelvic pain    Pain: R lower back/sacroiliac joint - 0/10, L anterior hip - 3/10    Objective     Divya Jacinto received the following interventions during the treatment session -  (TrA = transverse abdominis, PFM = pelvic floor muscle)  Pt consented to pelvic floor muscle assessment and treatment in prior visit - see EMR  All interventions billed as Therapeutic Exercise, per Medicaid guidelines    Therapeutic exercises to develop strength, endurance and core stabilization for 40 minutes -  [] TrA brace + straight leg raise (R&L), 2x4  [] Open books with green band (R&L), x10  [] Standing pallof press with doubled red band (R&L), x15  [] TrA brace + side-lying hip abduction (R&L), 2x8  [] Cat/cow with coordinated breath, 2 minutes   [] TrA brace + quadruped hip extension (R&L), x10  [] Standing on foam pad + TrA brace + standing shoulder  "extension with blue band (B), x20  [] Standing on foam pad + TrA brace + standing row with blue band (B), x20  [] TrA brace + wall push up, 2x15  [] Side-stepping with green band around knees, 3 minutes  [] Seated on theraball + pelvic rotations, 2 minutes  [] Seated on theraball + alternating kicks, 1 minute  [] Seated on theraball + row with green band, x40  [] Seated on theraball + alternating shoulder flexion (3#, R&L), x15   [] Lateral step up/down on 4-inch step (R&L), 3x10  [] Lateral step up/down on 6-inch step (L), 2x10  [] Standing on foam pad + TrA brace + toe taps to 8-inch step (2#, R&L), 2x20  [] Standing hip flexion + hip external rotation lift (2#, R&L), x10  [] TrA brace + standing straight leg raise (2#, R&L), 2x10  [] TrA brace + sit to stands from chair + 2 foam pads, 4x6  [] Total gym squats with yellow band around knees, 3 minutes  [] Treadmill walkinmph, 10% incline, 5 minutes  [] Standing hip/knee extension on Pilates chair with 2 blue springs and 1 red spring (R&L), x20  [] Happy baby stretch + diaphragmatic breathing, pelvic drop, 1 minute  [] Child's pose + diaphragmatic breathing, pelvic drop, 2 minutes  [] Butterfly hip stretch + diaphragmatic breathing, pelvic drop, 2 minutes  [] Half-kneeling lunge/hip flexor stretch (L), 1 minute  [] Supine hamstring stretch with strap (L), 1 minute  [] Hip flexor stretch in standing (L), 2x30 seconds  [] Lucian test hip flexor stretch (L), 2x1 minute  [] Diaphragmatic breathing + pelvic drop, 2 minutes in hooklying position  [] Seated ball roll out, 2x2 minutes - improved R lower back pain  [] Seated figure-4 stretch (R&L), 1 minute  [] Pelvic floor relaxation guided meditation - performed during e-stim for dry needling  [] HEP review, patient education  +Marching with +ER ( sartorius ) 40ftx2  +L prone hip flexor stretch with 1/2 roll under knee, 4x30"  +Prone ball squeezes between ankles 5" hod 10x - experienced cramping in HS  +Supine hip " "flexor iso/abdominal bracing with red ball, 5" hold x20 R/L  +Elevated clams, 20x R/L  +Reverse clams 20x R/L  +Straight leg bridging on blue ball, 2x10 5" hold        Manual therapy techniques: to develop flexibility, desensitization, and extensibility for 23 minutes  -  [] Obturator release at ischial tuberosities + active release with resisted contralateral hip external rotation (B)  [] Manual release to L hip flexors - improved tenderness after a few minutes of release  [] Manual release to L pelvic floor muscles internally/vaginally: levator ani - mild increased tone and tenderness that improved within a few minutes of release. PT provided cues for diaphragmatic breathing, visualization, and relaxation.  [] Myofascial decompression/cupping to L lower abdominal wall and anterior/medial hip  [] Dry needling with electrical stimulation to L iliacus, rectus femoris, and iliopsoas - no adverse effect, written consent provided 5/9/22, see EMR.  [] Lateral and long-arm distraction to L hip with belt  [] Trigger point release to L iliopsoas  [] Taping to bilateral sacroiliac join  [] Sacroiliac muscle-energy technique - pt responded best to resisted R hip flexion // L hip extension  [] Manual hip flexor stretch: prone, with hip internal rotation (L), 1 minute  [] L iliacus release    Home Exercises and Patient Education     Patient Education: progression of plan of care, plan for next session, pt prognosis, pelvic floor anatomy & function, relationship between TrA & PFM, relationship between hips & PFM, relationship between pelvic floor dysfunction & lower back pain, multifidi anatomy & function, lumbar spine anatomy    HEP update (1/12/23): Standing hip abduction circles, tabletop heel dip, standing hip flexion + external rotation    Exercises were reviewed, and Divya Jacinto was able to demonstrate them prior to the end of the session, as needed. Divya Jacinto demonstrated good understanding of the education provided.    "   Assessment   Limited tx secondary to late arrival. Moderate soft tissue restrictions with hip flexors is noted. Continued with pelvic/hip strengthening as well as TrA activating and flexibility. Pt reported pain appears to be localized to the anterior aspect of the L hip that increased with sartorius activation ) hip flexion+ER). Will continue to progress strengthening as tolerated.     Divya Jacinto is progressing well towards her goals.   Pt prognosis: good    Pt will continue to benefit from skilled outpatient physical therapy to address the deficits listed in the problem list box on initial evaluation, provide pt/family education and to maximize pt's level of independence in the home and community environment.     Pt's spiritual, cultural and educational needs considered and pt agreeable to plan of care and goals.  Anticipated barriers to physical therapy: none      Short Term Goals: 6 weeks   Pt to report 50% improvement in tenderness to palpation of pelvic floor to demonstrate improved trigger points and overactivity Met   Pt to report only rare incidences of urinary incontinence with coughing to demonstrate improved pelvic floor muscle coordination Met   Pt to report 50% improvement in pain with penetration (gynecological exam or intercourse) and orgasm to demonstrate improved pelvic floor muscle overactivity Met   Pt to be independent with introductory home exercise program Met      Long Term Goals: 12 weeks  Pt to report minimal/no tenderness to palpation of pelvic floor to demonstrate improved trigger points and overactivity Partially Met   Pt to demonstrate an improved score in the FOTO Pelvic Pain (PFDI) survey to no more than 8% impaired to demonstrate improving pelvic floor muscle tenderness, coordination. Not Met   Pt to deny pelvic pain with penetration (gynecological exam or intercourse) and orgasm to demonstrate improved pelvic floor muscle overactivity Partially Met   Pt to increase pelvic floor  strength to 4/5 to demonstrate improved strength needed for continence with ADLs. Partially Met   Pt to be independent with advanced home exercise program Not Met   Pt to demonstrate bilateral hip strength of 5/5 for improved pelvic girdle support with mobility. Not Met   Pt to report minimal/no back pain with ADLs and functional mobility to demonstrate improved lumbopelvic muscle support, coordination Not Met        Plan     Continue per Plan of Care  Increasing frequency to 2x/week for more pelvic girdle stability training      Bimal Gomez, PTA

## 2023-02-28 ENCOUNTER — CLINICAL SUPPORT (OUTPATIENT)
Dept: REHABILITATION | Facility: OTHER | Age: 38
End: 2023-02-28
Payer: MEDICAID

## 2023-02-28 DIAGNOSIS — R10.2 PELVIC PAIN: Primary | ICD-10-CM

## 2023-02-28 DIAGNOSIS — G89.29 CHRONIC LEFT-SIDED LOW BACK PAIN WITHOUT SCIATICA: ICD-10-CM

## 2023-02-28 DIAGNOSIS — R29.898 DECREASED STRENGTH OF TRUNK AND BACK: ICD-10-CM

## 2023-02-28 DIAGNOSIS — M54.50 CHRONIC LEFT-SIDED LOW BACK PAIN WITHOUT SCIATICA: ICD-10-CM

## 2023-02-28 PROCEDURE — 97110 THERAPEUTIC EXERCISES: CPT | Mod: PN | Performed by: PHYSICAL THERAPIST

## 2023-02-28 NOTE — PROGRESS NOTES
Pelvic Health Physical Therapy   Treatment Note     Name: Divya Jacinto [facility]  Clinic Number: [id number]    Therapy Diagnosis:   Encounter Diagnoses   Name Primary?    Pelvic pain Yes    Decreased strength of trunk and back     Chronic left-sided low back pain without sciatica      Referring Provider: Arnulfo    Visit Date: 2/28/2023    Referring Provider Orders: PT Eval and Treat  Medical Diagnosis from Referral: Pelvic floor dysfunction in female [M62.89]  Evaluation Date: 4/29/2022  Last Reassessment: 12/20/2022, visit #28  Authorization Period Expiration: 12/31/2023  Plan of Care Expiration: 3/20/2023  Visit # / Visits authorized: 36 (9/24)  FOTO: 3 (4/29/2022, 6/20/2022, 12/20/2022)    Time In: 16:00  Time Out: 17:00  Total Billable Time: 55 minutes    Precautions: Standard    Subjective     Divya Jacinto reports her R hip pain has been flared up for the past few days.      She was compliant with home exercise program  Response to previous treatment: felt fine, no issues   Functional change: fluctuating improvements in urinary urgency and back/hip/pelvic pain    Pain: L anterior hip - 6/10    Objective     Divya Jacinto received the following interventions during the treatment session -  (TrA = transverse abdominis, PFM = pelvic floor muscle)  Pt consented to pelvic floor muscle assessment and treatment in prior visit - see EMR  All interventions billed as Therapeutic Exercise, per Medicaid guidelines    Therapeutic exercises to develop strength, endurance and core stabilization for 40 minutes -  [] TrA brace + straight leg raise (R&L), 2x4  [] Open books with green band (R&L), x10  [x] Standing pallof press with doubled red band (R&L), 2x15  [] TrA brace + side-lying hip abduction (R&L), 2x8  [] Cat/cow with coordinated breath, 2 minutes   [] TrA brace + quadruped hip extension (R&L), x10  [] Standing on foam pad + TrA brace + standing shoulder extension with blue band (B), x20  [] Standing on foam pad + TrA brace +  "standing row with blue band (B), x20  [] TrA brace + wall push up, 2x15  [] Side-stepping with green band around knees, 3 minutes  [] Seated on theraball + pelvic rotations, 2 minutes  [] Seated on theraball + alternating kicks, 1 minute  [] Seated on theraball + row with green band, x40  [] Seated on theraball + alternating shoulder flexion (3#, R&L), x15   [x] Lateral step up/down on 6-inch step (L), 2x10  [x] Standing on foam pad + TrA brace + toe taps to 8-inch step (2#, R&L), 2x20  [x] Standing hip flexion + hip external rotation lift (2#, R&L), x10  [x] TrA brace + standing straight leg raise (2#, R&L), 2x10  [] TrA brace + sit to stands from chair + 2 foam pads, 4x6  [x] Shuttle squats, 3x20 (3 black bands and 1 red band or resistance)  [] Treadmill walkinmph, 10% incline, 5 minutes  [] Standing hip/knee extension on Pilates chair with 2 blue springs and 1 red spring (R&L), x20  [] Happy baby stretch + diaphragmatic breathing, pelvic drop, 1 minute  [] Child's pose + diaphragmatic breathing, pelvic drop, 2 minutes  [] Butterfly hip stretch + diaphragmatic breathing, pelvic drop, 2 minutes  [] Half-kneeling lunge/hip flexor stretch (L), 1 minute  [] Supine hamstring stretch with strap (L), 1 minute  [] Hip flexor stretch in standing (L), 2x30 seconds  [] Lucian test hip flexor stretch (L), 2x1 minute  [] Diaphragmatic breathing + pelvic drop, 2 minutes in hooklying position  [] Seated ball roll out, 2x2 minutes - improved R lower back pain  [] Seated figure-4 stretch (R&L), 1 minute  [] Pelvic floor relaxation guided meditation - performed during e-stim for dry needling  [x] HEP review, patient education  [] Marching with +ER ( sartorius ) 40ftx2  [] L prone hip flexor stretch with 1/2 roll under knee, 4x30"  [] Prone ball squeezes between ankles 5" hod 10x - experienced cramping in HS  [] Supine hip flexor iso/abdominal bracing with red ball, 5" hold x20 R/L  [] Elevated clams, 20x R/L  [x] Reverse clams " "(2#, R&L), x25  [] Straight leg bridging on blue ball, 2x10 5" hold  []   []   []   []   []   []       Manual therapy techniques: to develop flexibility, desensitization, and extensibility for 15 minutes  -  [] Obturator release at ischial tuberosities + active release with resisted contralateral hip external rotation (B)  [] Manual release to L hip flexors - improved tenderness after a few minutes of release  [] Manual release to L pelvic floor muscles internally/vaginally: levator ani - mild increased tone and tenderness that improved within a few minutes of release. PT provided cues for diaphragmatic breathing, visualization, and relaxation.  [] Myofascial decompression/cupping to L lower abdominal wall and anterior/medial hip  [x] Dry needling with electrical stimulation to L iliacus, rectus femoris, and iliopsoas - no adverse effect, written consent provided 5/9/22, see EMR.  [] Lateral and long-arm distraction to L hip with belt  [] Trigger point release to L iliopsoas  [] Taping to bilateral sacroiliac join  [] Sacroiliac muscle-energy technique - pt responded best to resisted R hip flexion // L hip extension  [] Manual hip flexor stretch: prone, with hip internal rotation (L), 1 minute  [x] L iliacus release    Home Exercises and Patient Education     Patient Education: progression of plan of care, plan for next session, pt prognosis, pelvic floor anatomy & function, relationship between TrA & PFM, relationship between hips & PFM, relationship between pelvic floor dysfunction & lower back pain, multifidi anatomy & function, lumbar spine anatomy    HEP update (1/12/23): Standing hip abduction circles, tabletop heel dip, standing hip flexion + external rotation    Exercises were reviewed, and Divya Jacinto was able to demonstrate them prior to the end of the session, as needed. Divya Jacinto demonstrated good understanding of the education provided.      Assessment     Pt tolerated treatment session well, with no " increased symptoms with exercise and improved L anterior hip pain at the conclusion of today's session. Pt's functional mobility and ability to perform ADLs still limited by pain and weakness. She requires skilled therapy for continued patient education and progressive resistance exercise.    Divya Jacinto is progressing well towards her goals.   Pt prognosis: good    Pt will continue to benefit from skilled outpatient physical therapy to address the deficits listed in the problem list box on initial evaluation, provide pt/family education and to maximize pt's level of independence in the home and community environment.     Pt's spiritual, cultural and educational needs considered and pt agreeable to plan of care and goals.  Anticipated barriers to physical therapy: none      Short Term Goals: 6 weeks   Pt to report 50% improvement in tenderness to palpation of pelvic floor to demonstrate improved trigger points and overactivity Met   Pt to report only rare incidences of urinary incontinence with coughing to demonstrate improved pelvic floor muscle coordination Met   Pt to report 50% improvement in pain with penetration (gynecological exam or intercourse) and orgasm to demonstrate improved pelvic floor muscle overactivity Met   Pt to be independent with introductory home exercise program Met      Long Term Goals: 12 weeks  Pt to report minimal/no tenderness to palpation of pelvic floor to demonstrate improved trigger points and overactivity Partially Met   Pt to demonstrate an improved score in the FOTO Pelvic Pain (PFDI) survey to no more than 8% impaired to demonstrate improving pelvic floor muscle tenderness, coordination. Not Met   Pt to deny pelvic pain with penetration (gynecological exam or intercourse) and orgasm to demonstrate improved pelvic floor muscle overactivity Partially Met   Pt to increase pelvic floor strength to 4/5 to demonstrate improved strength needed for continence with ADLs. Partially Met    Pt to be independent with advanced home exercise program Not Met   Pt to demonstrate bilateral hip strength of 5/5 for improved pelvic girdle support with mobility. Not Met   Pt to report minimal/no back pain with ADLs and functional mobility to demonstrate improved lumbopelvic muscle support, coordination Not Met        Plan     Continue per Plan of Care  Increasing frequency to 2x/week for more pelvic girdle stability training      Jess Ureña, PT

## 2023-03-01 ENCOUNTER — CLINICAL SUPPORT (OUTPATIENT)
Dept: REHABILITATION | Facility: OTHER | Age: 38
End: 2023-03-01
Payer: MEDICAID

## 2023-03-01 DIAGNOSIS — R10.2 PELVIC PAIN: Primary | ICD-10-CM

## 2023-03-01 DIAGNOSIS — R29.898 DECREASED STRENGTH OF TRUNK AND BACK: ICD-10-CM

## 2023-03-01 PROCEDURE — 97110 THERAPEUTIC EXERCISES: CPT | Mod: PN,CQ

## 2023-03-01 NOTE — PROGRESS NOTES
Pelvic Health Physical Therapy   Treatment Note     Name: Divya Chand  Clinic Number: [id number]    Therapy Diagnosis:   Encounter Diagnoses   Name Primary?    Pelvic pain Yes    Decreased strength of trunk and back      Referring Provider: Arnulfo    Visit Date: 3/1/2023    Referring Provider Orders: PT Eval and Treat  Medical Diagnosis from Referral: Pelvic floor dysfunction in female [M62.89]  Evaluation Date: 4/29/2022  Last Reassessment: 12/20/2022, visit #28  Authorization Period Expiration: 12/31/2023  Plan of Care Expiration: 3/20/2023  Visit # / Visits authorized: 36 (10/24)  FOTO: 3 (4/29/2022, 6/20/2022, 12/20/2022)    Time In: 1600  Time Out: 1706  Total Billable Time: 60 minutes    Precautions: Standard    Subjective     Divya Jacinto reports her R hip has been bother her more. States she feel the pain more in the groin and his directly related to how she lifts her hip up.     She was compliant with home exercise program  Response to previous treatment: feels like the DN is helping some.   Functional change: fluctuating improvements in urinary urgency and back/hip/pelvic pain    Pain: L anterior hip - 6/10    Objective     Divya Jacinto received the following interventions during the treatment session -  (TrA = transverse abdominis, PFM = pelvic floor muscle)  Pt consented to pelvic floor muscle assessment and treatment in prior visit - see EMR  All interventions billed as Therapeutic Exercise, per Medicaid guidelines    Therapeutic exercises to develop strength, endurance and core stabilization for 45 minutes -  [] TrA brace + straight leg raise (R&L), 2x4  [] Open books with green band (R&L), x10  [x] Standing pallof press with doubled red band (R&L), 2x15  [] TrA brace + side-lying hip abduction (R&L), 2x8  [] Cat/cow with coordinated breath, 2 minutes   [] TrA brace + quadruped hip extension (R&L), x10  [] Standing on foam pad + TrA brace + standing shoulder extension with blue band (B), x20  []  "Standing on foam pad + TrA brace + standing row with blue band (B), x20  [] TrA brace + wall push up, 2x15  [] Side-stepping with green band around knees, 3 minutes  [] Seated on theraball + pelvic rotations, 2 minutes  [] Seated on theraball + alternating kicks, 1 minute  [] Seated on theraball + row with green band, x40  [] Seated on theraball + alternating shoulder flexion (3#, R&L), x15   [] Lateral step up/down on 6-inch step (L), 2x10  [x] Standing on foam pad + TrA brace + toe taps to cone 20x R/L  [x] Standing hip flexion + hip external rotation lift (2#, R&L), x10  [x] TrA brace + standing straight leg raise (2#, R&L), 2x10  [] TrA brace + sit to stands from chair + 2 foam pads, 4x6  [] Shuttle squats, 3x20 (3 black bands and 1 red band or resistance)  [] Treadmill walkinmph, 10% incline, 5 minutes  [] Standing hip/knee extension on Teacher[employer] chair with 2 blue springs and 1 red spring (R&L), x20  [] Happy baby stretch + diaphragmatic breathing, pelvic drop, 1 minute  [] Child's pose + diaphragmatic breathing, pelvic drop, 2 minutes  [] Butterfly hip stretch + diaphragmatic breathing, pelvic drop, 2 minutes  [] Half-kneeling lunge/hip flexor stretch (L), 1 minute  [] Supine hamstring stretch with strap (L), 1 minute  [] Hip flexor stretch in standing (L), 2x30 seconds  [] Lucian test hip flexor stretch (L), 2x1 minute  [] Diaphragmatic breathing + pelvic drop, 2 minutes in hooklying position  [] Seated ball roll out, 2x2 minutes - improved R lower back pain  [] Seated figure-4 stretch (R&L), 1 minute  [] Pelvic floor relaxation guided meditation - performed during e-stim for dry needling  [] HEP review, patient education  [] Marching with +ER ( sartorius ) 40ftx2  [x] L prone hip flexor stretch with 1/2 roll under knee, 4x30"  [] Prone ball squeezes between ankles 5" hod 10x - experienced cramping in HS  [] Supine hip flexor iso/abdominal bracing with red ball, 5" hold x20 R/L  [] Elevated clams, 20x " "R/L  [x] Reverse clams (2#, R&L), x25  [x] Straight leg bridging on blue ball, 2x10 5" hold  [x] L SLS on airex + ball toss at rebounder, 2x20  []   []   []   []   []       Manual therapy techniques: to develop flexibility, desensitization, and extensibility for 15 minutes  -  [] Obturator release at ischial tuberosities + active release with resisted contralateral hip external rotation (B)  [] Manual release to L hip flexors - improved tenderness after a few minutes of release  [] Manual release to L pelvic floor muscles internally/vaginally: levator ani - mild increased tone and tenderness that improved within a few minutes of release. PT provided cues for diaphragmatic breathing, visualization, and relaxation.  [] Myofascial decompression/cupping to L lower abdominal wall and anterior/medial hip  [] Dry needling with electrical stimulation to L iliacus, rectus femoris, and iliopsoas - no adverse effect, written consent provided 5/9/22, see EMR.  [] Lateral and long-arm distraction to L hip with belt  [x] Trigger point release to L iliopsoas using green theraroller   [] Taping to bilateral sacroiliac join  [] Sacroiliac muscle-energy technique - pt responded best to resisted R hip flexion // L hip extension  [] Manual hip flexor stretch: prone, with hip internal rotation (L), 1 minute  [x] L iliacus release  [x] Side lying L hip flexor stretch 4 x 30" - performed by clinician      Home Exercises and Patient Education     Patient Education: progression of plan of care, plan for next session, pt prognosis, pelvic floor anatomy & function, relationship between TrA & PFM, relationship between hips & PFM, relationship between pelvic floor dysfunction & lower back pain, multifidi anatomy & function, lumbar spine anatomy    HEP update (1/12/23): Standing hip abduction circles, tabletop heel dip, standing hip flexion + external rotation    Exercises were reviewed, and Divya Jacinto was able to demonstrate them prior to the " end of the session, as needed. Divya Jacinto demonstrated good understanding of the education provided.      Assessment     Emphasis was focused on hip stabilization and posterior chain/glute strengthening. Pt reported continued pain/popping with flex+ER motions however no worsening of symptoms from previous visits. Weakness in glute med is noted with SLS exercises. Continue to focus on hip stability with dynamic/static movements. Pt is following up with MD concerning possible PRP injections.     Divya Jacinto is progressing well towards her goals.   Pt prognosis: good    Pt will continue to benefit from skilled outpatient physical therapy to address the deficits listed in the problem list box on initial evaluation, provide pt/family education and to maximize pt's level of independence in the home and community environment.     Pt's spiritual, cultural and educational needs considered and pt agreeable to plan of care and goals.  Anticipated barriers to physical therapy: none      Short Term Goals: 6 weeks   Pt to report 50% improvement in tenderness to palpation of pelvic floor to demonstrate improved trigger points and overactivity Met   Pt to report only rare incidences of urinary incontinence with coughing to demonstrate improved pelvic floor muscle coordination Met   Pt to report 50% improvement in pain with penetration (gynecological exam or intercourse) and orgasm to demonstrate improved pelvic floor muscle overactivity Met   Pt to be independent with introductory home exercise program Met      Long Term Goals: 12 weeks  Pt to report minimal/no tenderness to palpation of pelvic floor to demonstrate improved trigger points and overactivity Partially Met   Pt to demonstrate an improved score in the FOTO Pelvic Pain (PFDI) survey to no more than 8% impaired to demonstrate improving pelvic floor muscle tenderness, coordination. Not Met   Pt to deny pelvic pain with penetration (gynecological exam or intercourse) and  orgasm to demonstrate improved pelvic floor muscle overactivity Partially Met   Pt to increase pelvic floor strength to 4/5 to demonstrate improved strength needed for continence with ADLs. Partially Met   Pt to be independent with advanced home exercise program Not Met   Pt to demonstrate bilateral hip strength of 5/5 for improved pelvic girdle support with mobility. Not Met   Pt to report minimal/no back pain with ADLs and functional mobility to demonstrate improved lumbopelvic muscle support, coordination Not Met        Plan     Continue per Plan of Care  Increasing frequency to 2x/week for more pelvic girdle stability training      Bimal Gomez, PTA

## 2023-03-06 ENCOUNTER — CLINICAL SUPPORT (OUTPATIENT)
Dept: REHABILITATION | Facility: OTHER | Age: 38
End: 2023-03-06
Payer: MEDICAID

## 2023-03-06 DIAGNOSIS — R10.2 PELVIC PAIN: Primary | ICD-10-CM

## 2023-03-06 DIAGNOSIS — G89.29 CHRONIC LEFT-SIDED LOW BACK PAIN WITHOUT SCIATICA: ICD-10-CM

## 2023-03-06 DIAGNOSIS — M54.50 CHRONIC LEFT-SIDED LOW BACK PAIN WITHOUT SCIATICA: ICD-10-CM

## 2023-03-06 DIAGNOSIS — R29.898 DECREASED STRENGTH OF TRUNK AND BACK: ICD-10-CM

## 2023-03-06 PROCEDURE — 97140 MANUAL THERAPY 1/> REGIONS: CPT | Mod: PN

## 2023-03-06 PROCEDURE — 97110 THERAPEUTIC EXERCISES: CPT | Mod: PN

## 2023-03-06 NOTE — PROGRESS NOTES
[facility]   Physical Therapy Treatment Note     Name: Divya Jacinto [facility]  Clinic Number: [id number]    Therapy Diagnosis:   Encounter Diagnoses   Name Primary?    Pelvic pain Yes    Decreased strength of trunk and back     Chronic left-sided low back pain without sciatica      Referring Provider: Arnulfo    Visit Date: 3/6/2023    Referring Provider Orders: PT Eval and Treat  Medical Diagnosis from Referral: Pelvic floor dysfunction in female [M62.89]  Evaluation Date: 4/29/2022  Last Reassessment: 12/20/2022, visit #28  Authorization Period Expiration: 12/31/2023  Plan of Care Expiration: 3/20/2023  Visit # / Visits authorized: 37 (10/24)  FOTO: 3 (4/29/2022, 6/20/2022, 12/20/2022)    Time In: 1515  Time Out: 1600  Total Billable Time: 45 minutes    Precautions: Standard    Subjective     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     She was compliant with home exercise program  Response to previous treatment: feels like the DN is helping some.   Functional change: fluctuating improvements in urinary urgency and back/hip/pelvic pain    Pain: L anterior hip - 6/10    Objective     Divya Jacinto received the following interventions during the treatment session -  (TrA = transverse abdominis, PFM = pelvic floor muscle)  Pt consented to pelvic floor muscle assessment and treatment in prior visit - see EMR  All interventions billed as Therapeutic Exercise, per Medicaid guidelines    Therapeutic exercises to develop strength, endurance and core stabilization for 25 minutes -  [] TrA brace + straight leg raise (R&L), 2x4  [] Open books with green band (R&L), x10  [] Standing pallof press with doubled red band (R&L), 2x15  [] TrA brace + side-lying hip abduction (R&L), 2x8  [] Cat/cow with coordinated breath, 2 minutes   [] TrA brace + quadruped hip extension (R&L), x10  [] Standing on foam pad + TrA brace + standing shoulder extension with  "blue band (B), x20  [] Standing on foam pad + TrA brace + standing row with blue band (B), x20  [] TrA brace + wall push up, 2x15  [] Side-stepping with green band around knees, 3 minutes  [] Seated on theraball + pelvic rotations, 2 minutes  [] Seated on theraball + alternating kicks, 1 minute  [] Seated on theraball + row with green band, x40  [] Seated on theraball + alternating shoulder flexion (3#, R&L), x15   [] Lateral step up/down on 6-inch step (L), 2x10  [] Standing on foam pad + TrA brace + toe taps to cone 20x R/L  [] Standing hip flexion + hip external rotation lift (2#, R&L), x10  [] TrA brace + standing straight leg raise (2#, R&L), 2x10  [] TrA brace + sit to stands from chair + 2 foam pads, 4x6  [] Shuttle squats, 3x20 (3 black bands and 1 red band or resistance)  [] Treadmill walkinmph, 10% incline, 5 minutes  [] Standing hip/knee extension on Pilates chair with 2 blue springs and 1 red spring (R&L), x20  [] Happy baby stretch + diaphragmatic breathing, pelvic drop, 1 minute  [] Child's pose + diaphragmatic breathing, pelvic drop, 2 minutes  [] Butterfly hip stretch + diaphragmatic breathing, pelvic drop, 2 minutes  [] Half-kneeling lunge/hip flexor stretch (L), 1 minute  [] Supine hamstring stretch with strap (L), 1 minute  [] Hip flexor stretch in standing (L), 2x30 seconds  [] Lucian test hip flexor stretch (L), 2x1 minute  [] Diaphragmatic breathing + pelvic drop, 2 minutes in hooklying position  [] Seated ball roll out, 2x2 minutes - improved R lower back pain  [] Seated figure-4 stretch (R&L), 1 minute  [] Pelvic floor relaxation guided meditation - performed during e-stim for dry needling  [] HEP review, patient education  [] Marching with +ER ( sartorius ) 40ftx2  [x] L prone hip flexor stretch with 1/2 roll under knee, 4x30"  [] Prone ball squeezes between ankles 5" hod 10x - experienced cramping in HS  [] Supine hip flexor iso/abdominal bracing with red ball, 5" hold x20 R/L  [] " "Elevated clams, 20x R/L  [] Reverse clams (2#, R&L), x25  [] Straight leg bridging on blue ball, 2x10 5" hold  [] L SLS on airex + ball toss at rebounder, 2x20  [x] Prone press up x10  [x] Prone femoral nerve glides 2x20  [x] Prone hip extension 2x20  [x] Standing knee flexion x20 **increased left anterior hip pain  []       Manual therapy techniques: to develop flexibility, desensitization, and extensibility for 15 minutes  -  [] Obturator release at ischial tuberosities + active release with resisted contralateral hip external rotation (B)  [] Manual release to L hip flexors - improved tenderness after a few minutes of release  [] Manual release to L pelvic floor muscles internally/vaginally: levator ani - mild increased tone and tenderness that improved within a few minutes of release. PT provided cues for diaphragmatic breathing, visualization, and relaxation.  [] Myofascial decompression/cupping to L lower abdominal wall and anterior/medial hip  [x] Dry needling with electrical stimulation to L iliacus, iliopsoas - no adverse effect, written consent provided 5/9/22, see EMR.  [] Lateral and long-arm distraction to L hip with belt  [] Trigger point release to L iliopsoas using green theraroller   [] Taping to bilateral sacroiliac join  [] Sacroiliac muscle-energy technique - pt responded best to resisted R hip flexion // L hip extension  [] Manual hip flexor stretch: prone, with hip internal rotation (L), 1 minute  [] L iliacus release  [] Side lying L hip flexor stretch 4 x 30" - performed by clinician      Home Exercises and Patient Education     Patient Education: progression of plan of care, plan for next session, pt prognosis, pelvic floor anatomy & function, relationship between TrA & PFM, relationship between hips & PFM, relationship between pelvic floor dysfunction & lower back pain, multifidi anatomy & function, lumbar spine anatomy    HEP update (1/12/23): Standing hip abduction circles, tabletop heel " dip, standing hip flexion + external rotation    Exercises were reviewed, and Divya Jacinto was able to demonstrate them prior to the end of the session, as needed. Divya Jacinto demonstrated good understanding of the education provided.      Assessment     Emphasis was focused on femoral nerve mobility and mobilization of the left anterior chain. She tolerated all interventions well, but she reported some increased pain with standing knee flexion. Continue to focus on hip stability with dynamic/static movements. Pt is following up with MD concerning possible PRP injections.     Divya Jacinto is progressing well towards her goals.   Pt prognosis: good    Pt will continue to benefit from skilled outpatient physical therapy to address the deficits listed in the problem list box on initial evaluation, provide pt/family education and to maximize pt's level of independence in the home and community environment.     Pt's spiritual, cultural and educational needs considered and pt agreeable to plan of care and goals.  Anticipated barriers to physical therapy: none      Short Term Goals: 6 weeks   Pt to report 50% improvement in tenderness to palpation of pelvic floor to demonstrate improved trigger points and overactivity Met   Pt to report only rare incidences of urinary incontinence with coughing to demonstrate improved pelvic floor muscle coordination Met   Pt to report 50% improvement in pain with penetration (gynecological exam or intercourse) and orgasm to demonstrate improved pelvic floor muscle overactivity Met   Pt to be independent with introductory home exercise program Met      Long Term Goals: 12 weeks  Pt to report minimal/no tenderness to palpation of pelvic floor to demonstrate improved trigger points and overactivity Partially Met   Pt to demonstrate an improved score in the FOTO Pelvic Pain (PFDI) survey to no more than 8% impaired to demonstrate improving pelvic floor muscle tenderness, coordination. Not Met    Pt to deny pelvic pain with penetration (gynecological exam or intercourse) and orgasm to demonstrate improved pelvic floor muscle overactivity Partially Met   Pt to increase pelvic floor strength to 4/5 to demonstrate improved strength needed for continence with ADLs. Partially Met   Pt to be independent with advanced home exercise program Not Met   Pt to demonstrate bilateral hip strength of 5/5 for improved pelvic girdle support with mobility. Not Met   Pt to report minimal/no back pain with ADLs and functional mobility to demonstrate improved lumbopelvic muscle support, coordination Not Met        Plan     Continue per Plan of Care  Increasing frequency to 2x/week for more pelvic girdle stability training      Barrett Pastrana, PT

## 2023-03-08 NOTE — PROGRESS NOTES
[facility]   Physical Therapy Treatment Note     Name: Divya Jacinto [facility]  Clinic Number: [id number]    Therapy Diagnosis:   Encounter Diagnoses   Name Primary?    Pelvic pain Yes    Decreased strength of trunk and back     Chronic left-sided low back pain without sciatica      Referring Provider: Arnulfo    Visit Date: 3/9/2023    Referring Provider Orders: PT Eval and Treat  Medical Diagnosis from Referral: Pelvic floor dysfunction in female [M62.89]  Evaluation Date: 4/29/2022  Last Reassessment: 12/20/2022, visit #28  Authorization Period Expiration: 12/31/2023  Plan of Care Expiration: 3/20/2023  Visit # / Visits authorized: 38 (11/24)  FOTO: 3 (4/29/2022, 6/20/2022, 12/20/2022)    Time In: 1515  Time Out: 1600  Total Billable Time: 45 minutes    Precautions: Standard    Subjective     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     She was compliant with home exercise program  Response to previous treatment: feels like the DN is helping some.   Functional change: fluctuating improvements in urinary urgency and back/hip/pelvic pain    Pain: L anterior hip - 6/10    Objective     Divya Jacinto received the following interventions during the treatment session -  (TrA = transverse abdominis, PFM = pelvic floor muscle)  Pt consented to pelvic floor muscle assessment and treatment in prior visit - see EMR  All interventions billed as Therapeutic Exercise, per Medicaid guidelines    Therapeutic exercises to develop strength, endurance and core stabilization for 25 minutes -  [] TrA brace + straight leg raise (R&L), 2x4  [] Open books with green band (R&L), x10  [] Standing pallof press with doubled red band (R&L), 2x15  [] TrA brace + side-lying hip abduction (R&L), 2x8  [] Cat/cow with coordinated breath, 2 minutes   [] TrA brace + quadruped hip extension (R&L), x10  [] Standing on foam pad + TrA brace + standing shoulder extension with  "blue band (B), x20  [] Standing on foam pad + TrA brace + standing row with blue band (B), x20  [] TrA brace + wall push up, 2x15  [] Side-stepping with green band around knees, 3 minutes  [] Seated on theraball + pelvic rotations, 2 minutes  [] Seated on theraball + alternating kicks, 1 minute  [] Seated on theraball + row with green band, x40  [] Seated on theraball + alternating shoulder flexion (3#, R&L), x15   [] Lateral step up/down on 6-inch step (L), 2x10  [] Standing on foam pad + TrA brace + toe taps to cone 20x R/L  [] Standing hip flexion + hip external rotation lift (2#, R&L), x10  [] TrA brace + standing straight leg raise (2#, R&L), 2x10  [] TrA brace + sit to stands from chair + 2 foam pads, 4x6  [] Shuttle squats, 3x20 (3 black bands and 1 red band or resistance)  [] Treadmill walkinmph, 10% incline, 5 minutes  [] Standing hip/knee extension on Pilates chair with 2 blue springs and 1 red spring (R&L), x20  [] Happy baby stretch + diaphragmatic breathing, pelvic drop, 1 minute  [] Child's pose + diaphragmatic breathing, pelvic drop, 2 minutes  [] Butterfly hip stretch + diaphragmatic breathing, pelvic drop, 2 minutes  [] Half-kneeling lunge/hip flexor stretch (L), 1 minute  [] Supine hamstring stretch with strap (L), 1 minute  [] Hip flexor stretch in standing (L), 2x30 seconds  [] Lucian test hip flexor stretch (L), 2x1 minute  [] Diaphragmatic breathing + pelvic drop, 2 minutes in hooklying position  [] Seated ball roll out, 2x2 minutes - improved R lower back pain  [] Seated figure-4 stretch (R&L), 1 minute  [] Pelvic floor relaxation guided meditation - performed during e-stim for dry needling  [] HEP review, patient education  [] Marching with +ER ( sartorius ) 40ftx2  [x] L prone hip flexor stretch with 1/2 roll under knee, 4x30"  [] Prone ball squeezes between ankles 5" hod 10x - experienced cramping in HS  [] Supine hip flexor iso/abdominal bracing with red ball, 5" hold x20 R/L  [] " "Elevated clams, 20x R/L  [] Reverse clams (2#, R&L), x25  [] Straight leg bridging on blue ball, 2x10 5" hold  [] L SLS on airex + ball toss at rebounder, 2x20  [x] Prone press up x10  [x] Prone femoral nerve glides 2x20  [x] Prone hip extension 2x20  [x] Standing knee flexion x20 **increased left anterior hip pain  []       Manual therapy techniques: to develop flexibility, desensitization, and extensibility for 15 minutes  -  [] Obturator release at ischial tuberosities + active release with resisted contralateral hip external rotation (B)  [] Manual release to L hip flexors - improved tenderness after a few minutes of release  [] Manual release to L pelvic floor muscles internally/vaginally: levator ani - mild increased tone and tenderness that improved within a few minutes of release. PT provided cues for diaphragmatic breathing, visualization, and relaxation.  [] Myofascial decompression/cupping to L lower abdominal wall and anterior/medial hip  [x] Dry needling with electrical stimulation to L iliacus, iliopsoas - no adverse effect, written consent provided 5/9/22, see EMR.  [] Lateral and long-arm distraction to L hip with belt  [] Trigger point release to L iliopsoas using green theraroller   [] Taping to bilateral sacroiliac join  [] Sacroiliac muscle-energy technique - pt responded best to resisted R hip flexion // L hip extension  [] Manual hip flexor stretch: prone, with hip internal rotation (L), 1 minute  [] L iliacus release  [] Side lying L hip flexor stretch 4 x 30" - performed by clinician      Home Exercises and Patient Education     Patient Education: progression of plan of care, plan for next session, pt prognosis, pelvic floor anatomy & function, relationship between TrA & PFM, relationship between hips & PFM, relationship between pelvic floor dysfunction & lower back pain, multifidi anatomy & function, lumbar spine anatomy    HEP update (1/12/23): Standing hip abduction circles, tabletop heel " dip, standing hip flexion + external rotation    Exercises were reviewed, and Divya Jacinto was able to demonstrate them prior to the end of the session, as needed. Divya Jacinto demonstrated good understanding of the education provided.      Assessment     Emphasis was focused on femoral nerve mobility and mobilization of the left anterior chain. She tolerated all interventions well, but she reported some increased pain with standing knee flexion. Continue to focus on hip stability with dynamic/static movements. Pt is following up with MD concerning possible PRP injections.     Divya Jacinto is progressing well towards her goals.   Pt prognosis: good    Pt will continue to benefit from skilled outpatient physical therapy to address the deficits listed in the problem list box on initial evaluation, provide pt/family education and to maximize pt's level of independence in the home and community environment.     Pt's spiritual, cultural and educational needs considered and pt agreeable to plan of care and goals.  Anticipated barriers to physical therapy: none      Short Term Goals: 6 weeks   Pt to report 50% improvement in tenderness to palpation of pelvic floor to demonstrate improved trigger points and overactivity Met   Pt to report only rare incidences of urinary incontinence with coughing to demonstrate improved pelvic floor muscle coordination Met   Pt to report 50% improvement in pain with penetration (gynecological exam or intercourse) and orgasm to demonstrate improved pelvic floor muscle overactivity Met   Pt to be independent with introductory home exercise program Met      Long Term Goals: 12 weeks  Pt to report minimal/no tenderness to palpation of pelvic floor to demonstrate improved trigger points and overactivity Partially Met   Pt to demonstrate an improved score in the FOTO Pelvic Pain (PFDI) survey to no more than 8% impaired to demonstrate improving pelvic floor muscle tenderness, coordination. Not Met    Pt to deny pelvic pain with penetration (gynecological exam or intercourse) and orgasm to demonstrate improved pelvic floor muscle overactivity Partially Met   Pt to increase pelvic floor strength to 4/5 to demonstrate improved strength needed for continence with ADLs. Partially Met   Pt to be independent with advanced home exercise program Not Met   Pt to demonstrate bilateral hip strength of 5/5 for improved pelvic girdle support with mobility. Not Met   Pt to report minimal/no back pain with ADLs and functional mobility to demonstrate improved lumbopelvic muscle support, coordination Not Met        Plan     Continue per Plan of Care  Increasing frequency to 2x/week for more pelvic girdle stability training      Jess Ureña, PT

## 2023-03-09 ENCOUNTER — CLINICAL SUPPORT (OUTPATIENT)
Dept: REHABILITATION | Facility: OTHER | Age: 38
End: 2023-03-09
Payer: MEDICAID

## 2023-03-09 DIAGNOSIS — G89.29 CHRONIC LEFT-SIDED LOW BACK PAIN WITHOUT SCIATICA: ICD-10-CM

## 2023-03-09 DIAGNOSIS — M54.50 CHRONIC LEFT-SIDED LOW BACK PAIN WITHOUT SCIATICA: ICD-10-CM

## 2023-03-09 DIAGNOSIS — R29.898 DECREASED STRENGTH OF TRUNK AND BACK: ICD-10-CM

## 2023-03-09 DIAGNOSIS — R10.2 PELVIC PAIN: Primary | ICD-10-CM

## 2023-03-09 PROCEDURE — 97110 THERAPEUTIC EXERCISES: CPT | Mod: PN | Performed by: PHYSICAL THERAPIST

## 2023-03-09 NOTE — PATIENT INSTRUCTIONS
Urge Suppression  1) Stop what you're doing and put both feet on the ground  2) Perform 5-10 quick flicks of the pelvic floor, focusing on full relaxation between reps   - Imagine closing and opening the doors to the vagina  3) Take a deep breath and relax your pelvic floor   - Focusing on relaxing the jaw or belly might work better  4) Assess if the urge was real   - How long has it been since your last void?   - Does the urgency go away or not?  5) Proceed to the bathroom, if needed    *If applicable, gently squeeze the pelvic floor while coming to stand to help manage increased urgency with the transfer  *You may need to repeat the steps if urgency arises again on the way to the bathroom  *Try to suppress urgency when it's of a medium intensity as it's harder to suppress when urgency is severe  *Avoid performing a long squeeze of the pelvic floor to hold back urine, as it will likely not be effective  *Identify your triggers (putting key into the front door, seeing the toilet, arranging clothing for voiding)

## 2023-03-09 NOTE — PROGRESS NOTES
[facility]   Physical Therapy Treatment Note     Name: Divya Jacinto [facility]  Clinic Number: [id number]    Therapy Diagnosis:   Encounter Diagnoses   Name Primary?    Pelvic pain Yes    Decreased strength of trunk and back     Chronic left-sided low back pain without sciatica      Referring Provider: Arnulfo    Visit Date: 3/9/2023    Referring Provider Orders: PT Eval and Treat  Medical Diagnosis from Referral: Pelvic floor dysfunction in female [M62.89]  Evaluation Date: 4/29/2022  Last Reassessment: 12/20/2022, visit #28  Authorization Period Expiration: 12/31/2023  Plan of Care Expiration: 3/20/2023  Visit # / Visits authorized: 38 (11/24)  FOTO: 3 (4/29/2022, 6/20/2022, 12/20/2022)    Time In: 13:00  Time Out: 14:00  Total Billable Time: 55 minutes    Precautions: Standard    Subjective     Divya Jacinto reports minimal discomfort on the anterior hip today, but the inner thigh is feeling tight. She wants to try the elliptical today.     She was compliant with home exercise program  Response to previous treatment: feels like the DN is helping some.   Functional change: fluctuating improvements in urinary urgency and back/hip/pelvic pain    Pain: L anterior hip - 6/10    Objective     Divya Jacinto received the following interventions during the treatment session -  (TrA = transverse abdominis, PFM = pelvic floor muscle)  Pt consented to pelvic floor muscle assessment and treatment in prior visit - see EMR  All interventions billed as Therapeutic Exercise, per Medicaid guidelines    Therapeutic exercises to develop strength, endurance and core stabilization for 38 minutes -  [x] Elliptical: level 3, 5 minutes  [x] Hooklying hip adduction isometric with ball, x20 with 50% effort  [] TrA brace + straight leg raise (R&L), 2x4  [] Open books with green band (R&L), x10  [] Standing pallof press with doubled red band (R&L), 2x15  [] TrA brace + side-lying hip abduction (R&L), 2x8  [] Cat/cow with  coordinated breath, 2 minutes   [] TrA brace + quadruped hip extension (R&L), x10  [] Standing on foam pad + TrA brace + standing shoulder extension with blue band (B), x20  [] Standing on foam pad + TrA brace + standing row with blue band (B), x20  [] TrA brace + wall push up, 2x15  [] Side-stepping with green band around knees, 3 minutes  [] Seated on theraball + pelvic rotations, 2 minutes  [] Seated on theraball + alternating kicks, 1 minute  [] Seated on theraball + row with green band, x40  [] Seated on theraball + alternating shoulder flexion (3#, R&L), x15   [x] Lateral step up/down on 6-inch step (R&L), 2x12  [x] Standing on foam pad + TrA brace + toe taps to cone (2#, R&L), 2x20  [x] Standing hip flexion + hip external rotation lift (2#, R&L), 2x12  [x] TrA brace + standing straight leg raise (2#, R&L), 2x12  [] TrA brace + sit to stands from chair + 2 foam pads, 4x6  [] Shuttle squats, 3x20 (3 black bands and 1 red band or resistance)  [] Treadmill walkinmph, 10% incline, 5 minutes  [x] Standing hip/knee extension on Pilates chair with 2 blue springs and 1 red spring (R&L), x20  [] Happy baby stretch + diaphragmatic breathing, pelvic drop, 1 minute  [] Child's pose + diaphragmatic breathing, pelvic drop, 2 minutes  [] Butterfly hip stretch + diaphragmatic breathing, pelvic drop, 2 minutes  [] Half-kneeling lunge/hip flexor stretch (L), 1 minute  [] Supine hamstring stretch with strap (L), 1 minute  [] Hip flexor stretch in standing (L), 2x30 seconds  [] Lucian test hip flexor stretch (L), 2x1 minute  [] Diaphragmatic breathing + pelvic drop, 2 minutes in hooklying position  [] Seated ball roll out, 2x2 minutes - improved R lower back pain  [] Seated figure-4 stretch (R&L), 1 minute  [] Pelvic floor relaxation guided meditation - performed during e-stim for dry needling  [x] HEP review, patient education  [] Marching with +ER ( sartorius ) 40ftx2  [] L prone hip flexor stretch with 1/2 roll under knee,  "4x30"  [] Prone ball squeezes between ankles 5" hod 10x - experienced cramping in HS  [] Supine hip flexor iso/abdominal bracing with red ball, 5" hold x20 R/L  [] Elevated clams, 20x R/L  [x] Reverse clams (2#, R&L), x25  [] Straight leg bridging on blue ball, 2x10 5" hold  [] L SLS on airex + ball toss at rebounder, 2x20  [] Prone press up x10  [] Prone femoral nerve glides 2x20  [] Prone hip extension 2x20  [] Standing knee flexion x20 **increased left anterior hip pain  []   []   []     Manual therapy techniques: to develop flexibility, desensitization, and extensibility for 15 minutes  -  [] Obturator release at ischial tuberosities + active release with resisted contralateral hip external rotation (B)  [] Manual release to L hip flexors - improved tenderness after a few minutes of release  [] Manual release to L pelvic floor muscles internally/vaginally: levator ani - mild increased tone and tenderness that improved within a few minutes of release. PT provided cues for diaphragmatic breathing, visualization, and relaxation.  [] Myofascial decompression/cupping to L lower abdominal wall and anterior/medial hip  [x] Dry needling with electrical stimulation to L adductor longus, adductor chepe, pectineus, sartorious, iliopsoas - no adverse effect, written consent provided 5/9/22, see EMR.  [] Lateral and long-arm distraction to L hip with belt  [] Trigger point release to L iliopsoas using green theraroller   [] Taping to bilateral sacroiliac join  [] Sacroiliac muscle-energy technique - pt responded best to resisted R hip flexion // L hip extension  [] Manual hip flexor stretch: prone, with hip internal rotation (L), 1 minute  [] L iliacus release  [] Side lying L hip flexor stretch 4 x 30" - performed by clinician    Therapeutic activities to improve functional performance for 2 minutes -  [x] Instruction on urge suppression techniques      Home Exercises and Patient Education     Patient Education: " progression of plan of care, plan for next session, pt prognosis, pelvic floor anatomy & function, relationship between TrA & PFM, relationship between hips & PFM, relationship between pelvic floor dysfunction & lower back pain, multifidi anatomy & function, lumbar spine anatomy    HEP update (1/12/23): Standing hip abduction circles, tabletop heel dip, standing hip flexion + external rotation    Exercises were reviewed, and Divya Jacinto was able to demonstrate them prior to the end of the session, as needed. Divya Jacinto demonstrated good understanding of the education provided.      Assessment     Pt tolerated treatment session well, with no increased symptoms with exercise and improved L hip discomfort/tightness at the conclusion of today's session. Pt's functional mobility and ability to perform ADLs still limited by pain and weakness. She requires skilled therapy for continued patient education and progressive resistance exercise.     Divya Jacinto is progressing well towards her goals.   Pt prognosis: good    Pt will continue to benefit from skilled outpatient physical therapy to address the deficits listed in the problem list box on initial evaluation, provide pt/family education and to maximize pt's level of independence in the home and community environment.     Pt's spiritual, cultural and educational needs considered and pt agreeable to plan of care and goals.  Anticipated barriers to physical therapy: none      Short Term Goals: 6 weeks   Pt to report 50% improvement in tenderness to palpation of pelvic floor to demonstrate improved trigger points and overactivity Met   Pt to report only rare incidences of urinary incontinence with coughing to demonstrate improved pelvic floor muscle coordination Met   Pt to report 50% improvement in pain with penetration (gynecological exam or intercourse) and orgasm to demonstrate improved pelvic floor muscle overactivity Met   Pt to be independent with introductory home  exercise program Met      Long Term Goals: 12 weeks  Pt to report minimal/no tenderness to palpation of pelvic floor to demonstrate improved trigger points and overactivity Partially Met   Pt to demonstrate an improved score in the FOTO Pelvic Pain (PFDI) survey to no more than 8% impaired to demonstrate improving pelvic floor muscle tenderness, coordination. Not Met   Pt to deny pelvic pain with penetration (gynecological exam or intercourse) and orgasm to demonstrate improved pelvic floor muscle overactivity Partially Met   Pt to increase pelvic floor strength to 4/5 to demonstrate improved strength needed for continence with ADLs. Partially Met   Pt to be independent with advanced home exercise program Not Met   Pt to demonstrate bilateral hip strength of 5/5 for improved pelvic girdle support with mobility. Not Met   Pt to report minimal/no back pain with ADLs and functional mobility to demonstrate improved lumbopelvic muscle support, coordination Not Met        Plan     Continue per Plan of Care  Increasing frequency to 2x/week for more pelvic girdle stability training      Jess Ureña, PT

## 2023-03-14 ENCOUNTER — CLINICAL SUPPORT (OUTPATIENT)
Dept: REHABILITATION | Facility: OTHER | Age: 38
End: 2023-03-14
Payer: MEDICAID

## 2023-03-14 DIAGNOSIS — M54.50 CHRONIC LEFT-SIDED LOW BACK PAIN WITHOUT SCIATICA: ICD-10-CM

## 2023-03-14 DIAGNOSIS — R10.2 PELVIC PAIN: Primary | ICD-10-CM

## 2023-03-14 DIAGNOSIS — R29.898 DECREASED STRENGTH OF TRUNK AND BACK: ICD-10-CM

## 2023-03-14 DIAGNOSIS — G89.29 CHRONIC LEFT-SIDED LOW BACK PAIN WITHOUT SCIATICA: ICD-10-CM

## 2023-03-14 PROCEDURE — 97110 THERAPEUTIC EXERCISES: CPT | Mod: PN

## 2023-03-14 PROCEDURE — 97140 MANUAL THERAPY 1/> REGIONS: CPT | Mod: PN

## 2023-03-14 PROCEDURE — 97014 ELECTRIC STIMULATION THERAPY: CPT | Mod: PN

## 2023-03-14 NOTE — PROGRESS NOTES
[facility]   Physical Therapy Treatment Note     Name: Divya Jacinto [facility]  Clinic Number: [id number]    Therapy Diagnosis:   Encounter Diagnoses   Name Primary?    Pelvic pain Yes    Decreased strength of trunk and back     Chronic left-sided low back pain without sciatica      Referring Provider: Arnulfo    Visit Date: 3/14/2023    Referring Provider Orders: PT Eval and Treat  Medical Diagnosis from Referral: Pelvic floor dysfunction in female [M62.89]  Evaluation Date: 4/29/2022  Last Reassessment: 12/20/2022, visit #28  Authorization Period Expiration: 12/31/2023  Plan of Care Expiration: 3/20/2023  Visit # / Visits authorized: 39 (12/24)  FOTO: 3 (4/29/2022, 6/20/2022, 12/20/2022)    Time In: 4:00  Time Out: 5:00  Total Billable Time: 55 minutes    Precautions: Standard    Subjective     Divya Jacinto reports significant anterior hip and inner thigh tightness on left.     She was compliant with home exercise program  Response to previous treatment: feels like the DN is helping some.   Functional change: fluctuating improvements in urinary urgency and back/hip/pelvic pain    Pain: L anterior hip - 6/10    Objective     Divya Jacinto received the following interventions during the treatment session -  (TrA = transverse abdominis, PFM = pelvic floor muscle)  Pt consented to pelvic floor muscle assessment and treatment in prior visit - see EMR  All interventions billed as Therapeutic Exercise, per Medicaid guidelines    Therapeutic exercises to develop strength, endurance and core stabilization for 15 minutes -  [] Elliptical: level 3, 5 minutes  [] Hooklying hip adduction isometric with ball, x20 with 50% effort  [] TrA brace + straight leg raise (R&L), 2x4  [] Open books with green band (R&L), x10  [] Standing pallof press with doubled red band (R&L), 2x15  [] TrA brace + side-lying hip abduction (R&L), 2x8  [] Cat/cow with coordinated breath, 2 minutes   [] TrA brace + quadruped hip  "extension (R&L), x10  [] Standing on foam pad + TrA brace + standing shoulder extension with blue band (B), x20  [] Standing on foam pad + TrA brace + standing row with blue band (B), x20  [] TrA brace + wall push up, 2x15  [] Side-stepping with green band around knees, 3 minutes  [] Seated on theraball + pelvic rotations, 2 minutes  [] Seated on theraball + alternating kicks, 1 minute  [] Seated on theraball + row with green band, x40  [] Seated on theraball + alternating shoulder flexion (3#, R&L), x15   [] Lateral step up/down on 6-inch step (R&L), 2x12  [] Standing on foam pad + TrA brace + toe taps to cone (2#, R&L), 2x20  [] Standing hip flexion + hip external rotation lift (2#, R&L), 2x12  [] TrA brace + standing straight leg raise (2#, R&L), 2x12  [] TrA brace + sit to stands from chair + 2 foam pads, 4x6  [] Shuttle squats, 3x20 (3 black bands and 1 red band or resistance)  [] Treadmill walkinmph, 10% incline, 5 minutes  [] Standing hip/knee extension on Pilates chair with 2 blue springs and 1 red spring (R&L), x20  [] Happy baby stretch + diaphragmatic breathing, pelvic drop, 1 minute  [] Child's pose + diaphragmatic breathing, pelvic drop, 2 minutes  [] Butterfly hip stretch + diaphragmatic breathing, pelvic drop, 2 minutes  [] Half-kneeling lunge/hip flexor stretch (L), 1 minute  [] Supine hamstring stretch with strap (L), 1 minute  [] Hip flexor stretch in standing (L), 2x30 seconds  [] Lucian test hip flexor stretch (L), 2x1 minute  [] Diaphragmatic breathing + pelvic drop, 2 minutes in hooklying position  [] Seated ball roll out, 2x2 minutes - improved R lower back pain  [] Seated figure-4 stretch (R&L), 1 minute  [] Pelvic floor relaxation guided meditation - performed during e-stim for dry needling  [] HEP review, patient education  [] Marching with +ER ( sartorius ) 40ftx2  [] L prone hip flexor stretch with 1/2 roll under knee, 4x30"  [] Prone ball squeezes between ankles 5" hod 10x - " "experienced cramping in HS  [] Supine hip flexor iso/abdominal bracing with red ball, 5" hold x20 R/L  [] Elevated clams, 20x R/L  [] Reverse clams (2#, R&L), x25  [] Straight leg bridging on blue ball, 2x10 5" hold  [] L SLS on airex + ball toss at rebounder, 2x20  [] Prone press up x10  [x] Prone femoral nerve glides 2x20  [x] Prone hip extension 2x20  [] Standing knee flexion x20 **increased left anterior hip pain  [x]Prone knee flexion with hip extension x20   []   []     Manual therapy techniques: to develop flexibility, desensitization, and extensibility for 25 minutes  -  [] Obturator release at ischial tuberosities + active release with resisted contralateral hip external rotation (B)  [] Manual release to L hip flexors - improved tenderness after a few minutes of release  [] Manual release to L pelvic floor muscles internally/vaginally: levator ani - mild increased tone and tenderness that improved within a few minutes of release. PT provided cues for diaphragmatic breathing, visualization, and relaxation.  [] Myofascial decompression/cupping to L lower abdominal wall and anterior/medial hip  [x] Dry needling with electrical stimulation to L adductor longus, adductor chepe, sartorious, iliopsoas - no adverse effect, written consent provided 5/9/22, see EMR.  [] Lateral and long-arm distraction to L hip with belt  [] Trigger point release to L iliopsoas using green theraroller   [] Taping to bilateral sacroiliac join  [] Sacroiliac muscle-energy technique - pt responded best to resisted R hip flexion // L hip extension  [] Manual hip flexor stretch: prone, with hip internal rotation (L), 1 minute  [] L iliacus release  [] Side lying L hip flexor stretch 4 x 30" - performed by clinician    Therapeutic activities to improve functional performance for 0 minutes -  [] Instruction on urge suppression techniques      Home Exercises and Patient Education     Patient Education: progression of plan of care, plan for " next session, pt prognosis, pelvic floor anatomy & function, relationship between TrA & PFM, relationship between hips & PFM, relationship between pelvic floor dysfunction & lower back pain, multifidi anatomy & function, lumbar spine anatomy    HEP update (1/12/23): Standing hip abduction circles, tabletop heel dip, standing hip flexion + external rotation    Exercises were reviewed, and Divya Jacinto was able to demonstrate them prior to the end of the session, as needed. Divya Jacinto demonstrated good understanding of the education provided.      Assessment     Pt tolerated treatment session well, with no increased symptoms with exercise and improved L hip discomfort/tightness at the conclusion of today's session. Pt's functional mobility and ability to perform ADLs still limited by pain and weakness. She requires skilled therapy for continued patient education and progressive resistance exercise.     Divya Jacinto is progressing well towards her goals.   Pt prognosis: good    Pt will continue to benefit from skilled outpatient physical therapy to address the deficits listed in the problem list box on initial evaluation, provide pt/family education and to maximize pt's level of independence in the home and community environment.     Pt's spiritual, cultural and educational needs considered and pt agreeable to plan of care and goals.  Anticipated barriers to physical therapy: none      Short Term Goals: 6 weeks   Pt to report 50% improvement in tenderness to palpation of pelvic floor to demonstrate improved trigger points and overactivity Met   Pt to report only rare incidences of urinary incontinence with coughing to demonstrate improved pelvic floor muscle coordination Met   Pt to report 50% improvement in pain with penetration (gynecological exam or intercourse) and orgasm to demonstrate improved pelvic floor muscle overactivity Met   Pt to be independent with introductory home exercise program Met      Long Term  Goals: 12 weeks  Pt to report minimal/no tenderness to palpation of pelvic floor to demonstrate improved trigger points and overactivity Partially Met   Pt to demonstrate an improved score in the FOTO Pelvic Pain (PFDI) survey to no more than 8% impaired to demonstrate improving pelvic floor muscle tenderness, coordination. Not Met   Pt to deny pelvic pain with penetration (gynecological exam or intercourse) and orgasm to demonstrate improved pelvic floor muscle overactivity Partially Met   Pt to increase pelvic floor strength to 4/5 to demonstrate improved strength needed for continence with ADLs. Partially Met   Pt to be independent with advanced home exercise program Not Met   Pt to demonstrate bilateral hip strength of 5/5 for improved pelvic girdle support with mobility. Not Met   Pt to report minimal/no back pain with ADLs and functional mobility to demonstrate improved lumbopelvic muscle support, coordination Not Met        Plan     Continue per Plan of Care  Increasing frequency to 2x/week for more pelvic girdle stability training      Barrett Pastrana, PT

## 2023-03-20 ENCOUNTER — CLINICAL SUPPORT (OUTPATIENT)
Dept: REHABILITATION | Facility: OTHER | Age: 38
End: 2023-03-20
Payer: MEDICAID

## 2023-03-20 DIAGNOSIS — M54.50 CHRONIC LEFT-SIDED LOW BACK PAIN WITHOUT SCIATICA: ICD-10-CM

## 2023-03-20 DIAGNOSIS — R29.898 DECREASED STRENGTH OF TRUNK AND BACK: ICD-10-CM

## 2023-03-20 DIAGNOSIS — G89.29 CHRONIC LEFT-SIDED LOW BACK PAIN WITHOUT SCIATICA: ICD-10-CM

## 2023-03-20 DIAGNOSIS — R10.2 PELVIC PAIN: Primary | ICD-10-CM

## 2023-03-20 PROCEDURE — 97014 ELECTRIC STIMULATION THERAPY: CPT | Mod: PN

## 2023-03-20 PROCEDURE — 97140 MANUAL THERAPY 1/> REGIONS: CPT | Mod: PN

## 2023-03-20 PROCEDURE — 97164 PT RE-EVAL EST PLAN CARE: CPT | Mod: PN

## 2023-03-20 NOTE — PROGRESS NOTES
[facility]   Physical Therapy Updated Plan of Care    Name: Divya Jacinto [facility]  Clinic Number: [id number]    Therapy Diagnosis:   Encounter Diagnoses   Name Primary?    Pelvic pain Yes    Decreased strength of trunk and back     Chronic left-sided low back pain without sciatica      Referring Provider: Arnulfo    Visit Date: 3/20/2023    Referring Provider Orders: PT Eval and Treat  Medical Diagnosis from Referral: Pelvic floor dysfunction in female [M62.89]  Evaluation Date: 4/29/2022  Last Reassessment: 12/20/2022, visit #28  Authorization Period Expiration: 12/31/2023  Plan of Care Expiration: 3/20/2023  Visit # / Visits authorized: 40 (13/24)  FOTO: 3 (4/29/2022, 6/20/2022, 12/20/2022)    Time In: 4:10  Time Out: 5:00  Total Billable Time: 50 minutes    Precautions: Standard    Subjective     Divya Jacinto reports persistent anterior hip and inner thigh tightness on left.     She was compliant with home exercise program  Response to previous treatment: feels like the DN is helping some.   Functional change: fluctuating improvements in urinary urgency and back/hip/pelvic pain    Pain: L anterior hip - 6/10    Objective     Divya Jacinto received the following interventions during the treatment session -  (TrA = transverse abdominis, PFM = pelvic floor muscle)  Pt consented to pelvic floor muscle assessment and treatment in prior visit - see EMR  All interventions billed as Therapeutic Exercise, per Medicaid guidelines    Therapeutic exercises to develop strength, endurance and core stabilization for 00 minutes -  [] Elliptical: level 3, 5 minutes  [] Hooklying hip adduction isometric with ball, x20 with 50% effort  [] TrA brace + straight leg raise (R&L), 2x4  [] Open books with green band (R&L), x10  [] Standing pallof press with doubled red band (R&L), 2x15  [] TrA brace + side-lying hip abduction (R&L), 2x8  [] Cat/cow with coordinated breath, 2 minutes   [] TrA brace + quadruped hip  "extension (R&L), x10  [] Standing on foam pad + TrA brace + standing shoulder extension with blue band (B), x20  [] Standing on foam pad + TrA brace + standing row with blue band (B), x20  [] TrA brace + wall push up, 2x15  [] Side-stepping with green band around knees, 3 minutes  [] Seated on theraball + pelvic rotations, 2 minutes  [] Seated on theraball + alternating kicks, 1 minute  [] Seated on theraball + row with green band, x40  [] Seated on theraball + alternating shoulder flexion (3#, R&L), x15   [] Lateral step up/down on 6-inch step (R&L), 2x12  [] Standing on foam pad + TrA brace + toe taps to cone (2#, R&L), 2x20  [] Standing hip flexion + hip external rotation lift (2#, R&L), 2x12  [] TrA brace + standing straight leg raise (2#, R&L), 2x12  [] TrA brace + sit to stands from chair + 2 foam pads, 4x6  [] Shuttle squats, 3x20 (3 black bands and 1 red band or resistance)  [] Treadmill walkinmph, 10% incline, 5 minutes  [] Standing hip/knee extension on Pilates chair with 2 blue springs and 1 red spring (R&L), x20  [] Happy baby stretch + diaphragmatic breathing, pelvic drop, 1 minute  [] Child's pose + diaphragmatic breathing, pelvic drop, 2 minutes  [] Butterfly hip stretch + diaphragmatic breathing, pelvic drop, 2 minutes  [] Half-kneeling lunge/hip flexor stretch (L), 1 minute  [] Supine hamstring stretch with strap (L), 1 minute  [] Hip flexor stretch in standing (L), 2x30 seconds  [] Lucian test hip flexor stretch (L), 2x1 minute  [] Diaphragmatic breathing + pelvic drop, 2 minutes in hooklying position  [] Seated ball roll out, 2x2 minutes - improved R lower back pain  [] Seated figure-4 stretch (R&L), 1 minute  [] Pelvic floor relaxation guided meditation - performed during e-stim for dry needling  [] HEP review, patient education  [] Marching with +ER ( sartorius ) 40ftx2  [] L prone hip flexor stretch with 1/2 roll under knee, 4x30"  [] Prone ball squeezes between ankles 5" hod 10x - " "experienced cramping in HS  [] Supine hip flexor iso/abdominal bracing with red ball, 5" hold x20 R/L  [] Elevated clams, 20x R/L  [] Reverse clams (2#, R&L), x25  [] Straight leg bridging on blue ball, 2x10 5" hold  [] L SLS on airex + ball toss at rebounder, 2x20  [] Prone press up x10  [] Prone femoral nerve glides 2x20  [] Prone hip extension 2x20  [] Standing knee flexion x20 **increased left anterior hip pain  []Prone knee flexion with hip extension x20   []   []     Manual therapy techniques: to develop flexibility, desensitization, and extensibility for 25 minutes  -  [] Obturator release at ischial tuberosities + active release with resisted contralateral hip external rotation (B)  [] Manual release to L hip flexors - improved tenderness after a few minutes of release  [] Manual release to L pelvic floor muscles internally/vaginally: levator ani - mild increased tone and tenderness that improved within a few minutes of release. PT provided cues for diaphragmatic breathing, visualization, and relaxation.  [] Myofascial decompression/cupping to L lower abdominal wall and anterior/medial hip  [x] Dry needling with electrical stimulation to L adductor longus, adductor chepe, iliacus, iliopsoas - no adverse effect, written consent provided 5/9/22, see EMR.  [] Lateral and long-arm distraction to L hip with belt  [] Trigger point release to L iliopsoas using green theraroller   [] Taping to bilateral sacroiliac join  [] Sacroiliac muscle-energy technique - pt responded best to resisted R hip flexion // L hip extension  [] Manual hip flexor stretch: prone, with hip internal rotation (L), 1 minute  [] L iliacus release  [] Side lying L hip flexor stretch 4 x 30" - performed by clinician    Therapeutic activities to improve functional performance for 0 minutes -  [] Instruction on urge suppression techniques    Re-assessment    Lumbar Active range of motion  Pain/dysfunction with movement:   Flexion 80 Right " "hamstring tightness   Extension 80 Left hip flexor/adductor "pulling pain"   Right side bending 80 No pain   Left side bending 80 Right QL pain   Right rotation NT    Left rotation NT      Left hip higher than right in stance, slight left SB    Long sitting test: Left long to even    Right LE   Left LE      Iliopsoas:  L2 5/5 Iliopsoas: L2 4/5 **with pain    Quadriceps:  L3 - femoral nerve 5/5 Quadriceps: L3 - femoral nerve 5/5    Hip adduction:  L3 - obterator 5/5 Hip adduction: L3 - obterator 5/5    Hamstrings:  S2 5/5 Hamstrings: S2 4/5    Ankle DF/EV:  L4 5/5 Ankle DF/EV: L4 5/5    GT Ext:  L5 5/5 GT Ext L5 5/5    Hip Abduction:  L5-S1 - Superior gluteal nerve 5/5  Hip Abduction: L5-S1 - Superior gluteal nerve 5/5    Hip extension:  L5-S2 - Inferior gluteal nerve 4/5 Hip extension: L5-S2 - Inferior gluteal nerve 4/5    Ankle PF:   S1 - tibial nerve NT Ankle PF S1 - tibial nerve NT         Slump R: + for increased posterior chain tightness  Slump L:  negative    SLR R: + for increased tightness at right gastroc/hamstring  SLR L: negative    + prone femoral nerve tension on left with increased anterior left hip / groin pain    Joint mobility:   Lumbar: Hypermobility at L2 with segmental PA mobility testing    + prone instability test at L2    No tenderness to palpation at SIJ and SIJ provocation tests negative        Home Exercises and Patient Education     Patient Education: progression of plan of care, plan for next session, pt prognosis, pelvic floor anatomy & function, relationship between TrA & PFM, relationship between hips & PFM, relationship between pelvic floor dysfunction & lower back pain, multifidi anatomy & function, lumbar spine anatomy    HEP update (1/12/23): Standing hip abduction circles, tabletop heel dip, standing hip flexion + external rotation    Exercises were reviewed, and Divya Jacinto was able to demonstrate them prior to the end of the session, as needed. Divya Jacinto demonstrated good " understanding of the education provided.      Assessment     Divya presents today with persisting left side anterior hip and groin discomfort. She reports persisting pain with stressful situations, certain movements, and after orgasm. She stated that she continues to get relief from dry needling treatment, but the relief is temporary. She presents today with s/s of possible left pelvic upslip or innominate rotation. She also had positive signs for femoral nerve irritation and instability at mid-lumbar levels with segmental PA mobility testing. We will continue to address persisting pain and functional limitations with goal of returning to OF.    Divya Jacinto is progressing well towards her goals.   Pt prognosis: good    Pt will continue to benefit from skilled outpatient physical therapy to address the deficits listed in the problem list box on initial evaluation, provide pt/family education and to maximize pt's level of independence in the home and community environment.     Pt's spiritual, cultural and educational needs considered and pt agreeable to plan of care and goals.  Anticipated barriers to physical therapy: none      Short Term Goals: 6 weeks   Pt to report 50% improvement in tenderness to palpation of pelvic floor to demonstrate improved trigger points and overactivity Met   Pt to report only rare incidences of urinary incontinence with coughing to demonstrate improved pelvic floor muscle coordination Met   Pt to report 50% improvement in pain with penetration (gynecological exam or intercourse) and orgasm to demonstrate improved pelvic floor muscle overactivity Met   Pt to be independent with introductory home exercise program Met      Long Term Goals: 12 weeks  Pt to report minimal/no tenderness to palpation of pelvic floor to demonstrate improved trigger points and overactivity Partially Met   Pt to demonstrate an improved score in the FOTO Pelvic Pain (PFDI) survey to no more than 8% impaired to  demonstrate improving pelvic floor muscle tenderness, coordination. Not Met   Pt to deny pelvic pain with penetration (gynecological exam or intercourse) and orgasm to demonstrate improved pelvic floor muscle overactivity Partially Met   Pt to increase pelvic floor strength to 4/5 to demonstrate improved strength needed for continence with ADLs. Partially Met   Pt to be independent with advanced home exercise program Not Met   Pt to demonstrate bilateral hip strength of 5/5 for improved pelvic girdle support with mobility. Not Met   Pt to report minimal/no back pain with ADLs and functional mobility to demonstrate improved lumbopelvic muscle support, coordination Not Met        Plan     Continue per Plan of Care  Increasing frequency to 2x/week for more pelvic girdle stability training, Therapeutic Exercises, Manual Therapeutic Technique, Neuromuscular Re Education, Therapeutic Activities. Modalities, Kinesiotape prn, and Functional Dry Needling as needed.      Barrett Pastrana, PT

## 2023-03-21 NOTE — PROGRESS NOTES
[facility]   Physical Therapy Treatment Note    Name: Divya Jacinto [facility]  Clinic Number: [id number]    Therapy Diagnosis:   Encounter Diagnoses   Name Primary?    Pelvic pain Yes    Decreased strength of trunk and back     Chronic left-sided low back pain without sciatica      Referring Provider: Arnulfo    Visit Date: 3/22/2023    Referring Provider Orders: PT Eval and Treat  Medical Diagnosis from Referral: Pelvic floor dysfunction in female [M62.89]  Evaluation Date: 4/29/2022  Last Reassessment: 3/20/2023  Authorization Period Expiration: 12/31/2023  Plan of Care Expiration: 6/20/2023  Visit # / Visits authorized: 41 (14/24)  FOTO: 3 (4/29/2022, 6/20/2022, 12/20/2022)    Time In: 16:12  Time Out: 17:06  Total Billable Time: 53 minutes    Precautions: Standard    Subjective     Divya Jacinto reports inner thigh soreness/tightness. Her pain is of less intensity and frequency now as compared to several months ago.     She was compliant with home exercise program - does elliptical and standing straight leg raise, sometimes tabletop heel dip (is more compliant with standing exercises because she can do them at work)    Response to previous treatment: no adverse effect  Functional change: fluctuating improvements in urinary urgency and back/hip/pelvic pain    Pain: L anterior hip - 6/10    Objective     Divya Jacinto received the following interventions during the treatment session -  (TrA = transverse abdominis, PFM = pelvic floor muscle)  Pt consented to pelvic floor muscle assessment and treatment in prior visit - see EMR  All interventions billed as Therapeutic Exercise, per Medicaid guidelines    Therapeutic exercises to develop strength, endurance and core stabilization for 38 minutes -  [] Elliptical: level 3, 5 minutes  [] Hooklying hip adduction isometric with ball, x20 with 50% effort  [] Open books with green band (R&L), x10  [] Standing pallof press with doubled red band (R&L),  2x15  [] TrA brace + side-lying hip abduction (R&L), 2x8  [] Cat/cow with coordinated breath, 2 minutes   [] TrA brace + quadruped hip extension (R&L), x10  [] Standing on foam pad + TrA brace + standing shoulder extension with blue band (B), x20  [] Standing on foam pad + TrA brace + standing row with blue band (B), x20  [] TrA brace + wall push up, 2x15  [] Side-stepping with green band around knees, 3 minutes  [] Seated on theraball + pelvic rotations, 2 minutes  [] Seated on theraball + alternating kicks, 1 minute  [] Seated on theraball + row with green band, x40  [] Seated on theraball + alternating shoulder flexion (3#, R&L), x15   [x] Lateral step up/down on 6-inch step (R&L), 2x12  [x] Standing on foam pad + TrA brace + toe taps to cone (2#, R&L), 2x20  [x] Standing hip flexion + hip external rotation lift (2#, R&L), 2x12  [x] TrA brace + standing straight leg raise (2#, R&L), 2x12  [x] TrA brace + standing hip abduction + contralateral arm lift (2#, R&L), 2x12  [] TrA brace + sit to stands from chair + 2 foam pads, 4x6  [] Shuttle squats, 3x20 (3 black bands and 1 red band or resistance)  [] Treadmill walkinmph, 10% incline, 5 minutes  [] Standing hip/knee extension on [id number] chair with 2 blue springs and 1 red spring (R&L), x20  [] Happy baby stretch + diaphragmatic breathing, pelvic drop, 1 minute  [] Child's pose + diaphragmatic breathing, pelvic drop, 2 minutes  [] Butterfly hip stretch + diaphragmatic breathing, pelvic drop, 2 minutes  [] Half-kneeling lunge/hip flexor stretch (L), 1 minute  [] Supine hamstring stretch with strap (L), 1 minute  [] Hip flexor stretch in standing (L), 2x30 seconds  [] Lucian test hip flexor stretch (L), 2x1 minute  [] Diaphragmatic breathing + pelvic drop, 2 minutes in hooklying position  [] Seated ball roll out, 2x2 minutes - improved R lower back pain  [] Seated figure-4 stretch (R&L), 1 minute  [] Pelvic floor relaxation guided meditation - performed during  "e-stim for dry needling  [] HEP review, patient education  [] Marching with +ER ( sartorius ) 40ftx2  [] L prone hip flexor stretch with 1/2 roll under knee, 4x30"  [] Prone ball squeezes between ankles 5" hold 10x - experienced cramping in HS  [] Supine hip flexor iso/abdominal bracing with red ball, 5" hold x20 R/L  [] Elevated clams, 20x R/L  [x] Side-lying reverse clams (2#, R&L), 2x15  [x] Side-lying clams (R&L), 2x15  [] Straight leg bridging on blue ball, 2x10 5" hold  [] L SLS on airex + ball toss at rebounder, 2x20  [] Prone press up x10  [] Prone femoral nerve glides 2x20  [] Prone hip extension 2x20  [] Standing knee flexion x20 **increased left anterior hip pain  [] Prone knee flexion with hip extension x20   []   []     Manual therapy techniques: to develop flexibility, desensitization, and extensibility for 13 minutes  -  [] Obturator release at ischial tuberosities + active release with resisted contralateral hip external rotation (B)  [] Manual release to L hip flexors - improved tenderness after a few minutes of release  [] Manual release to L pelvic floor muscles internally/vaginally: levator ani - mild increased tone and tenderness that improved within a few minutes of release. PT provided cues for diaphragmatic breathing, visualization, and relaxation.  [] Myofascial decompression/cupping to L lower abdominal wall and anterior/medial hip  [x] Dry needling with electrical stimulation to L adductor longus, adductor chepe - no adverse effect, written consent provided 5/9/22, see EMR.  [] Lateral and long-arm distraction to L hip with belt  [] Trigger point release to L iliopsoas using green theraroller   [] Taping to bilateral sacroiliac join  [] Sacroiliac muscle-energy technique - pt responded best to resisted R hip flexion // L hip extension  [] Manual hip flexor stretch: prone, with hip internal rotation (L), 1 minute  [] L iliacus release  [] Side lying L hip flexor stretch 4 x 30" - performed " by clinician    Therapeutic activities to improve functional performance for 2 minutes -  [] Instruction on urge suppression techniques  [x] Trialing SI belt - reduced inner thigh pain when wearing SI belt      Home Exercises and Patient Education     Patient Education: progression of plan of care, plan for next session, pt prognosis, pelvic floor anatomy & function, relationship between TrA & PFM, relationship between hips & PFM, relationship between pelvic floor dysfunction & lower back pain, multifidi anatomy & function, lumbar spine anatomy    HEP (1/12/23): Standing hip abduction circles, tabletop heel dip, standing hip flexion + external rotation  HEP update (3/22/23): Standing hip abduction, standing straight leg raise, standing hip flexion + external rotation, countertop push-ups    Exercises were reviewed, and Divya Jacinto was able to demonstrate them prior to the end of the session, as needed. Divya Jacinto demonstrated good understanding of the education provided.      Assessment     Divya Jacinto tolerated treatment well today, with reduced inner thigh discomfort after manual interventions and when trialing SI belt. She notes fatigue of the inner thigh muscles with standing exercise, suggesting endurance deficits that limit tolerance for prolonged activity. Signs and symptoms still consistent with femoral nerve irritation and instability at mid-lumbar levels, which should respond to continued manual interventions and progressive resistance exercise. HEP updated today to help promote compliance. We will continue to address persisting pain and functional limitations with goal of returning to PLOF.    Divya Jacinto is progressing well towards her goals.   Pt prognosis: good    Pt will continue to benefit from skilled outpatient physical therapy to address the deficits listed in the problem list box on initial evaluation, provide pt/family education and to maximize pt's level of independence in the home and  community environment.     Pt's spiritual, cultural and educational needs considered and pt agreeable to plan of care and goals.  Anticipated barriers to physical therapy: none      Short Term Goals: 6 weeks   Pt to report 50% improvement in tenderness to palpation of pelvic floor to demonstrate improved trigger points and overactivity Met   Pt to report only rare incidences of urinary incontinence with coughing to demonstrate improved pelvic floor muscle coordination Met   Pt to report 50% improvement in pain with penetration (gynecological exam or intercourse) and orgasm to demonstrate improved pelvic floor muscle overactivity Met   Pt to be independent with introductory home exercise program Met      Long Term Goals: 12 weeks  Pt to report minimal/no tenderness to palpation of pelvic floor to demonstrate improved trigger points and overactivity Partially Met   Pt to demonstrate an improved score in the FOTO Pelvic Pain (PFDI) survey to no more than 8% impaired to demonstrate improving pelvic floor muscle tenderness, coordination. Not Met   Pt to deny pelvic pain with penetration (gynecological exam or intercourse) and orgasm to demonstrate improved pelvic floor muscle overactivity Partially Met   Pt to increase pelvic floor strength to 4/5 to demonstrate improved strength needed for continence with ADLs. Partially Met   Pt to be independent with advanced home exercise program Not Met   Pt to demonstrate bilateral hip strength of 5/5 for improved pelvic girdle support with mobility. Not Met   Pt to report minimal/no back pain with ADLs and functional mobility to demonstrate improved lumbopelvic muscle support, coordination Not Met        Plan     Continue per Plan of Care  Increasing frequency to 2x/week for more pelvic girdle stability training, Therapeutic Exercises, Manual Therapeutic Technique, Neuromuscular Re Education, Therapeutic Activities. Modalities, Kinesiotape prn, and Functional Dry Needling as  needed.      Jess Ureña, PT

## 2023-03-22 ENCOUNTER — CLINICAL SUPPORT (OUTPATIENT)
Dept: REHABILITATION | Facility: OTHER | Age: 38
End: 2023-03-22
Payer: MEDICAID

## 2023-03-22 DIAGNOSIS — R10.2 PELVIC PAIN: Primary | ICD-10-CM

## 2023-03-22 DIAGNOSIS — R29.898 DECREASED STRENGTH OF TRUNK AND BACK: ICD-10-CM

## 2023-03-22 DIAGNOSIS — M54.50 CHRONIC LEFT-SIDED LOW BACK PAIN WITHOUT SCIATICA: ICD-10-CM

## 2023-03-22 DIAGNOSIS — G89.29 CHRONIC LEFT-SIDED LOW BACK PAIN WITHOUT SCIATICA: ICD-10-CM

## 2023-03-22 PROCEDURE — 97110 THERAPEUTIC EXERCISES: CPT | Mod: PN | Performed by: PHYSICAL THERAPIST

## 2023-03-27 ENCOUNTER — CLINICAL SUPPORT (OUTPATIENT)
Dept: REHABILITATION | Facility: OTHER | Age: 38
End: 2023-03-27
Payer: MEDICAID

## 2023-03-27 DIAGNOSIS — G89.29 CHRONIC LEFT-SIDED LOW BACK PAIN WITHOUT SCIATICA: ICD-10-CM

## 2023-03-27 DIAGNOSIS — M54.50 CHRONIC LEFT-SIDED LOW BACK PAIN WITHOUT SCIATICA: ICD-10-CM

## 2023-03-27 DIAGNOSIS — R29.898 DECREASED STRENGTH OF TRUNK AND BACK: ICD-10-CM

## 2023-03-27 DIAGNOSIS — R10.2 PELVIC PAIN: Primary | ICD-10-CM

## 2023-03-27 PROCEDURE — 97140 MANUAL THERAPY 1/> REGIONS: CPT | Mod: PN

## 2023-03-27 PROCEDURE — 97014 ELECTRIC STIMULATION THERAPY: CPT | Mod: PN

## 2023-03-28 ENCOUNTER — CLINICAL SUPPORT (OUTPATIENT)
Dept: REHABILITATION | Facility: OTHER | Age: 38
End: 2023-03-28
Payer: MEDICAID

## 2023-03-28 DIAGNOSIS — M54.50 CHRONIC LEFT-SIDED LOW BACK PAIN WITHOUT SCIATICA: ICD-10-CM

## 2023-03-28 DIAGNOSIS — R10.2 PELVIC PAIN: Primary | ICD-10-CM

## 2023-03-28 DIAGNOSIS — G89.29 CHRONIC LEFT-SIDED LOW BACK PAIN WITHOUT SCIATICA: ICD-10-CM

## 2023-03-28 DIAGNOSIS — R29.898 DECREASED STRENGTH OF TRUNK AND BACK: ICD-10-CM

## 2023-03-28 PROCEDURE — 97110 THERAPEUTIC EXERCISES: CPT | Mod: PN | Performed by: PHYSICAL THERAPIST

## 2023-03-28 NOTE — PROGRESS NOTES
[facility]   Physical Therapy Treatment Note    Name: Divya Jacinto [facility]  Clinic Number: [id number]    Therapy Diagnosis:   Encounter Diagnoses   Name Primary?    Pelvic pain Yes    Decreased strength of trunk and back     Chronic left-sided low back pain without sciatica      Referring Provider: Arnulfo    Visit Date: 3/28/2023    Referring Provider Orders: PT Eval and Treat  Medical Diagnosis from Referral: Pelvic floor dysfunction in female [M62.89]  Evaluation Date: 4/29/2022  Last Reassessment: 3/20/2023  Authorization Period Expiration: 12/31/2023  Plan of Care Expiration: 6/20/2023  Visit # / Visits authorized: 41 (14/24)  FOTO: 3 (4/29/2022, 6/20/2022, 12/20/2022)    Time In: 16:00  Time Out: 17:03  Total Billable Time: 55 minutes    Precautions: Standard    Subjective     Divya Jacinto reports inner thigh soreness/tightness. Her pain is of less intensity and frequency now as compared to several months ago. She started wearing an SI belt continuously, which she thinks has been helping her symptoms.     She was compliant with home exercise program - does elliptical and standing straight leg raise, sometimes tabletop heel dip (is more compliant with standing exercises because she can do them at work)    Response to previous treatment: no adverse effect  Functional change: fluctuating improvements in urinary urgency and back/hip/pelvic pain    Pain: L anterior hip - 5/10    Objective     Divya Jacinto received the following interventions during the treatment session -  (TrA = transverse abdominis, PFM = pelvic floor muscle)  Pt consented to pelvic floor muscle assessment and treatment in prior visit - see EMR  All interventions billed as Therapeutic Exercise, per Medicaid guidelines    Therapeutic exercises to develop strength, endurance and core stabilization for 40 minutes -  [] Elliptical: level 3, 5 minutes  [x] Seated hip adduction isometric with ball, x20 with 50% effort  [] Open  books with green band (R&L), x10  [x] Standing pallof press with doubled green band (R&L), 2x10  [] TrA brace + side-lying hip abduction (R&L), 2x8  [] Cat/cow with coordinated breath, 2 minutes   [] TrA brace + quadruped hip extension (R&L), x10  [x] Standing on foam pad + TrA brace + standing shoulder extension with blue band (B), 2x30  [x] Standing on foam pad + TrA brace + standing row with black band (B), 2x30  [] TrA brace + wall push up, 2x15  [x] Side-stepping on foam beam, 2 minutes  [x] Side-stepping with green band around knees, 1 minute  [] Seated on theraball + pelvic rotations, 2 minutes  [] Seated on theraball + alternating kicks, 1 minute  [] Seated on theraball + row with green band, x40  [] Seated on theraball + alternating shoulder flexion (3#, R&L), x15   [] Lateral step up/down on 6-inch step (R&L), 2x12  [x] Standing on foam pad + TrA brace + toe taps to cone (R&L), x20  [] Standing hip flexion + hip external rotation lift (2#, R&L), 2x12  [] TrA brace + standing straight leg raise (2#, R&L), 2x12  [] TrA brace + standing hip abduction + contralateral arm lift (2#, R&L), 2x12  [] TrA brace + sit to stands from chair + 2 foam pads, 4x6  [] Shuttle squats, 3x20 (3 black bands and 1 red band or resistance)  [] Treadmill walkinmph, 10% incline, 5 minutes  [x] Standing hip/knee extension on Pilates chair with 2 blue springs and 2 red springs (R&L), x30  [] Happy baby stretch + diaphragmatic breathing, pelvic drop, 1 minute  [] Child's pose + diaphragmatic breathing, pelvic drop, 2 minutes  [] Butterfly hip stretch + diaphragmatic breathing, pelvic drop, 2 minutes  [] Half-kneeling lunge/hip flexor stretch (L), 1 minute  [] Supine hamstring stretch with strap (L), 1 minute  [] Hip flexor stretch in standing (L), 2x30 seconds  [] Lucian test hip flexor stretch (L), 2x1 minute  [] Diaphragmatic breathing + pelvic drop, 2 minutes in hooklying position  [] Seated ball roll out, 2x2 minutes - improved  "R lower back pain  [] Seated figure-4 stretch (R&L), 1 minute  [] Pelvic floor relaxation guided meditation - performed during e-stim for dry needling  [] HEP review, patient education  [] Marching with +ER ( sartorius ) 40ftx2  [] L prone hip flexor stretch with 1/2 roll under knee, 4x30"  [] Prone ball squeezes between ankles 5" hold 10x - experienced cramping in HS  [] Supine hip flexor iso/abdominal bracing with red ball, 5" hold x20 R/L  [] Elevated clams, 20x R/L  [x] Side-lying reverse clams (2#, R&L), 2x15  [x] Side-lying clams (R&L), 2x15  [] Straight leg bridging on blue ball, 2x10 5" hold  [] L SLS on airex + ball toss at rebounder, 2x20  [] Prone press up x10  [] Prone femoral nerve glides 2x20  [] Prone hip extension 2x20  [] Standing knee flexion x20 **increased left anterior hip pain  [] Prone knee flexion with hip extension x20   []   []     Manual therapy techniques: to develop flexibility, desensitization, and extensibility for 15 minutes  -  [] Obturator release at ischial tuberosities + active release with resisted contralateral hip external rotation (B)  [] Manual release to L hip flexors - improved tenderness after a few minutes of release  [] Manual release to L pelvic floor muscles internally/vaginally: levator ani - mild increased tone and tenderness that improved within a few minutes of release. PT provided cues for diaphragmatic breathing, visualization, and relaxation.  [] Myofascial decompression/cupping to L lower abdominal wall and anterior/medial hip  [x] Dry needling with electrical stimulation to L adductor longus, adductor chepe, pectineus - no adverse effect, written consent provided 5/9/22, see EMR.  [] Lateral and long-arm distraction to L hip with belt  [] Trigger point release to L iliopsoas using green theraroller   [] Taping to bilateral sacroiliac join  [] Sacroiliac muscle-energy technique - pt responded best to resisted R hip flexion // L hip extension  [] Manual hip " "flexor stretch: prone, with hip internal rotation (L), 1 minute  [] L iliacus release  [] Side lying L hip flexor stretch 4 x 30" - performed by clinician    Therapeutic activities to improve functional performance for 0 minutes -  [] Instruction on urge suppression techniques  [] Trialing SI belt - reduced inner thigh pain when wearing SI belt      Home Exercises and Patient Education     Patient Education: progression of plan of care, plan for next session, pt prognosis, pelvic floor anatomy & function, relationship between TrA & PFM, relationship between hips & PFM, relationship between pelvic floor dysfunction & lower back pain, multifidi anatomy & function, lumbar spine anatomy    HEP (3/22/23): standing hip abduction, standing straight leg raise, standing hip flexion + external rotation, countertop push-ups    Exercises were reviewed, and Divya Jacinto was able to demonstrate them prior to the end of the session, as needed. Divya Jacinto demonstrated good understanding of the education provided.      Assessment     Divya Jacinto tolerated treatment well today, with reduced inner thigh discomfort after manual interventions and great tolerance for exercise today. Pt found glute exercises challenging today. We will continue to address persisting pain and functional limitations with goal of returning to OF.    Divya Jacinto is progressing well towards her goals.   Pt prognosis: good    Pt will continue to benefit from skilled outpatient physical therapy to address the deficits listed in the problem list box on initial evaluation, provide pt/family education and to maximize pt's level of independence in the home and community environment.     Pt's spiritual, cultural and educational needs considered and pt agreeable to plan of care and goals.  Anticipated barriers to physical therapy: none      Short Term Goals: 6 weeks   Pt to report 50% improvement in tenderness to palpation of pelvic floor to demonstrate improved " trigger points and overactivity Met   Pt to report only rare incidences of urinary incontinence with coughing to demonstrate improved pelvic floor muscle coordination Met   Pt to report 50% improvement in pain with penetration (gynecological exam or intercourse) and orgasm to demonstrate improved pelvic floor muscle overactivity Met   Pt to be independent with introductory home exercise program Met      Long Term Goals: 12 weeks  Pt to report minimal/no tenderness to palpation of pelvic floor to demonstrate improved trigger points and overactivity Partially Met   Pt to demonstrate an improved score in the FOTO Pelvic Pain (PFDI) survey to no more than 8% impaired to demonstrate improving pelvic floor muscle tenderness, coordination. Not Met   Pt to deny pelvic pain with penetration (gynecological exam or intercourse) and orgasm to demonstrate improved pelvic floor muscle overactivity Partially Met   Pt to increase pelvic floor strength to 4/5 to demonstrate improved strength needed for continence with ADLs. Partially Met   Pt to be independent with advanced home exercise program Not Met   Pt to demonstrate bilateral hip strength of 5/5 for improved pelvic girdle support with mobility. Not Met   Pt to report minimal/no back pain with ADLs and functional mobility to demonstrate improved lumbopelvic muscle support, coordination Not Met        Plan     Continue per Plan of Care  Increasing frequency to 2x/week for more pelvic girdle stability training, Therapeutic Exercises, Manual Therapeutic Technique, Neuromuscular Re Education, Therapeutic Activities. Modalities, Kinesiotape prn, and Functional Dry Needling as needed.      Jess Ureña, PT

## 2023-03-29 NOTE — PROGRESS NOTES
[facility]   Physical Therapy Treatment Note     Name: Divya Jacinto [facility]  Clinic Number: [id number]    Therapy Diagnosis:   Encounter Diagnoses   Name Primary?    Pelvic pain Yes    Decreased strength of trunk and back     Chronic left-sided low back pain without sciatica      Referring Provider: Arnulfo    Visit Date: 3/27/2023    Referring Provider Orders: PT Eval and Treat  Medical Diagnosis from Referral: Pelvic floor dysfunction in female [M62.89]  Evaluation Date: 4/29/2022  Last Reassessment: 12/20/2022, visit #28  Authorization Period Expiration: 12/31/2023  Plan of Care Expiration: 3/20/2023  Visit # / Visits authorized: 43 (16/24)  FOTO: 3 (4/29/2022, 6/20/2022, 12/20/2022)    Time In: 4:15  Time Out: 5:00  Total Billable Time: 45 minutes    Precautions: Standard    Subjective     Divya Jacinto reports persisting anterior hip and inner thigh tightness on left.     She was compliant with home exercise program  Response to previous treatment: feels like the DN is helping some.   Functional change: fluctuating improvements in urinary urgency and back/hip/pelvic pain    Pain: L anterior hip - 6/10    Objective     Divya Jacinto received the following interventions during the treatment session -  (TrA = transverse abdominis, PFM = pelvic floor muscle)  Pt consented to pelvic floor muscle assessment and treatment in prior visit - see EMR  All interventions billed as Therapeutic Exercise, per Medicaid guidelines    Therapeutic exercises to develop strength, endurance and core stabilization for 00 minutes -  [] Elliptical: level 3, 5 minutes  [] Hooklying hip adduction isometric with ball, x20 with 50% effort  [] TrA brace + straight leg raise (R&L), 2x4  [] Open books with green band (R&L), x10  [] Standing pallof press with doubled red band (R&L), 2x15  [] TrA brace + side-lying hip abduction (R&L), 2x8  [] Cat/cow with coordinated breath, 2 minutes   [] TrA brace + quadruped hip  "extension (R&L), x10  [] Standing on foam pad + TrA brace + standing shoulder extension with blue band (B), x20  [] Standing on foam pad + TrA brace + standing row with blue band (B), x20  [] TrA brace + wall push up, 2x15  [] Side-stepping with green band around knees, 3 minutes  [] Seated on theraball + pelvic rotations, 2 minutes  [] Seated on theraball + alternating kicks, 1 minute  [] Seated on theraball + row with green band, x40  [] Seated on theraball + alternating shoulder flexion (3#, R&L), x15   [] Lateral step up/down on 6-inch step (R&L), 2x12  [] Standing on foam pad + TrA brace + toe taps to cone (2#, R&L), 2x20  [] Standing hip flexion + hip external rotation lift (2#, R&L), 2x12  [] TrA brace + standing straight leg raise (2#, R&L), 2x12  [] TrA brace + sit to stands from chair + 2 foam pads, 4x6  [] Shuttle squats, 3x20 (3 black bands and 1 red band or resistance)  [] Treadmill walkinmph, 10% incline, 5 minutes  [] Standing hip/knee extension on Pilates chair with 2 blue springs and 1 red spring (R&L), x20  [] Happy baby stretch + diaphragmatic breathing, pelvic drop, 1 minute  [] Child's pose + diaphragmatic breathing, pelvic drop, 2 minutes  [] Butterfly hip stretch + diaphragmatic breathing, pelvic drop, 2 minutes  [] Half-kneeling lunge/hip flexor stretch (L), 1 minute  [] Supine hamstring stretch with strap (L), 1 minute  [] Hip flexor stretch in standing (L), 2x30 seconds  [] Lucian test hip flexor stretch (L), 2x1 minute  [] Diaphragmatic breathing + pelvic drop, 2 minutes in hooklying position  [] Seated ball roll out, 2x2 minutes - improved R lower back pain  [] Seated figure-4 stretch (R&L), 1 minute  [] Pelvic floor relaxation guided meditation - performed during e-stim for dry needling  [] HEP review, patient education  [] Marching with +ER ( sartorius ) 40ftx2  [] L prone hip flexor stretch with 1/2 roll under knee, 4x30"  [] Prone ball squeezes between ankles 5" hod 10x - " "experienced cramping in HS  [] Supine hip flexor iso/abdominal bracing with red ball, 5" hold x20 R/L  [] Elevated clams, 20x R/L  [] Reverse clams (2#, R&L), x25  [] Straight leg bridging on blue ball, 2x10 5" hold  [] L SLS on airex + ball toss at rebounder, 2x20  [] Prone press up x10  [] Prone femoral nerve glides 2x20  [] Prone hip extension 2x20  [] Standing knee flexion x20 **increased left anterior hip pain  []Prone knee flexion with hip extension x20   []   []     Manual therapy techniques: to develop flexibility, desensitization, and extensibility for 25 minutes  -  [] Obturator release at ischial tuberosities + active release with resisted contralateral hip external rotation (B)  [] Manual release to L hip flexors - improved tenderness after a few minutes of release  [] Manual release to L pelvic floor muscles internally/vaginally: levator ani - mild increased tone and tenderness that improved within a few minutes of release. PT provided cues for diaphragmatic breathing, visualization, and relaxation.  [] Myofascial decompression/cupping to L lower abdominal wall and anterior/medial hip  [x] Dry needling with electrical stimulation to L adductor longus, adductor chepe, iliopsoas - no adverse effect, written consent provided 5/9/22, see EMR.  [] Lateral and long-arm distraction to L hip with belt  [] Trigger point release to L iliopsoas using green theraroller   [] Taping to bilateral sacroiliac join  [] Sacroiliac muscle-energy technique - pt responded best to resisted R hip flexion // L hip extension  [] Manual hip flexor stretch: prone, with hip internal rotation (L), 1 minute  [] L iliacus release  [] Side lying L hip flexor stretch 4 x 30" - performed by clinician    Therapeutic activities to improve functional performance for 0 minutes -  [] Instruction on urge suppression techniques      Home Exercises and Patient Education     Patient Education: progression of plan of care, plan for next session, " pt prognosis, pelvic floor anatomy & function, relationship between TrA & PFM, relationship between hips & PFM, relationship between pelvic floor dysfunction & lower back pain, multifidi anatomy & function, lumbar spine anatomy    HEP update (1/12/23): Standing hip abduction circles, tabletop heel dip, standing hip flexion + external rotation    Exercises were reviewed, and Divya Jacinto was able to demonstrate them prior to the end of the session, as needed. Divya Jacinto demonstrated good understanding of the education provided.      Assessment     Pt tolerated treatment session well, with no increased symptoms with exercise and improved L hip discomfort/tightness at the conclusion of today's session. patient was advised to be more regular with home exercise program and development of work/life balance. She requires skilled therapy for continued patient education and progressive resistance exercise.     Divya Jacinto is progressing well towards her goals.   Pt prognosis: good    Pt will continue to benefit from skilled outpatient physical therapy to address the deficits listed in the problem list box on initial evaluation, provide pt/family education and to maximize pt's level of independence in the home and community environment.     Pt's spiritual, cultural and educational needs considered and pt agreeable to plan of care and goals.  Anticipated barriers to physical therapy: none      Short Term Goals: 6 weeks   Pt to report 50% improvement in tenderness to palpation of pelvic floor to demonstrate improved trigger points and overactivity Met   Pt to report only rare incidences of urinary incontinence with coughing to demonstrate improved pelvic floor muscle coordination Met   Pt to report 50% improvement in pain with penetration (gynecological exam or intercourse) and orgasm to demonstrate improved pelvic floor muscle overactivity Met   Pt to be independent with introductory home exercise program Met      Long Term  Goals: 12 weeks  Pt to report minimal/no tenderness to palpation of pelvic floor to demonstrate improved trigger points and overactivity Partially Met   Pt to demonstrate an improved score in the FOTO Pelvic Pain (PFDI) survey to no more than 8% impaired to demonstrate improving pelvic floor muscle tenderness, coordination. Not Met   Pt to deny pelvic pain with penetration (gynecological exam or intercourse) and orgasm to demonstrate improved pelvic floor muscle overactivity Partially Met   Pt to increase pelvic floor strength to 4/5 to demonstrate improved strength needed for continence with ADLs. Partially Met   Pt to be independent with advanced home exercise program Not Met   Pt to demonstrate bilateral hip strength of 5/5 for improved pelvic girdle support with mobility. Not Met   Pt to report minimal/no back pain with ADLs and functional mobility to demonstrate improved lumbopelvic muscle support, coordination Not Met        Plan     Continue per Plan of Care  Increasing frequency to 2x/week for more pelvic girdle stability training      Barrett Pastrana, PT

## 2023-04-10 NOTE — PROGRESS NOTES
[facility] [place] [facility]   Physical Therapy Treatment Note    Name: Divya Jacinto [facility]  Clinic Number: [id number]    Therapy Diagnosis:   Encounter Diagnoses   Name Primary?    Pelvic pain Yes    Decreased strength of trunk and back     Chronic left-sided low back pain without sciatica      Referring Provider: Arnulfo    Visit Date: 4/11/2023    Referring Provider Orders: PT Eval and Treat  Medical Diagnosis from Referral: Pelvic floor dysfunction in female [M62.89]  Evaluation Date: 4/29/2022  Last Reassessment: 3/20/2023  Authorization Period Expiration: 6/15/2023  Plan of Care Expiration: 6/20/2023  Visit # / Visits authorized: 42 (15/24)  FOTO: 3 (4/29/2022, 6/20/2022, 12/20/2022)    Time In: 15:00  Time Out: 16:00  Total Billable Time: 58 minutes    Precautions: Standard    Subjective     Divya Jacinto reports inner thigh soreness/tightness. Symptoms have been a little worse this week with the stress of putting her dog down. The SI belt is still helping with pain on days that require prolonged standing. Her adductors have been bothering her more, radiating pain up into the pelvic floor.  She is meeting with a new orthopedist physician assistant (Sendy Conway PA-C) in May.     She was compliant with home exercise program  Response to previous treatment: no adverse effect  Functional change: fluctuating improvements in urinary urgency and back/hip/pelvic pain    Pain: L anterior hip - 4/10    Objective     Divya Jacinto received the following interventions during the treatment session -  (TrA = transverse abdominis, PFM = pelvic floor muscle)  Pt consented to pelvic floor muscle assessment and treatment in prior visit - see EMR  All interventions billed as Therapeutic Exercise, per Medicaid guidelines    Therapeutic exercises to develop strength, endurance and core stabilization for 35 minutes -  [] Elliptical: level 3, 5 minutes  [] Seated hip adduction isometric with ball, x20 with 50% effort  []  Open books with green band (R&L), x10  [] Standing pallof press with doubled green band (R&L), 2x10  [x] TrA brace + bridging with belt around knees, 3x10  [] Cat/cow with coordinated breath, 2 minutes   [] TrA brace + quadruped hip extension (R&L), x10  [] Standing on foam pad + TrA brace + standing shoulder extension with blue band (B), 2x30  [] Standing on foam pad + TrA brace + standing row with black band (B), 2x30  [] TrA brace + wall push up, 2x15  [] Side-stepping on foam beam, 2 minutes  [] Side-stepping with green band around knees, 1 minute  [] Seated on theraball + pelvic rotations, 2 minutes  [] Seated on theraball + alternating kicks, 1 minute  [] Seated on theraball + row with green band, x40  [] Seated on theraball + alternating shoulder flexion (3#, R&L), x15   [x] Lateral step up/down on 6-inch step (R&L), 2x15  [x] Standing on foam pad + TrA brace + toe taps to cone (R&L), 2x15  [] Standing hip flexion + hip external rotation lift (2#, R&L), 2x12  [] TrA brace + standing straight leg raise (2#, R&L), 2x12  [] TrA brace + standing hip abduction + contralateral arm lift (2#, R&L), 2x12  [] TrA brace + sit to stands from chair + 2 foam pads, 4x6  [] Shuttle squats, 3x20 (3 black bands and 1 red band or resistance)  [] Treadmill walkinmph, 10% incline, 5 minutes  [x] Standing hip/knee extension on Pilates chair with 2 blue springs and 2 red springs (R&L), x20  [] Happy baby stretch + diaphragmatic breathing, pelvic drop, 1 minute  [] Child's pose + diaphragmatic breathing, pelvic drop, 2 minutes  [] Butterfly hip stretch + diaphragmatic breathing, pelvic drop, 2 minutes  [] Half-kneeling lunge/hip flexor stretch (L), 1 minute  [] Supine hamstring stretch with strap (L), 1 minute  [] Hip flexor stretch in standing (L), 2x30 seconds  [] Lucian test hip flexor stretch (L), 2x1 minute  [] Diaphragmatic breathing + pelvic drop, 2 minutes in hooklying position  [] Seated ball roll out, 2x2 minutes -  "improved R lower back pain  [] Seated figure-4 stretch (R&L), 1 minute  [] Pelvic floor relaxation guided meditation - performed during e-stim for dry needling  [] HEP review, patient education  [] Marching with +ER ( sartorius ) 40ftx2  [] L prone hip flexor stretch with 1/2 roll under knee, 4x30"  [] Prone ball squeezes between ankles 5" hold 10x - experienced cramping in HS  [] Supine hip flexor iso/abdominal bracing with red ball, 5" hold x20 R/L  [] Elevated clams, 20x R/L  [x] Side-lying reverse clams (2#, R&L), 2x15  [x] Side-lying clams (R&L), 2x15  [] Straight leg bridging on blue ball, 2x10 5" hold  [] L SLS on airex + ball toss at rebounder, 2x20  [] Prone press up x10  [] Prone femoral nerve glides 2x20  [] Prone hip extension 2x20  [] Standing knee flexion x20 **increased left anterior hip pain  [] Prone knee flexion with hip extension x20   []   []     Manual therapy techniques: to develop flexibility, desensitization, and extensibility for 23 minutes  -  [] Obturator release at ischial tuberosities + active release with resisted contralateral hip external rotation (B)  [] Manual release to L hip flexors - improved tenderness after a few minutes of release  [] Manual release to L pelvic floor muscles internally/vaginally: levator ani - mild increased tone and tenderness that improved within a few minutes of release. PT provided cues for diaphragmatic breathing, visualization, and relaxation.  [] Myofascial decompression/cupping to L lower abdominal wall and anterior/medial hip  [x] Dry needling with electrical stimulation to L adductor longus, adductor chepe, pectineus, rectus femoris, iliopsoas - no adverse effect, written consent provided 5/9/22, see EMR.  [x] Long-arm distraction to L hip with belt - no significant change in symptoms  [] Trigger point release to L iliopsoas using green theraroller   [] Taping to bilateral sacroiliac join  [x] Sacroiliac muscle-energy technique - no significant " "change in symptoms  [] Manual hip flexor stretch: prone, with hip internal rotation (L), 1 minute  [] L iliacus release  [] Side lying L hip flexor stretch 4 x 30" - performed by clinician    Therapeutic activities to improve functional performance for 0 minutes -  [] Instruction on urge suppression techniques  [] Trialing SI belt - reduced inner thigh pain when wearing SI belt      Home Exercises and Patient Education     Patient Education: progression of plan of care, plan for next session, pt prognosis, pelvic floor anatomy & function, relationship between TrA & PFM, relationship between hips & PFM, relationship between pelvic floor dysfunction & lower back pain, multifidi anatomy & function, lumbar spine anatomy    HEP (3/22/23): standing hip abduction, standing straight leg raise, standing hip flexion + external rotation, countertop push-ups    Exercises were reviewed, and Divya Jacinto was able to demonstrate them prior to the end of the session, as needed. Divya Jacinto demonstrated good understanding of the education provided.      Assessment     Divya Jacinto tolerated treatment well today, with reduced inner thigh discomfort after manual interventions and great tolerance for exercise today. Significant tenderness to palpation of R adductor chepe, as well as significant twitch with dry needling. We will continue to address persisting pain and functional limitations with goal of returning to PLOF.    Divya Jacinto is progressing well towards her goals.   Pt prognosis: good    Pt will continue to benefit from skilled outpatient physical therapy to address the deficits listed in the problem list box on initial evaluation, provide pt/family education and to maximize pt's level of independence in the home and community environment.     Pt's spiritual, cultural and educational needs considered and pt agreeable to plan of care and goals.  Anticipated barriers to physical therapy: none      Short Term Goals: 6 weeks   Pt " to report 50% improvement in tenderness to palpation of pelvic floor to demonstrate improved trigger points and overactivity Met   Pt to report only rare incidences of urinary incontinence with coughing to demonstrate improved pelvic floor muscle coordination Met   Pt to report 50% improvement in pain with penetration (gynecological exam or intercourse) and orgasm to demonstrate improved pelvic floor muscle overactivity Met   Pt to be independent with introductory home exercise program Met      Long Term Goals: 12 weeks  Pt to report minimal/no tenderness to palpation of pelvic floor to demonstrate improved trigger points and overactivity Partially Met   Pt to demonstrate an improved score in the FOTO Pelvic Pain (PFDI) survey to no more than 8% impaired to demonstrate improving pelvic floor muscle tenderness, coordination. Not Met   Pt to deny pelvic pain with penetration (gynecological exam or intercourse) and orgasm to demonstrate improved pelvic floor muscle overactivity Partially Met   Pt to increase pelvic floor strength to 4/5 to demonstrate improved strength needed for continence with ADLs. Partially Met   Pt to be independent with advanced home exercise program Not Met   Pt to demonstrate bilateral hip strength of 5/5 for improved pelvic girdle support with mobility. Not Met   Pt to report minimal/no back pain with ADLs and functional mobility to demonstrate improved lumbopelvic muscle support, coordination Not Met        Plan     Continue per Plan of Care  Increasing frequency to 2x/week for more pelvic girdle stability training, Therapeutic Exercises, Manual Therapeutic Technique, Neuromuscular Re Education, Therapeutic Activities. Modalities, Kinesiotape prn, and Functional Dry Needling as needed.      Jess Ureña, PT

## 2023-04-11 ENCOUNTER — CLINICAL SUPPORT (OUTPATIENT)
Dept: REHABILITATION | Facility: OTHER | Age: 38
End: 2023-04-11
Payer: MEDICAID

## 2023-04-11 DIAGNOSIS — R29.898 DECREASED STRENGTH OF TRUNK AND BACK: ICD-10-CM

## 2023-04-11 DIAGNOSIS — M54.50 CHRONIC LEFT-SIDED LOW BACK PAIN WITHOUT SCIATICA: ICD-10-CM

## 2023-04-11 DIAGNOSIS — G89.29 CHRONIC LEFT-SIDED LOW BACK PAIN WITHOUT SCIATICA: ICD-10-CM

## 2023-04-11 DIAGNOSIS — R10.2 PELVIC PAIN: Primary | ICD-10-CM

## 2023-04-11 PROCEDURE — 97110 THERAPEUTIC EXERCISES: CPT | Mod: PN | Performed by: PHYSICAL THERAPIST

## 2023-04-13 ENCOUNTER — CLINICAL SUPPORT (OUTPATIENT)
Dept: REHABILITATION | Facility: OTHER | Age: 38
End: 2023-04-13
Payer: MEDICAID

## 2023-04-13 DIAGNOSIS — G89.29 CHRONIC LEFT-SIDED LOW BACK PAIN WITHOUT SCIATICA: ICD-10-CM

## 2023-04-13 DIAGNOSIS — M54.50 CHRONIC LEFT-SIDED LOW BACK PAIN WITHOUT SCIATICA: ICD-10-CM

## 2023-04-13 DIAGNOSIS — R29.898 DECREASED STRENGTH OF TRUNK AND BACK: ICD-10-CM

## 2023-04-13 DIAGNOSIS — R10.2 PELVIC PAIN: Primary | ICD-10-CM

## 2023-04-13 PROCEDURE — 97110 THERAPEUTIC EXERCISES: CPT | Mod: PN

## 2023-04-13 PROCEDURE — 97140 MANUAL THERAPY 1/> REGIONS: CPT | Mod: PN

## 2023-04-13 NOTE — PROGRESS NOTES
[facility]   Physical Therapy Treatment Note    Name: Divya Jacinto [facility]  Clinic Number: [id number]    Therapy Diagnosis:   Encounter Diagnoses   Name Primary?    Pelvic pain Yes    Decreased strength of trunk and back     Chronic left-sided low back pain without sciatica      Referring Provider: Arnulfo    Visit Date: 4/13/2023    Referring Provider Orders: PT Eval and Treat  Medical Diagnosis from Referral: Pelvic floor dysfunction in female [M62.89]  Evaluation Date: 4/29/2022  Last Reassessment: 3/20/2023  Authorization Period Expiration: 6/15/2023  Plan of Care Expiration: 6/20/2023  Visit # / Visits authorized: 42 (15/24)  FOTO: 3 (4/29/2022, 6/20/2022, 12/20/2022)    Time In: 15:05  Time Out: 16:00  Total Billable Time: 55 minutes    Precautions: Standard    Subjective     Divya Jacinto reports bilateral groin tension this week after a tough couple days at work. She stated that the SIJ belt has helped decrease tightness while performing work tasks.   She is meeting with a new orthopedist physician assistant (Sendy Conway PA-C) in May.     She was compliant with home exercise program  Response to previous treatment: no adverse effect  Functional change: fluctuating improvements in urinary urgency and back/hip/pelvic pain    Pain: L anterior hip - 4/10    Objective     Divya Jacinto received the following interventions during the treatment session -  (TrA = transverse abdominis, PFM = pelvic floor muscle)  Pt consented to pelvic floor muscle assessment and treatment in prior visit - see EMR  All interventions billed as Therapeutic Exercise, per Medicaid guidelines    Therapeutic exercises to develop strength, endurance and core stabilization for 30 minutes -  [] Elliptical: level 3, 5 minutes  [] Seated hip adduction isometric with ball, x20 with 50% effort  [] Open books with green band (R&L), x10  [] Standing pallof press with doubled green band (R&L), 2x10  [x] TrA brace +  bridging with belt around knees, 3x10  [] Cat/cow with coordinated breath, 2 minutes   [] TrA brace + quadruped hip extension (R&L), x10  [] Standing on foam pad + TrA brace + standing shoulder extension with blue band (B), 2x30  [] Standing on foam pad + TrA brace + standing row with black band (B), 2x30  [] TrA brace + wall push up, 2x15  [] Side-stepping on foam beam, 2 minutes  [] Side-stepping with green band around knees, 1 minute  [] Seated on theraball + pelvic rotations, 2 minutes  [] Seated on theraball + alternating kicks, 1 minute  [] Seated on theraball + row with green band, x40  [] Seated on theraball + alternating shoulder flexion (3#, R&L), x15   [] Lateral step up/down on 6-inch step (R&L), 2x15  [] Standing on foam pad + TrA brace + toe taps to cone (R&L), 2x15  [] Standing hip flexion + hip external rotation lift (2#, R&L), 2x12  [] TrA brace + standing straight leg raise (2#, R&L), 2x12  [] TrA brace + standing hip abduction + contralateral arm lift (2#, R&L), 2x12  [] TrA brace + sit to stands from chair + 2 foam pads, 4x6  [] Shuttle squats, 3x20 (3 black bands and 1 red band or resistance)  [] Treadmill walkinmph, 10% incline, 5 minutes  [] Standing hip/knee extension on Pilates chair with 2 blue springs and 2 red springs (R&L), x20  [] Happy baby stretch + diaphragmatic breathing, pelvic drop, 1 minute  [] Child's pose + diaphragmatic breathing, pelvic drop, 2 minutes  [] Butterfly hip stretch + diaphragmatic breathing, pelvic drop, 2 minutes  [] Half-kneeling lunge/hip flexor stretch (L), 1 minute  [] Supine hamstring stretch with strap (L), 1 minute  [] Hip flexor stretch in standing (L), 2x30 seconds  [] Lucian test hip flexor stretch (L), 2x1 minute  [] Diaphragmatic breathing + pelvic drop, 2 minutes in hooklying position  [] Seated ball roll out, 2x2 minutes - improved R lower back pain  [] Seated figure-4 stretch (R&L), 1 minute  [] Pelvic floor relaxation guided meditation -  "performed during e-stim for dry needling  [] HEP review, patient education  [] Marching with +ER ( sartorius ) 40ftx2  [] L prone hip flexor stretch with 1/2 roll under knee, 4x30"  [] Prone ball squeezes between ankles 5" hold 10x - experienced cramping in HS  [] Supine hip flexor iso/abdominal bracing with red ball, 5" hold x20 R/L  [] Elevated clams, 20x R/L  [] Side-lying reverse clams (2#, R&L), 2x15  [] Side-lying clams (R&L), 2x15  [] Straight leg bridging on blue ball, 2x10 5" hold  [] L SLS on airex + ball toss at rebounder, 2x20  [] Prone press up x10  [x] Prone femoral nerve glides 2x20  [] Prone hip extension 2x20  [] Standing knee flexion x20 **increased left anterior hip pain  [] Prone knee flexion with hip extension x20   [x] Bridge on ball with hamstring curl 2x10  [x] Dying bugs 3x7  []     Manual therapy techniques: to develop flexibility, desensitization, and extensibility for 25 minutes  -  [] Obturator release at ischial tuberosities + active release with resisted contralateral hip external rotation (B)  [] Manual release to L hip flexors - improved tenderness after a few minutes of release  [] Manual release to L pelvic floor muscles internally/vaginally: levator ani - mild increased tone and tenderness that improved within a few minutes of release. PT provided cues for diaphragmatic breathing, visualization, and relaxation.  [] Myofascial decompression/cupping to L lower abdominal wall and anterior/medial hip  [] Dry needling with electrical stimulation to L adductor longus, adductor chepe, pectineus, rectus femoris, iliopsoas - no adverse effect, written consent provided 5/9/22, see EMR.  [x] Prone Long-arm distraction to L hip with belt - no significant change in symptoms  [] Trigger point release to L iliopsoas using green theraroller   [] Taping to bilateral sacroiliac join  [] Sacroiliac muscle-energy technique - no significant change in symptoms  [] Manual hip flexor stretch: prone, " "with hip internal rotation (L), 1 minute  [] L iliacus release  [] Side lying L hip flexor stretch 4 x 30" - performed by clinician  [x] SL lumbopelvic manipulation bilaterally  [x] SIJ isometric MET with hips at 90/90, 45 degrees of hip flexion / 45 degrees of adduction, "shotgun" 3x5" hold with each      Therapeutic activities to improve functional performance for 0 minutes -  [] Instruction on urge suppression techniques  [] Trialing SI belt - reduced inner thigh pain when wearing SI belt      Home Exercises and Patient Education     Patient Education: progression of plan of care, plan for next session, pt prognosis, pelvic floor anatomy & function, relationship between TrA & PFM, relationship between hips & PFM, relationship between pelvic floor dysfunction & lower back pain, multifidi anatomy & function, lumbar spine anatomy    HEP (3/22/23): standing hip abduction, standing straight leg raise, standing hip flexion + external rotation, countertop push-ups    Exercises were reviewed, and Divya Jacinto was able to demonstrate them prior to the end of the session, as needed. Divya Jacinto demonstrated good understanding of the education provided.      Assessment     Divya Jacinto tolerated treatment well today, with reduced inner thigh discomfort after manual interventions and great tolerance for exercise today. Patient reported feeling like she needed her low back to "pop" with lumbar manipulation. We will continue to address persisting pain and functional limitations with goal of returning to PLOF.    Divya Jacinto is progressing well towards her goals.   Pt prognosis: good    Pt will continue to benefit from skilled outpatient physical therapy to address the deficits listed in the problem list box on initial evaluation, provide pt/family education and to maximize pt's level of independence in the home and community environment.     Pt's spiritual, cultural and educational needs considered and pt agreeable to plan of " care and goals.  Anticipated barriers to physical therapy: none      Short Term Goals: 6 weeks   Pt to report 50% improvement in tenderness to palpation of pelvic floor to demonstrate improved trigger points and overactivity Met   Pt to report only rare incidences of urinary incontinence with coughing to demonstrate improved pelvic floor muscle coordination Met   Pt to report 50% improvement in pain with penetration (gynecological exam or intercourse) and orgasm to demonstrate improved pelvic floor muscle overactivity Met   Pt to be independent with introductory home exercise program Met      Long Term Goals: 12 weeks  Pt to report minimal/no tenderness to palpation of pelvic floor to demonstrate improved trigger points and overactivity Partially Met   Pt to demonstrate an improved score in the FOTO Pelvic Pain (PFDI) survey to no more than 8% impaired to demonstrate improving pelvic floor muscle tenderness, coordination. Not Met   Pt to deny pelvic pain with penetration (gynecological exam or intercourse) and orgasm to demonstrate improved pelvic floor muscle overactivity Partially Met   Pt to increase pelvic floor strength to 4/5 to demonstrate improved strength needed for continence with ADLs. Partially Met   Pt to be independent with advanced home exercise program Not Met   Pt to demonstrate bilateral hip strength of 5/5 for improved pelvic girdle support with mobility. Not Met   Pt to report minimal/no back pain with ADLs and functional mobility to demonstrate improved lumbopelvic muscle support, coordination Not Met        Plan     Continue per Plan of Care  Increasing frequency to 2x/week for more pelvic girdle stability training, Therapeutic Exercises, Manual Therapeutic Technique, Neuromuscular Re Education, Therapeutic Activities. Modalities, Kinesiotape prn, and Functional Dry Needling as needed.      Barrett Pastrana, PT

## 2023-04-14 NOTE — PROGRESS NOTES
[facility]   Physical Therapy Treatment Note    Name: Divya Jacinto [facility]  Clinic Number: [id number]    Therapy Diagnosis:   Encounter Diagnoses   Name Primary?    Pelvic pain Yes    Decreased strength of trunk and back     Chronic left-sided low back pain without sciatica      Referring Provider: Arnulfo    Visit Date: 4/18/2023    Referring Provider Orders: PT Eval and Treat  Medical Diagnosis from Referral: Pelvic floor dysfunction in female [M62.89]  Evaluation Date: 4/29/2022  Last Reassessment: 3/20/2023  Authorization Period Expiration: 6/15/2023  Plan of Care Expiration: 6/20/2023  Visit # / Visits authorized: 43 (16/24)  FOTO: 3 (4/29/2022, 6/20/2022, 12/20/2022)    Time In: 15:00  Time Out: 16:00  Total Billable Time: 58 minutes    Precautions: Standard    Subjective     Divya Jacinto reports inner thigh soreness. Her hip flexors are not as bothersome as they used to use. She notes no significant change in symptoms after SI techniques from last visit.  She is meeting with a new orthopedist physician assistant (Sendy Conway PA-C) in May.     She was compliant with home exercise program  Response to previous treatment: no adverse effect  Functional change: fluctuating improvements in urinary urgency and back/hip/pelvic pain    Pain: L anterior/medial hip - 2/10    Objective     Divya Jacinto received the following interventions during the treatment session -  (TrA = transverse abdominis, PFM = pelvic floor muscle)  Pt consented to pelvic floor muscle assessment and treatment in prior visit - see EMR  All interventions billed as Therapeutic Exercise, per Medicaid guidelines    Therapeutic exercises to develop strength, endurance and core stabilization for 38 minutes -  [] Elliptical: level 3, 5 minutes  [x] Hooklying TrA brace + hip adduction isometric with ball, 2x10 with 50% effort  [] Open books with green band (R&L), x10  [] Standing pallof press with doubled green band (R&L),  2x10  [x] TrA brace + bridging with belt around knees, 3x8  [x] Side-lying hip adduction (L), 2x5 - challenging  [] Cat/cow with coordinated breath, 2 minutes   [] TrA brace + quadruped hip extension (R&L), x10  [] Standing on foam pad + TrA brace + standing shoulder extension with blue band (B), 2x30  [] Standing on foam pad + TrA brace + standing row with black band (B), 2x30  [] TrA brace + wall push up, 2x15  [] Side-stepping on foam beam, 2 minutes  [] Side-stepping with green band around knees, 1 minute  [] Seated on theraball + pelvic rotations, 2 minutes  [] Seated on theraball + alternating kicks, 1 minute  [] Seated on theraball + row with green band, x40  [] Seated on theraball + alternating shoulder flexion (3#, R&L), x15   [x] Lateral step up/down on 6-inch step (R&L), 2x15  [x] Standing on foam pad + TrA brace + toe taps to cone (R&L), 2x15  [x] TrA brace + standing knee flexion (2#, R&L), 2x15  [] Standing hip flexion + hip external rotation lift (2#, R&L), 2x12  [] TrA brace + standing straight leg raise (2#, R&L), 2x12  [] TrA brace + standing hip abduction + contralateral arm lift (2#, R&L), 2x12  [] TrA brace + sit to stands from chair + 2 foam pads, 4x6  [] Shuttle squats, 3x20 (3 black bands and 1 red band or resistance)  [] Treadmill walkinmph, 10% incline, 5 minutes  [] Standing hip/knee extension on Pilates chair with 2 blue springs and 2 red springs (R&L), x20  [] Happy baby stretch + diaphragmatic breathing, pelvic drop, 1 minute  [] Child's pose + diaphragmatic breathing, pelvic drop, 2 minutes  [] Butterfly hip stretch + diaphragmatic breathing, pelvic drop, 2 minutes  [] Half-kneeling lunge/hip flexor stretch (L), 1 minute  [] Supine hamstring stretch with strap (L), 1 minute  [] Hip flexor stretch in standing (L), 2x30 seconds  [] Lucian test hip flexor stretch (L), 2x1 minute  [] Diaphragmatic breathing + pelvic drop, 2 minutes in hooklying position  [] Seated ball roll out, 2x2  "minutes - improved R lower back pain  [] Seated figure-4 stretch (R&L), 1 minute  [] Pelvic floor relaxation guided meditation - performed during e-stim for dry needling  [] HEP review, patient education  [] Marching with +ER ( sartorius ) 40ftx2  [] L prone hip flexor stretch with 1/2 roll under knee, 4x30"  [] Prone ball squeezes between ankles 5" hold 10x - experienced cramping in HS  [] Supine hip flexor iso/abdominal bracing with red ball, 5" hold x20 R/L  [] Elevated clams, 20x R/L  [x] Side-lying reverse clams (2#, R&L), 2x15  [x] Side-lying clams (R&L), 2x15  [] Straight leg bridging on blue ball, 2x10 5" hold  [] L SLS on airex + ball toss at rebounder, 2x20  [] Prone press up x10  [] Prone femoral nerve glides 2x20  [] Prone hip extension 2x20  [] Standing knee flexion x20  [] Prone knee flexion with hip extension x20   [] Bridge on ball with hamstring curl 2x10  [] Dying bugs 3x7  []     Manual therapy techniques: to develop flexibility, desensitization, and extensibility for 20 minutes  -  [] Obturator release at ischial tuberosities + active release with resisted contralateral hip external rotation (B)  [] Manual release to L hip flexors - improved tenderness after a few minutes of release  [] Manual release to L pelvic floor muscles internally/vaginally: levator ani - mild increased tone and tenderness that improved within a few minutes of release. PT provided cues for diaphragmatic breathing, visualization, and relaxation.  [] Myofascial decompression/cupping to L lower abdominal wall and anterior/medial hip  [x] Dry needling with electrical stimulation to L adductor longus, adductor hcepe, pectineus - no adverse effect, written consent provided 5/9/22, see EMR.  [] Prone Long-arm distraction to L hip with belt - no significant change in symptoms  [] Trigger point release to L iliopsoas using green theraroller   [] Taping to bilateral sacroiliac join  [] Sacroiliac muscle-energy technique - no " "significant change in symptoms  [] Manual hip flexor stretch: prone, with hip internal rotation (L), 1 minute  [] L iliacus release  [] Side lying L hip flexor stretch 4 x 30" - performed by clinician  [] SL lumbopelvic manipulation bilaterally  [] SIJ isometric MET with hips at 90/90, 45 degrees of hip flexion / 45 degrees of adduction, "shotgun" 3x5" hold with each      Therapeutic activities to improve functional performance for 0 minutes -  [] Instruction on urge suppression techniques  [] Trialing SI belt - reduced inner thigh pain when wearing SI belt      Home Exercises and Patient Education     Patient Education: progression of plan of care, plan for next session, pt prognosis, pelvic floor anatomy & function, relationship between TrA & PFM, relationship between hips & PFM, relationship between pelvic floor dysfunction & lower back pain, multifidi anatomy & function, lumbar spine anatomy    HEP (3/22/23): standing hip abduction, standing straight leg raise, standing hip flexion + external rotation, countertop push-ups    Exercises were reviewed, and Divya Jacinto was able to demonstrate them prior to the end of the session, as needed. Divya Jacinto demonstrated good understanding of the education provided.      Assessment     Divya Jacinto tolerated treatment well today, with reduced inner thigh discomfort after needling and great tolerance for exercise today. Able to tolerate side-lying hip adduction for the first time today, but it was challenging. We will continue to address persisting pain and functional limitations with goal of returning to PLOF.    Divya Jacinto is progressing well towards her goals.   Pt prognosis: good    Pt will continue to benefit from skilled outpatient physical therapy to address the deficits listed in the problem list box on initial evaluation, provide pt/family education and to maximize pt's level of independence in the home and community environment.     Pt's spiritual, cultural and " educational needs considered and pt agreeable to plan of care and goals.  Anticipated barriers to physical therapy: none      Short Term Goals: 6 weeks   Pt to report 50% improvement in tenderness to palpation of pelvic floor to demonstrate improved trigger points and overactivity Met   Pt to report only rare incidences of urinary incontinence with coughing to demonstrate improved pelvic floor muscle coordination Met   Pt to report 50% improvement in pain with penetration (gynecological exam or intercourse) and orgasm to demonstrate improved pelvic floor muscle overactivity Met   Pt to be independent with introductory home exercise program Met      Long Term Goals: 12 weeks  Pt to report minimal/no tenderness to palpation of pelvic floor to demonstrate improved trigger points and overactivity Partially Met   Pt to demonstrate an improved score in the FOTO Pelvic Pain (PFDI) survey to no more than 8% impaired to demonstrate improving pelvic floor muscle tenderness, coordination. Not Met   Pt to deny pelvic pain with penetration (gynecological exam or intercourse) and orgasm to demonstrate improved pelvic floor muscle overactivity Partially Met   Pt to increase pelvic floor strength to 4/5 to demonstrate improved strength needed for continence with ADLs. Partially Met   Pt to be independent with advanced home exercise program Not Met   Pt to demonstrate bilateral hip strength of 5/5 for improved pelvic girdle support with mobility. Not Met   Pt to report minimal/no back pain with ADLs and functional mobility to demonstrate improved lumbopelvic muscle support, coordination Not Met        Plan     Continue per Plan of Care  Increasing frequency to 2x/week for more pelvic girdle stability training, Therapeutic Exercises, Manual Therapeutic Technique, Neuromuscular Re Education, Therapeutic Activities. Modalities, Kinesiotape prn, and Functional Dry Needling as needed.      Jess Ureña, PT

## 2023-04-18 ENCOUNTER — TELEPHONE (OUTPATIENT)
Dept: ORTHOPEDICS | Facility: CLINIC | Age: 38
End: 2023-04-18
Payer: MEDICAID

## 2023-04-18 ENCOUNTER — CLINICAL SUPPORT (OUTPATIENT)
Dept: REHABILITATION | Facility: OTHER | Age: 38
End: 2023-04-18
Payer: MEDICAID

## 2023-04-18 DIAGNOSIS — G89.29 CHRONIC LEFT-SIDED LOW BACK PAIN WITHOUT SCIATICA: ICD-10-CM

## 2023-04-18 DIAGNOSIS — R29.898 DECREASED STRENGTH OF TRUNK AND BACK: ICD-10-CM

## 2023-04-18 DIAGNOSIS — R10.2 PELVIC PAIN: Primary | ICD-10-CM

## 2023-04-18 DIAGNOSIS — M54.50 CHRONIC LEFT-SIDED LOW BACK PAIN WITHOUT SCIATICA: ICD-10-CM

## 2023-04-18 PROCEDURE — 97110 THERAPEUTIC EXERCISES: CPT | Mod: PN | Performed by: PHYSICAL THERAPIST

## 2023-04-18 NOTE — TELEPHONE ENCOUNTER
----- Message from Cele Sumner MA sent at 4/18/2023  4:12 PM CDT -----  Regarding: Sooner appt  Contact: [phone]  Patient is calling to get a sooner appt. Please call and advise.

## 2023-04-18 NOTE — TELEPHONE ENCOUNTER
Called the pt to inform her that there aren't any sooner appt. Pt say she'll keep the appt that she has.pt v/u

## 2023-04-24 NOTE — PROGRESS NOTES
[facility]   Physical Therapy Treatment Note    Name: Divya Jacinto [facility]  Clinic Number: [id number]    Therapy Diagnosis:   Encounter Diagnoses   Name Primary?    Pelvic pain Yes    Decreased strength of trunk and back     Chronic left-sided low back pain without sciatica      Referring Provider: Arnulfo    Visit Date: 4/25/2023    Referring Provider Orders: PT Eval and Treat  Medical Diagnosis from Referral: Pelvic floor dysfunction in female [M62.89]  Evaluation Date: 4/29/2022  Last Reassessment: 3/20/2023  Authorization Period Expiration: 6/15/2023  Plan of Care Expiration: 6/20/2023  Visit # / Visits authorized: 44 (17/24)  FOTO: 3 (4/29/2022, 6/20/2022, 12/20/2022)    Time In: 14:50  Time Out: 16:00  Total Billable Time: 68 minutes    Precautions: Standard    Subjective     Divya Jacinto reports inner thigh soreness. It's easier for her to lift L leg to don pants these days.   She is meeting with a new orthopedist physician assistant (Sendy Conway PA-C) in May.     She was compliant with home exercise program  Response to previous treatment: no adverse effect  Functional change: fluctuating improvements in urinary urgency and back/hip/pelvic pain    Pain: L anterior/medial hip - 2/10    Objective     Divya Jacinto received the following interventions during the treatment session -  (TrA = transverse abdominis, PFM = pelvic floor muscle)  Pt consented to pelvic floor muscle assessment and treatment in prior visit - see EMR  All interventions billed as Therapeutic Exercise, per Medicaid guidelines    Therapeutic exercises to develop strength, endurance and core stabilization for 45 minutes -  [] Elliptical: level 3, 5 minutes  [] Hooklying TrA brace + hip adduction isometric with ball, 2x10 with 50% effort  [] Open books with green band (R&L), x10  [] Standing pallof press with doubled green band (R&L), 2x10  [x] TrA brace + bridging with belt around knees, 3x10  [x] TrA brace +  straight leg raises (R&L), 5x5  [x] Side-lying hip adduction (L), 2x10 - challenging  [] Cat/cow with coordinated breath, 2 minutes   [] TrA brace + quadruped hip extension (R&L), x10  [x] Standing on foam pad + TrA brace + standing shoulder extension with blue band (B), x30  [x] Standing on foam pad + TrA brace + standing row with black band (B), x30  [] TrA brace + wall push up, 2x15  [] Side-stepping on foam beam, 2 minutes  [x] Side-stepping with red tubing around ankles, 90 seconds  [] Seated on theraball + pelvic rotations, 2 minutes  [] Seated on theraball + alternating kicks, 1 minute  [] Seated on theraball + row with green band, x40  [] Seated on theraball + alternating shoulder flexion (3#, R&L), x15   [x] Lateral step up/down on 6-inch step (R&L), 2x15  [x] Standing on foam pad + TrA brace + toe taps to cone (R&L), 2x15  [] TrA brace + standing knee flexion (2#, R&L), 2x15  [] Standing hip flexion + hip external rotation lift (2#, R&L), 2x12  [] TrA brace + standing straight leg raise (2#, R&L), 2x12  [] TrA brace + standing hip abduction + contralateral arm lift (2#, R&L), 2x12  [] TrA brace + sit to stands from chair + 2 foam pads, 4x6  [] Shuttle squats, 3x20 (3 black bands and 1 red band of resistance)  [] Treadmill walkinmph, 10% incline, 5 minutes  [] Standing hip/knee extension on [employer] chair with 2 blue springs and 2 red springs (R&L), x20  [] Happy baby stretch + diaphragmatic breathing, pelvic drop, 1 minute  [] Child's pose + diaphragmatic breathing, pelvic drop, 2 minutes  [] Butterfly hip stretch + diaphragmatic breathing, pelvic drop, 2 minutes  [] Half-kneeling lunge/hip flexor stretch (L), 1 minute  [] Supine hamstring stretch with strap (L), 1 minute  [] Hip flexor stretch in standing (L), 2x30 seconds  [] Lucian test hip flexor stretch (L), 2x1 minute  [] Diaphragmatic breathing + pelvic drop, 2 minutes in hooklying position  [] Seated ball roll out, 2x2 minutes - improved R lower  "back pain  [] Seated figure-4 stretch (R&L), 1 minute  [] Pelvic floor relaxation guided meditation - performed during e-stim for dry needling  [] HEP review, patient education  [] Marching with +ER ( sartorius ) 40ftx2  [] L prone hip flexor stretch with 1/2 roll under knee, 4x30"  [] Prone ball squeezes between ankles 5" hold 10x - experienced cramping in HS  [] Supine hip flexor iso/abdominal bracing with red ball, 5" hold x20 R/L  [] Elevated clams, 20x R/L  [] Side-lying reverse clams (2#, R&L), 2x15  [x] Side-lying clams (R&L), 4x10  [] Straight leg bridging on blue ball, 2x10 5" hold  [] L SLS on airex + ball toss at rebounder, 2x20  [] Prone press up x10  [] Prone femoral nerve glides 2x20  [] Prone hip extension 2x20  [] Standing knee flexion x20  [] Prone knee flexion with hip extension x20   [] Bridge on ball with hamstring curl 2x10  [] Dying bugs 3x7  []     Manual therapy techniques: to develop flexibility, desensitization, and extensibility for 23 minutes  -  [] Obturator release at ischial tuberosities + active release with resisted contralateral hip external rotation (B)  [] Manual release to L hip flexors - improved tenderness after a few minutes of release  [] Manual release to L pelvic floor muscles internally/vaginally: levator ani - mild increased tone and tenderness that improved within a few minutes of release. PT provided cues for diaphragmatic breathing, visualization, and relaxation.  [x] Myofascial decompression/cupping to L adductor longus  [x] Dry needling with electrical stimulation to L adductor longus, adductor chepe, pectineus - no adverse effect, written consent provided 5/9/22, see EMR.  [] Prone Long-arm distraction to L hip with belt - no significant change in symptoms  [] Trigger point release to L iliopsoas using green theraroller   [] Taping to bilateral sacroiliac join  [] Sacroiliac muscle-energy technique - no significant change in symptoms  [] Manual hip flexor stretch: " "prone, with hip internal rotation (L), 1 minute  [] L iliacus release  [] Side lying L hip flexor stretch 4 x 30" - performed by clinician  [] SL lumbopelvic manipulation bilaterally  [] SIJ isometric MET with hips at 90/90, 45 degrees of hip flexion / 45 degrees of adduction, "shotgun" 3x5" hold with each      Therapeutic activities to improve functional performance for 0 minutes -  [] Instruction on urge suppression techniques  [] Trialing SI belt - reduced inner thigh pain when wearing SI belt      Home Exercises and Patient Education     Patient Education: progression of plan of care, plan for next session, pt prognosis, pelvic floor anatomy & function, relationship between TrA & PFM, relationship between hips & PFM, relationship between pelvic floor dysfunction & lower back pain, multifidi anatomy & function, lumbar spine anatomy    HEP (3/22/23): standing hip abduction, standing straight leg raise, standing hip flexion + external rotation, countertop push-ups    Exercises were reviewed, and Divya Jacinto was able to demonstrate them prior to the end of the session, as needed. Divya Jacinto demonstrated good understanding of the education provided.      Assessment     Divya Jacinto tolerated treatment well today, with reduced inner thigh discomfort after needling and great tolerance for exercise today. Able to tolerate a ton of exercise today, demonstrating significantly improved exercise tolerance over the last few months. We will continue to address persisting pain and functional limitations with goal of returning to [facility].    Divya Jacinto is progressing well towards her goals.   Pt prognosis: good    Pt will continue to benefit from skilled outpatient physical therapy to address the deficits listed in the problem list box on initial evaluation, provide pt/family education and to maximize pt's level of independence in the home and community environment.     Pt's spiritual, cultural and educational needs considered " and pt agreeable to plan of care and goals.  Anticipated barriers to physical therapy: none      Short Term Goals: 6 weeks   Pt to report 50% improvement in tenderness to palpation of pelvic floor to demonstrate improved trigger points and overactivity Met   Pt to report only rare incidences of urinary incontinence with coughing to demonstrate improved pelvic floor muscle coordination Met   Pt to report 50% improvement in pain with penetration (gynecological exam or intercourse) and orgasm to demonstrate improved pelvic floor muscle overactivity Met   Pt to be independent with introductory home exercise program Met      Long Term Goals: 12 weeks  Pt to report minimal/no tenderness to palpation of pelvic floor to demonstrate improved trigger points and overactivity Partially Met   Pt to demonstrate an improved score in the FOTO Pelvic Pain (PFDI) survey to no more than 8% impaired to demonstrate improving pelvic floor muscle tenderness, coordination. Not Met   Pt to deny pelvic pain with penetration (gynecological exam or intercourse) and orgasm to demonstrate improved pelvic floor muscle overactivity Partially Met   Pt to increase pelvic floor strength to 4/5 to demonstrate improved strength needed for continence with ADLs. Partially Met   Pt to be independent with advanced home exercise program Not Met   Pt to demonstrate bilateral hip strength of 5/5 for improved pelvic girdle support with mobility. Not Met   Pt to report minimal/no back pain with ADLs and functional mobility to demonstrate improved lumbopelvic muscle support, coordination Not Met        Plan     Continue per Plan of Care  Increasing frequency to 2x/week for more pelvic girdle stability training, Therapeutic Exercises, Manual Therapeutic Technique, Neuromuscular Re Education, Therapeutic Activities. Modalities, Kinesiotape prn, and Functional Dry Needling as needed.      Jess Ureña, PT

## 2023-04-25 ENCOUNTER — CLINICAL SUPPORT (OUTPATIENT)
Dept: REHABILITATION | Facility: OTHER | Age: 38
End: 2023-04-25
Payer: MEDICAID

## 2023-04-25 DIAGNOSIS — G89.29 CHRONIC LEFT-SIDED LOW BACK PAIN WITHOUT SCIATICA: ICD-10-CM

## 2023-04-25 DIAGNOSIS — R10.2 PELVIC PAIN: Primary | ICD-10-CM

## 2023-04-25 DIAGNOSIS — R29.898 DECREASED STRENGTH OF TRUNK AND BACK: ICD-10-CM

## 2023-04-25 DIAGNOSIS — M54.50 CHRONIC LEFT-SIDED LOW BACK PAIN WITHOUT SCIATICA: ICD-10-CM

## 2023-04-25 PROCEDURE — 97110 THERAPEUTIC EXERCISES: CPT | Mod: PN | Performed by: PHYSICAL THERAPIST

## 2023-04-27 ENCOUNTER — CLINICAL SUPPORT (OUTPATIENT)
Dept: REHABILITATION | Facility: OTHER | Age: 38
End: 2023-04-27
Payer: MEDICAID

## 2023-04-27 DIAGNOSIS — G89.29 CHRONIC LEFT-SIDED LOW BACK PAIN WITHOUT SCIATICA: ICD-10-CM

## 2023-04-27 DIAGNOSIS — M54.50 CHRONIC LEFT-SIDED LOW BACK PAIN WITHOUT SCIATICA: ICD-10-CM

## 2023-04-27 DIAGNOSIS — R10.2 PELVIC PAIN: Primary | ICD-10-CM

## 2023-04-27 DIAGNOSIS — R29.898 DECREASED STRENGTH OF TRUNK AND BACK: ICD-10-CM

## 2023-04-27 PROCEDURE — 97110 THERAPEUTIC EXERCISES: CPT | Mod: PN

## 2023-04-27 PROCEDURE — 97140 MANUAL THERAPY 1/> REGIONS: CPT | Mod: PN

## 2023-04-27 NOTE — PROGRESS NOTES
" [facility] OUTPATIENT THERAPY AND WELLNESS   Physical Therapy Treatment Note    [facility]  Clinic Number: [id number]    Therapy Diagnosis:   Encounter Diagnoses   Name Primary?    Pelvic pain Yes    Decreased strength of trunk and back     Chronic left-sided low back pain without sciatica      Referring Provider: Arnulfo    Visit Date: 4/27/2023    Referring Provider Orders: PT Eval and Treat  Medical Diagnosis from Referral: Pelvic floor dysfunction in female [M62.89]  Evaluation Date: 4/29/2022  Last Reassessment: 3/20/2023  Authorization Period Expiration: 6/15/2023  Plan of Care Expiration: 6/20/2023  Visit # / Visits authorized: 48 (21/36)  FOTO: 3 (4/29/2022, 6/20/2022, 12/20/2022)    Time In: 300p  Time Out: 400p  Total Billable Time: 60 minutes    Precautions: Standard    Subjective     Divya Jacinto reports she is in a "flare up" today. She is unsure why. She had a sedentary day yesterday, but she woke up with increased pain today.     She was compliant with home exercise program  Response to previous treatment: no adverse effect  Functional change: fluctuating improvements in urinary urgency and back/hip/pelvic pain    Pain: L anterior/medial hip - 2/10    Objective     Divya Jacinto received the following interventions during the treatment session -  (TrA = transverse abdominis, PFM = pelvic floor muscle)  Pt consented to pelvic floor muscle assessment and treatment in prior visit - see EMR  All interventions billed as Therapeutic Exercise, per Medicaid guidelines    Therapeutic exercises to develop strength, endurance and core stabilization for 30 minutes -  [] Elliptical: level 3, 5 minutes  [] Hooklying TrA brace + hip adduction isometric with ball, 2x10 with 50% effort  [] Open books with green band (R&L), x10  [] Standing pallof press with doubled green band (R&L), 2x10  [] TrA brace + bridging with belt around knees, 3x10  [] TrA brace + straight leg raises (R&L), 5x5  [] Side-lying hip " adduction (L), 2x10 - challenging  [] Cat/cow with coordinated breath, 2 minutes   [] TrA brace + quadruped hip extension (R&L), x10  [] Standing on foam pad + TrA brace + standing shoulder extension with blue band (B), x30  [] Standing on foam pad + TrA brace + standing row with black band (B), x30  [] TrA brace + wall push up, 2x15  [] Side-stepping on foam beam, 2 minutes  [] Side-stepping with red tubing around ankles, 90 seconds  [] Seated on theraball + pelvic rotations, 2 minutes  [] Seated on theraball + alternating kicks, 1 minute  [] Seated on theraball + row with green band, x40  [] Seated on theraball + alternating shoulder flexion (3#, R&L), x15   [] Lateral step up/down on 6-inch step (R&L), 2x15  [] Standing on foam pad + TrA brace + toe taps to cone (R&L), 2x15  [] TrA brace + standing knee flexion (2#, R&L), 2x15  [] Standing hip flexion + hip external rotation lift (2#, R&L), 2x12  [] TrA brace + standing straight leg raise (2#, R&L), 2x12  [] TrA brace + standing hip abduction + contralateral arm lift (2#, R&L), 2x12  [] TrA brace + sit to stands from chair + 2 foam pads, 4x6  [] Shuttle squats, 3x20 (3 black bands and 1 red band of resistance)  [] Treadmill walkinmph, 10% incline, 5 minutes  [] Standing hip/knee extension on [employer] chair with 2 blue springs and 2 red springs (R&L), x20  [] Happy baby stretch + diaphragmatic breathing, pelvic drop, 1 minute  [] Child's pose + diaphragmatic breathing, pelvic drop, 2 minutes  [] Butterfly hip stretch + diaphragmatic breathing, pelvic drop, 2 minutes  [] Half-kneeling lunge/hip flexor stretch (L), 1 minute  [] Supine hamstring stretch with strap (L), 1 minute  [] Hip flexor stretch in standing (L), 2x30 seconds  [] Lucian test hip flexor stretch (L), 2x1 minute  [] Diaphragmatic breathing + pelvic drop, 2 minutes in hooklying position  [] Seated ball roll out, 2x2 minutes - improved R lower back pain  [] Seated figure-4 stretch (R&L), 1  "minute  [] Pelvic floor relaxation guided meditation - performed during e-stim for dry needling  [] HEP review, patient education  [] Marching with +ER ( sartorius ) 40ftx2  [] L prone hip flexor stretch with 1/2 roll under knee, 4x30"  [] Prone ball squeezes between ankles 5" hold 10x - experienced cramping in HS  [] Supine hip flexor iso/abdominal bracing with red ball, 5" hold x20 R/L  [] Elevated clams, 20x R/L  [] Side-lying reverse clams (2#, R&L), 2x15  [] Side-lying clams (R&L), 4x10  [] Straight leg bridging on blue ball, 2x10 5" hold  [] L SLS on airex + ball toss at rebounder, 2x20  [] Prone press up x10  [] Prone femoral nerve glides 2x20  [] Prone hip extension 2x20  [] Standing knee flexion x20  [] Prone knee flexion with hip extension x20   [] Bridge on ball with hamstring curl 2x10  [] Dying bugs 3x7  [x] Hesitation marching 10# KB 2x20'  [x] SLS with cone tap forward/contralateral side x10 (patient unable to reach out towards left side with left lower extremity 2nd to pain)  [x] Drinking birds 2x10 with upper extremity assist  [x] Shuttle squat 50# 3x20  [x] SLS on right with left side soccer ball rolls to left side x5  [x] Reverse lunges x10    Manual therapy techniques: to develop flexibility, desensitization, and extensibility for 25 minutes  -  [] Obturator release at ischial tuberosities + active release with resisted contralateral hip external rotation (B)  [] Manual release to L hip flexors - improved tenderness after a few minutes of release  [] Manual release to L pelvic floor muscles internally/vaginally: levator ani - mild increased tone and tenderness that improved within a few minutes of release. PT provided cues for diaphragmatic breathing, visualization, and relaxation.  [] Myofascial decompression/cupping to L adductor longus  [x] Dry needling with electrical stimulation to L adductor longus, adductor chepe, psoas - no adverse effect, written consent provided 5/9/22, see EMR.  [] " "Prone Long-arm distraction to L hip with belt - no significant change in symptoms  [] Trigger point release to L iliopsoas using green theraroller   [] Taping to bilateral sacroiliac join  [] Sacroiliac muscle-energy technique - no significant change in symptoms  [] Manual hip flexor stretch: prone, with hip internal rotation (L), 1 minute  [] L iliacus release  [] Side lying L hip flexor stretch 4 x 30" - performed by clinician  [] SL lumbopelvic manipulation bilaterally  [] SIJ isometric MET with hips at 90/90, 45 degrees of hip flexion / 45 degrees of adduction, "shotgun" 3x5" hold with each      Therapeutic activities to improve functional performance for 0 minutes -  [] Instruction on urge suppression techniques  [] Trialing SI belt - reduced inner thigh pain when wearing SI belt      Home Exercises and Patient Education     Patient Education: progression of plan of care, plan for next session, pt prognosis, pelvic floor anatomy & function, relationship between TrA & PFM, relationship between hips & PFM, relationship between pelvic floor dysfunction & lower back pain, multifidi anatomy & function, lumbar spine anatomy    HEP (3/22/23): standing hip abduction, standing straight leg raise, standing hip flexion + external rotation, countertop push-ups    Exercises were reviewed, and Divya Jacinto was able to demonstrate them prior to the end of the session, as needed. Divya Jacinto demonstrated good understanding of the education provided.      Assessment     Divya Jacinto tolerated treatment well today, with reduced inner thigh discomfort after needling and great tolerance for exercise today. She had some difficulty with reverse lunges and cone taps 2nd to left anterior hip/groin tightness. We will continue to address persisting pain and functional limitations with goal of returning to [facility].    Divya Jacinto is progressing well towards her goals.   Pt prognosis: good    Pt will continue to benefit from skilled outpatient " physical therapy to address the deficits listed in the problem list box on initial evaluation, provide pt/family education and to maximize pt's level of independence in the home and community environment.     Pt's spiritual, cultural and educational needs considered and pt agreeable to plan of care and goals.  Anticipated barriers to physical therapy: none      Short Term Goals: 6 weeks   Pt to report 50% improvement in tenderness to palpation of pelvic floor to demonstrate improved trigger points and overactivity Met   Pt to report only rare incidences of urinary incontinence with coughing to demonstrate improved pelvic floor muscle coordination Met   Pt to report 50% improvement in pain with penetration (gynecological exam or intercourse) and orgasm to demonstrate improved pelvic floor muscle overactivity Met   Pt to be independent with introductory home exercise program Met      Long Term Goals: 12 weeks  Pt to report minimal/no tenderness to palpation of pelvic floor to demonstrate improved trigger points and overactivity Partially Met   Pt to demonstrate an improved score in the FOTO Pelvic Pain (PFDI) survey to no more than 8% impaired to demonstrate improving pelvic floor muscle tenderness, coordination. Not Met   Pt to deny pelvic pain with penetration (gynecological exam or intercourse) and orgasm to demonstrate improved pelvic floor muscle overactivity Partially Met   Pt to increase pelvic floor strength to 4/5 to demonstrate improved strength needed for continence with ADLs. Partially Met   Pt to be independent with advanced home exercise program Not Met   Pt to demonstrate bilateral hip strength of 5/5 for improved pelvic girdle support with mobility. Not Met   Pt to report minimal/no back pain with ADLs and functional mobility to demonstrate improved lumbopelvic muscle support, coordination Not Met        Plan     Continue per Plan of Care  Increasing frequency to 2x/week for more pelvic girdle  stability training, Therapeutic Exercises, Manual Therapeutic Technique, Neuromuscular Re Education, Therapeutic Activities. Modalities, Kinesiotape prn, and Functional Dry Needling as needed.      Barrett Pastrana, PT

## 2023-05-02 ENCOUNTER — CLINICAL SUPPORT (OUTPATIENT)
Dept: REHABILITATION | Facility: OTHER | Age: 38
End: 2023-05-02
Payer: MEDICAID

## 2023-05-02 DIAGNOSIS — R10.2 PELVIC PAIN: Primary | ICD-10-CM

## 2023-05-02 DIAGNOSIS — M54.50 CHRONIC LEFT-SIDED LOW BACK PAIN WITHOUT SCIATICA: ICD-10-CM

## 2023-05-02 DIAGNOSIS — G89.29 CHRONIC LEFT-SIDED LOW BACK PAIN WITHOUT SCIATICA: ICD-10-CM

## 2023-05-02 DIAGNOSIS — R29.898 DECREASED STRENGTH OF TRUNK AND BACK: ICD-10-CM

## 2023-05-02 PROCEDURE — 97014 ELECTRIC STIMULATION THERAPY: CPT | Mod: PN

## 2023-05-02 PROCEDURE — 97140 MANUAL THERAPY 1/> REGIONS: CPT | Mod: PN

## 2023-05-02 PROCEDURE — 97110 THERAPEUTIC EXERCISES: CPT | Mod: PN

## 2023-05-02 NOTE — PROGRESS NOTES
[facility]   Physical Therapy Treatment Note    Name: Divya Jacinto [facility]  Clinic Number: [id number]    Therapy Diagnosis:   Encounter Diagnoses   Name Primary?    Pelvic pain Yes    Decreased strength of trunk and back     Chronic left-sided low back pain without sciatica      Referring Provider: Arnulfo    Visit Date: 5/2/2023    Referring Provider Orders: PT Eval and Treat  Medical Diagnosis from Referral: Pelvic floor dysfunction in female [M62.89]  Evaluation Date: 4/29/2022  Last Reassessment: 3/20/2023  Authorization Period Expiration: 6/15/2023  Plan of Care Expiration: 6/20/2023  Visit # / Visits authorized: 49 (22/36)  FOTO: 3 (4/29/2022, 6/20/2022, 12/20/2022)    Time In: 400p  Time Out: 500p  Total Billable Time: 60 minutes    Precautions: Standard    Subjective     Divya Jacinto reports she is feeling her typical symptoms mild to moderately.     She was compliant with home exercise program  Response to previous treatment: no adverse effect  Functional change: fluctuating improvements in urinary urgency and back/hip/pelvic pain    Pain: L anterior/medial hip - 2/10    Objective     Divya Jacinto received the following interventions during the treatment session -  (TrA = transverse abdominis, PFM = pelvic floor muscle)  Pt consented to pelvic floor muscle assessment and treatment in prior visit - see EMR  All interventions billed as Therapeutic Exercise, per Medicaid guidelines    Therapeutic exercises to develop strength, endurance and core stabilization for 30 minutes -  [] Elliptical: level 3, 5 minutes  [] Hooklying TrA brace + hip adduction isometric with ball, 2x10 with 50% effort  [] Open books with green band (R&L), x10  [] Standing pallof press with doubled green band (R&L), 2x10  [] TrA brace + bridging with belt around knees, 3x10  [] TrA brace + straight leg raises (R&L), 5x5  [] Side-lying hip adduction (L), 2x10 - challenging  [] Cat/cow with coordinated breath, 2  minutes   [] TrA brace + quadruped hip extension (R&L), x10  [] Standing on foam pad + TrA brace + standing shoulder extension with blue band (B), x30  [] Standing on foam pad + TrA brace + standing row with black band (B), x30  [] TrA brace + wall push up, 2x15  [] Side-stepping on foam beam, 2 minutes  [] Side-stepping with red tubing around ankles, 90 seconds  [] Seated on theraball + pelvic rotations, 2 minutes  [] Seated on theraball + alternating kicks, 1 minute  [] Seated on theraball + row with green band, x40  [] Seated on theraball + alternating shoulder flexion (3#, R&L), x15   [] Lateral step up/down on 6-inch step (R&L), 2x15  [] Standing on foam pad + TrA brace + toe taps to cone (R&L), 2x15  [] TrA brace + standing knee flexion (2#, R&L), 2x15  [] Standing hip flexion + hip external rotation lift (2#, R&L), 2x12  [] TrA brace + standing straight leg raise (2#, R&L), 2x12  [] TrA brace + standing hip abduction + contralateral arm lift (2#, R&L), 2x12  [] TrA brace + sit to stands from chair + 2 foam pads, 4x6  [] Shuttle squats, 3x20 (3 black bands and 1 red band of resistance)  [] Treadmill walkinmph, 10% incline, 5 minutes  [] Standing hip/knee extension on Pilates chair with 2 blue springs and 2 red springs (R&L), x20  [] Happy baby stretch + diaphragmatic breathing, pelvic drop, 1 minute  [] Child's pose + diaphragmatic breathing, pelvic drop, 2 minutes  [] Butterfly hip stretch + diaphragmatic breathing, pelvic drop, 2 minutes  [] Half-kneeling lunge/hip flexor stretch (L), 1 minute  [] Supine hamstring stretch with strap (L), 1 minute  [] Hip flexor stretch in standing (L), 2x30 seconds  [] Lucian test hip flexor stretch (L), 2x1 minute  [] Diaphragmatic breathing + pelvic drop, 2 minutes in hooklying position  [] Seated ball roll out, 2x2 minutes - improved R lower back pain  [] Seated figure-4 stretch (R&L), 1 minute  [] Pelvic floor relaxation guided meditation - performed during e-stim  "for dry needling  [] HEP review, patient education  [] Marching with +ER ( sartorius ) 40ftx2  [] L prone hip flexor stretch with 1/2 roll under knee, 4x30"  [] Prone ball squeezes between ankles 5" hold 10x - experienced cramping in HS  [] Supine hip flexor iso/abdominal bracing with red ball, 5" hold x20 R/L  [] Elevated clams, 20x R/L  [] Side-lying reverse clams (2#, R&L), 2x15  [] Side-lying clams (R&L), 4x10  [] Straight leg bridging on blue ball, 2x10 5" hold  [] L SLS on airex + ball toss at rebounder, 2x20  [] Prone press up x10  [] Prone femoral nerve glides 2x20  [] Prone hip extension 2x20  [] Standing knee flexion x20  [] Prone knee flexion with hip extension x20   [] Bridge on ball with hamstring curl 2x10  [] Dying bugs 3x7  [] Hesitation marching 10# KB 2x20'  [x] SLS with cone tap forward/contralateral side x10 (patient unable to reach out towards left side with left lower extremity 2nd to pain)  [x] Drinking birds 2x10 with upper extremity assist  [x] Shuttle squat 50# 3x20  [x] SLS on right with left side soccer ball rolls to left side x5  [x] Reverse lunges x10    Manual therapy techniques: to develop flexibility, desensitization, and extensibility for 15 minutes  -  [] Obturator release at ischial tuberosities + active release with resisted contralateral hip external rotation (B)  [] Manual release to L hip flexors - improved tenderness after a few minutes of release  [] Manual release to L pelvic floor muscles internally/vaginally: levator ani - mild increased tone and tenderness that improved within a few minutes of release. PT provided cues for diaphragmatic breathing, visualization, and relaxation.  [] Myofascial decompression/cupping to L adductor longus  [x] Dry needling with electrical stimulation to L adductor longus, adductor chepe, psoas - no adverse effect, written consent provided 5/9/22, see EMR.  [] Prone Long-arm distraction to L hip with belt - no significant change in " "symptoms  [] Trigger point release to L iliopsoas using green theraroller   [] Taping to bilateral sacroiliac join  [] Sacroiliac muscle-energy technique - no significant change in symptoms  [] Manual hip flexor stretch: prone, with hip internal rotation (L), 1 minute  [] L iliacus release  [] Side lying L hip flexor stretch 4 x 30" - performed by clinician  [] SL lumbopelvic manipulation bilaterally  [] SIJ isometric MET with hips at 90/90, 45 degrees of hip flexion / 45 degrees of adduction, "shotgun" 3x5" hold with each      Therapeutic activities to improve functional performance for 0 minutes -  [] Instruction on urge suppression techniques  [] Trialing SI belt - reduced inner thigh pain when wearing SI belt      Home Exercises and Patient Education     Patient Education: progression of plan of care, plan for next session, pt prognosis, pelvic floor anatomy & function, relationship between TrA & PFM, relationship between hips & PFM, relationship between pelvic floor dysfunction & lower back pain, multifidi anatomy & function, lumbar spine anatomy    HEP (3/22/23): standing hip abduction, standing straight leg raise, standing hip flexion + external rotation, countertop push-ups    Exercises were reviewed, and Divya Jacinto was able to demonstrate them prior to the end of the session, as needed. Divya Jacinto demonstrated good understanding of the education provided.      Assessment     Divya Jacinto tolerated treatment well today, with reduced inner thigh discomfort after needling and great tolerance for exercise today. She fatigues out quickly with left side hip flexor therapeutic exercise. We will continue to address persisting pain and functional limitations with goal of returning to [facility].    Divya Jacinto is progressing well towards her goals.   Pt prognosis: good    Pt will continue to benefit from skilled outpatient physical therapy to address the deficits listed in the problem list box on initial evaluation, " provide pt/family education and to maximize pt's level of independence in the home and community environment.     Pt's spiritual, cultural and educational needs considered and pt agreeable to plan of care and goals.  Anticipated barriers to physical therapy: none      Short Term Goals: 6 weeks   Pt to report 50% improvement in tenderness to palpation of pelvic floor to demonstrate improved trigger points and overactivity Met   Pt to report only rare incidences of urinary incontinence with coughing to demonstrate improved pelvic floor muscle coordination Met   Pt to report 50% improvement in pain with penetration (gynecological exam or intercourse) and orgasm to demonstrate improved pelvic floor muscle overactivity Met   Pt to be independent with introductory home exercise program Met      Long Term Goals: 12 weeks  Pt to report minimal/no tenderness to palpation of pelvic floor to demonstrate improved trigger points and overactivity Partially Met   Pt to demonstrate an improved score in the FOTO Pelvic Pain (PFDI) survey to no more than 8% impaired to demonstrate improving pelvic floor muscle tenderness, coordination. Not Met   Pt to deny pelvic pain with penetration (gynecological exam or intercourse) and orgasm to demonstrate improved pelvic floor muscle overactivity Partially Met   Pt to increase pelvic floor strength to 4/5 to demonstrate improved strength needed for continence with ADLs. Partially Met   Pt to be independent with advanced home exercise program Not Met   Pt to demonstrate bilateral hip strength of 5/5 for improved pelvic girdle support with mobility. Not Met   Pt to report minimal/no back pain with ADLs and functional mobility to demonstrate improved lumbopelvic muscle support, coordination Not Met        Plan     Continue per Plan of Care  Increasing frequency to 2x/week for more pelvic girdle stability training, Therapeutic Exercises, Manual Therapeutic Technique, Neuromuscular Re Education,  Therapeutic Activities. Modalities, Kinesiotape prn, and Functional Dry Needling as needed.      Barrett Pastrana, PT

## 2023-05-04 ENCOUNTER — CLINICAL SUPPORT (OUTPATIENT)
Dept: REHABILITATION | Facility: OTHER | Age: 38
End: 2023-05-04
Payer: MEDICAID

## 2023-05-04 DIAGNOSIS — R10.2 PELVIC PAIN: Primary | ICD-10-CM

## 2023-05-04 DIAGNOSIS — M54.50 CHRONIC LEFT-SIDED LOW BACK PAIN WITHOUT SCIATICA: ICD-10-CM

## 2023-05-04 DIAGNOSIS — G89.29 CHRONIC LEFT-SIDED LOW BACK PAIN WITHOUT SCIATICA: ICD-10-CM

## 2023-05-04 DIAGNOSIS — R29.898 DECREASED STRENGTH OF TRUNK AND BACK: ICD-10-CM

## 2023-05-04 PROCEDURE — 97110 THERAPEUTIC EXERCISES: CPT | Mod: PN | Performed by: PHYSICAL THERAPIST

## 2023-05-04 NOTE — PROGRESS NOTES
[facility]   Physical Therapy Treatment Note    Name: Divya Jacinto [facility]  Clinic Number: [id number]    Therapy Diagnosis:   Encounter Diagnoses   Name Primary?    Pelvic pain Yes    Decreased strength of trunk and back     Chronic left-sided low back pain without sciatica      Referring Provider: Arnulfo    Visit Date: 5/4/2023    Referring Provider Orders: PT Eval and Treat  Medical Diagnosis from Referral: Pelvic floor dysfunction in female [M62.89]  Evaluation Date: 4/29/2022  Last Reassessment: 3/20/2023  Authorization Period Expiration: 6/15/2023  Plan of Care Expiration: 6/20/2023  Visit # / Visits authorized: 50 (23/36)  FOTO: 3 (4/29/2022, 6/20/2022, 12/20/2022)    Time In: 14:05  Time Out: 15:00  Total Billable Time: 55 minutes    Precautions: Standard    Subjective     Divya Jacinto reports inner thigh soreness/tightness. She wonders if the adductor chepe is chronically irritated due to decreased pelvic stability following injury.     She was compliant with home exercise program  Response to previous treatment: no adverse effect  Functional change: fluctuating improvements in urinary urgency and back/hip/pelvic pain    Pain: L anterior/medial hip - 2/10    Objective     Divya Jacinto received the following interventions during the treatment session -  (TrA = transverse abdominis, PFM = pelvic floor muscle)  Pt consented to pelvic floor muscle assessment and treatment in prior visit - see EMR  All interventions billed as Therapeutic Exercise, per Medicaid guidelines    Therapeutic exercises to develop strength, endurance and core stabilization for 45 minutes -  [x] HEP review, patient education  [] Elliptical: level 3, 5 minutes  [] Hooklying TrA brace + hip adduction isometric with ball, 2x10 with 50% effort  [] Open books with green band (R&L), x10  [] Standing pallof press with doubled green band (R&L), 2x10  [x] TrA brace + bridging with belt around knees, 2x20  [x] TrA brace +  straight leg raises (R&L), 2x10  [x] Side-lying hip abduction (R&L), 3x10  [x] Side-lying hip adduction (R&L), 2x10 - challenging  [] Cat/cow with coordinated breath, 2 minutes   [] TrA brace + quadruped hip extension (R&L), x10  [] Standing on foam pad + TrA brace + standing shoulder extension with blue band (B), x30  [] Standing on foam pad + TrA brace + standing row with black band (B), x30  [] TrA brace + wall push up, 2x15  [] Side-stepping on foam beam, 2 minutes  [] Side-stepping with red tubing around ankles, 90 seconds  [] Seated on theraball + pelvic rotations, 2 minutes  [] Seated on theraball + alternating kicks, 1 minute  [] Seated on theraball + row with green band, x40  [] Seated on theraball + alternating shoulder flexion (3#, R&L), x15   [x] Lateral step up/down on 6-inch step (R&L), 3x10  [x] Standing on foam pad + TrA brace + toe taps to cone (3#, R&L), 3x10  [x] TrA brace + standing knee flexion (3#, R&L), 3x10  [x] Standing hip flexion + hip external rotation lift (3#, R&L), 3x10  [] TrA brace + standing straight leg raise (3#, R&L), 3x10  [] TrA brace + standing hip abduction + contralateral arm lift (2#, R&L), 2x12  [] TrA brace + sit to stands from chair + 2 foam pads, 4x6  [] Shuttle squats, 3x20 (3 black bands and 1 red band of resistance)  [] Treadmill walkinmph, 10% incline, 5 minutes  [x] Standing hip/knee extension on Pilates chair with 2 blue springs and 2 red springs (R&L), x30  [] Happy baby stretch + diaphragmatic breathing, pelvic drop, 1 minute  [] Child's pose + diaphragmatic breathing, pelvic drop, 2 minutes  [] Butterfly hip stretch + diaphragmatic breathing, pelvic drop, 2 minutes  [] Half-kneeling lunge/hip flexor stretch (L), 1 minute  [] Supine hamstring stretch with strap (L), 1 minute  [] Hip flexor stretch in standing (L), 2x30 seconds  [] Lucian test hip flexor stretch (L), 2x1 minute  [] Diaphragmatic breathing + pelvic drop, 2 minutes in hooklying position  []  "Seated ball roll out, 2x2 minutes - improved R lower back pain  [x] Seated figure-4 stretch (R&L), 1 minute  [] Marching with +ER ( sartorius ) 40ftx2  [] L prone hip flexor stretch with 1/2 roll under knee, 4x30"  [] Prone ball squeezes between ankles 5" hold 10x - experienced cramping in HS  [] Supine hip flexor iso/abdominal bracing with red ball, 5" hold x20 R/L  [] Side-lying reverse clams (2#, R&L), 2x15  [x] Side-lying clams with yellow band (R&L), 3x10  [] Straight leg bridging on blue ball, 2x10 5" hold  [] L SLS on airex + ball toss at rebounder, 2x20  [] Prone press up x10  [] Prone femoral nerve glides 2x20  [] Prone hip extension 2x20  [] Standing knee flexion x20  [] Prone knee flexion with hip extension x20   [] Bridge on ball with hamstring curl 2x10  [] Dying bugs 3x7  [] Hesitation marching 10# KB 2x20'  [] SLS with cone tap forward/contralateral side x10 (patient unable to reach out towards left side with left lower extremity 2nd to pain)  [] Drinking birds 2x10 with upper extremity assist  [] Shuttle squat 50# 3x20  [] SLS on right with left side soccer ball rolls to left side x5  [] Reverse lunges x10  []   []     Manual therapy techniques: to develop flexibility, desensitization, and extensibility for 8 minutes  -  [x] L obturator release at ischial tuberosities + active release with resisted contralateral hip external rotation  [] Manual release to L hip flexors - improved tenderness after a few minutes of release  [] Manual release to L pelvic floor muscles internally/vaginally: levator ani - mild increased tone and tenderness that improved within a few minutes of release. PT provided cues for diaphragmatic breathing, visualization, and relaxation.  [] Myofascial decompression/cupping to L adductor longus  [] Dry needling with electrical stimulation to L adductor longus, adductor chepe, psoas - no adverse effect, written consent provided 5/9/22, see EMR.  [] Prone Long-arm distraction to L " "hip with belt - no significant change in symptoms  [] Trigger point release to L iliopsoas using green theraroller   [] Taping to bilateral sacroiliac join  [] Sacroiliac muscle-energy technique - no significant change in symptoms  [] Manual hip flexor stretch: prone, with hip internal rotation (L), 1 minute  [] L iliacus release  [] Side lying L hip flexor stretch 4 x 30" - performed by clinician  [] SL lumbopelvic manipulation bilaterally  [] SIJ isometric MET with hips at 90/90, 45 degrees of hip flexion / 45 degrees of adduction, "shotgun" 3x5" hold with each      Therapeutic activities to improve functional performance for 0 minutes -  [] Instruction on urge suppression techniques  [] Trialing SI belt - reduced inner thigh pain when wearing SI belt      Home Exercises and Patient Education     Patient Education: progression of plan of care, plan for next session, pt prognosis, pelvic floor anatomy & function, relationship between TrA & PFM, relationship between hips & PFM, relationship between pelvic floor dysfunction & lower back pain, multifidi anatomy & function, lumbar spine anatomy    HEP (3/22/23): standing hip abduction, standing straight leg raise, standing hip flexion + external rotation, countertop push-ups    Exercises were reviewed, and Divya Jacinto was able to demonstrate them prior to the end of the session, as needed. Divya Jacinto demonstrated good understanding of the education provided.      Assessment     Divya Jacinto tolerated treatment well today, with great tolerance for exercise today. Glute exercises continue to be challenging. We will continue to address persisting pain and functional limitations with goal of returning to [facility].    Divya Jacinto is progressing well towards her goals.   Pt prognosis: good    Pt will continue to benefit from skilled outpatient physical therapy to address the deficits listed in the problem list box on initial evaluation, provide pt/family education and to " maximize pt's level of independence in the home and community environment.     Pt's spiritual, cultural and educational needs considered and pt agreeable to plan of care and goals.  Anticipated barriers to physical therapy: none      Short Term Goals: 6 weeks   Pt to report 50% improvement in tenderness to palpation of pelvic floor to demonstrate improved trigger points and overactivity Met   Pt to report only rare incidences of urinary incontinence with coughing to demonstrate improved pelvic floor muscle coordination Met   Pt to report 50% improvement in pain with penetration (gynecological exam or intercourse) and orgasm to demonstrate improved pelvic floor muscle overactivity Met   Pt to be independent with introductory home exercise program Met      Long Term Goals: 12 weeks  Pt to report minimal/no tenderness to palpation of pelvic floor to demonstrate improved trigger points and overactivity Partially Met   Pt to demonstrate an improved score in the FOTO Pelvic Pain (PFDI) survey to no more than 8% impaired to demonstrate improving pelvic floor muscle tenderness, coordination. Not Met   Pt to deny pelvic pain with penetration (gynecological exam or intercourse) and orgasm to demonstrate improved pelvic floor muscle overactivity Partially Met   Pt to increase pelvic floor strength to 4/5 to demonstrate improved strength needed for continence with ADLs. Partially Met   Pt to be independent with advanced home exercise program Not Met   Pt to demonstrate bilateral hip strength of 5/5 for improved pelvic girdle support with mobility. Not Met   Pt to report minimal/no back pain with ADLs and functional mobility to demonstrate improved lumbopelvic muscle support, coordination Not Met        Plan     Continue per Plan of Care  Increasing frequency to 2x/week for more pelvic girdle stability training, Therapeutic Exercises, Manual Therapeutic Technique, Neuromuscular Re Education, Therapeutic Activities. Modalities,  Kinesiotape prn, and Functional Dry Needling as needed.      Jess Ureña, PT

## 2023-05-09 ENCOUNTER — CLINICAL SUPPORT (OUTPATIENT)
Dept: REHABILITATION | Facility: OTHER | Age: 38
End: 2023-05-09
Payer: MEDICAID

## 2023-05-09 DIAGNOSIS — R29.898 DECREASED STRENGTH OF TRUNK AND BACK: ICD-10-CM

## 2023-05-09 DIAGNOSIS — R10.2 PELVIC PAIN: Primary | ICD-10-CM

## 2023-05-09 DIAGNOSIS — M54.50 CHRONIC LEFT-SIDED LOW BACK PAIN WITHOUT SCIATICA: ICD-10-CM

## 2023-05-09 DIAGNOSIS — G89.29 CHRONIC LEFT-SIDED LOW BACK PAIN WITHOUT SCIATICA: ICD-10-CM

## 2023-05-09 PROCEDURE — 97014 ELECTRIC STIMULATION THERAPY: CPT | Mod: PN

## 2023-05-09 PROCEDURE — 97110 THERAPEUTIC EXERCISES: CPT | Mod: PN

## 2023-05-09 PROCEDURE — 97140 MANUAL THERAPY 1/> REGIONS: CPT | Mod: PN

## 2023-05-09 NOTE — PROGRESS NOTES
[facility]   Physical Therapy Treatment Note    Name: Divya Jacinto [facility]  Clinic Number: [id number]    Therapy Diagnosis:   Encounter Diagnoses   Name Primary?    Pelvic pain Yes    Decreased strength of trunk and back     Chronic left-sided low back pain without sciatica      Referring Provider: Arnulfo    Visit Date: 5/9/2023    Referring Provider Orders: PT Eval and Treat  Medical Diagnosis from Referral: Pelvic floor dysfunction in female [M62.89]  Evaluation Date: 4/29/2022  Last Reassessment: 3/20/2023  Authorization Period Expiration: 6/15/2023  Plan of Care Expiration: 6/20/2023  Visit # / Visits authorized: 51 (24/36)  FOTO: 3 (4/29/2022, 6/20/2022, 12/20/2022)    Time In: 210p  Time Out: 300p  Total Billable Time: 50 minutes    Precautions: Standard    Subjective     Divya Jacinto reports she is feeling her typical symptoms mild to moderately.     She was compliant with home exercise program  Response to previous treatment: no adverse effect  Functional change: fluctuating improvements in urinary urgency and back/hip/pelvic pain    Pain: L anterior/medial hip - 2/10    Objective     Divya Jacinto received the following interventions during the treatment session -  (TrA = transverse abdominis, PFM = pelvic floor muscle)  Pt consented to pelvic floor muscle assessment and treatment in prior visit - see EMR  All interventions billed as Therapeutic Exercise, per Medicaid guidelines    Therapeutic exercises to develop strength, endurance and core stabilization for 10 minutes -  [x] Elliptical: level 3, 10 minutes  [] Hooklying TrA brace + hip adduction isometric with ball, 2x10 with 50% effort  [] Open books with green band (R&L), x10  [] Standing pallof press with doubled green band (R&L), 2x10  [] TrA brace + bridging with belt around knees, 3x10  [] TrA brace + straight leg raises (R&L), 5x5  [] Side-lying hip adduction (L), 2x10 - challenging  [] Cat/cow with coordinated breath, 2  minutes   [] TrA brace + quadruped hip extension (R&L), x10  [] Standing on foam pad + TrA brace + standing shoulder extension with blue band (B), x30  [] Standing on foam pad + TrA brace + standing row with black band (B), x30  [] TrA brace + wall push up, 2x15  [] Side-stepping on foam beam, 2 minutes  [] Side-stepping with red tubing around ankles, 90 seconds  [] Seated on theraball + pelvic rotations, 2 minutes  [] Seated on theraball + alternating kicks, 1 minute  [] Seated on theraball + row with green band, x40  [] Seated on theraball + alternating shoulder flexion (3#, R&L), x15   [] Lateral step up/down on 6-inch step (R&L), 2x15  [] Standing on foam pad + TrA brace + toe taps to cone (R&L), 2x15  [] TrA brace + standing knee flexion (2#, R&L), 2x15  [] Standing hip flexion + hip external rotation lift (2#, R&L), 2x12  [] TrA brace + standing straight leg raise (2#, R&L), 2x12  [] TrA brace + standing hip abduction + contralateral arm lift (2#, R&L), 2x12  [] TrA brace + sit to stands from chair + 2 foam pads, 4x6  [] Shuttle squats, 3x20 (3 black bands and 1 red band of resistance)  [] Treadmill walkinmph, 10% incline, 5 minutes  [] Standing hip/knee extension on Pilates chair with 2 blue springs and 2 red springs (R&L), x20  [] Happy baby stretch + diaphragmatic breathing, pelvic drop, 1 minute  [] Child's pose + diaphragmatic breathing, pelvic drop, 2 minutes  [] Butterfly hip stretch + diaphragmatic breathing, pelvic drop, 2 minutes  [] Half-kneeling lunge/hip flexor stretch (L), 1 minute  [] Supine hamstring stretch with strap (L), 1 minute  [] Hip flexor stretch in standing (L), 2x30 seconds  [] Lucian test hip flexor stretch (L), 2x1 minute  [] Diaphragmatic breathing + pelvic drop, 2 minutes in hooklying position  [] Seated ball roll out, 2x2 minutes - improved R lower back pain  [] Seated figure-4 stretch (R&L), 1 minute  [] Pelvic floor relaxation guided meditation - performed during e-stim  "for dry needling  [] HEP review, patient education  [] Marching with +ER ( sartorius ) 40ftx2  [] L prone hip flexor stretch with 1/2 roll under knee, 4x30"  [] Prone ball squeezes between ankles 5" hold 10x - experienced cramping in HS  [] Supine hip flexor iso/abdominal bracing with red ball, 5" hold x20 R/L  [] Elevated clams, 20x R/L  [] Side-lying reverse clams (2#, R&L), 2x15  [] Side-lying clams (R&L), 4x10  [] Straight leg bridging on blue ball, 2x10 5" hold  [] L SLS on airex + ball toss at rebounder, 2x20  [] Prone press up x10  [] Prone femoral nerve glides 2x20  [] Prone hip extension 2x20  [] Standing knee flexion x20  [] Prone knee flexion with hip extension x20   [] Bridge on ball with hamstring curl 2x10  [] Dying bugs 3x7  [] Hesitation marching 10# KB 2x20'  [] SLS with cone tap forward/contralateral side x10 (patient unable to reach out towards left side with left lower extremity 2nd to pain)  [] Drinking birds 2x10 with upper extremity assist  [] Shuttle squat 50# 3x20  [] SLS on right with left side soccer ball rolls to left side x5  [] Reverse lunges x10    Manual therapy techniques: to develop flexibility, desensitization, and extensibility for 15 minutes  -  [] Obturator release at ischial tuberosities + active release with resisted contralateral hip external rotation (B)  [] Manual release to L hip flexors - improved tenderness after a few minutes of release  [] Manual release to L pelvic floor muscles internally/vaginally: levator ani - mild increased tone and tenderness that improved within a few minutes of release. PT provided cues for diaphragmatic breathing, visualization, and relaxation.  [] Myofascial decompression/cupping to L adductor longus  [x] Dry needling with electrical stimulation to L adductor longus, adductor chepe, psoas - no adverse effect, written consent provided 5/9/22, see EMR.  [] Prone Long-arm distraction to L hip with belt - no significant change in symptoms  [] " "Trigger point release to L iliopsoas using green theraroller   [] Taping to bilateral sacroiliac join  [] Sacroiliac muscle-energy technique - no significant change in symptoms  [] Manual hip flexor stretch: prone, with hip internal rotation (L), 1 minute  [] L iliacus release  [] Side lying L hip flexor stretch 4 x 30" - performed by clinician  [] SL lumbopelvic manipulation bilaterally  [] SIJ isometric MET with hips at 90/90, 45 degrees of hip flexion / 45 degrees of adduction, "shotgun" 3x5" hold with each      Therapeutic activities to improve functional performance for 0 minutes -  [] Instruction on urge suppression techniques  [] Trialing SI belt - reduced inner thigh pain when wearing SI belt      Home Exercises and Patient Education     Patient Education: progression of plan of care, plan for next session, pt prognosis, pelvic floor anatomy & function, relationship between TrA & PFM, relationship between hips & PFM, relationship between pelvic floor dysfunction & lower back pain, multifidi anatomy & function, lumbar spine anatomy    HEP (3/22/23): standing hip abduction, standing straight leg raise, standing hip flexion + external rotation, countertop push-ups    Exercises were reviewed, and Divya Jacinto was able to demonstrate them prior to the end of the session, as needed. Divya Jacinto demonstrated good understanding of the education provided.      Assessment     Divya Jacinto tolerated treatment well today, with reduced inner thigh discomfort after needling. Patient stated that she was unable to perform high level exercise today 2nd to time constraints. We will continue to progress her functional exercises and address persisting pain and functional limitations with goal of returning to [facility].    Divya Jacinto is progressing well towards her goals.   Pt prognosis: good    Pt will continue to benefit from skilled outpatient physical therapy to address the deficits listed in the problem list box on initial " evaluation, provide pt/family education and to maximize pt's level of independence in the home and community environment.     Pt's spiritual, cultural and educational needs considered and pt agreeable to plan of care and goals.  Anticipated barriers to physical therapy: none      Short Term Goals: 6 weeks   Pt to report 50% improvement in tenderness to palpation of pelvic floor to demonstrate improved trigger points and overactivity Met   Pt to report only rare incidences of urinary incontinence with coughing to demonstrate improved pelvic floor muscle coordination Met   Pt to report 50% improvement in pain with penetration (gynecological exam or intercourse) and orgasm to demonstrate improved pelvic floor muscle overactivity Met   Pt to be independent with introductory home exercise program Met      Long Term Goals: 12 weeks  Pt to report minimal/no tenderness to palpation of pelvic floor to demonstrate improved trigger points and overactivity Partially Met   Pt to demonstrate an improved score in the FOTO Pelvic Pain (PFDI) survey to no more than 8% impaired to demonstrate improving pelvic floor muscle tenderness, coordination. Not Met   Pt to deny pelvic pain with penetration (gynecological exam or intercourse) and orgasm to demonstrate improved pelvic floor muscle overactivity Partially Met   Pt to increase pelvic floor strength to 4/5 to demonstrate improved strength needed for continence with ADLs. Partially Met   Pt to be independent with advanced home exercise program Not Met   Pt to demonstrate bilateral hip strength of 5/5 for improved pelvic girdle support with mobility. Not Met   Pt to report minimal/no back pain with ADLs and functional mobility to demonstrate improved lumbopelvic muscle support, coordination Not Met        Plan     Continue per Plan of Care  Increasing frequency to 2x/week for more pelvic girdle stability training, Therapeutic Exercises, Manual Therapeutic Technique, Neuromuscular Re  Education, Therapeutic Activities. Modalities, Kinesiotape prn, and Functional Dry Needling as needed.      Barrett Pastrana, PT

## 2023-05-11 ENCOUNTER — CLINICAL SUPPORT (OUTPATIENT)
Dept: REHABILITATION | Facility: OTHER | Age: 38
End: 2023-05-11
Payer: MEDICAID

## 2023-05-11 DIAGNOSIS — G89.29 CHRONIC LEFT-SIDED LOW BACK PAIN WITHOUT SCIATICA: ICD-10-CM

## 2023-05-11 DIAGNOSIS — M54.50 CHRONIC LEFT-SIDED LOW BACK PAIN WITHOUT SCIATICA: ICD-10-CM

## 2023-05-11 DIAGNOSIS — R29.898 DECREASED STRENGTH OF TRUNK AND BACK: ICD-10-CM

## 2023-05-11 DIAGNOSIS — R10.2 PELVIC PAIN: Primary | ICD-10-CM

## 2023-05-11 PROCEDURE — 97110 THERAPEUTIC EXERCISES: CPT | Mod: PN | Performed by: PHYSICAL THERAPIST

## 2023-05-11 NOTE — PROGRESS NOTES
[facility]   Physical Therapy Treatment Note    Name: Divya Jacinto [facility]  Clinic Number: [id number]    Therapy Diagnosis:   Encounter Diagnoses   Name Primary?    Pelvic pain Yes    Decreased strength of trunk and back     Chronic left-sided low back pain without sciatica      Referring Provider: Arnulfo    Visit Date: 5/11/2023    Referring Provider Orders: PT Eval and Treat  Medical Diagnosis from Referral: Pelvic floor dysfunction in female [M62.89]  Evaluation Date: 4/29/2022  Last Reassessment: 3/20/2023  Authorization Period Expiration: 6/15/2023  Plan of Care Expiration: 6/20/2023  Visit # / Visits authorized: 52 (25/36)  FOTO: 3 (4/29/2022, 6/20/2022, 12/20/2022)    Time In: 13:17  Time Out: 14:00  Total Billable Time: 40 minutes    Precautions: Standard    Subjective     Divya Jacinto reports she is feeling soreness in the inner/back L thigh. Pain post-orgasm is unchanged, but it's easier for her to manage with stretching afterwards than it used to be. She is curious about needling the adductor chepe from the back of the thigh.     She was compliant with home exercise program  Response to previous treatment: no adverse effect  Functional change: fluctuating improvements in urinary urgency and back/hip/pelvic pain    Pain: L anterior/medial hip - 2/10    Objective     Divya Jacinto received the following interventions during the treatment session -  (TrA = transverse abdominis, PFM = pelvic floor muscle)  Pt consented to pelvic floor muscle assessment and treatment in prior visit - see EMR  All interventions billed as Therapeutic Exercise, per Medicaid guidelines    Therapeutic exercises to develop strength, endurance and core stabilization for 25 minutes -  [] Elliptical: level 3, 10 minutes  [] Hooklying TrA brace + hip adduction isometric with ball, 2x10 with 50% effort  [] Open books with green band (R&L), x10  [] Standing pallof press with doubled green band (R&L), 2x10  [x] TrA  brace + bridging with belt around knees, 3x10  [x] TrA brace + straight leg raises (R&L), 2x10  [x] Side-lying hip adduction (L), 3x8 - challenging  [x] Side-lying hip abduction (L), x15  [] Cat/cow with coordinated breath, 2 minutes   [] TrA brace + quadruped hip extension (R&L), x10  [] Standing on foam pad + TrA brace + standing shoulder extension with blue band (B), x30  [] Standing on foam pad + TrA brace + standing row with black band (B), x30  [] TrA brace + wall push up, 2x15  [] Side-stepping on foam beam, 2 minutes  [] Side-stepping with red tubing around ankles, 90 seconds  [] Seated on theraball + pelvic rotations, 2 minutes  [] Seated on theraball + alternating kicks, 1 minute  [] Seated on theraball + row with green band, x40  [] Seated on theraball + alternating shoulder flexion (3#, R&L), x15   [] Lateral step up/down on 6-inch step (R&L), 2x15  [] Standing on foam pad + TrA brace + toe taps to cone (R&L), 2x15  [] TrA brace + standing knee flexion (2#, R&L), 2x15  [] Standing hip flexion + hip external rotation lift (2#, R&L), 2x12  [] TrA brace + standing straight leg raise (2#, R&L), 2x12  [] TrA brace + standing hip abduction + contralateral arm lift (2#, R&L), 2x12  [] TrA brace + sit to stands from chair + 2 foam pads, 4x6  [] Shuttle squats, 3x20 (3 black bands and 1 red band of resistance)  [] Treadmill walkinmph, 10% incline, 5 minutes  [] Standing hip/knee extension on Pilates chair with 2 blue springs and 2 red springs (R&L), x20  [] Happy baby stretch + diaphragmatic breathing, pelvic drop, 1 minute  [] Child's pose + diaphragmatic breathing, pelvic drop, 2 minutes  [] Butterfly hip stretch + diaphragmatic breathing, pelvic drop, 2 minutes  [] Half-kneeling lunge/hip flexor stretch (L), 1 minute  [] Supine hamstring stretch with strap (L), 1 minute  [] Hip flexor stretch in standing (L), 2x30 seconds  [] Lucian test hip flexor stretch (L), 2x1 minute  [] Diaphragmatic breathing +  "pelvic drop, 2 minutes in hooklying position  [] Seated ball roll out, 2x2 minutes - improved R lower back pain  [] Seated figure-4 stretch (R&L), 1 minute  [] Pelvic floor relaxation guided meditation - performed during e-stim for dry needling  [x] HEP review, patient education  [] Marching with +ER ( sartorius ) 40ftx2  [] L prone hip flexor stretch with 1/2 roll under knee, 4x30"  [] Prone ball squeezes between ankles 5" hold 10x - experienced cramping in HS  [] Supine hip flexor iso/abdominal bracing with red ball, 5" hold x20 R/L  [] Elevated clams, 20x R/L  [] Side-lying reverse clams (2#, R&L), 2x15  [] Side-lying clams (R&L), 4x10  [] Straight leg bridging on blue ball, 2x10 5" hold  [] L SLS on airex + ball toss at rebounder, 2x20  [] Prone press up x10  [] Prone femoral nerve glides 2x20  [] Prone hip extension 2x20  [] Standing knee flexion x20  [] Prone knee flexion with hip extension x20   [] Bridge on ball with hamstring curl 2x10  [] Dying bugs 3x7  [] Hesitation marching 10# KB 2x20'  [] SLS with cone tap forward/contralateral side x10 (patient unable to reach out towards left side with left lower extremity 2nd to pain)  [] Drinking birds 2x10 with upper extremity assist  [] Shuttle squat 50# 3x20  [] SLS on right with left side soccer ball rolls to left side x5  [] Reverse lunges x10  [x] Seated figure-4 stretch (L), 1 minute    Manual therapy techniques: to develop flexibility, desensitization, and extensibility for 15 minutes  -  [] Obturator release at ischial tuberosities + active release with resisted contralateral hip external rotation (B)  [] Manual release to L hip flexors - improved tenderness after a few minutes of release  [] Manual release to L pelvic floor muscles internally/vaginally: levator ani - mild increased tone and tenderness that improved within a few minutes of release. PT provided cues for diaphragmatic breathing, visualization, and relaxation.  [] Myofascial " "decompression/cupping to L adductor longus  [x] Dry needling with electrical stimulation to L adductor chepe, semimembranosus - no adverse effect, written consent provided 5/9/22, see EMR.  [] Prone Long-arm distraction to L hip with belt - no significant change in symptoms  [] Trigger point release to L iliopsoas using green theraroller   [] Taping to bilateral sacroiliac join  [] Sacroiliac muscle-energy technique - no significant change in symptoms  [] Manual hip flexor stretch: prone, with hip internal rotation (L), 1 minute  [] L iliacus release  [] Side lying L hip flexor stretch 4 x 30" - performed by clinician  [] SL lumbopelvic manipulation bilaterally  [] SIJ isometric MET with hips at 90/90, 45 degrees of hip flexion / 45 degrees of adduction, "shotgun" 3x5" hold with each      Therapeutic activities to improve functional performance for 0 minutes -  [] Instruction on urge suppression techniques  [] Trialing SI belt - reduced inner thigh pain when wearing SI belt      Home Exercises and Patient Education     Patient Education: progression of plan of care, plan for next session, pt prognosis, pelvic floor anatomy & function, relationship between TrA & PFM, relationship between hips & PFM, relationship between pelvic floor dysfunction & lower back pain, multifidi anatomy & function, lumbar spine anatomy    HEP (3/22/23): standing hip abduction, standing straight leg raise, standing hip flexion + external rotation, countertop push-ups    Exercises were reviewed, and Divya Jacinto was able to demonstrate them prior to the end of the session, as needed. Divya Jacinto demonstrated good understanding of the education provided.      Assessment     Divya Jacinto tolerated treatment well today, with reduced inner thigh discomfort after needling and exercise. Needling adductor chepe reproduced pt's familiar L anterior hip pain. We will continue to progress her functional exercises and address persisting pain and " functional limitations with goal of returning to [facility].    Divya Jacinto is progressing well towards her goals.   Pt prognosis: good    Pt will continue to benefit from skilled outpatient physical therapy to address the deficits listed in the problem list box on initial evaluation, provide pt/family education and to maximize pt's level of independence in the home and community environment.     Pt's spiritual, cultural and educational needs considered and pt agreeable to plan of care and goals.  Anticipated barriers to physical therapy: none      Short Term Goals: 6 weeks   Pt to report 50% improvement in tenderness to palpation of pelvic floor to demonstrate improved trigger points and overactivity Met   Pt to report only rare incidences of urinary incontinence with coughing to demonstrate improved pelvic floor muscle coordination Met   Pt to report 50% improvement in pain with penetration (gynecological exam or intercourse) and orgasm to demonstrate improved pelvic floor muscle overactivity Met   Pt to be independent with introductory home exercise program Met      Long Term Goals: 12 weeks  Pt to report minimal/no tenderness to palpation of pelvic floor to demonstrate improved trigger points and overactivity Partially Met   Pt to demonstrate an improved score in the FOTO Pelvic Pain (PFDI) survey to no more than 8% impaired to demonstrate improving pelvic floor muscle tenderness, coordination. Not Met   Pt to deny pelvic pain with penetration (gynecological exam or intercourse) and orgasm to demonstrate improved pelvic floor muscle overactivity Partially Met   Pt to increase pelvic floor strength to 4/5 to demonstrate improved strength needed for continence with ADLs. Partially Met   Pt to be independent with advanced home exercise program Not Met   Pt to demonstrate bilateral hip strength of 5/5 for improved pelvic girdle support with mobility. Not Met   Pt to report minimal/no back pain with ADLs and functional  mobility to demonstrate improved lumbopelvic muscle support, coordination Not Met        Plan     Continue per Plan of Care  Increasing frequency to 2x/week for more pelvic girdle stability training, Therapeutic Exercises, Manual Therapeutic Technique, Neuromuscular Re Education, Therapeutic Activities. Modalities, Kinesiotape prn, and Functional Dry Needling as needed.      Jess Ureña, PT

## 2023-05-16 ENCOUNTER — OFFICE VISIT (OUTPATIENT)
Dept: ORTHOPEDICS | Facility: CLINIC | Age: 38
End: 2023-05-16
Payer: MEDICAID

## 2023-05-16 ENCOUNTER — CLINICAL SUPPORT (OUTPATIENT)
Dept: REHABILITATION | Facility: OTHER | Age: 38
End: 2023-05-16
Payer: MEDICAID

## 2023-05-16 ENCOUNTER — PATIENT MESSAGE (OUTPATIENT)
Dept: ORTHOPEDICS | Facility: CLINIC | Age: 38
End: 2023-05-16

## 2023-05-16 VITALS
HEIGHT: 69 IN | DIASTOLIC BLOOD PRESSURE: 79 MMHG | SYSTOLIC BLOOD PRESSURE: 139 MMHG | BODY MASS INDEX: 39.53 KG/M2 | HEART RATE: 68 BPM | WEIGHT: 266.88 LBS

## 2023-05-16 DIAGNOSIS — M54.50 CHRONIC LEFT-SIDED LOW BACK PAIN WITHOUT SCIATICA: ICD-10-CM

## 2023-05-16 DIAGNOSIS — Z98.890 S/P HIP ARTHROSCOPY: ICD-10-CM

## 2023-05-16 DIAGNOSIS — R10.2 PELVIC PAIN: ICD-10-CM

## 2023-05-16 DIAGNOSIS — R10.2 PELVIC PAIN: Primary | ICD-10-CM

## 2023-05-16 DIAGNOSIS — G89.29 CHRONIC LEFT HIP PAIN: Primary | ICD-10-CM

## 2023-05-16 DIAGNOSIS — G89.29 CHRONIC LEFT-SIDED LOW BACK PAIN WITHOUT SCIATICA: ICD-10-CM

## 2023-05-16 DIAGNOSIS — R29.898 DECREASED STRENGTH OF TRUNK AND BACK: ICD-10-CM

## 2023-05-16 DIAGNOSIS — M25.552 CHRONIC LEFT HIP PAIN: ICD-10-CM

## 2023-05-16 DIAGNOSIS — G89.29 CHRONIC LEFT HIP PAIN: ICD-10-CM

## 2023-05-16 DIAGNOSIS — M25.552 CHRONIC LEFT HIP PAIN: Primary | ICD-10-CM

## 2023-05-16 DIAGNOSIS — Z98.890 S/P HIP ARTHROSCOPY: Primary | ICD-10-CM

## 2023-05-16 DIAGNOSIS — M25.559 HIP PAIN, UNSPECIFIED LATERALITY: Primary | ICD-10-CM

## 2023-05-16 PROCEDURE — 1159F PR MEDICATION LIST DOCUMENTED IN MEDICAL RECORD: ICD-10-PCS | Mod: CPTII,,,

## 2023-05-16 PROCEDURE — 1159F MED LIST DOCD IN RCRD: CPT | Mod: CPTII,,,

## 2023-05-16 PROCEDURE — 99999 PR PBB SHADOW E&M-EST. PATIENT-LVL III: CPT | Mod: PBBFAC,,,

## 2023-05-16 PROCEDURE — 3078F PR MOST RECENT DIASTOLIC BLOOD PRESSURE < 80 MM HG: ICD-10-PCS | Mod: CPTII,,,

## 2023-05-16 PROCEDURE — 99214 OFFICE O/P EST MOD 30 MIN: CPT | Mod: S$PBB,,,

## 2023-05-16 PROCEDURE — 3078F DIAST BP <80 MM HG: CPT | Mod: CPTII,,,

## 2023-05-16 PROCEDURE — 99213 OFFICE O/P EST LOW 20 MIN: CPT | Mod: PBBFAC,PN

## 2023-05-16 PROCEDURE — 97140 MANUAL THERAPY 1/> REGIONS: CPT | Mod: PN

## 2023-05-16 PROCEDURE — 97014 ELECTRIC STIMULATION THERAPY: CPT | Mod: PN

## 2023-05-16 PROCEDURE — 3075F PR MOST RECENT SYSTOLIC BLOOD PRESS GE 130-139MM HG: ICD-10-PCS | Mod: CPTII,,,

## 2023-05-16 PROCEDURE — 99999 PR PBB SHADOW E&M-EST. PATIENT-LVL III: ICD-10-PCS | Mod: PBBFAC,,,

## 2023-05-16 PROCEDURE — 99214 PR OFFICE/OUTPT VISIT, EST, LEVL IV, 30-39 MIN: ICD-10-PCS | Mod: S$PBB,,,

## 2023-05-16 PROCEDURE — 3075F SYST BP GE 130 - 139MM HG: CPT | Mod: CPTII,,,

## 2023-05-16 PROCEDURE — 3008F PR BODY MASS INDEX (BMI) DOCUMENTED: ICD-10-PCS | Mod: CPTII,,,

## 2023-05-16 PROCEDURE — 3008F BODY MASS INDEX DOCD: CPT | Mod: CPTII,,,

## 2023-05-16 RX ORDER — BACLOFEN 10 MG/1
10 TABLET ORAL 3 TIMES DAILY
COMMUNITY
Start: 2023-05-05

## 2023-05-16 RX ORDER — LABETALOL 100 MG/1
100 TABLET, FILM COATED ORAL 2 TIMES DAILY
COMMUNITY
Start: 2023-05-04

## 2023-05-16 NOTE — PROGRESS NOTES
"  Patient ID: Divya Salazar is a 37 y.o. female    Pain of the Left Hip and Pain of the Right Hip      History of Present Illness:    Divya Salazar presents to clinic for left hip pain. atraumatic, left-sided inguinal/flaquita pain x 8-10 years. She has seen multiple providers for this.  Patient denies known AMRIT. The pain started 10 years ago and is becoming progressively worse.  Pain is located over (points to) anterior left hip. She reports that the pain is a 8 /10 sharp pain today. The pain is affecting ADLs and limiting desired level of activity. Denies numbness, tingling, radiation and inability to bear weight.  Pain is 10 /10 at its worst.  Reports physical therapist and prior orthopedics providers suspect adductor tendonitis, had MRI about 2 years ago and is status post left hip arthroscopy with Dr. Hartman [facility].  She reports no improvement following this procedure.  She has tried physical therapy, including extensive pelvic floor physical therapy, as well as multiple injections (pudendal nerve block, left inguinal cryoablation, L3 IL ARA, and pelvic botox) without relief of any of her symptoms. Pain worsened with activity and after intercourse.  Patient reports the week she is supposed to be ovulating she gets increased pain.  Patient reports her OB has ruled out any gynecological cause of this.    S/p L hip arthroscopy with labral debridement. Intraoperative findings include "Fraying of the anterior labrum, small area of Grade III chondromalacia weightbearing portion of acetabulum"     Occupation:  and manager    Ambulating: unassisted  Diabetic: no  Smoking: no  Hx of DVT/PE: no    PAST MEDICAL HISTORY: No past medical history on file.  PAST SURGICAL HISTORY:   Past Surgical History:   Procedure Laterality Date    ARTHROSCOPY, HIP Left 3/18/2021    Procedure: ARTHROSCOPY, HIP, WITH LABRUM REPAIR;  Surgeon: Diego Jacinto MD;  Location: [facility] OR;  Service: Orthopedics;  Laterality: " Left;  C-arm  Taoist w/ [employer] Nep[facility] notified 3/8/21 [place]  Taoist confirmed 3/17/21 [place]    FULGURATION OF ENDOMETRIOSIS      KNEE ARTHROSCOPY W/ ACL RECONSTRUCTION Left 7/15/2021    Procedure: RECONSTRUCTION, KNEE, ACL, ARTHROSCOPIC with hamstring autograft;  Surgeon: Diego Jacinto MD;  Location: [facility];  Service: Orthopedics;  Laterality: Left;  Pre-op adductor canal block    Arthrex tight rope, interference screws, tendon strippers, c-arm  hamstring autograft Sindi notified [facility] 7/7/21- Confirmed [facility]     FAMILY HISTORY:   Family History   Problem Relation Age of Onset    Cancer Neg Hx      SOCIAL HISTORY:   Social History     Occupational History    Not on file   Tobacco Use    Smoking status: Former     Types: Cigarettes     Start date: 2012    Smokeless tobacco: Never   Substance and Sexual Activity    Alcohol use: Yes     Comment: occasional    Drug use: No    Sexual activity: Yes     Partners: Male        MEDICATIONS:   Current Outpatient Medications:     ALPRAZolam (XANAX) 0.25 MG tablet, Take 0.25 mg by mouth daily as needed., Disp: , Rfl:     baclofen (LIORESAL) 10 MG tablet, Take 10 mg by mouth 3 (three) times daily., Disp: , Rfl:     buPROPion (WELLBUTRIN XL) 150 MG TB24 tablet, Take 150 mg by mouth once daily., Disp: , Rfl:     cyclobenzaprine (FLEXERIL) 5 MG tablet, Take 5 mg by mouth daily as needed., Disp: , Rfl:     labetaloL (NORMODYNE) 100 MG tablet, Take 100 mg by mouth 2 (two) times daily., Disp: , Rfl:     DULoxetine (CYMBALTA) 30 MG capsule, Take by mouth., Disp: , Rfl:     gabapentin (NEURONTIN) 100 MG capsule, Take 100 mg by mouth 3 (three) times daily., Disp: , Rfl:     hydrOXYzine HCL (ATARAX) 25 MG tablet, Take 25 mg by mouth 2 (two) times daily. as needed for anxiety., Disp: , Rfl:     sertraline (ZOLOFT) 50 MG tablet, , Disp: , Rfl:     triamcinolone acetonide 0.025% (KENALOG) 0.025 % cream, Apply topically 2 (two) times daily., Disp: , Rfl:   ALLERGIES: Review of patient's  allergies indicates:  No Known Allergies      Physical Exam     Vitals:    05/16/23 1129   BP: 139/79   Pulse: 68     Alert and oriented to person, place and time. No acute distress. Well-groomed, not ill appearing. Pupils round and reactive, normal respiratory effort, no audible wheezing.       General:  The patient is alert and oriented x 3.  Mood is pleasant.      left HIP EXAMINATION     OBSERVATION / INSPECTION  Gait:   Nonantalgic   Alignment:  Neutral   Scars:   + prior hip scope  Muscle atrophy: None   Effusion:  None   Warmth:  None   Leg lengths:   Equal     TENDERNESS         Trochanteric bursa   -   Piriformis    -   SI joint    +   Psoas tendon   -   Rectus insertion  -   Adductor insertion  +  Pubic symphysis  -     ROM: (* = pain)    Flexion:    120 degrees *  External rotation: 40 degrees  Internal rotation with axial load: 30 degrees  Internal rotation without axial load: 40 degrees  Abduction:  45 degrees  Adduction:   20 degrees    SPECIAL TESTS:  Pain w/ forced internal rotation (FADIR): Negative   Pain w/ forced external rotation (PEDRO): Negative   Circumduction test:    Negative   Stinchfield test:    Negative   Log roll:      Negative   Snapping hip (internal):   Negative   Sit-up pain:     Negative   Resisted sit-up pain:    Negative   Trendelenburg test:    Negative       EXTREMITY NEURO-VASCULAR EXAMINATION:   Sensation:  Grossly intact to light touch all dermatomal regions.   Motor Function:  Fully intact motor function at hip, knee, foot and ankle    DTRs;  quadriceps and  achilles 2+.  No clonus and downgoing Babinski.    Vascular status:  DP and PT pulses 2+, brisk capillary refill, symmetric.    Skin: intact, compartments soft.    Imaging:     Bilateral hip X-rays ordered/reviewed by me showing no evidence of fracture or dislocation. There is no obvious malalignment. No evidence of masses, lesions or foreign bodies.       Assessment & Plan    Chronic left hip pain  -     MRI Hip W  WO Contrast Left; Future; Expected date: 05/16/2023    Pelvic pain    S/P hip arthroscopy         I made the decision to obtain old records of the patient including previous notes and imaging. New imaging was ordered today of the extremity or extremities evaluated. I independently reviewed and interpreted the radiographs and/or MRIs/CT scan today as well as prior imaging.    We discussed at length different treatment options including conservative vs surgical management. These include anti-inflammatories, acetaminophen, rest, ice, heat, formal physical therapy including strengthening and stretching exercises, home exercise programs, injections, dry needling, and finally surgical intervention.      Patient with left hip pain times 10 years.  She has failed multiple injections for her lumbar spine as well as pelvic floor PT and gynecological reasons for this pain.  I am unsure what is causing this pain, no improvement status post left labral debridement.  We will obtain MRI of left hip. I did instruct her we do not perform hip arthroscopies.     Follow up: MRI results  X-rays next visit: none    All questions were answered and patient is agreeable to the above plan.

## 2023-05-17 DIAGNOSIS — G89.29 CHRONIC LEFT HIP PAIN: Primary | ICD-10-CM

## 2023-05-17 DIAGNOSIS — M25.552 CHRONIC LEFT HIP PAIN: Primary | ICD-10-CM

## 2023-05-17 NOTE — PROGRESS NOTES
Rheum referral placed.     
Partially impaired: cannot see medication labels or newsprint, but can see obstacles in path, and the surrounding layout; can count fingers at arm's length

## 2023-05-18 ENCOUNTER — CLINICAL SUPPORT (OUTPATIENT)
Dept: REHABILITATION | Facility: OTHER | Age: 38
End: 2023-05-18
Payer: MEDICAID

## 2023-05-18 ENCOUNTER — PATIENT MESSAGE (OUTPATIENT)
Dept: RHEUMATOLOGY | Facility: CLINIC | Age: 38
End: 2023-05-18
Payer: MEDICAID

## 2023-05-18 DIAGNOSIS — R29.898 DECREASED STRENGTH OF TRUNK AND BACK: ICD-10-CM

## 2023-05-18 DIAGNOSIS — G89.29 CHRONIC LEFT-SIDED LOW BACK PAIN WITHOUT SCIATICA: ICD-10-CM

## 2023-05-18 DIAGNOSIS — M54.50 CHRONIC LEFT-SIDED LOW BACK PAIN WITHOUT SCIATICA: ICD-10-CM

## 2023-05-18 DIAGNOSIS — R10.2 PELVIC PAIN: Primary | ICD-10-CM

## 2023-05-18 PROCEDURE — 97110 THERAPEUTIC EXERCISES: CPT | Mod: PN | Performed by: PHYSICAL THERAPIST

## 2023-05-18 NOTE — PROGRESS NOTES
[facility]   Physical Therapy Treatment Note    Name: Divya Jacinto [facility]  Clinic Number: [id number]    Therapy Diagnosis:   Encounter Diagnoses   Name Primary?    Pelvic pain Yes    Decreased strength of trunk and back     Chronic left-sided low back pain without sciatica      Referring Provider: Arnulfo    Visit Date: 5/16/2023    Referring Provider Orders: PT Eval and Treat  Medical Diagnosis from Referral: Pelvic floor dysfunction in female [M62.89]  Evaluation Date: 4/29/2022  Last Reassessment: 3/20/2023  Authorization Period Expiration: 6/15/2023  Plan of Care Expiration: 6/20/2023  Visit # / Visits authorized: 53 (26/36)  FOTO: 3 (4/29/2022, 6/20/2022, 12/20/2022)    Time In: 420p (late arrival)  Time Out: 500p  Total Billable Time: 40 minutes    Precautions: Standard    Subjective     Divya Jacinto reports she is feeling soreness in the inner/back L thigh. Her pain was severe after working 14 hours on 5/14/23.     She was compliant with home exercise program  Response to previous treatment: no adverse effect  Functional change: fluctuating improvements in urinary urgency and back/hip/pelvic pain    Pain: L anterior/medial hip - 2/10    Objective     Divya Jacinto received the following interventions during the treatment session -  (TrA = transverse abdominis, PFM = pelvic floor muscle)  Pt consented to pelvic floor muscle assessment and treatment in prior visit - see EMR  All interventions billed as Therapeutic Exercise, per Medicaid guidelines    Therapeutic exercises to develop strength, endurance and core stabilization for 25 minutes -  [] Elliptical: level 3, 10 minutes  [] Hooklying TrA brace + hip adduction isometric with ball, 2x10 with 50% effort  [] Open books with green band (R&L), x10  [] Standing pallof press with doubled green band (R&L), 2x10  [] TrA brace + bridging with belt around knees, 3x10  [] TrA brace + straight leg raises (R&L), 2x10  [] Side-lying hip adduction  (L), 3x8 - challenging  [] Side-lying hip abduction (L), x15  [] Cat/cow with coordinated breath, 2 minutes   [] TrA brace + quadruped hip extension (R&L), x10  [] Standing on foam pad + TrA brace + standing shoulder extension with blue band (B), x30  [] Standing on foam pad + TrA brace + standing row with black band (B), x30  [] TrA brace + wall push up, 2x15  [] Side-stepping on foam beam, 2 minutes  [] Side-stepping with red tubing around ankles, 90 seconds  [] Seated on theraball + pelvic rotations, 2 minutes  [] Seated on theraball + alternating kicks, 1 minute  [] Seated on theraball + row with green band, x40  [] Seated on theraball + alternating shoulder flexion (3#, R&L), x15   [] Lateral step up/down on 6-inch step (R&L), 2x15  [] Standing on foam pad + TrA brace + toe taps to cone (R&L), 2x15  [] TrA brace + standing knee flexion (2#, R&L), 2x15  [] Standing hip flexion + hip external rotation lift (2#, R&L), 2x12  [] TrA brace + standing straight leg raise (2#, R&L), 2x12  [] TrA brace + standing hip abduction + contralateral arm lift (2#, R&L), 2x12  [] TrA brace + sit to stands from chair + 2 foam pads, 4x6  [] Shuttle squats, 3x20 (3 black bands and 1 red band of resistance)  [] Treadmill walkinmph, 10% incline, 5 minutes  [] Standing hip/knee extension on PilWizzgo chair with 2 blue springs and 2 red springs (R&L), x20  [] Happy baby stretch + diaphragmatic breathing, pelvic drop, 1 minute  [] Child's pose + diaphragmatic breathing, pelvic drop, 2 minutes  [] Butterfly hip stretch + diaphragmatic breathing, pelvic drop, 2 minutes  [] Half-kneeling lunge/hip flexor stretch (L), 1 minute  [] Supine hamstring stretch with strap (L), 1 minute  [] Hip flexor stretch in standing (L), 2x30 seconds  [] Lucian test hip flexor stretch (L), 2x1 minute  [] Diaphragmatic breathing + pelvic drop, 2 minutes in hooklying position  [] Seated ball roll out, 2x2 minutes - improved R lower back pain  [] Seated  "figure-4 stretch (R&L), 1 minute  [] Pelvic floor relaxation guided meditation - performed during e-stim for dry needling  [x] HEP review, patient education  [] Marching with +ER ( sartorius ) 40ftx2  [] L prone hip flexor stretch with 1/2 roll under knee, 4x30"  [] Prone ball squeezes between ankles 5" hold 10x - experienced cramping in HS  [] Supine hip flexor iso/abdominal bracing with red ball, 5" hold x20 R/L  [] Elevated clams, 20x R/L  [] Side-lying reverse clams (2#, R&L), 2x15  [] Side-lying clams (R&L), 4x10  [] Straight leg bridging on blue ball, 2x10 5" hold  [] L SLS on airex + ball toss at rebounder, 2x20  [] Prone press up x10  [] Prone femoral nerve glides 2x20  [] Prone hip extension 2x20  [] Standing knee flexion x20  [] Prone knee flexion with hip extension x20   [] Bridge on ball with hamstring curl 2x10  [] Dying bugs 3x7  [] Hesitation marching 10# KB 2x20'  [] SLS with cone tap forward/contralateral side x10 (patient unable to reach out towards left side with left lower extremity 2nd to pain)  [] Drinking birds 2x10 with upper extremity assist  [] Shuttle squat 50# 3x20  [] SLS on right with left side soccer ball rolls to left side x5  [] Reverse lunges x10  [] Seated figure-4 stretch (L), 1 minute    Manual therapy techniques: to develop flexibility, desensitization, and extensibility for 25 minutes  -  [] Obturator release at ischial tuberosities + active release with resisted contralateral hip external rotation (B)  [] Manual release to L hip flexors - improved tenderness after a few minutes of release  [] Manual release to L pelvic floor muscles internally/vaginally: levator ani - mild increased tone and tenderness that improved within a few minutes of release. PT provided cues for diaphragmatic breathing, visualization, and relaxation.  [] Myofascial decompression/cupping to L adductor longus  [x] Dry needling with electrical stimulation to L adductor chepe, semimembranosus, left psoas, " "left TFL - no adverse effect, written consent provided 5/9/22, see EMR.  [x] Prone Long-arm distraction to L hip with belt - no significant change in symptoms  [] Trigger point release to L iliopsoas using green theraroller   [] Taping to bilateral sacroiliac join  [] Sacroiliac muscle-energy technique - no significant change in symptoms  [] Manual hip flexor stretch: prone, with hip internal rotation (L), 1 minute  [] L iliacus release  [] Side lying L hip flexor stretch 4 x 30" - performed by clinician  [] SL lumbopelvic manipulation bilaterally  [] SIJ isometric MET with hips at 90/90, 45 degrees of hip flexion / 45 degrees of adduction, "shotgun" 3x5" hold with each      Therapeutic activities to improve functional performance for 0 minutes -  [] Instruction on urge suppression techniques  [] Trialing SI belt - reduced inner thigh pain when wearing SI belt      Home Exercises and Patient Education     Patient Education: progression of plan of care, plan for next session, pt prognosis, pelvic floor anatomy & function, relationship between TrA & PFM, relationship between hips & PFM, relationship between pelvic floor dysfunction & lower back pain, multifidi anatomy & function, lumbar spine anatomy    HEP (3/22/23): standing hip abduction, standing straight leg raise, standing hip flexion + external rotation, countertop push-ups    Exercises were reviewed, and Divya Jacinto was able to demonstrate them prior to the end of the session, as needed. Divya Jacinto demonstrated good understanding of the education provided.      Assessment     Divya Jacinto tolerated treatment well today, with reduced inner thigh discomfort after needling and exercise. Needling adductor chepe reproduced pt's familiar L anterior hip pain. Patient was unable to perform therapeutic exercise today 2nd to increased emotional state after visit with PA earlier in the day. We will continue to progress her functional exercises and address persisting " pain and functional limitations with goal of returning to PLOF.    Divya Jacinto is progressing well towards her goals.   Pt prognosis: good    Pt will continue to benefit from skilled outpatient physical therapy to address the deficits listed in the problem list box on initial evaluation, provide pt/family education and to maximize pt's level of independence in the home and community environment.     Pt's spiritual, cultural and educational needs considered and pt agreeable to plan of care and goals.  Anticipated barriers to physical therapy: none      Short Term Goals: 6 weeks   Pt to report 50% improvement in tenderness to palpation of pelvic floor to demonstrate improved trigger points and overactivity Met   Pt to report only rare incidences of urinary incontinence with coughing to demonstrate improved pelvic floor muscle coordination Met   Pt to report 50% improvement in pain with penetration (gynecological exam or intercourse) and orgasm to demonstrate improved pelvic floor muscle overactivity Met   Pt to be independent with introductory home exercise program Met      Long Term Goals: 12 weeks  Pt to report minimal/no tenderness to palpation of pelvic floor to demonstrate improved trigger points and overactivity Partially Met   Pt to demonstrate an improved score in the FOTO Pelvic Pain (PFDI) survey to no more than 8% impaired to demonstrate improving pelvic floor muscle tenderness, coordination. Not Met   Pt to deny pelvic pain with penetration (gynecological exam or intercourse) and orgasm to demonstrate improved pelvic floor muscle overactivity Partially Met   Pt to increase pelvic floor strength to 4/5 to demonstrate improved strength needed for continence with ADLs. Partially Met   Pt to be independent with advanced home exercise program Not Met   Pt to demonstrate bilateral hip strength of 5/5 for improved pelvic girdle support with mobility. Not Met   Pt to report minimal/no back pain with ADLs and  functional mobility to demonstrate improved lumbopelvic muscle support, coordination Not Met        Plan     Continue per Plan of Care  Increasing frequency to 2x/week for more pelvic girdle stability training, Therapeutic Exercises, Manual Therapeutic Technique, Neuromuscular Re Education, Therapeutic Activities. Modalities, Kinesiotape prn, and Functional Dry Needling as needed.      Barrett Pastrana, PT

## 2023-05-18 NOTE — PROGRESS NOTES
[facility]   Physical Therapy Treatment Note    Name: Divya Jacinto [facility]  Clinic Number: [id number]    Therapy Diagnosis:   Encounter Diagnoses   Name Primary?    Pelvic pain Yes    Decreased strength of trunk and back     Chronic left-sided low back pain without sciatica      Referring Provider: Arnulfo    Visit Date: 5/18/2023    Referring Provider Orders: PT Eval and Treat  Medical Diagnosis from Referral: Pelvic floor dysfunction in female [M62.89]  Evaluation Date: 4/29/2022  Last Reassessment: 3/20/2023  Authorization Period Expiration: 6/15/2023  Plan of Care Expiration: 6/20/2023  Visit # / Visits authorized: 54 (27/36)  FOTO: 3 (4/29/2022, 6/20/2022, 12/20/2022)    Time In: 14:10 (late arrival)  Time Out: 15:00  Total Billable Time: 45 minutes    Precautions: Standard    Subjective     Divya Jacinto reports her usual soreness in the inner/back L thigh.     She was compliant with home exercise program  Response to previous treatment: no adverse effect  Functional change: fluctuating improvements in urinary urgency and back/hip/pelvic pain    Pain: L anterior/medial hip - 2/10    Objective     Divya Jacinto received the following interventions during the treatment session -  (TrA = transverse abdominis, PFM = pelvic floor muscle)  Pt consented to pelvic floor muscle assessment and treatment in prior visit - see EMR  All interventions billed as Therapeutic Exercise, per Medicaid guidelines    Therapeutic exercises to develop strength, endurance and core stabilization for 30 minutes -  [] Elliptical: level 3, 10 minutes  [] Hooklying TrA brace + hip adduction isometric with ball, 2x10 with 50% effort  [] Open books with green band (R&L), x10  [] Standing pallof press with doubled green band (R&L), 2x10  [x] TrA brace + bridging with belt around knees, 3x10  [x] TrA brace + straight leg raises (R&L), 3x10  [x] Side-lying hip adduction (L), 3x8  [x] Side-lying hip abduction (L), 2x10 -  challenging  [x] Hooklying hip adduction isometric with ball between knees, x20 with 3-second hold  [] Cat/cow with coordinated breath, 2 minutes   [] TrA brace + quadruped hip extension (R&L), x10  [] Standing on foam pad + TrA brace + standing shoulder extension with blue band (B), x30  [] Standing on foam pad + TrA brace + standing row with black band (B), x30  [] TrA brace + wall push up, 2x15  [] Side-stepping on foam beam, 2 minutes  [] Side-stepping with red tubing around ankles, 90 seconds  [] Seated on theraball + pelvic rotations, 2 minutes  [] Seated on theraball + alternating kicks, 1 minute  [] Seated on theraball + row with green band, x40  [] Seated on theraball + alternating shoulder flexion (3#, R&L), x15   [] Lateral step up/down on 6-inch step (R&L), 2x15  [] Standing on foam pad + TrA brace + toe taps to cone (R&L), 2x15  [] TrA brace + standing knee flexion (2#, R&L), 2x15  [] Standing hip flexion + hip external rotation lift (2#, R&L), 2x12  [] TrA brace + standing straight leg raise (2#, R&L), 2x12  [] TrA brace + standing hip abduction + contralateral arm lift (2#, R&L), 2x12  [] TrA brace + sit to stands from chair + 2 foam pads, 4x6  [] Shuttle squats, 3x20 (3 black bands and 1 red band of resistance)  [] Treadmill walkinmph, 10% incline, 5 minutes  [] Standing hip/knee extension on [employer] chair with 2 blue springs and 2 red springs (R&L), x20  [] Happy baby stretch + diaphragmatic breathing, pelvic drop, 1 minute  [] Child's pose + diaphragmatic breathing, pelvic drop, 2 minutes  [] Butterfly hip stretch + diaphragmatic breathing, pelvic drop, 2 minutes  [] Half-kneeling lunge/hip flexor stretch (L), 1 minute  [] Supine hamstring stretch with strap (L), 1 minute  [] Hip flexor stretch in standing (L), 2x30 seconds  [] Lucian test hip flexor stretch (L), 2x1 minute  [] Diaphragmatic breathing + pelvic drop, 2 minutes in hooklying position  [] Seated ball roll out, 2x2 minutes -  "improved R lower back pain  [] Seated figure-4 stretch (R&L), 1 minute  [] Pelvic floor relaxation guided meditation - performed during e-stim for dry needling  [x] HEP review, patient education  [] Marching with +ER ( sartorius ) 40ftx2  [] L prone hip flexor stretch with 1/2 roll under knee, 4x30"  [] Prone ball squeezes between ankles 5" hold 10x - experienced cramping in HS  [] Supine hip flexor iso/abdominal bracing with red ball, 5" hold x20 R/L  [] Elevated clams, 20x R/L  [] Side-lying reverse clams (2#, R&L), 2x15  [] Side-lying clams (R&L), 4x10  [] Straight leg bridging on blue ball, 2x10 5" hold  [] L SLS on airex + ball toss at rebounder, 2x20  [] Prone press up x10  [] Prone femoral nerve glides 2x20  [] Prone hip extension 2x20  [] Standing knee flexion x20  [] Prone knee flexion with hip extension x20   [] Bridge on ball with hamstring curl 2x10  [] Dying bugs 3x7  [] Hesitation marching 10# KB 2x20'  [] SLS with cone tap forward/contralateral side x10 (patient unable to reach out towards left side with left lower extremity 2nd to pain)  [] Drinking birds 2x10 with upper extremity assist  [] Shuttle squat 50# 3x20  [] SLS on right with left side soccer ball rolls to left side x5  [] Reverse lunges x10  [] Seated figure-4 stretch (L), 1 minute    Manual therapy techniques: to develop flexibility, desensitization, and extensibility for 15 minutes  -  [] Obturator release at ischial tuberosities + active release with resisted contralateral hip external rotation (B)  [] Manual release to L hip flexors - improved tenderness after a few minutes of release  [] Manual release to L pelvic floor muscles internally/vaginally: levator ani - mild increased tone and tenderness that improved within a few minutes of release. PT provided cues for diaphragmatic breathing, visualization, and relaxation.  [] Myofascial decompression/cupping to L adductor longus  [x] Dry needling with electrical stimulation to L adductor " "chepe - no adverse effect, written consent provided 5/9/22, see EMR.  [] Prone Long-arm distraction to L hip with belt - no significant change in symptoms  [] Trigger point release to L iliopsoas using green theraroller   [] Taping to bilateral sacroiliac join  [] Sacroiliac muscle-energy technique - no significant change in symptoms  [] Manual hip flexor stretch: prone, with hip internal rotation (L), 1 minute  [] L iliacus release  [] Side lying L hip flexor stretch 4 x 30" - performed by clinician  [] SL lumbopelvic manipulation bilaterally  [] SIJ isometric MET with hips at 90/90, 45 degrees of hip flexion / 45 degrees of adduction, "shotgun" 3x5" hold with each      Therapeutic activities to improve functional performance for 0 minutes -  [] Instruction on urge suppression techniques  [] Trialing SI belt - reduced inner thigh pain when wearing SI belt      Home Exercises and Patient Education     Patient Education: progression of plan of care, plan for next session, pt prognosis, pelvic floor anatomy & function, relationship between TrA & PFM, relationship between hips & PFM, relationship between pelvic floor dysfunction & lower back pain, multifidi anatomy & function, lumbar spine anatomy    HEP (3/22/23): standing hip abduction, standing straight leg raise, standing hip flexion + external rotation, countertop push-ups  HEP update (5/18/23): add side-lying hip abduction    Exercises were reviewed, and Divya Jacinto was able to demonstrate them prior to the end of the session, as needed. Divya Jacinto demonstrated good understanding of the education provided.      Assessment     Divya Jacinto tolerated treatment well today, with reduced inner thigh discomfort after needling and exercise. Able to resume exercise today. She notes difficulty and muscle burn with glute exercise, suggesting functional strength deficits; PT added side-lying hip abduction to HEP to work on more glute strengthening at home. We will continue " to progress her functional exercises and address persisting pain and functional limitations with goal of returning to PLOF.    Divya Jacinto is progressing well towards her goals.   Pt prognosis: good    Pt will continue to benefit from skilled outpatient physical therapy to address the deficits listed in the problem list box on initial evaluation, provide pt/family education and to maximize pt's level of independence in the home and community environment.     Pt's spiritual, cultural and educational needs considered and pt agreeable to plan of care and goals.  Anticipated barriers to physical therapy: none      Short Term Goals: 6 weeks   Pt to report 50% improvement in tenderness to palpation of pelvic floor to demonstrate improved trigger points and overactivity Met   Pt to report only rare incidences of urinary incontinence with coughing to demonstrate improved pelvic floor muscle coordination Met   Pt to report 50% improvement in pain with penetration (gynecological exam or intercourse) and orgasm to demonstrate improved pelvic floor muscle overactivity Met   Pt to be independent with introductory home exercise program Met      Long Term Goals: 12 weeks  Pt to report minimal/no tenderness to palpation of pelvic floor to demonstrate improved trigger points and overactivity Partially Met   Pt to demonstrate an improved score in the FOTO Pelvic Pain (PFDI) survey to no more than 8% impaired to demonstrate improving pelvic floor muscle tenderness, coordination. Not Met   Pt to deny pelvic pain with penetration (gynecological exam or intercourse) and orgasm to demonstrate improved pelvic floor muscle overactivity Partially Met   Pt to increase pelvic floor strength to 4/5 to demonstrate improved strength needed for continence with ADLs. Partially Met   Pt to be independent with advanced home exercise program Not Met   Pt to demonstrate bilateral hip strength of 5/5 for improved pelvic girdle support with mobility. Not  Met   Pt to report minimal/no back pain with ADLs and functional mobility to demonstrate improved lumbopelvic muscle support, coordination Not Met        Plan     Continue per Plan of Care  Increasing frequency to 2x/week for more pelvic girdle stability training, Therapeutic Exercises, Manual Therapeutic Technique, Neuromuscular Re Education, Therapeutic Activities. Modalities, Kinesiotape prn, and Functional Dry Needling as needed.      Jess Ureña, PT

## 2023-05-22 NOTE — PROGRESS NOTES
[facility]   Physical Therapy Treatment Note    Name: Divya Jacinto [facility]  Clinic Number: [id number]    Therapy Diagnosis:   Encounter Diagnoses   Name Primary?    Pelvic pain Yes    Decreased strength of trunk and back     Chronic left-sided low back pain without sciatica      Referring Provider: Arnulfo    Visit Date: 5/25/2023    Referring Provider Orders: PT Eval and Treat  Medical Diagnosis from Referral: Pelvic floor dysfunction in female [M62.89]  Evaluation Date: 4/29/2022  Last Reassessment: 3/20/2023  Authorization Period Expiration: 6/15/2023  Plan of Care Expiration: 6/20/2023  Visit # / Visits authorized: 54 (27/36)  FOTO: 3 (4/29/2022, 6/20/2022, 12/20/2022)    Time In: 16:00  Time Out: 17:00  Total Billable Time: 53 minutes    Precautions: Standard    Subjective     Divya Jacinto reports her usual soreness in the inner/back L thigh. The SIJ MET helps to cut off pain when it starts.     She was somewhat compliant with home exercise program - forgot new side-lying hip abduction  Response to previous treatment: no adverse effect  Functional change: fluctuating improvements in urinary urgency and back/hip/pelvic pain    Pain: L anterior/medial hip - 3/10    Objective     Divya Jacinto received the following interventions during the treatment session -  (TrA = transverse abdominis, PFM = pelvic floor muscle)  Pt consented to pelvic floor muscle assessment and treatment in prior visit - see EMR  All interventions billed as Therapeutic Exercise, per Medicaid guidelines    Therapeutic exercises to develop strength, endurance and core stabilization for 23 minutes -  [] Elliptical: level 3, 10 minutes  [] Hooklying TrA brace + hip adduction isometric with ball, 2x10 with 50% effort  [] Open books with green band (R&L), x10  [] Standing pallof press with doubled green band (R&L), 2x10  [] TrA brace + bridging with belt around knees, 3x10  [x] TrA brace + straight leg raises (R&L), 2x15  [x]  Side-lying hip adduction (L), 2x15  [] Side-lying hip abduction (L), 2x10 - challenging  [] Hooklying hip adduction isometric with ball between knees, x20 with 3-second hold  [] Cat/cow with coordinated breath, 2 minutes   [] TrA brace + quadruped hip extension (R&L), x10  [x] Standing on foam pad + TrA brace + standing shoulder extension with black band (B), x40  [x] Standing on foam pad + TrA brace + standing row with black band (B), x40  [] TrA brace + wall push up, 2x15  [] Side-stepping on foam beam, 2 minutes  [] Side-stepping with red tubing around ankles, 90 seconds  [] Seated on theraball + pelvic rotations, 2 minutes  [] Seated on theraball + alternating kicks, 1 minute  [] Seated on theraball + row with green band, x40  [] Seated on theraball + alternating shoulder flexion (3#, R&L), x15   [x] Lateral step up/down on 8-inch step (R&L), 2x10  [x] Standing on foam pad + TrA brace + toe taps to cone (2#, R&L), 2x15  [] TrA brace + standing knee flexion (2#, R&L), 2x15  [] Standing hip flexion + hip external rotation lift (2#, R&L), 2x12  [] TrA brace + standing straight leg raise (2#, R&L), 2x12  [] TrA brace + standing hip abduction + contralateral arm lift (2#, R&L), 2x12  [] TrA brace + sit to stands from chair + 2 foam pads, 4x6  [x] Shuttle squats, 3x15 (3 black bands and 1 red band of resistance)  [] Treadmill walkinmph, 10% incline, 5 minutes  [] Standing hip/knee extension on Pilates chair with 2 blue springs and 2 red springs (R&L), x20  [] Happy baby stretch + diaphragmatic breathing, pelvic drop, 1 minute  [] Child's pose + diaphragmatic breathing, pelvic drop, 2 minutes  [] Butterfly hip stretch + diaphragmatic breathing, pelvic drop, 2 minutes  [] Half-kneeling lunge/hip flexor stretch (L), 1 minute  [] Supine hamstring stretch with strap (L), 1 minute  [] Hip flexor stretch in standing (L), 2x30 seconds  [] Lucian test hip flexor stretch (L), 2x1 minute  [] Diaphragmatic breathing + pelvic  "drop, 2 minutes in hooklying position  [] Seated ball roll out, 2x2 minutes - improved R lower back pain  [] Seated figure-4 stretch (R&L), 1 minute  [] Pelvic floor relaxation guided meditation - performed during e-stim for dry needling  [x] HEP review, patient education  [] Marching with +ER ( sartorius ) 40ftx2  [] L prone hip flexor stretch with 1/2 roll under knee, 4x30"  [] Prone ball squeezes between ankles 5" hold 10x - experienced cramping in HS  [] Supine hip flexor iso/abdominal bracing with red ball, 5" hold x20 R/L  [] Elevated clams, 20x R/L  [] Side-lying reverse clams (2#, R&L), 2x15  [] Side-lying clams (R&L), 4x10  [] Straight leg bridging on blue ball, 2x10 5" hold  [] L SLS on airex + ball toss at rebounder, 2x20  [] Prone press up x10  [] Prone femoral nerve glides 2x20  [] Prone hip extension 2x20  [] Standing knee flexion x20  [] Prone knee flexion with hip extension x20   [] Bridge on ball with hamstring curl 2x10  [] Dying bugs 3x7  [] Hesitation marching 10# KB 2x20'  [] SLS with cone tap forward/contralateral side x10 (patient unable to reach out towards left side with left lower extremity 2nd to pain)  [] Drinking birds 2x10 with upper extremity assist  [] Shuttle squat 50# 3x20  [] SLS on right with left side soccer ball rolls to left side x5  [] Reverse lunges x10  [] Seated figure-4 stretch (L), 1 minute    Manual therapy techniques: to develop flexibility, desensitization, and extensibility for 30 minutes  -  [] Obturator release at ischial tuberosities + active release with resisted contralateral hip external rotation (B)  [] Manual release to L hip flexors - improved tenderness after a few minutes of release  [] Manual release to L pelvic floor muscles internally/vaginally: levator ani - mild increased tone and tenderness that improved within a few minutes of release. PT provided cues for diaphragmatic breathing, visualization, and relaxation.  [] Myofascial decompression/cupping to " "L adductor longus  [x] Dry needling with electrical stimulation to L adductor chepe, multifidi of S1-2, piriformis - no adverse effect, written consent provided 5/9/22, see EMR.  [] Prone Long-arm distraction to L hip with belt - no significant change in symptoms  [] Trigger point release to L iliopsoas using green theraroller   [] Taping to bilateral sacroiliac join  [] Sacroiliac muscle-energy technique - no significant change in symptoms  [] Manual hip flexor stretch: prone, with hip internal rotation (L), 1 minute  [] L iliacus release  [] Side lying L hip flexor stretch 4 x 30" - performed by clinician  [] SL lumbopelvic manipulation bilaterally  [] SIJ isometric MET with hips at 90/90, 45 degrees of hip flexion / 45 degrees of adduction, "shotgun" 3x5" hold with each    Therapeutic activities to improve functional performance for 0 minutes -  [] Instruction on urge suppression techniques  [] Trialing SI belt - reduced inner thigh pain when wearing SI belt      Home Exercises and Patient Education     Patient Education: progression of plan of care, plan for next session, pt prognosis, pelvic floor anatomy & function, relationship between TrA & PFM, relationship between hips & PFM, relationship between pelvic floor dysfunction & lower back pain, multifidi anatomy & function, lumbar spine anatomy    HEP (3/22/23): standing hip abduction, standing straight leg raise, standing hip flexion + external rotation, countertop push-ups  HEP update (5/18/23): add side-lying hip abduction    Exercises were reviewed, and Divya Jacinto was able to demonstrate them prior to the end of the session, as needed. Divya Jacinto demonstrated good understanding of the education provided.      Assessment     Divya Jacinto tolerated treatment well today, with reduced inner thigh discomfort after needling and exercise. Able to progress standing and mat exercises today. SIJ MET seems to be helping symptoms. We will continue to progress her " functional exercises and address persisting pain and functional limitations with goal of returning to PLOF.    Divya Jacinto is progressing well towards her goals.   Pt prognosis: good    Pt will continue to benefit from skilled outpatient physical therapy to address the deficits listed in the problem list box on initial evaluation, provide pt/family education and to maximize pt's level of independence in the home and community environment.     Pt's spiritual, cultural and educational needs considered and pt agreeable to plan of care and goals.  Anticipated barriers to physical therapy: none      Short Term Goals: 6 weeks   Pt to report 50% improvement in tenderness to palpation of pelvic floor to demonstrate improved trigger points and overactivity Met   Pt to report only rare incidences of urinary incontinence with coughing to demonstrate improved pelvic floor muscle coordination Met   Pt to report 50% improvement in pain with penetration (gynecological exam or intercourse) and orgasm to demonstrate improved pelvic floor muscle overactivity Met   Pt to be independent with introductory home exercise program Met      Long Term Goals: 12 weeks  Pt to report minimal/no tenderness to palpation of pelvic floor to demonstrate improved trigger points and overactivity Partially Met   Pt to demonstrate an improved score in the FOTO Pelvic Pain (PFDI) survey to no more than 8% impaired to demonstrate improving pelvic floor muscle tenderness, coordination. Not Met   Pt to deny pelvic pain with penetration (gynecological exam or intercourse) and orgasm to demonstrate improved pelvic floor muscle overactivity Partially Met   Pt to increase pelvic floor strength to 4/5 to demonstrate improved strength needed for continence with ADLs. Partially Met   Pt to be independent with advanced home exercise program Not Met   Pt to demonstrate bilateral hip strength of 5/5 for improved pelvic girdle support with mobility. Not Met   Pt to  report minimal/no back pain with ADLs and functional mobility to demonstrate improved lumbopelvic muscle support, coordination Not Met        Plan     Continue per Plan of Care  Increasing frequency to 2x/week for more pelvic girdle stability training, Therapeutic Exercises, Manual Therapeutic Technique, Neuromuscular Re Education, Therapeutic Activities. Modalities, Kinesiotape prn, and Functional Dry Needling as needed.      Jess Ureña, PT

## 2023-05-23 ENCOUNTER — CLINICAL SUPPORT (OUTPATIENT)
Dept: REHABILITATION | Facility: OTHER | Age: 38
End: 2023-05-23
Payer: MEDICAID

## 2023-05-23 DIAGNOSIS — R10.2 PELVIC PAIN: Primary | ICD-10-CM

## 2023-05-23 DIAGNOSIS — R29.898 DECREASED STRENGTH OF TRUNK AND BACK: ICD-10-CM

## 2023-05-23 DIAGNOSIS — G89.29 CHRONIC LEFT-SIDED LOW BACK PAIN WITHOUT SCIATICA: ICD-10-CM

## 2023-05-23 DIAGNOSIS — M54.50 CHRONIC LEFT-SIDED LOW BACK PAIN WITHOUT SCIATICA: ICD-10-CM

## 2023-05-23 PROCEDURE — 97014 ELECTRIC STIMULATION THERAPY: CPT | Mod: PN

## 2023-05-23 PROCEDURE — 97110 THERAPEUTIC EXERCISES: CPT | Mod: PN

## 2023-05-23 PROCEDURE — 97140 MANUAL THERAPY 1/> REGIONS: CPT | Mod: PN

## 2023-05-23 NOTE — PROGRESS NOTES
[facility]   Physical Therapy Treatment Note    Name: Divya Jacinto [facility]  Clinic Number: [id number]    Therapy Diagnosis:   Encounter Diagnoses   Name Primary?    Pelvic pain Yes    Decreased strength of trunk and back     Chronic left-sided low back pain without sciatica      Referring Provider: Arnulfo    Visit Date: 5/23/2023    Referring Provider Orders: PT Eval and Treat  Medical Diagnosis from Referral: Pelvic floor dysfunction in female [M62.89]  Evaluation Date: 4/29/2022  Last Reassessment: 3/20/2023  Authorization Period Expiration: 6/15/2023  Plan of Care Expiration: 6/20/2023  Visit # / Visits authorized: 55 (28/36)  FOTO: 3 (4/29/2022, 6/20/2022, 12/20/2022)    Time In: 405p  Time Out: 455p  Total Billable Time: 50 minutes    Precautions: Standard    Subjective     Divya Jacinto reports not feeling that bad considering she had her most busy weekend of the year this past weekend. She has been doing stress management lately with some success. She is going to make work/life balance a bigger priority.     She was compliant with home exercise program  Response to previous treatment: no adverse effect  Functional change: fluctuating improvements in urinary urgency and back/hip/pelvic pain    Pain: L anterior/medial hip - 2/10    Objective     Divya Jacinto received the following interventions during the treatment session -  (TrA = transverse abdominis, PFM = pelvic floor muscle)  Pt consented to pelvic floor muscle assessment and treatment in prior visit - see EMR  All interventions billed as Therapeutic Exercise, per Medicaid guidelines    Therapeutic exercises to develop strength, endurance and core stabilization for 10 minutes -  [] Elliptical: level 3, 10 minutes  [] Hooklying TrA brace + hip adduction isometric with ball, 2x10 with 50% effort  [] Open books with green band (R&L), x10  [] Standing pallof press with doubled green band (R&L), 2x10  [] TrA brace + bridging with belt  around knees, 3x10  [] TrA brace + straight leg raises (R&L), 3x10  [] Side-lying hip adduction (L), 3x8  [] Side-lying hip abduction (L), 2x10 - challenging  [] Hooklying hip adduction isometric with ball between knees, x20 with 3-second hold  [] Cat/cow with coordinated breath, 2 minutes   [] TrA brace + quadruped hip extension (R&L), x10  [] Standing on foam pad + TrA brace + standing shoulder extension with blue band (B), x30  [] Standing on foam pad + TrA brace + standing row with black band (B), x30  [] TrA brace + wall push up, 2x15  [] Side-stepping on foam beam, 2 minutes  [] Side-stepping with red tubing around ankles, 90 seconds  [] Seated on theraball + pelvic rotations, 2 minutes  [] Seated on theraball + alternating kicks, 1 minute  [] Seated on theraball + row with green band, x40  [] Seated on theraball + alternating shoulder flexion (3#, R&L), x15   [] Lateral step up/down on 6-inch step (R&L), 2x15  [] Standing on foam pad + TrA brace + toe taps to cone (R&L), 2x15  [] TrA brace + standing knee flexion (2#, R&L), 2x15  [] Standing hip flexion + hip external rotation lift (2#, R&L), 2x12  [] TrA brace + standing straight leg raise (2#, R&L), 2x12  [] TrA brace + standing hip abduction + contralateral arm lift (2#, R&L), 2x12  [] TrA brace + sit to stands from chair + 2 foam pads, 4x6  [] Shuttle squats, 3x20 (3 black bands and 1 red band of resistance)  [] Treadmill walkinmph, 10% incline, 5 minutes  [] Standing hip/knee extension on Pilates chair with 2 blue springs and 2 red springs (R&L), x20  [] Happy baby stretch + diaphragmatic breathing, pelvic drop, 1 minute  [] Child's pose + diaphragmatic breathing, pelvic drop, 2 minutes  [] Butterfly hip stretch + diaphragmatic breathing, pelvic drop, 2 minutes  [] Half-kneeling lunge/hip flexor stretch (L), 1 minute  [] Supine hamstring stretch with strap (L), 1 minute  [] Hip flexor stretch in standing (L), 2x30 seconds  [] Lucian test hip flexor  "stretch (L), 2x1 minute  [] Diaphragmatic breathing + pelvic drop, 2 minutes in hooklying position  [] Seated ball roll out, 2x2 minutes - improved R lower back pain  [] Seated figure-4 stretch (R&L), 1 minute  [] Pelvic floor relaxation guided meditation - performed during e-stim for dry needling  [x] HEP review, patient education  [] Marching with +ER ( sartorius ) 40ftx2  [] L prone hip flexor stretch with 1/2 roll under knee, 4x30"  [] Prone ball squeezes between ankles 5" hold 10x - experienced cramping in HS  [] Supine hip flexor iso/abdominal bracing with red ball, 5" hold x20 R/L  [] Elevated clams, 20x R/L  [] Side-lying reverse clams (2#, R&L), 2x15  [] Side-lying clams (R&L), 4x10  [] Straight leg bridging on blue ball, 2x10 5" hold  [] L SLS on airex + ball toss at rebounder, 2x20  [] Prone press up x10  [] Prone femoral nerve glides 2x20  [] Prone hip extension 2x20  [] Standing knee flexion x20  [] Prone knee flexion with hip extension x20   [] Bridge on ball with hamstring curl 2x10  [] Dying bugs 3x7  [] Hesitation marching 10# KB 2x20'  [] SLS with cone tap forward/contralateral side x10 (patient unable to reach out towards left side with left lower extremity 2nd to pain)  [] Drinking birds 2x10 with upper extremity assist  [] Shuttle squat 50# 3x20  [] SLS on right with left side soccer ball rolls to left side x5  [] Reverse lunges x10  [] Seated figure-4 stretch (L), 1 minute  [x] METs for left posterior rotated innominate 10x10"    Manual therapy techniques: to develop flexibility, desensitization, and extensibility for 30 minutes  -  [] Obturator release at ischial tuberosities + active release with resisted contralateral hip external rotation (B)  [] Manual release to L hip flexors - improved tenderness after a few minutes of release  [] Manual release to L pelvic floor muscles internally/vaginally: levator ani - mild increased tone and tenderness that improved within a few minutes of release. " "PT provided cues for diaphragmatic breathing, visualization, and relaxation.  [] Myofascial decompression/cupping to L adductor longus  [x] Dry needling with electrical stimulation to L adductor chepe, left psoas, left TFL - no adverse effect, written consent provided 5/9/22, see EMR.  [] Prone Long-arm distraction to L hip with belt - no significant change in symptoms  [] Trigger point release to L iliopsoas using green theraroller   [] Taping to bilateral sacroiliac join  [] Sacroiliac muscle-energy technique - no significant change in symptoms  [] Manual hip flexor stretch: prone, with hip internal rotation (L), 1 minute  [] L iliacus release  [] Side lying L hip flexor stretch 4 x 30" - performed by clinician  [] SL lumbopelvic manipulation bilaterally  [] SIJ isometric MET with hips at 90/90, 45 degrees of hip flexion / 45 degrees of adduction, "shotgun" 3x5" hold with each      Therapeutic activities to improve functional performance for 0 minutes -  [] Instruction on urge suppression techniques  [] Trialing SI belt - reduced inner thigh pain when wearing SI belt      Home Exercises and Patient Education     Patient Education: progression of plan of care, plan for next session, pt prognosis, pelvic floor anatomy & function, relationship between TrA & PFM, relationship between hips & PFM, relationship between pelvic floor dysfunction & lower back pain, multifidi anatomy & function, lumbar spine anatomy    HEP (3/22/23): standing hip abduction, standing straight leg raise, standing hip flexion + external rotation, countertop push-ups  HEP update (5/18/23): add side-lying hip abduction    Exercises were reviewed, and Divya Jacinto was able to demonstrate them prior to the end of the session, as needed. Divya Jacinto demonstrated good understanding of the education provided.      Assessment     Divya Jacinto tolerated treatment well today, with reduced inner thigh discomfort after needling and exercise. She tolerated " METs well reporting some relief after therapeutic exercise. We will continue to progress her functional exercises and address persisting pain and functional limitations with goal of returning to PLOF.    Divya Jacinto is progressing well towards her goals.   Pt prognosis: good    Pt will continue to benefit from skilled outpatient physical therapy to address the deficits listed in the problem list box on initial evaluation, provide pt/family education and to maximize pt's level of independence in the home and community environment.     Pt's spiritual, cultural and educational needs considered and pt agreeable to plan of care and goals.  Anticipated barriers to physical therapy: none      Short Term Goals: 6 weeks   Pt to report 50% improvement in tenderness to palpation of pelvic floor to demonstrate improved trigger points and overactivity Met   Pt to report only rare incidences of urinary incontinence with coughing to demonstrate improved pelvic floor muscle coordination Met   Pt to report 50% improvement in pain with penetration (gynecological exam or intercourse) and orgasm to demonstrate improved pelvic floor muscle overactivity Met   Pt to be independent with introductory home exercise program Met      Long Term Goals: 12 weeks  Pt to report minimal/no tenderness to palpation of pelvic floor to demonstrate improved trigger points and overactivity Partially Met   Pt to demonstrate an improved score in the FOTO Pelvic Pain (PFDI) survey to no more than 8% impaired to demonstrate improving pelvic floor muscle tenderness, coordination. Not Met   Pt to deny pelvic pain with penetration (gynecological exam or intercourse) and orgasm to demonstrate improved pelvic floor muscle overactivity Partially Met   Pt to increase pelvic floor strength to 4/5 to demonstrate improved strength needed for continence with ADLs. Partially Met   Pt to be independent with advanced home exercise program Not Met   Pt to demonstrate  bilateral hip strength of 5/5 for improved pelvic girdle support with mobility. Not Met   Pt to report minimal/no back pain with ADLs and functional mobility to demonstrate improved lumbopelvic muscle support, coordination Not Met        Plan     Continue per Plan of Care  Increasing frequency to 2x/week for more pelvic girdle stability training, Therapeutic Exercises, Manual Therapeutic Technique, Neuromuscular Re Education, Therapeutic Activities. Modalities, Kinesiotape prn, and Functional Dry Needling as needed.      Barrett Pastrana, PT

## 2023-05-24 ENCOUNTER — HOSPITAL ENCOUNTER (OUTPATIENT)
Dept: RADIOLOGY | Facility: OTHER | Age: 38
Discharge: HOME | End: 2023-05-24
Payer: MEDICAID

## 2023-05-24 DIAGNOSIS — G89.29 CHRONIC LEFT HIP PAIN: ICD-10-CM

## 2023-05-24 DIAGNOSIS — M25.552 CHRONIC LEFT HIP PAIN: ICD-10-CM

## 2023-05-24 PROCEDURE — 25500020 PHARM REV CODE 255

## 2023-05-24 PROCEDURE — 73723 MRI JOINT LWR EXTR W/O&W/DYE: CPT | Mod: TC,LT

## 2023-05-24 PROCEDURE — 73723 MRI HIP W WO CONTRAST LEFT: ICD-10-PCS | Mod: 26,LT,, | Performed by: RADIOLOGY

## 2023-05-24 PROCEDURE — A9585 GADOBUTROL INJECTION: HCPCS

## 2023-05-24 PROCEDURE — 73723 MRI JOINT LWR EXTR W/O&W/DYE: CPT | Mod: 26,LT,, | Performed by: RADIOLOGY

## 2023-05-24 RX ORDER — GADOBUTROL 604.72 MG/ML
10 INJECTION INTRAVENOUS
Status: COMPLETED | OUTPATIENT
Start: 2023-05-24 | End: 2023-05-24

## 2023-05-24 RX ADMIN — GADOBUTROL 10 ML: 604.72 INJECTION INTRAVENOUS at 06:05

## 2023-05-25 ENCOUNTER — CLINICAL SUPPORT (OUTPATIENT)
Dept: REHABILITATION | Facility: OTHER | Age: 38
End: 2023-05-25
Payer: MEDICAID

## 2023-05-25 DIAGNOSIS — R10.2 PELVIC PAIN: Primary | ICD-10-CM

## 2023-05-25 DIAGNOSIS — M54.50 CHRONIC LEFT-SIDED LOW BACK PAIN WITHOUT SCIATICA: ICD-10-CM

## 2023-05-25 DIAGNOSIS — R29.898 DECREASED STRENGTH OF TRUNK AND BACK: ICD-10-CM

## 2023-05-25 DIAGNOSIS — G89.29 CHRONIC LEFT-SIDED LOW BACK PAIN WITHOUT SCIATICA: ICD-10-CM

## 2023-05-25 PROCEDURE — 97110 THERAPEUTIC EXERCISES: CPT | Mod: PN | Performed by: PHYSICAL THERAPIST

## 2023-05-29 ENCOUNTER — CLINICAL SUPPORT (OUTPATIENT)
Dept: REHABILITATION | Facility: OTHER | Age: 38
End: 2023-05-29
Payer: MEDICAID

## 2023-05-29 DIAGNOSIS — M54.50 CHRONIC LEFT-SIDED LOW BACK PAIN WITHOUT SCIATICA: ICD-10-CM

## 2023-05-29 DIAGNOSIS — G89.29 CHRONIC LEFT-SIDED LOW BACK PAIN WITHOUT SCIATICA: ICD-10-CM

## 2023-05-29 DIAGNOSIS — R10.2 PELVIC PAIN: Primary | ICD-10-CM

## 2023-05-29 DIAGNOSIS — R29.898 DECREASED STRENGTH OF TRUNK AND BACK: ICD-10-CM

## 2023-05-29 PROCEDURE — 97110 THERAPEUTIC EXERCISES: CPT | Mod: PN

## 2023-05-29 NOTE — PROGRESS NOTES
" [facility] OUTPATIENT THERAPY AND WELLNESS   Physical Therapy Treatment Note    Name: Divya Chand  Clinic Number: [id number]    Therapy Diagnosis:   Encounter Diagnoses   Name Primary?    Pelvic pain Yes    Decreased strength of trunk and back     Chronic left-sided low back pain without sciatica        Referring Provider: Arnulfo    Visit Date: 5/29/2023    Referring Provider Orders: PT Eval and Treat  Medical Diagnosis from Referral: Pelvic floor dysfunction in female [M62.89]  Evaluation Date: 4/29/2022  Last Reassessment: 3/20/2023  Authorization Period Expiration: 6/15/2023  Plan of Care Expiration: 6/20/2023  Visit # / Visits authorized: 57 (30/36)  FOTO: 3 (4/29/2022, 6/20/2022, 12/20/2022)    Time In: 400pm  Time Out: 500pm  Total Billable Time: 60 minutes    Precautions: Standard    Subjective     Pt reports that she is doing well today. Pt states that she was performing a stretch/yoga on her left side and felt a "pop" will immediate symptom relief. Pt states that her job has been stressful this week because her employees are unreliable.      She was compliant with home exercise program  Response to previous treatment: no adverse effect  Functional change: fluctuating improvements in urinary urgency and back/hip/pelvic pain    Pain: L anterior/medial hip - 2/10    Objective     Divya Jacinto received the following interventions during the treatment session -  (TrA = transverse abdominis, PFM = pelvic floor muscle)  Pt consented to pelvic floor muscle assessment and treatment in prior visit - see EMR  All interventions billed as Therapeutic Exercise, per Medicaid guidelines    Therapeutic exercises to develop strength, endurance and core stabilization for 33 minutes -  [] Elliptical: level 3, 10 minutes  [] Hooklying TrA brace + hip adduction isometric with ball, 2x10 with 50% effort  [] Open books with green band (R&L), x10  [] Standing pallof press with doubled green band (R&L), 2x10  [] TrA brace + " bridging with belt around knees, 3x10  [] TrA brace + straight leg raises (R&L), 3x10  [] Side-lying hip adduction (L), 3x8  [] Side-lying hip abduction (L), 2x10 - challenging  [] Hooklying hip adduction isometric with ball between knees, x20 with 3-second hold  [] Cat/cow with coordinated breath, 2 minutes   [] TrA brace + quadruped hip extension (R&L), x10  [] Standing on foam pad + TrA brace + standing shoulder extension with blue band (B), x30  [] Standing on foam pad + TrA brace + standing row with black band (B), x30  [] TrA brace + wall push up, 2x15  [] Side-stepping on foam beam, 2 minutes  [] Side-stepping with red tubing around ankles, 90 seconds  [] Seated on theraball + pelvic rotations, 2 minutes  [] Seated on theraball + alternating kicks, 1 minute  [] Seated on theraball + row with green band, x40  [] Seated on theraball + alternating shoulder flexion (3#, R&L), x15   [] Lateral step up/down on 6-inch step (R&L), 2x15  [] Standing on foam pad + TrA brace + toe taps to cone (R&L), 2x15  [] TrA brace + standing knee flexion (2#, R&L), 2x15  [] Standing hip flexion + hip external rotation lift (2#, R&L), 2x12  [] TrA brace + standing straight leg raise (2#, R&L), 2x12  [] TrA brace + standing hip abduction + contralateral arm lift (2#, R&L), 2x12  [] TrA brace + sit to stands from chair + 2 foam pads, 4x6  [] Shuttle squats, 3x20 (3 black bands and 1 red band of resistance)  [] Treadmill walkinmph, 10% incline, 5 minutes  [] Standing hip/knee extension on Pilates chair with 2 blue springs and 2 red springs (R&L), x20  [] Happy baby stretch + diaphragmatic breathing, pelvic drop, 1 minute  [] Child's pose + diaphragmatic breathing, pelvic drop, 2 minutes  [] Butterfly hip stretch + diaphragmatic breathing, pelvic drop, 2 minutes  [] Half-kneeling lunge/hip flexor stretch (L), 1 minute  [] Supine hamstring stretch with strap (L), 1 minute  [] Hip flexor stretch in standing (L), 2x30 seconds  []  "Lucian test hip flexor stretch (L), 2x1 minute  [] Diaphragmatic breathing + pelvic drop, 2 minutes in hooklying position  [] Seated ball roll out, 2x2 minutes - improved R lower back pain  [] Seated figure-4 stretch (R&L), 1 minute  [] Pelvic floor relaxation guided meditation - performed during e-stim for dry needling  [] HEP review, patient education  [] Marching with +ER ( sartorius ) 40ftx2  [] L prone hip flexor stretch with 1/2 roll under knee, 4x30"  [] Prone ball squeezes between ankles 5" hold 10x - experienced cramping in HS  [] Supine hip flexor iso/abdominal bracing with red ball, 5" hold x20 R/L  [] Elevated clams, 20x R/L  [] Side-lying reverse clams (2#, R&L), 2x15  [] Side-lying clams (R&L), 4x10  [] Straight leg bridging on blue ball, 2x10 5" hold  [] L SLS on airex + ball toss at rebounder, 2x20  [] Prone press up x10  [] Prone femoral nerve glides 2x20  [] Prone hip extension 2x20  [] Standing knee flexion x20  [] Prone knee flexion with hip extension x20   [x] Bridge on ball with hamstring curl 3x5   [x] Dying bugs 3x5  [x] Hesitation marching 10# KB 2x20'  [] SLS with cone tap forward/contralateral side x10 (patient unable to reach out towards left side with left lower extremity 2nd to pain)  [] Drinking birds 2x10 with upper extremity assist  [] Shuttle squat 50# 3x20  [] SLS on right with left side soccer ball rolls to left side x5  [x] Reverse lunges x10  [x] Seated figure-4 stretch (L), 1 minute  [] METs for left posterior rotated innominate 10x10"    Manual therapy techniques: to develop flexibility, desensitization, and extensibility for 27 minutes  -  [] Obturator release at ischial tuberosities + active release with resisted contralateral hip external rotation (B)  [] Manual release to L hip flexors - improved tenderness after a few minutes of release  [] Manual release to L pelvic floor muscles internally/vaginally: levator ani - mild increased tone and tenderness that improved within " "a few minutes of release. PT provided cues for diaphragmatic breathing, visualization, and relaxation.  [] Myofascial decompression/cupping to L adductor longus  [x] Dry needling with electrical stimulation to L adductor chepe, left psoas, left TFL - no adverse effect, written consent provided 5/9/22, see EMR.  [] Prone Long-arm distraction to L hip with belt - no significant change in symptoms  [] Trigger point release to L iliopsoas using green theraroller   [] Taping to bilateral sacroiliac join  [] Sacroiliac muscle-energy technique - no significant change in symptoms  [] Manual hip flexor stretch: prone, with hip internal rotation (L), 1 minute  [] L iliacus release  [] Side lying L hip flexor stretch 4 x 30" - performed by clinician  [] SL lumbopelvic manipulation bilaterally  [] SIJ isometric MET with hips at 90/90, 45 degrees of hip flexion / 45 degrees of adduction, "shotgun" 3x5" hold with each      Therapeutic activities to improve functional performance for 0 minutes -  [] Instruction on urge suppression techniques  [] Trialing SI belt - reduced inner thigh pain when wearing SI belt      Home Exercises and Patient Education     Patient Education: progression of plan of care, plan for next session, pt prognosis, pelvic floor anatomy & function, relationship between TrA & PFM, relationship between hips & PFM, relationship between pelvic floor dysfunction & lower back pain, multifidi anatomy & function, lumbar spine anatomy    HEP (3/22/23): standing hip abduction, standing straight leg raise, standing hip flexion + external rotation, countertop push-ups  HEP update (5/18/23): add side-lying hip abduction    Exercises were reviewed, and Divya Jacinto was able to demonstrate them prior to the end of the session, as needed. Divya Jacinto demonstrated good understanding of the education provided.      Assessment     Pt tolerated treatment session well today. Pt experienced symptom relief following dry needling " intervention. Pt demonstrates impaired transverse abdominus activation during dying bug exercise. Continue PT POC with emphasis on lumbopelvic stablization for improved tolerance to work and ADL's.     Divya Jacinto is progressing well towards her goals.   Pt prognosis: good    Pt will continue to benefit from skilled outpatient physical therapy to address the deficits listed in the problem list box on initial evaluation, provide pt/family education and to maximize pt's level of independence in the home and community environment.     Pt's spiritual, cultural and educational needs considered and pt agreeable to plan of care and goals.  Anticipated barriers to physical therapy: none      Short Term Goals: 6 weeks   Pt to report 50% improvement in tenderness to palpation of pelvic floor to demonstrate improved trigger points and overactivity Met   Pt to report only rare incidences of urinary incontinence with coughing to demonstrate improved pelvic floor muscle coordination Met   Pt to report 50% improvement in pain with penetration (gynecological exam or intercourse) and orgasm to demonstrate improved pelvic floor muscle overactivity Met   Pt to be independent with introductory home exercise program Met      Long Term Goals: 12 weeks  Pt to report minimal/no tenderness to palpation of pelvic floor to demonstrate improved trigger points and overactivity Partially Met   Pt to demonstrate an improved score in the FOTO Pelvic Pain (PFDI) survey to no more than 8% impaired to demonstrate improving pelvic floor muscle tenderness, coordination. Not Met   Pt to deny pelvic pain with penetration (gynecological exam or intercourse) and orgasm to demonstrate improved pelvic floor muscle overactivity Partially Met   Pt to increase pelvic floor strength to 4/5 to demonstrate improved strength needed for continence with ADLs. Partially Met   Pt to be independent with advanced home exercise program Not Met   Pt to demonstrate  bilateral hip strength of 5/5 for improved pelvic girdle support with mobility. Not Met   Pt to report minimal/no back pain with ADLs and functional mobility to demonstrate improved lumbopelvic muscle support, coordination Not Met        Plan     Continue per Plan of Care  Increasing frequency to 2x/week for more pelvic girdle stability training, Therapeutic Exercises, Manual Therapeutic Technique, Neuromuscular Re Education, Therapeutic Activities. Modalities, Kinesiotape prn, and Functional Dry Needling as needed.      Mariana Webber, PT

## 2023-05-30 NOTE — PROGRESS NOTES
[facility]   Physical Therapy Treatment Note    Name: Divya Jacinto [facility]  Clinic Number: [id number]    Therapy Diagnosis:   Encounter Diagnoses   Name Primary?    Pelvic pain Yes    Decreased strength of trunk and back     Chronic left-sided low back pain without sciatica        Referring Provider: Arnulfo    Visit Date: 5/31/2023    Referring Provider Orders: PT Eval and Treat  Medical Diagnosis from Referral: Pelvic floor dysfunction in female [M62.89]  Evaluation Date: 4/29/2022  Last Reassessment: 3/20/2023  Authorization Period Expiration: 6/15/2023  Plan of Care Expiration: 6/20/2023  Visit # / Visits authorized: 58 (31/36)  FOTO: 3 (4/29/2022, 6/20/2022, 12/20/2022)    Time In: 13:00  Time Out: 14:00  Total Billable Time: 60 minutes    Precautions: Standard    Subjective     Pt reports less L hip adductor tightness/spasm after foam rolling the upper/inner posterior thigh. She continues to perform self-SIJ METs at home.     She was compliant with home exercise program  Response to previous treatment: no adverse effect  Functional change: fluctuating improvements in urinary urgency and back/hip/pelvic pain    Pain: L anterior/medial hip - 2/10    Objective     Divya Jacinto received the following interventions during the treatment session -  (TrA = transverse abdominis, PFM = pelvic floor muscle)  Pt consented to pelvic floor muscle assessment and treatment in prior visit - see EMR  All interventions billed as Therapeutic Exercise, per Medicaid guidelines    Therapeutic exercises to develop strength, endurance and core stabilization for 40 minutes -  [] Elliptical: level 3, 10 minutes  [] Hooklying TrA brace + hip adduction isometric with ball, 2x10 with 50% effort  [] Open books with green band (R&L), x10  [] Standing pallof press with doubled green band (R&L), 2x10  [x] TrA brace + bridging with belt around knees, 4x10  [x] TrA brace + straight leg raises (R&L), 3x10  [x] Side-lying hip  adduction (R&L), 3x8  [x] Side-lying hip abduction (R&L), 3x8 - challenging  [x] Hooklying hip adduction isometric with ball between knees, x20 with 3-second hold  [] Cat/cow with coordinated breath, 2 minutes   [] TrA brace + quadruped hip extension (R&L), x10  [] Standing on foam pad + TrA brace + standing shoulder extension with blue band (B), x30  [] Standing on foam pad + TrA brace + standing row with black band (B), x30  [] TrA brace + wall push up, 2x15  [] Side-stepping on foam beam, 2 minutes  [] Side-stepping with red tubing around ankles, 90 seconds  [] Seated on theraball + pelvic rotations, 2 minutes  [] Seated on theraball + alternating kicks, 1 minute  [] Seated on theraball + row with green band, x40  [] Seated on theraball + alternating shoulder flexion (3#, R&L), x15   [x] Lateral step up/down on 8-inch step (R&L), 3x10  [x] Standing on foam pad + TrA brace + toe taps to cone (R&L), x30  [] TrA brace + standing knee flexion (2#, R&L), 2x20  [] Standing hip flexion + hip external rotation lift (2#, R&L), 2x12  [] TrA brace + standing straight leg raise (2#, R&L), 2x12  [] TrA brace + standing hip abduction + contralateral arm lift (2#, R&L), 2x12  [] TrA brace + sit to stands from chair + 2 foam pads, 4x6  [x] Shuttle squats, 3x20 (4 black bands of resistance)  [] Treadmill walkinmph, 10% incline, 5 minutes  [] Standing hip/knee extension on Pilates chair with 2 blue springs and 2 red springs (R&L), x20  [] Happy baby stretch + diaphragmatic breathing, pelvic drop, 1 minute  [] Child's pose + diaphragmatic breathing, pelvic drop, 2 minutes  [] Butterfly hip stretch + diaphragmatic breathing, pelvic drop, 2 minutes  [] Half-kneeling lunge/hip flexor stretch (L), 1 minute  [] Supine hamstring stretch with strap (L), 1 minute  [] Hip flexor stretch in standing (L), 2x30 seconds  [] Lucian test hip flexor stretch (L), 2x1 minute  [] Diaphragmatic breathing + pelvic drop, 2 minutes in hooklying  "position  [] Seated ball roll out, 2x2 minutes - improved R lower back pain  [] Seated figure-4 stretch (R&L), 1 minute  [] Pelvic floor relaxation guided meditation - performed during e-stim for dry needling  [] HEP review, patient education  [] Marching with +ER ( sartorius ) 40ftx2  [] L prone hip flexor stretch with 1/2 roll under knee, 4x30"  [] Prone ball squeezes between ankles 5" hold 10x - experienced cramping in HS  [] Supine hip flexor iso/abdominal bracing with red ball, 5" hold x20 R/L  [] Elevated clams, 20x R/L  [] Side-lying reverse clams (2#, R&L), 2x15  [] Side-lying clams (R&L), 4x10  [] Straight leg bridging on blue ball, 2x10 5" hold  [] L SLS on airex + ball toss at rebounder, 2x20  [] Prone press up x10  [] Prone femoral nerve glides 2x20  [] Prone hip extension 2x20  [] Standing knee flexion x20  [] Prone knee flexion with hip extension x20   [] Bridge on ball with hamstring curl 3x5   [] Dying bugs 3x5  [x] Hesitation marching 15# KB (R&L),  60ft  [] SLS with cone tap forward/contralateral side x10 (patient unable to reach out towards left side with left lower extremity 2nd to pain)  [] Drinking birds 2x10 with upper extremity assist  [] Shuttle squat 50# 3x20  [] SLS on right with left side soccer ball rolls to left side x5  [x] Reverse lunges (R&L), x10  [] Seated figure-4 stretch (L), 1 minute  [] METs for left posterior rotated innominate 10x10"    Manual therapy techniques: to develop flexibility, desensitization, and extensibility for 20 minutes  -  [] Obturator release at ischial tuberosities + active release with resisted contralateral hip external rotation (B)  [] Manual release to L hip flexors - improved tenderness after a few minutes of release  [] Manual release to L pelvic floor muscles internally/vaginally: levator ani - mild increased tone and tenderness that improved within a few minutes of release. PT provided cues for diaphragmatic breathing, visualization, and " "relaxation.  [] Myofascial decompression/cupping to L adductor longus  [x] Dry needling with electrical stimulation to L adductor chepe, piriformis- no adverse effect, written consent provided 5/9/22, see EMR.  [] Prone Long-arm distraction to L hip with belt - no significant change in symptoms  [] Trigger point release to L iliopsoas using green theraroller   [] Taping to bilateral sacroiliac join  [] Sacroiliac muscle-energy technique - no significant change in symptoms  [] Manual hip flexor stretch: prone, with hip internal rotation (L), 1 minute  [] L iliacus release  [] Side lying L hip flexor stretch 4 x 30" - performed by clinician  [] SL lumbopelvic manipulation bilaterally  [] SIJ isometric MET with hips at 90/90, 45 degrees of hip flexion / 45 degrees of adduction, "shotgun" 3x5" hold with each      Therapeutic activities to improve functional performance for 0 minutes -  [] Instruction on urge suppression techniques  [] Trialing SI belt - reduced inner thigh pain when wearing SI belt      Home Exercises and Patient Education     Patient Education: progression of plan of care, plan for next session, pt prognosis, pelvic floor anatomy & function, relationship between TrA & PFM, relationship between hips & PFM, relationship between pelvic floor dysfunction & lower back pain, multifidi anatomy & function, lumbar spine anatomy    HEP (3/22/23): standing hip abduction, standing straight leg raise, standing hip flexion + external rotation, countertop push-ups  HEP update (5/18/23): add side-lying hip abduction    Exercises were reviewed, and Divya Jacinto was able to demonstrate them prior to the end of the session, as needed. Divya Jacinto demonstrated good understanding of the education provided.      Assessment     Pt tolerated treatment session well today. Pt experienced symptom relief following dry needling intervention. Tolerating exercise progressions well, without increased symptoms. Additional progressive " resistance exercise needed. Continue PT POC with emphasis on lumbopelvic stablization for improved tolerance to work and ADL's.     Divya Jacinto is progressing well towards her goals.   Pt prognosis: good    Pt will continue to benefit from skilled outpatient physical therapy to address the deficits listed in the problem list box on initial evaluation, provide pt/family education and to maximize pt's level of independence in the home and community environment.     Pt's spiritual, cultural and educational needs considered and pt agreeable to plan of care and goals.  Anticipated barriers to physical therapy: none      Short Term Goals: 6 weeks   Pt to report 50% improvement in tenderness to palpation of pelvic floor to demonstrate improved trigger points and overactivity Met   Pt to report only rare incidences of urinary incontinence with coughing to demonstrate improved pelvic floor muscle coordination Met   Pt to report 50% improvement in pain with penetration (gynecological exam or intercourse) and orgasm to demonstrate improved pelvic floor muscle overactivity Met   Pt to be independent with introductory home exercise program Met      Long Term Goals: 12 weeks  Pt to report minimal/no tenderness to palpation of pelvic floor to demonstrate improved trigger points and overactivity Partially Met   Pt to demonstrate an improved score in the FOTO Pelvic Pain (PFDI) survey to no more than 8% impaired to demonstrate improving pelvic floor muscle tenderness, coordination. Not Met   Pt to deny pelvic pain with penetration (gynecological exam or intercourse) and orgasm to demonstrate improved pelvic floor muscle overactivity Partially Met   Pt to increase pelvic floor strength to 4/5 to demonstrate improved strength needed for continence with ADLs. Partially Met   Pt to be independent with advanced home exercise program Not Met   Pt to demonstrate bilateral hip strength of 5/5 for improved pelvic girdle support with  mobility. Not Met   Pt to report minimal/no back pain with ADLs and functional mobility to demonstrate improved lumbopelvic muscle support, coordination Not Met        Plan     Continue per Plan of Care  Increasing frequency to 2x/week for more pelvic girdle stability training, Therapeutic Exercises, Manual Therapeutic Technique, Neuromuscular Re Education, Therapeutic Activities. Modalities, Kinesiotape prn, and Functional Dry Needling as needed.      Jess Ureña, PT

## 2023-05-31 ENCOUNTER — CLINICAL SUPPORT (OUTPATIENT)
Dept: REHABILITATION | Facility: OTHER | Age: 38
End: 2023-05-31
Payer: MEDICAID

## 2023-05-31 DIAGNOSIS — R29.898 DECREASED STRENGTH OF TRUNK AND BACK: ICD-10-CM

## 2023-05-31 DIAGNOSIS — G89.29 CHRONIC LEFT-SIDED LOW BACK PAIN WITHOUT SCIATICA: ICD-10-CM

## 2023-05-31 DIAGNOSIS — R10.2 PELVIC PAIN: Primary | ICD-10-CM

## 2023-05-31 DIAGNOSIS — M54.50 CHRONIC LEFT-SIDED LOW BACK PAIN WITHOUT SCIATICA: ICD-10-CM

## 2023-05-31 PROCEDURE — 97110 THERAPEUTIC EXERCISES: CPT | Mod: PN | Performed by: PHYSICAL THERAPIST

## 2023-06-06 ENCOUNTER — CLINICAL SUPPORT (OUTPATIENT)
Dept: REHABILITATION | Facility: OTHER | Age: 38
End: 2023-06-06
Payer: MEDICAID

## 2023-06-06 DIAGNOSIS — R10.2 PELVIC PAIN: Primary | ICD-10-CM

## 2023-06-06 DIAGNOSIS — R29.898 DECREASED STRENGTH OF TRUNK AND BACK: ICD-10-CM

## 2023-06-06 PROCEDURE — 97110 THERAPEUTIC EXERCISES: CPT | Mod: PN,CQ

## 2023-06-06 NOTE — PROGRESS NOTES
[facility]   Physical Therapy Treatment Note    Name: Divya Jacinto [facility]  Clinic Number: [id number]    Therapy Diagnosis:   Encounter Diagnoses   Name Primary?    Pelvic pain Yes    Decreased strength of trunk and back        Referring Provider: Arnulfo    Visit Date: 6/6/2023    Referring Provider Orders: PT Eval and Treat  Medical Diagnosis from Referral: Pelvic floor dysfunction in female [M62.89]  Evaluation Date: 4/29/2022  Last Reassessment: 3/20/2023  Authorization Period Expiration: 6/15/2023  Plan of Care Expiration: 6/20/2023  Visit # / Visits authorized: 59 (33/36)  FOTO: 3 (4/29/2022, 6/20/2022, 12/20/2022)    Time In: 1412 ( late arrival )  Time Out: 1500  Total Billable Time: 48 minutes    Precautions: Standard    Subjective     Pt report slightly less pain today. States she did wake up with some groin pain however it was not too bad. Feels like her hip is somewhat better.      She was compliant with home exercise program  Response to previous treatment: felt better after last PT session.   Functional change: fluctuating improvements in urinary urgency and back/hip/pelvic pain    Pain: L anterior/medial hip - 2/10    Objective     Divya Jacinto received the following interventions during the treatment session -  (TrA = transverse abdominis, PFM = pelvic floor muscle)  Pt consented to pelvic floor muscle assessment and treatment in prior visit - see EMR  All interventions billed as Therapeutic Exercise, per Medicaid guidelines    Therapeutic exercises to develop strength, endurance and core stabilization for 48 minutes -  [] Elliptical: level 3, 10 minutes  [] Hooklying TrA brace + hip adduction isometric with ball, 2x10 with 50% effort  [] Open books with green band (R&L), x10  [] Standing pallof press with doubled green band (R&L), 2x10  [x] TrA brace + bridging with belt around knees, 4x10  [x] TrA brace + straight leg raises (R&L), 3x10  [x] Side-lying hip adduction (R&L),  3x8  [x] Side-lying hip abduction (R&L), 3x8 - challenging  [x] Hooklying hip adduction isometric with ball between knees, x20 with 3-second hold  [] Cat/cow with coordinated breath, 2 minutes   [] TrA brace + quadruped hip extension (R&L), x10  [] Standing on foam pad + TrA brace + standing shoulder extension with blue band (B), x30  [] Standing on foam pad + TrA brace + standing row with black band (B), x30  [] TrA brace + wall push up, 2x15  [] Side-stepping on foam beam, 2 minutes  [] Side-stepping with red tubing around ankles, 90 seconds  [] Seated on theraball + pelvic rotations, 2 minutes  [] Seated on theraball + alternating kicks, 1 minute  [] Seated on theraball + row with green band, x40  [] Seated on theraball + alternating shoulder flexion (3#, R&L), x15   [x] Lateral step up/down on 8-inch step (R&L), 3x10  [x] Standing on foam pad + TrA brace + toe taps to cone (R&L), x20  [x] Scoop+lift ( shovel ) at freemotion 7# 10x R/L using black weighted bar   [] TrA brace + standing knee flexion (2#, R&L), 2x20  [] Standing hip flexion + hip external rotation lift (2#, R&L), 2x12  [] TrA brace + standing straight leg raise (2#, R&L), 2x12  [] TrA brace + standing hip abduction + contralateral arm lift (2#, R&L), 2x12  [] TrA brace + sit to stands from chair + 2 foam pads, 4x6  [] Shuttle squats, 3x20 (4 black bands of resistance)  [] Treadmill walkinmph, 10% incline, 5 minutes  [] Standing hip/knee extension on Pilates chair with 2 blue springs and 2 red springs (R&L), x20  [] Happy baby stretch + diaphragmatic breathing, pelvic drop, 1 minute  [] Child's pose + diaphragmatic breathing, pelvic drop, 2 minutes  [] Butterfly hip stretch + diaphragmatic breathing, pelvic drop, 2 minutes  [] Half-kneeling lunge/hip flexor stretch (L), 1 minute  [] Supine hamstring stretch with strap (L), 1 minute  [] Hip flexor stretch in standing (L), 2x30 seconds  [] Lucian test hip flexor stretch (L), 2x1 minute  []  "Diaphragmatic breathing + pelvic drop, 2 minutes in hooklying position  [] Seated ball roll out, 2x2 minutes - improved R lower back pain  [] Seated figure-4 stretch (R&L), 1 minute  [] Pelvic floor relaxation guided meditation - performed during e-stim for dry needling  [] HEP review, patient education  [] Marching with +ER ( sartorius ) 40ftx2  [] L prone hip flexor stretch with 1/2 roll under knee, 4x30"  [] Prone ball squeezes between ankles 5" hold 10x - experienced cramping in HS  [] Supine hip flexor iso/abdominal bracing with red ball, 5" hold x20 R/L  [] Elevated clams, 20x R/L  [] Side-lying reverse clams (2#, R&L), 2x15  [] Side-lying clams (R&L), 4x10  [x] Bridging with UE pulldown, pink sports cord, 3x10  [] L SLS on airex + ball toss at rebounder, 2x20  [] Prone press up x10  [] Prone femoral nerve glides 2x20  [] Prone hip extension 2x20  [] Standing knee flexion x20  [] Prone knee flexion with hip extension x20   [] Bridge on ball with hamstring curl 3x5   [] Dying bugs 3x5  [x] Hesitation marching 15# KB (R&L),  60ft  [] SLS with cone tap forward/contralateral side x10 (patient unable to reach out towards left side with left lower extremity 2nd to pain)  [] Drinking birds 2x10 with upper extremity assist  [] Shuttle squat 50# 3x20  [] SLS on right with left side soccer ball rolls to left side x5  [] Reverse lunges (R&L), x10  [] Seated figure-4 stretch (L), 1 minute  [] METs for left posterior rotated innominate 10x10"    Manual therapy techniques: to develop flexibility, desensitization, and extensibility for 00 minutes  -  [] Obturator release at ischial tuberosities + active release with resisted contralateral hip external rotation (B)  [] Manual release to L hip flexors - improved tenderness after a few minutes of release  [] Manual release to L pelvic floor muscles internally/vaginally: levator ani - mild increased tone and tenderness that improved within a few minutes of release. PT provided " "cues for diaphragmatic breathing, visualization, and relaxation.  [] Myofascial decompression/cupping to L adductor longus  [] Dry needling with electrical stimulation to L adductor chepe, piriformis- no adverse effect, written consent provided 5/9/22, see EMR.  [] Prone Long-arm distraction to L hip with belt - no significant change in symptoms  [] Trigger point release to L iliopsoas using green theraroller   [] Taping to bilateral sacroiliac join  [] Sacroiliac muscle-energy technique - no significant change in symptoms  [] Manual hip flexor stretch: prone, with hip internal rotation (L), 1 minute  [] L iliacus release  [] Side lying L hip flexor stretch 4 x 30" - performed by clinician  [] SL lumbopelvic manipulation bilaterally  [] SIJ isometric MET with hips at 90/90, 45 degrees of hip flexion / 45 degrees of adduction, "shotgun" 3x5" hold with each      Therapeutic activities to improve functional performance for 0 minutes -  [] Instruction on urge suppression techniques  [] Trialing SI belt - reduced inner thigh pain when wearing SI belt      Home Exercises and Patient Education     Patient Education: progression of plan of care, plan for next session, pt prognosis, pelvic floor anatomy & function, relationship between TrA & PFM, relationship between hips & PFM, relationship between pelvic floor dysfunction & lower back pain, multifidi anatomy & function, lumbar spine anatomy    HEP (3/22/23): standing hip abduction, standing straight leg raise, standing hip flexion + external rotation, countertop push-ups  HEP update (5/18/23): add side-lying hip abduction    Exercises were reviewed, and Divya Jacinto was able to demonstrate them prior to the end of the session, as needed. Divya Jacinto demonstrated good understanding of the education provided.      Assessment   Continued with core/stabilization exercises with improved tolerance being noted. Improved glute med activation was noted with SL hip abduction with " decreased compensation was present. Continued with SLS/core stabilization exercises to help improve functional strength and balance.     Divya Jacinto is progressing well towards her goals.   Pt prognosis: good    Pt will continue to benefit from skilled outpatient physical therapy to address the deficits listed in the problem list box on initial evaluation, provide pt/family education and to maximize pt's level of independence in the home and community environment.     Pt's spiritual, cultural and educational needs considered and pt agreeable to plan of care and goals.  Anticipated barriers to physical therapy: none      Short Term Goals: 6 weeks   Pt to report 50% improvement in tenderness to palpation of pelvic floor to demonstrate improved trigger points and overactivity Met   Pt to report only rare incidences of urinary incontinence with coughing to demonstrate improved pelvic floor muscle coordination Met   Pt to report 50% improvement in pain with penetration (gynecological exam or intercourse) and orgasm to demonstrate improved pelvic floor muscle overactivity Met   Pt to be independent with introductory home exercise program Met      Long Term Goals: 12 weeks  Pt to report minimal/no tenderness to palpation of pelvic floor to demonstrate improved trigger points and overactivity Partially Met   Pt to demonstrate an improved score in the FOTO Pelvic Pain (PFDI) survey to no more than 8% impaired to demonstrate improving pelvic floor muscle tenderness, coordination. Not Met   Pt to deny pelvic pain with penetration (gynecological exam or intercourse) and orgasm to demonstrate improved pelvic floor muscle overactivity Partially Met   Pt to increase pelvic floor strength to 4/5 to demonstrate improved strength needed for continence with ADLs. Partially Met   Pt to be independent with advanced home exercise program Not Met   Pt to demonstrate bilateral hip strength of 5/5 for improved pelvic girdle support with  mobility. Not Met   Pt to report minimal/no back pain with ADLs and functional mobility to demonstrate improved lumbopelvic muscle support, coordination Not Met        Plan     Continue per Plan of Care    Increasing frequency to 2x/week for more pelvic girdle stability training, Therapeutic Exercises, Manual Therapeutic Technique, Neuromuscular Re Education, Therapeutic Activities. Modalities, Kinesiotape prn, and Functional Dry Needling as needed.      Bimal Gomez, PTA

## 2023-06-13 ENCOUNTER — CLINICAL SUPPORT (OUTPATIENT)
Dept: REHABILITATION | Facility: OTHER | Age: 38
End: 2023-06-13
Payer: MEDICAID

## 2023-06-13 DIAGNOSIS — R29.898 DECREASED STRENGTH OF TRUNK AND BACK: ICD-10-CM

## 2023-06-13 DIAGNOSIS — R10.2 PELVIC PAIN: Primary | ICD-10-CM

## 2023-06-13 PROCEDURE — 97110 THERAPEUTIC EXERCISES: CPT | Mod: PN,CQ

## 2023-06-13 NOTE — PROGRESS NOTES
"[facility]   Physical Therapy Treatment Note    Name: Divya Chand  Clinic Number: [id number]    Therapy Diagnosis:   Encounter Diagnoses   Name Primary?    Pelvic pain Yes    Decreased strength of trunk and back      Referring Provider: Arnulfo    Visit Date: 6/13/2023    Referring Provider Orders: PT Eval and Treat  Medical Diagnosis from Referral: Pelvic floor dysfunction in female [M62.89]  Evaluation Date: 4/29/2022  Last Reassessment: 3/20/2023  Authorization Period Expiration: 6/15/2023  Plan of Care Expiration: 6/20/2023  Visit # / Visits authorized: 59 (33/36)  FOTO: 3 (4/29/2022, 6/20/2022, 12/20/2022)    Time In: 1805 ( late arrival )   Time Out: 1900  Total Billable Time: 55 minutes    Precautions: Standard    Subjective     Pt report she has been noticing a "locked up" feeling in her R SI region. States  sometimes it bothers her and or she can feel it coming on.       She was compliant with home exercise program  Response to previous treatment: felt better after last PT session.   Functional change: fluctuating improvements in urinary urgency and back/hip/pelvic pain    Pain: L anterior/medial hip - 2/10    Objective     Divya Jacinto received the following interventions during the treatment session -  (TrA = transverse abdominis, PFM = pelvic floor muscle)  Pt consented to pelvic floor muscle assessment and treatment in prior visit - see EMR  All interventions billed as Therapeutic Exercise, per Medicaid guidelines    Therapeutic exercises to develop strength, endurance and core stabilization for 49 minutes -  [] Elliptical: level 3, 10 minutes  [] Hooklying TrA brace + hip adduction isometric with ball, 2x10 with 50% effort  [] Open books with green band (R&L), x10  [] Standing pallof press with doubled green band (R&L), 2x10  [x] TrA brace + bridging with belt around knees, 4x10  [x] TrA brace + straight leg raises (R&L), 3x10  [x] Side-lying hip adduction (R&L), 3x8  [x] " Side-lying hip abduction (R&L), 3x8 - challenging  [x] Hooklying hip adduction isometric with ball between knees, x20 with 3-second hold  [] Cat/cow with coordinated breath, 2 minutes   [] TrA brace + quadruped hip extension (R&L), x10  [] Standing on foam pad + TrA brace + standing shoulder extension with blue band (B), x30  [] Standing on foam pad + TrA brace + standing row with black band (B), x30  [] TrA brace + wall push up, 2x15  [] Side-stepping on foam beam, 2 minutes  [] Side-stepping with red tubing around ankles, 90 seconds  [] Seated on theraball + pelvic rotations, 2 minutes  [] Seated on theraball + alternating kicks, 1 minute  [] Seated on theraball + row with green band, x40  [] Seated on theraball + alternating shoulder flexion (3#, R&L), x15   [x] Lateral step up/down on 8-inch step (R&L), 2x10  [x] Standing on foam pad + TrA brace using pilates ring + toe taps to cone (R&L), x20  [x] Scoop+lift ( shovel ) at freemotion 7# 10x R/L using black weighted bar   [] TrA brace + standing knee flexion (2#, R&L), 2x20  [] Standing hip flexion + hip external rotation lift (2#, R&L), 2x12  [] TrA brace + standing straight leg raise (2#, R&L), 2x12  [] TrA brace + standing hip abduction + contralateral arm lift (2#, R&L), 2x12  [] TrA brace + sit to stands from chair + 2 foam pads, 4x6  [] Shuttle squats, 3x20 (4 black bands of resistance)  [] Treadmill walkinmph, 10% incline, 5 minutes  [] Standing hip/knee extension on Pilates chair with 2 blue springs and 2 red springs (R&L), x20  [] Happy baby stretch + diaphragmatic breathing, pelvic drop, 1 minute  [] Child's pose + diaphragmatic breathing, pelvic drop, 2 minutes  [] Butterfly hip stretch + diaphragmatic breathing, pelvic drop, 2 minutes  [] Half-kneeling lunge/hip flexor stretch (L), 1 minute  [] Supine hamstring stretch with strap (L), 1 minute  [] Hip flexor stretch in standing (L), 2x30 seconds  [] Lucian test hip flexor stretch (L), 2x1  "minute  [] Diaphragmatic breathing + pelvic drop, 2 minutes in hooklying position  [] Seated ball roll out, 2x2 minutes - improved R lower back pain  [] Seated figure-4 stretch (R&L), 1 minute  [] Pelvic floor relaxation guided meditation - performed during e-stim for dry needling  [] HEP review, patient education  [] Marching with +ER ( sartorius ) 40ftx2  [] L prone hip flexor stretch with 1/2 roll under knee, 4x30"  [] Prone ball squeezes between ankles 5" hold 10x - experienced cramping in HS  [] Supine hip flexor iso/abdominal bracing with red ball, 5" hold x20 R/L  [] Elevated clams, 20x R/L  [] Side-lying reverse clams (2#, R&L), 2x15  [] Side-lying clams (R&L), 4x10  [x] Bridging with UE pulldown, pink sports cord, 3x10  [] L SLS on airex + ball toss at rebounder, 2x20  [x] Standing stability chair press down + TrA, 2 red springs, 20x R/L  [x] Fire hydrants in quadruped, 20x R/L  [] Prone press up x10  [] Prone femoral nerve glides 2x20  [] Prone hip extension 2x20  [] Standing knee flexion x20  [] Prone knee flexion with hip extension x20   [] Bridge on ball with hamstring curl 3x5   [] Dying bugs 3x5  [x] Hesitation marching 15# KB (R&L),  80ftx2  [] SLS with cone tap forward/contralateral side x10 (patient unable to reach out towards left side with left lower extremity 2nd to pain)  [] Drinking birds 2x10 with upper extremity assist  [] Shuttle squat 50# 3x20  [] SLS on right with left side soccer ball rolls to left side x5  [] Reverse lunges (R&L), x10  [] Seated figure-4 stretch (L), 1 minute  [] METs for left posterior rotated innominate 10x10"    Manual therapy techniques: to develop flexibility, desensitization, and extensibility for 6 minutes  -  [x] STM with theragun to R SI region  [] Obturator release at ischial tuberosities + active release with resisted contralateral hip external rotation (B)  [] Manual release to L hip flexors - improved tenderness after a few minutes of release  [] Manual " "release to L pelvic floor muscles internally/vaginally: levator ani - mild increased tone and tenderness that improved within a few minutes of release. PT provided cues for diaphragmatic breathing, visualization, and relaxation.  [] Myofascial decompression/cupping to L adductor longus  [] Dry needling with electrical stimulation to L adductor chepe, piriformis- no adverse effect, written consent provided 5/9/22, see EMR.  [] Prone Long-arm distraction to L hip with belt - no significant change in symptoms  [] Trigger point release to L iliopsoas using green theraroller   [] Taping to bilateral sacroiliac join  [] Sacroiliac muscle-energy technique - no significant change in symptoms  [] Manual hip flexor stretch: prone, with hip internal rotation (L), 1 minute  [] L iliacus release  [] Side lying L hip flexor stretch 4 x 30" - performed by clinician  [] SL lumbopelvic manipulation bilaterally  [] SIJ isometric MET with hips at 90/90, 45 degrees of hip flexion / 45 degrees of adduction, "shotgun" 3x5" hold with each      Therapeutic activities to improve functional performance for 0 minutes -  [] Instruction on urge suppression techniques  [] Trialing SI belt - reduced inner thigh pain when wearing SI belt      Home Exercises and Patient Education     Patient Education: progression of plan of care, plan for next session, pt prognosis, pelvic floor anatomy & function, relationship between TrA & PFM, relationship between hips & PFM, relationship between pelvic floor dysfunction & lower back pain, multifidi anatomy & function, lumbar spine anatomy    HEP (3/22/23): standing hip abduction, standing straight leg raise, standing hip flexion + external rotation, countertop push-ups  HEP update (5/18/23): add side-lying hip abduction    Exercises were reviewed, and Divya Jacinto was able to demonstrate them prior to the end of the session, as needed. Divya Jacinto demonstrated good understanding of the education provided.    "   Assessment   Divya alexander demonstrated improved tolerance with exercise this visit. Added increased resistance/core/stabilization exercises and she was able to show improved TrA activation as well as minimal pelvic/hip compensation/collapsing with quadruped exercise. Overall she appears to be showing sings of improvement however pain with ADLs/certain movements continue to be notable at this time. Will continue to progress core/stabilization as tolerated with transition to functional movements.      Divya Alexander is progressing well towards her goals.   Pt prognosis: good    Pt will continue to benefit from skilled outpatient physical therapy to address the deficits listed in the problem list box on initial evaluation, provide pt/family education and to maximize pt's level of independence in the home and community environment.     Pt's spiritual, cultural and educational needs considered and pt agreeable to plan of care and goals.  Anticipated barriers to physical therapy: none      Short Term Goals: 6 weeks   Pt to report 50% improvement in tenderness to palpation of pelvic floor to demonstrate improved trigger points and overactivity Met   Pt to report only rare incidences of urinary incontinence with coughing to demonstrate improved pelvic floor muscle coordination Met   Pt to report 50% improvement in pain with penetration (gynecological exam or intercourse) and orgasm to demonstrate improved pelvic floor muscle overactivity Met   Pt to be independent with introductory home exercise program Met      Long Term Goals: 12 weeks  Pt to report minimal/no tenderness to palpation of pelvic floor to demonstrate improved trigger points and overactivity Partially Met   Pt to demonstrate an improved score in the FOTO Pelvic Pain (PFDI) survey to no more than 8% impaired to demonstrate improving pelvic floor muscle tenderness, coordination. Not Met   Pt to deny pelvic pain with penetration (gynecological exam or intercourse)  and orgasm to demonstrate improved pelvic floor muscle overactivity Partially Met   Pt to increase pelvic floor strength to 4/5 to demonstrate improved strength needed for continence with ADLs. Partially Met   Pt to be independent with advanced home exercise program Not Met   Pt to demonstrate bilateral hip strength of 5/5 for improved pelvic girdle support with mobility. Not Met   Pt to report minimal/no back pain with ADLs and functional mobility to demonstrate improved lumbopelvic muscle support, coordination Not Met        Plan     Continue per Plan of Care    Increasing frequency to 2x/week for more pelvic girdle stability training, Therapeutic Exercises, Manual Therapeutic Technique, Neuromuscular Re Education, Therapeutic Activities. Modalities, Kinesiotape prn, and Functional Dry Needling as needed.      Bimal Gomez, PTA

## 2023-06-14 NOTE — PROGRESS NOTES
"[facility]   Physical Therapy Treatment Note    Name: Divya Jacinto [facility]  Clinic Number: [id number]    Therapy Diagnosis:   Encounter Diagnoses   Name Primary?    Pelvic pain Yes    Decreased strength of trunk and back     Chronic left-sided low back pain without sciatica      Referring Provider: Arnulfo    Visit Date: 6/16/2023    Referring Provider Orders: PT Eval and Treat  Medical Diagnosis from Referral: Pelvic floor dysfunction in female [M62.89]  Evaluation Date: 4/29/2022  Last Reassessment: 6/16/2023  Authorization Period Expiration: 6/15/2023  Plan of Care Expiration: 9/16/2023  Visit # / Visits authorized: 60 (34/48)  FOTO: 3 (4/29/2022, 6/20/2022, 12/20/2022)    Time In: 15:00  Time Out: 16:00  Total Billable Time: 60 minutes    Precautions: Standard    Subjective     Pt report she has been noticing a "locked up" feeling in her R SI region, 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 Her symptoms are overall improved after working less in the last week. Overall, the intensity of frequency of her flare-ups have improved markedly in the last 2 months, which attributes to more exercise. She worst flare-up she has is post-orgasm, and even that's not as bad as it used to be.     She was compliant with home exercise program  Response to previous treatment: felt better after last PT session.   Functional change: fluctuating improvements in urinary urgency and back/hip/pelvic pain    Pain: L anterior/medial hip - 2/10    Objective     Divya Jacinto received the following interventions during the treatment session -  (TrA = transverse abdominis, PFM = pelvic floor muscle)  Pt consented to pelvic floor muscle assessment and treatment in prior visit - see EMR  All interventions billed as Therapeutic Exercise, per Medicaid guidelines    Therapeutic exercises to develop strength, endurance and core stabilization for 40 minutes -  [x] HEP review, patient education  [] Elliptical: level 3, " 10 minutes  [] Hooklying TrA brace + hip adduction isometric with ball, 2x10 with 50% effort  [] Standing pallof press with doubled green band (R&L), 2x10  [x] TrA brace + bridging with belt around knees, 4x10  [] TrA brace + straight leg raises (R&L), 3x10  [x] Side-lying hip adduction (R&L), 3x12  [x] Side-lying hip abduction (R&L), 3x12  [x] Hooklying tabletop heel dips (R&L), 3x10  [] Cat/cow with coordinated breath, 2 minutes   [] TrA brace + quadruped hip extension (R&L), x10  [] Standing on foam pad + TrA brace + standing shoulder extension with blue band (B), x30  [] Standing on foam pad + TrA brace + standing row with black band (B), x30  [] TrA brace + wall push up, 2x15  [x] Side-stepping with red tubing around ankles, 90 seconds  [x] Lateral step up/down on 8-inch step (R&L), 3x8  [] Standing on foam pad + TrA brace using pilates ring + toe taps to cone (R&L), x20  [] Scoop+lift ( shovel ) at freemotion 7# 10x R/L using black weighted bar   [] TrA brace + standing knee flexion (2#, R&L), 2x20  [] Standing hip flexion + hip external rotation lift (2#, R&L), 2x12  [] TrA brace + standing straight leg raise (2#, R&L), 2x12  [] TrA brace + standing hip abduction + contralateral arm lift (2#, R&L), 2x12  [] TrA brace + sit to stands from chair + 2 foam pads, 4x6  [x] Standing march (4#, R&L), x15  [] Shuttle squats, 3x20 (4 black bands of resistance)  [] Treadmill walkinmph, 10% incline, 5 minutes  [] Standing hip/knee extension on [employer] chair with 2 blue springs and 2 red springs (R&L), x20  [] Butterfly hip stretch + diaphragmatic breathing, pelvic drop, 2 minutes  [] Half-kneeling lunge/hip flexor stretch (L), 1 minute  [] Supine hamstring stretch with strap (L), 1 minute  [] Hip flexor stretch in standing (L), 2x30 seconds  [] Lucian test hip flexor stretch (L), 2x1 minute  [] Diaphragmatic breathing + pelvic drop, 2 minutes in hooklying position  [] Seated ball roll out, 2x2 minutes - improved R  "lower back pain  [] Marching with +ER ( sartorius ) 40ftx2  [] L prone hip flexor stretch with 1/2 roll under knee, 4x30"  [] Prone ball squeezes between ankles 5" hold 10x - experienced cramping in HS  [] Supine hip flexor iso/abdominal bracing with red ball, 5" hold x20 R/L  [] Elevated clams, 20x R/L  [] Side-lying reverse clams (2#, R&L), 2x15  [] Side-lying clams (R&L), 4x10  [] Bridging with UE pulldown, pink sports cord, 3x10  [] L SLS on airex + ball toss at rebounder, 2x20  [] Standing stability chair press down + TrA, 2 red springs, 20x R/L  [] Fire hydrants in quadruped, 20x R/L  [] Prone press up x10  [] Prone femoral nerve glides 2x20  [] Prone hip extension 2x20  [] Standing knee flexion x20  [] Prone knee flexion with hip extension x20   [] Bridge on ball with hamstring curl 3x5   [] Dying bugs 3x5  [x] Hesitation marching 20# KB (R&L),  80ft  [] SLS with cone tap forward/contralateral side x10 (patient unable to reach out towards left side with left lower extremity 2nd to pain)  [] Drinking birds 2x10 with upper extremity assist  [x] Shuttle squat 75# 3x20  [] SLS on right with left side soccer ball rolls to left side x5  [] Reverse lunges (R&L), x10  [] Seated figure-4 stretch (L), 1 minute  [] METs for left posterior rotated innominate 10x10"    Manual therapy techniques: to develop flexibility, desensitization, and extensibility for 20 minutes  -  [] STM with theragun to R SI region  [] Obturator release at ischial tuberosities + active release with resisted contralateral hip external rotation (B)  [] Manual release to L hip flexors - improved tenderness after a few minutes of release  [] Manual release to L pelvic floor muscles internally/vaginally: levator ani - mild increased tone and tenderness that improved within a few minutes of release. PT provided cues for diaphragmatic breathing, visualization, and relaxation.  [] Myofascial decompression/cupping to L adductor longus  [x] Dry needling " "with electrical stimulation to L adductor chepe, L gluteus medius, L piriformis, and R quadratus lumborum - no adverse effect, written consent provided 5/9/22, see EMR.  [] Prone Long-arm distraction to L hip with belt - no significant change in symptoms  [] Trigger point release to L iliopsoas using green theraroller   [] Taping to bilateral sacroiliac join  [] Sacroiliac muscle-energy technique - no significant change in symptoms  [] Manual hip flexor stretch: prone, with hip internal rotation (L), 1 minute  [] L iliacus release  [] Side lying L hip flexor stretch 4 x 30" - performed by clinician  [] SL lumbopelvic manipulation bilaterally  [] SIJ isometric MET with hips at 90/90, 45 degrees of hip flexion / 45 degrees of adduction, "shotgun" 3x5" hold with each      Therapeutic activities to improve functional performance for 0 minutes -  [] Instruction on urge suppression techniques  [] Trialing SI belt - reduced inner thigh pain when wearing SI belt      Home Exercises and Patient Education     Patient Education: progression of plan of care, plan for next session, pt prognosis, pelvic floor anatomy & function, relationship between TrA & PFM, relationship between hips & PFM, relationship between pelvic floor dysfunction & lower back pain, multifidi anatomy & function, lumbar spine anatomy    HEP (3/22/23): standing hip abduction, standing straight leg raise, standing hip flexion + external rotation, countertop push-ups  HEP update (5/18/23): add side-lying hip abduction    Exercises were reviewed, and Divya Jacinto was able to demonstrate them prior to the end of the session, as needed. Divya Jacinto demonstrated good understanding of the education provided.      Assessment     Pt tolerated treatment session well, with good understanding of education provided and no increased symptoms with exercise. Overall she appears to be showing signs of improvement, however pain with ADLs/certain movements continue to be " notable at this time. Will continue to progress core/stabilization as tolerated with transition to functional movements.      Divya Jacinto is progressing well towards her goals.   Pt prognosis: good    Pt will continue to benefit from skilled outpatient physical therapy to address the deficits listed in the problem list box on initial evaluation, provide pt/family education and to maximize pt's level of independence in the home and community environment.     Pt's spiritual, cultural and educational needs considered and pt agreeable to plan of care and goals.  Anticipated barriers to physical therapy: none      Short Term Goals: 6 weeks   Pt to report 50% improvement in tenderness to palpation of pelvic floor to demonstrate improved trigger points and overactivity Met   Pt to report only rare incidences of urinary incontinence with coughing to demonstrate improved pelvic floor muscle coordination Met   Pt to report 50% improvement in pain with penetration (gynecological exam or intercourse) and orgasm to demonstrate improved pelvic floor muscle overactivity Met   Pt to be independent with introductory home exercise program Met      Long Term Goals: 12 weeks  Pt to report minimal/no tenderness to palpation of pelvic floor to demonstrate improved trigger points and overactivity Partially Met   Pt to demonstrate an improved score in the FOTO Pelvic Pain (PFDI) survey to no more than 8% impaired to demonstrate improving pelvic floor muscle tenderness, coordination. Not Met   Pt to deny pelvic pain with penetration (gynecological exam or intercourse) and orgasm to demonstrate improved pelvic floor muscle overactivity Partially Met   Pt to increase pelvic floor strength to 4/5 to demonstrate improved strength needed for continence with ADLs. Partially Met   Pt to be independent with advanced home exercise program Partially Met   Pt to demonstrate bilateral hip strength of 5/5 for improved pelvic girdle support with  mobility. Partially Met   Pt to report minimal/no back pain with ADLs and functional mobility to demonstrate improved lumbopelvic muscle support, coordination Partially Met        Plan     Continue per Plan of Care  Planning to discharge this summer    Increasing frequency to 2x/week for more pelvic girdle stability training, Therapeutic Exercises, Manual Therapeutic Technique, Neuromuscular Re Education, Therapeutic Activities. Modalities, Kinesiotape prn, and Functional Dry Needling as needed.      Jess Ureña, PT

## 2023-06-16 ENCOUNTER — CLINICAL SUPPORT (OUTPATIENT)
Dept: REHABILITATION | Facility: OTHER | Age: 38
End: 2023-06-16
Payer: MEDICAID

## 2023-06-16 DIAGNOSIS — R10.2 PELVIC PAIN: Primary | ICD-10-CM

## 2023-06-16 DIAGNOSIS — M54.50 CHRONIC LEFT-SIDED LOW BACK PAIN WITHOUT SCIATICA: ICD-10-CM

## 2023-06-16 DIAGNOSIS — R29.898 DECREASED STRENGTH OF TRUNK AND BACK: ICD-10-CM

## 2023-06-16 DIAGNOSIS — G89.29 CHRONIC LEFT-SIDED LOW BACK PAIN WITHOUT SCIATICA: ICD-10-CM

## 2023-06-16 PROCEDURE — 97110 THERAPEUTIC EXERCISES: CPT | Mod: PN | Performed by: PHYSICAL THERAPIST

## 2023-06-16 NOTE — PLAN OF CARE
"[facility]   Pelvic Health Physical Therapy Reassessment    Date: 6/16/2023   Name: Divya Chand  Clinic Number: [id number]    Therapy Diagnosis:   Encounter Diagnoses   Name Primary?    Pelvic pain Yes    Decreased strength of trunk and back     Chronic left-sided low back pain without sciatica      Referring Provider: Aga Arredondo MD    Referring Provider Orders: PT Eval and Treat  Medical Diagnosis from Referral: Pelvic floor dysfunction in female [M62.89]  Evaluation Date: 4/29/2022  Last Reassessment: 6/16/2023  Authorization Period Expiration: 6/15/2023  Plan of Care Expiration: 9/16/2023  Visit # / Visits authorized: 60 (34/48)    SUBJECTIVE     Divya Jacinto reports continued fluctuating hip pain, but she has been reporting milder pain in recent visits, as well as fewer episodes of debilitating pain. 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 Her symptoms are overall improved after working less in the last week. Overall, the intensity of frequency of her flare-ups have improved markedly in the last 2 months, which attributes to more exercise. She worst flare-up she has is post-orgasm, and even that's not as bad as it used to be.    Pain (L inner thigh and hip, lower abdomen)  4/29/22: current - mild, worst - severe, best - mild  7/6/22: mild/moderate  9/28/22: 3/10 (worst - 6/10, post-orgasm)  12/20/22: mild/moderate  6/16/23: 2/10    OBJECTIVE      Hip Strength 4/29/22 7/6/22 9/28/22 6/12/23   R hip flexion 4+/5 4+/5 4+/5 5/5   R hip external rotation 4/5 4+/5 4+/5 4+/5   L hip flexion 4+/5 4+/5 4+/5 4+/5   L hip external rotation 4-/5 4/5 4/5 4+/5      Hip Range of Motion 4/29/22 7/6/22 9/28/22 6/12/23   R internal rotation 15°  25° 30° 40°   L internal rotation 25° 30° 30° 35°      Standing load transfer  4/29/22: Mild unsteadiness with single-leg balance  7/6/22: Minimal/no " unsteadiness  9/28/22: Good pelvic girdle stability  12/20/22: Good pelvic girdle stability  6/16/23: Good stability and trunk positioning with standing exercises requiring load transfer on unstable surfaces    Abdominal wall  4/29/22: Tender, increased tension and thickened , 1-inch diastasis recti  7/6/22: Continued tenderness   9/28/22: Continued tenderness, as well as in adductors  12/20/22: Mild/moderate tenderness in L abdominal wall, hip flexors, adductors  6/16/23: continued mild tenderness    Pelvic girdle stability  4/29/22: Pt demonstrates moderately impaired ability to stabilize pelvis with Active Straight Leg Raise Test. Able to improve slightly with verbal/manual cues for transverse abdominus activation. Tends to demonstrate oblique-dominant abdominal wall contraction, but is able to perform transverse abdominis contraction with verbal and tactile cues.  7/6/22: Mild pelvic girdle stability deficits with supine load transfer, definitely improved from evaluation. Good transverse abdominis contraction on command, no verbal cues required.  9/28/22: Not assessed  12/20/22: Mild pelvic girdle stability deficits  6/16/23: Good pelvic girdle stability    Pelvic floor  4/29/22: tender areas noted as follows: bilateral periurethral muscles (with reproduction of urinary urgency), levator ani (L significantly more tender), as well as L introitus  Muscle Bulk: hypertonus  Muscle Power: 3/5  Muscle Endurance: 6 seconds  # Reps To Fatigue: 4  Quality of contraction: slow rise, slow relaxation and incomplete relaxation  Specificity: patient co-contracts glutes   7/6/22: mild tenderness in levator ani, no tenderness in periurethral muscles today.   Muscle Power: 3-4/5  Muscle Endurance: 8 seconds  Quality of contraction: slow rise, slow relaxation  Specificity: appropriate  9/28/22: Not assessed  12/20/22: Not assessed  6/16/23: Not assessed       ASSESSMENT     Pt has progressed fairly well with physical therapy,  demonstrating overall improvements in pelvic floor strength/coordination, hip/core strength, hip range of motion, and activity tolerance. Pt continues to experience pain/difficulty with ADLs and functional mobility due to abdominal wall pain/L hip pain. Progressive resistance exercise for the core and hip have improved pt's exercise tolerance and meli have improved her standing tolerance. Overall she appears to be showing signs of improvement, however pain with ADLs/certain movements continue to be notable at this time. Will continue to progress core/stabilization as tolerated with transition to functional movements. Pt has met/partially met some goals from the initial evaluation, and the rest of those goals remain appropriate for the plan of care. Pt will benefit from continued skilled therapy to provide targeted manual therapy and progressive resistance exercise.    Short Term Goals: 6 weeks   Pt to report 50% improvement in tenderness to palpation of pelvic floor to demonstrate improved trigger points and overactivity Met   Pt to report only rare incidences of urinary incontinence with coughing to demonstrate improved pelvic floor muscle coordination Met   Pt to report 50% improvement in pain with penetration (gynecological exam or intercourse) and orgasm to demonstrate improved pelvic floor muscle overactivity Met   Pt to be independent with introductory home exercise program Met      Long Term Goals: 12 weeks  Pt to report minimal/no tenderness to palpation of pelvic floor to demonstrate improved trigger points and overactivity Partially Met   Pt to demonstrate an improved score in the FOTO Pelvic Pain (PFDI) survey to no more than 8% impaired to demonstrate improving pelvic floor muscle tenderness, coordination. Not Met   Pt to deny pelvic pain with penetration (gynecological exam or intercourse) and orgasm to demonstrate improved pelvic floor muscle overactivity Partially Met   Pt to increase pelvic floor  strength to 4/5 to demonstrate improved strength needed for continence with ADLs. Partially Met   Pt to be independent with advanced home exercise program Partially Met   Pt to demonstrate bilateral hip strength of 5/5 for improved pelvic girdle support with mobility. Partially Met   Pt to report minimal/no back pain with ADLs and functional mobility to demonstrate improved lumbopelvic muscle support, coordination Partially Met           PLAN     Plan of Care certification: 6/16/2023 to 9/16/2023    Outpatient Physical Therapy - 2 times per week for 12 weeks to include the following interventions: Therapeutic Exercise, Manual Therapy, Therapeutic Activity, Neuromuscular Re-education, Electrical Stimulation Unattended, Self-Care, Patient Education    Planning to discharge from physical therapy this summer    Jess Ureña, PT, DPT          I CERTIFY THE NEED FOR THESE SERVICES FURNISHED UNDER THIS PLAN OF TREATMENT AND WHILE UNDER MY CARE   Physician's comments:     Physician's Signature: ___________________________________________________

## 2023-06-21 ENCOUNTER — CLINICAL SUPPORT (OUTPATIENT)
Dept: REHABILITATION | Facility: OTHER | Age: 38
End: 2023-06-21
Payer: MEDICAID

## 2023-06-21 DIAGNOSIS — R10.2 PELVIC PAIN: Primary | ICD-10-CM

## 2023-06-21 DIAGNOSIS — R29.898 DECREASED STRENGTH OF TRUNK AND BACK: ICD-10-CM

## 2023-06-21 DIAGNOSIS — M54.50 CHRONIC LEFT-SIDED LOW BACK PAIN WITHOUT SCIATICA: ICD-10-CM

## 2023-06-21 DIAGNOSIS — G89.29 CHRONIC LEFT-SIDED LOW BACK PAIN WITHOUT SCIATICA: ICD-10-CM

## 2023-06-21 PROCEDURE — 97110 THERAPEUTIC EXERCISES: CPT | Mod: PN

## 2023-06-21 NOTE — PROGRESS NOTES
[facility]   Physical Therapy Treatment Note    Name: Divya Jacinto [facility]  Clinic Number: [id number]    Therapy Diagnosis:   Encounter Diagnoses   Name Primary?    Pelvic pain Yes    Decreased strength of trunk and back     Chronic left-sided low back pain without sciatica      Referring Provider: Arnulfo    Visit Date: 6/21/2023    Referring Provider Orders: PT Eval and Treat  Medical Diagnosis from Referral: Pelvic floor dysfunction in female [M62.89]  Evaluation Date: 4/29/2022  Last Reassessment: 3/20/2023  Authorization Period Expiration: 6/15/2023  Plan of Care Expiration: 6/20/2023  Visit # / Visits authorized: 59 (33/36)  FOTO: 3 (4/29/2022, 6/20/2022, 12/20/2022)    Time In: 02:00pm  Time Out: 03:00pm  Total Billable Time: 60 minutes    Precautions: Standard    Subjective     Pt reports that she is doing well today. Pt states that she is having less anxiety/stress because she has been working less. This has resulted in a decrease in her anterior hip pain.      She was compliant with home exercise program  Response to previous treatment: felt better after last PT session.   Functional change: fluctuating improvements in urinary urgency and back/hip/pelvic pain    Pain: L anterior/medial hip - 2/10    Objective     Divya Jacinto received the following interventions during the treatment session -  (TrA = transverse abdominis, PFM = pelvic floor muscle)  Pt consented to pelvic floor muscle assessment and treatment in prior visit - see EMR  All interventions billed as Therapeutic Exercise, per Medicaid guidelines    Therapeutic exercises to develop strength, endurance and core stabilization for 60 minutes -  [] Elliptical: level 3, 10 minutes  [] Hooklying TrA brace + hip adduction isometric with ball, 2x10 with 50% effort  [] Open books with green band (R&L), x10  [] Standing pallof press with doubled green band (R&L), 2x10  [x] TrA brace + bridging with belt around knees, 4x10  [x] TrA  brace + straight leg raises (R&L), 3x10  [x] Side-lying hip adduction (R&L), 3x8  [x] Side-lying hip abduction (R&L), 3x8 - challenging  [x] Hooklying hip adduction isometric with ball between knees, x20 with 3-second hold  [] Cat/cow with coordinated breath, 2 minutes   [] TrA brace + quadruped hip extension (R&L), x10  [] Standing on foam pad + TrA brace + standing shoulder extension with blue band (B), x30  [] Standing on foam pad + TrA brace + standing row with black band (B), x30  [] TrA brace + wall push up, 2x15  [] Side-stepping on foam beam, 2 minutes  [] Side-stepping with red tubing around ankles, 90 seconds  [] Seated on theraball + pelvic rotations, 2 minutes  [] Seated on theraball + alternating kicks, 1 minute  [] Seated on theraball + row with green band, x40  [] Seated on theraball + alternating shoulder flexion (3#, R&L), x15   [x] Lateral step up/down on 8-inch step (R&L), 2x10  [x] Standing on foam pad + TrA brace using pilates ring + toe taps to cone (R&L), x20  [] Scoop+lift ( shovel ) at freemotion 7# 10x R/L using black weighted bar   [] TrA brace + standing knee flexion (2#, R&L), 2x20  [] Standing hip flexion + hip external rotation lift (2#, R&L), 2x12  [x] TrA brace + standing straight leg raise (2#, R&L), 2x12  [] TrA brace + standing hip abduction + contralateral arm lift (2#, R&L), 2x12  [] TrA brace + sit to stands from chair + 2 foam pads, 4x6  [] Shuttle squats, 3x20 (4 black bands of resistance)  [] Treadmill walkinmph, 10% incline, 5 minutes  [x] Standing hip/knee extension on Pilates chair with 2 blue springs and 2 red springs (R&L), x20  [] Happy baby stretch + diaphragmatic breathing, pelvic drop, 1 minute  [] Child's pose + diaphragmatic breathing, pelvic drop, 2 minutes  [] Butterfly hip stretch + diaphragmatic breathing, pelvic drop, 2 minutes  [] Half-kneeling lunge/hip flexor stretch (L), 1 minute  [] Supine hamstring stretch with strap (L), 1 minute  [] Hip flexor  "stretch in standing (L), 2x30 seconds  [] Lucian test hip flexor stretch (L), 2x1 minute  [] Diaphragmatic breathing + pelvic drop, 2 minutes in hooklying position  [] Seated ball roll out, 2x2 minutes - improved R lower back pain  [] Seated figure-4 stretch (R&L), 1 minute  [] Pelvic floor relaxation guided meditation - performed during e-stim for dry needling  [] HEP review, patient education  [] Marching with +ER ( sartorius ) 40ftx2  [] L prone hip flexor stretch with 1/2 roll under knee, 4x30"  [] Prone ball squeezes between ankles 5" hold 10x - experienced cramping in HS  [] Supine hip flexor iso/abdominal bracing with red ball, 5" hold x20 R/L  [] Elevated clams, 20x R/L  [] Side-lying reverse clams (2#, R&L), 2x15  [] Side-lying clams (R&L), 4x10  [x] Bridging with UE pulldown, pink sports cord, 3x10  [] L SLS on airex + ball toss at rebounder, 2x20  [x] Standing stability chair press down + TrA, 2 red springs, 20x R/L  [x] Fire hydrants in quadruped, 20x R/L  [x] Prone press up x10  [] Prone femoral nerve glides 2x20  [] Prone hip extension 2x20  [] Standing knee flexion x20  [] Prone knee flexion with hip extension x20   [] Bridge on ball with hamstring curl 3x5   [x] Dying bugs 3x5  [x] Hesitation marching 15# KB (R&L),  80ftx2  [] SLS with cone tap forward/contralateral side x10 (patient unable to reach out towards left side with left lower extremity 2nd to pain)  [] Drinking birds 2x10 with upper extremity assist  [] Shuttle squat 50# 3x20  [] SLS on right with left side soccer ball rolls to left side x5  [] Reverse lunges (R&L), x10  [] Seated figure-4 stretch (L), 1 minute  [] METs for left posterior rotated innominate 10x10"    Manual therapy techniques: to develop flexibility, desensitization, and extensibility for 0 minutes  -  [] STM with theragun to R SI region  [] Obturator release at ischial tuberosities + active release with resisted contralateral hip external rotation (B)  [] Manual release " "to L hip flexors - improved tenderness after a few minutes of release  [] Manual release to L pelvic floor muscles internally/vaginally: levator ani - mild increased tone and tenderness that improved within a few minutes of release. PT provided cues for diaphragmatic breathing, visualization, and relaxation.  [] Myofascial decompression/cupping to L adductor longus  [] Dry needling with electrical stimulation to L adductor chepe, piriformis- no adverse effect, written consent provided 5/9/22, see EMR.  [] Prone Long-arm distraction to L hip with belt - no significant change in symptoms  [] Trigger point release to L iliopsoas using green theraroller   [] Taping to bilateral sacroiliac join  [] Sacroiliac muscle-energy technique - no significant change in symptoms  [] Manual hip flexor stretch: prone, with hip internal rotation (L), 1 minute  [] L iliacus release  [] Side lying L hip flexor stretch 4 x 30" - performed by clinician  [] SL lumbopelvic manipulation bilaterally  [] SIJ isometric MET with hips at 90/90, 45 degrees of hip flexion / 45 degrees of adduction, "shotgun" 3x5" hold with each      Therapeutic activities to improve functional performance for 0 minutes -  [] Instruction on urge suppression techniques  [] Trialing SI belt - reduced inner thigh pain when wearing SI belt      Home Exercises and Patient Education     Patient Education: progression of plan of care, plan for next session, pt prognosis, pelvic floor anatomy & function, relationship between TrA & PFM, relationship between hips & PFM, relationship between pelvic floor dysfunction & lower back pain, multifidi anatomy & function, lumbar spine anatomy    HEP (3/22/23): standing hip abduction, standing straight leg raise, standing hip flexion + external rotation, countertop push-ups  HEP update (5/18/23): add side-lying hip abduction    Exercises were reviewed, and Divya Jacinto was able to demonstrate them prior to the end of the session, as " needed. Divya Alexander demonstrated good understanding of the education provided.      Assessment   Divya alexander demonstrated improved tolerance with exercise this visit. Pt demonstrates improved transverse abdominus activation throughout AROM with dying bug exercise. Continue PT POC.    Divya Alexander is progressing well towards her goals.   Pt prognosis: good    Pt will continue to benefit from skilled outpatient physical therapy to address the deficits listed in the problem list box on initial evaluation, provide pt/family education and to maximize pt's level of independence in the home and community environment.     Pt's spiritual, cultural and educational needs considered and pt agreeable to plan of care and goals.  Anticipated barriers to physical therapy: none      Short Term Goals: 6 weeks   Pt to report 50% improvement in tenderness to palpation of pelvic floor to demonstrate improved trigger points and overactivity Met   Pt to report only rare incidences of urinary incontinence with coughing to demonstrate improved pelvic floor muscle coordination Met   Pt to report 50% improvement in pain with penetration (gynecological exam or intercourse) and orgasm to demonstrate improved pelvic floor muscle overactivity Met   Pt to be independent with introductory home exercise program Met      Long Term Goals: 12 weeks  Pt to report minimal/no tenderness to palpation of pelvic floor to demonstrate improved trigger points and overactivity Partially Met   Pt to demonstrate an improved score in the FOTO Pelvic Pain (PFDI) survey to no more than 8% impaired to demonstrate improving pelvic floor muscle tenderness, coordination. Not Met   Pt to deny pelvic pain with penetration (gynecological exam or intercourse) and orgasm to demonstrate improved pelvic floor muscle overactivity Partially Met   Pt to increase pelvic floor strength to 4/5 to demonstrate improved strength needed for continence with ADLs. Partially Met   Pt to be  independent with advanced home exercise program Not Met   Pt to demonstrate bilateral hip strength of 5/5 for improved pelvic girdle support with mobility. Not Met   Pt to report minimal/no back pain with ADLs and functional mobility to demonstrate improved lumbopelvic muscle support, coordination Not Met        Plan     Continue per Plan of Care    Increasing frequency to 2x/week for more pelvic girdle stability training, Therapeutic Exercises, Manual Therapeutic Technique, Neuromuscular Re Education, Therapeutic Activities. Modalities, Kinesiotape prn, and Functional Dry Needling as needed.      Mariana Webber, PT

## 2023-06-26 NOTE — PROGRESS NOTES
[facility]   Physical Therapy Treatment Note    Name: Diyva Jacinto [facility]  Clinic Number: [id number]    Therapy Diagnosis:   Encounter Diagnoses   Name Primary?    Pelvic pain Yes    Decreased strength of trunk and back     Chronic left-sided low back pain without sciatica      Referring Provider: Arnulfo    Visit Date: 6/27/2023    Referring Provider Orders: PT Eval and Treat  Medical Diagnosis from Referral: Pelvic floor dysfunction in female [M62.89]  Evaluation Date: 4/29/2022  Last Reassessment: 3/20/2023  Authorization Period Expiration: 6/15/2023  Plan of Care Expiration: 6/20/2023  Visit # / Visits authorized:60 (34/36)  FOTO: 3 (4/29/2022, 6/20/2022, 12/20/2022)    Time In: 15:00  Time Out: 16:00  Total Billable Time: 60 minutes    Precautions: Standard    Subjective     Pt reports that she is doing well today. She is a few days into a 30-day yoga challenge, and she has been walking daily (with spouse). She is noting reduced pain overall, including with ovulation recently (which is a huge improvement).     She was compliant with home exercise program  Response to previous treatment: felt better after last PT session.   Functional change: fluctuating improvements in urinary urgency and back/hip/pelvic pain    Pain: L anterior/medial hip (1-2/10)    Objective     Divya Jacinto received the following interventions during the treatment session -  (TrA = transverse abdominis, PFM = pelvic floor muscle)  Pt consented to pelvic floor muscle assessment and treatment in prior visit - see EMR  All interventions billed as Therapeutic Exercise, per Medicaid guidelines    Therapeutic exercises to develop strength, endurance and core stabilization for 30 minutes -  [] Elliptical: level 3, 10 minutes  [] Hooklying TrA brace + hip adduction isometric with ball, 2x10 with 50% effort  [] Open books with green band (R&L), x10  [] Standing pallof press with doubled green band (R&L), 2x10  [] TrA brace +  bridging with belt around knees, 4x10  [] TrA brace + straight leg raises (R&L), 3x10  [x] Side-lying hip adduction (R&L), 3x10  [x] Side-lying hip abduction (R&L), 3x10  [] Hooklying hip adduction isometric with ball between knees, x20 with 3-second hold  [] Cat/cow with coordinated breath, 2 minutes   [] TrA brace + quadruped hip extension (R&L), x10  [] Standing on foam pad + TrA brace + standing shoulder extension with blue band (B), x30  [] Standing on foam pad + TrA brace + standing row with black band (B), x30  [] TrA brace + wall push up, 2x15  [] Side-stepping on foam beam, 2 minutes  [] Side-stepping with red tubing around ankles, 90 seconds  [] Seated on theraball + pelvic rotations, 2 minutes  [] Seated on theraball + alternating kicks, 1 minute  [] Seated on theraball + row with green band, x40  [] Seated on theraball + alternating shoulder flexion (3#, R&L), x15   [x] Lateral step up/down on 8-inch step (R&L), 2x10  [x] Standing on foam pad + TrA brace using pilates ring + toe taps to cone (R&L), x30  [] Scoop+lift ( shovel ) at freemotion 7# 10x R/L using black weighted bar   [] TrA brace + standing knee flexion (2#, R&L), 2x20  [] Standing hip flexion + hip external rotation lift (2#, R&L), 2x12  [] TrA brace + standing straight leg raise (2#, R&L), 2x12  [] TrA brace + standing hip abduction + contralateral arm lift (2#, R&L), 2x12  [] TrA brace + sit to stands from chair + 2 foam pads, 4x6  [] Shuttle squats, 3x20 (4 black bands of resistance)  [] Treadmill walkinmph, 10% incline, 5 minutes  [x] Standing hip/knee extension on PilFurious chair with 2 blue springs and 2 red springs (R&L), x40  [] Happy baby stretch + diaphragmatic breathing, pelvic drop, 1 minute  [] Child's pose + diaphragmatic breathing, pelvic drop, 2 minutes  [] Butterfly hip stretch + diaphragmatic breathing, pelvic drop, 2 minutes  [] Half-kneeling lunge/hip flexor stretch (L), 1 minute  [] Supine hamstring stretch with strap  "(L), 1 minute  [] Hip flexor stretch in standing (L), 2x30 seconds  [] Lucian test hip flexor stretch (L), 2x1 minute  [] Diaphragmatic breathing + pelvic drop, 2 minutes in hooklying position  [] Seated ball roll out, 2x2 minutes - improved R lower back pain  [x] Seated figure-4 stretch (R&L), 1 minute  [] Pelvic floor relaxation guided meditation - performed during e-stim for dry needling  [x] HEP review, patient education  [] Marching with +ER ( sartorius ) 40ftx2  [] L prone hip flexor stretch with 1/2 roll under knee, 4x30"  [] Prone ball squeezes between ankles 5" hold 10x - experienced cramping in HS  [] Supine hip flexor iso/abdominal bracing with red ball, 5" hold x20 R/L  [] Elevated clams, 20x R/L  [] Side-lying reverse clams (2#, R&L), 2x15  [] Side-lying clams (R&L), 4x10  [] Bridging with UE pulldown, pink sports cord, 3x10  [] L SLS on airex + ball toss at rebounder, 2x20  [] Standing stability chair press down + TrA, 2 red springs, 20x R/L  [] Fire hydrants in quadruped, 20x R/L  [] Prone press up x10  [] Prone femoral nerve glides 2x20  [] Prone hip extension 2x20  [] Standing knee flexion x20  [] Prone knee flexion with hip extension x20   [] Bridge on ball with hamstring curl 3x5   [] Dying bugs 3x5  [x] Hesitation marching 15# KB (R&L),  80ftx2  [] SLS with cone tap forward/contralateral side x10 (patient unable to reach out towards left side with left lower extremity 2nd to pain)  [] Drinking birds 2x10 with upper extremity assist  [] Shuttle squat 50# 3x20  [] SLS on right with left side soccer ball rolls to left side x5  [] Reverse lunges (R&L), x10  [] Seated figure-4 stretch (L), 1 minute  [] METs for left posterior rotated innominate 10x10"    Manual therapy techniques: to develop flexibility, desensitization, and extensibility for 30 minutes  -  [] STM with theragun to R SI region  [] Obturator release at ischial tuberosities + active release with resisted contralateral hip external " "rotation (B)  [] Manual release to L hip flexors - improved tenderness after a few minutes of release  [] Manual release to L pelvic floor muscles internally/vaginally: levator ani - mild increased tone and tenderness that improved within a few minutes of release. PT provided cues for diaphragmatic breathing, visualization, and relaxation.  [] Myofascial decompression/cupping to L adductor longus  [x] Dry needling with electrical stimulation to L adductor chepe, piriformis - no adverse effect, written consent provided 5/9/22, see EMR.  [] Prone Long-arm distraction to L hip with belt - no significant change in symptoms  [] Trigger point release to L iliopsoas using green theraroller   [] Taping to bilateral sacroiliac join  [] Sacroiliac muscle-energy technique - no significant change in symptoms  [] Manual hip flexor stretch: prone, with hip internal rotation (L), 1 minute  [] L iliacus release  [] Side lying L hip flexor stretch 4 x 30" - performed by clinician  [] SL lumbopelvic manipulation bilaterally  [] SIJ isometric MET with hips at 90/90, 45 degrees of hip flexion / 45 degrees of adduction, "shotgun" 3x5" hold with each      Therapeutic activities to improve functional performance for 0 minutes -  [] Instruction on urge suppression techniques  [] Trialing SI belt - reduced inner thigh pain when wearing SI belt      Home Exercises and Patient Education     Patient Education: progression of plan of care, plan for next session, pt prognosis, pelvic floor anatomy & function, relationship between TrA & PFM, relationship between hips & PFM, relationship between pelvic floor dysfunction & lower back pain, multifidi anatomy & function, lumbar spine anatomy    HEP (3/22/23): standing hip abduction, standing straight leg raise, standing hip flexion + external rotation, countertop push-ups  HEP update (5/18/23): add side-lying hip abduction    Exercises were reviewed, and Divya Jacinto was able to demonstrate them " prior to the end of the session, as needed. Divya Jacinto demonstrated good understanding of the education provided.      Assessment     Pt tolerated treatment session well, with good understanding of education provided and no increased symptoms with exercise. Pt's exercise tolerance has improved markedly in the last 2-3 months, and her symptoms, while still present, are consistently lower. Pt's functional mobility and ability to perform ADLs still limited by hip myofascial dysfunction and pain. PT on hold after today's visit due to insurance limitations, but she may return for reassessment and HEP update as needed.    Divya Jacinto is progressing well towards her goals.   Pt prognosis: good    Pt will continue to benefit from skilled outpatient physical therapy to address the deficits listed in the problem list box on initial evaluation, provide pt/family education and to maximize pt's level of independence in the home and community environment.     Pt's spiritual, cultural and educational needs considered and pt agreeable to plan of care and goals.  Anticipated barriers to physical therapy: none      Short Term Goals: 6 weeks   Pt to report 50% improvement in tenderness to palpation of pelvic floor to demonstrate improved trigger points and overactivity Met   Pt to report only rare incidences of urinary incontinence with coughing to demonstrate improved pelvic floor muscle coordination Met   Pt to report 50% improvement in pain with penetration (gynecological exam or intercourse) and orgasm to demonstrate improved pelvic floor muscle overactivity Met   Pt to be independent with introductory home exercise program Met      Long Term Goals: 12 weeks  Pt to report minimal/no tenderness to palpation of pelvic floor to demonstrate improved trigger points and overactivity Partially Met   Pt to demonstrate an improved score in the FOTO Pelvic Pain (PFDI) survey to no more than 8% impaired to demonstrate improving pelvic floor  muscle tenderness, coordination. Not Met   Pt to deny pelvic pain with penetration (gynecological exam or intercourse) and orgasm to demonstrate improved pelvic floor muscle overactivity Partially Met   Pt to increase pelvic floor strength to 4/5 to demonstrate improved strength needed for continence with ADLs. Partially Met   Pt to be independent with advanced home exercise program Partially Met   Pt to demonstrate bilateral hip strength of 5/5 for improved pelvic girdle support with mobility. Partially Met   Pt to report minimal/no back pain with ADLs and functional mobility to demonstrate improved lumbopelvic muscle support, coordination Partially Met        Plan     Continue per Plan of Care  Hold off on continued treatment after today's visit    2x/week: pelvic girdle stability training, Therapeutic Exercises, Manual Therapeutic Technique, Neuromuscular Re Education, Therapeutic Activities. Modalities, Kinesiotape prn, and Functional Dry Needling as needed.      Jess Ureña, PT

## 2023-06-27 ENCOUNTER — CLINICAL SUPPORT (OUTPATIENT)
Dept: REHABILITATION | Facility: OTHER | Age: 38
End: 2023-06-27
Payer: MEDICAID

## 2023-06-27 DIAGNOSIS — G89.29 CHRONIC LEFT-SIDED LOW BACK PAIN WITHOUT SCIATICA: ICD-10-CM

## 2023-06-27 DIAGNOSIS — R10.2 PELVIC PAIN: Primary | ICD-10-CM

## 2023-06-27 DIAGNOSIS — R29.898 DECREASED STRENGTH OF TRUNK AND BACK: ICD-10-CM

## 2023-06-27 DIAGNOSIS — M54.50 CHRONIC LEFT-SIDED LOW BACK PAIN WITHOUT SCIATICA: ICD-10-CM

## 2023-06-27 PROCEDURE — 97110 THERAPEUTIC EXERCISES: CPT | Mod: PN | Performed by: PHYSICAL THERAPIST

## 2024-04-23 ENCOUNTER — OFFICE VISIT (OUTPATIENT)
Dept: URGENT CARE | Facility: CLINIC | Age: 39
End: 2024-04-23
Payer: COMMERCIAL

## 2024-04-23 VITALS
RESPIRATION RATE: 20 BRPM | HEART RATE: 65 BPM | OXYGEN SATURATION: 98 % | DIASTOLIC BLOOD PRESSURE: 80 MMHG | BODY MASS INDEX: 38.06 KG/M2 | HEIGHT: 69 IN | SYSTOLIC BLOOD PRESSURE: 150 MMHG | WEIGHT: 257 LBS | TEMPERATURE: 98 F

## 2024-04-23 DIAGNOSIS — M62.830 SPASM OF MUSCLE OF LOWER BACK: ICD-10-CM

## 2024-04-23 DIAGNOSIS — I10 HYPERTENSION, UNSPECIFIED TYPE: ICD-10-CM

## 2024-04-23 DIAGNOSIS — M54.50 LOW BACK PAIN WITHOUT SCIATICA, UNSPECIFIED BACK PAIN LATERALITY, UNSPECIFIED CHRONICITY: Primary | ICD-10-CM

## 2024-04-23 LAB
B-HCG UR QL: NEGATIVE
BILIRUB UR QL STRIP: NEGATIVE
CTP QC/QA: YES
GLUCOSE UR QL STRIP: NEGATIVE
KETONES UR QL STRIP: NEGATIVE
LEUKOCYTE ESTERASE UR QL STRIP: NEGATIVE
PH, POC UA: 5 (ref 5–8)
POC BLOOD, URINE: POSITIVE
POC NITRATES, URINE: NEGATIVE
PROT UR QL STRIP: NEGATIVE
SP GR UR STRIP: 1.02 (ref 1–1.03)
UROBILINOGEN UR STRIP-ACNC: ABNORMAL (ref 0.1–1.1)

## 2024-04-23 PROCEDURE — 81025 URINE PREGNANCY TEST: CPT | Mod: S$GLB,,, | Performed by: FAMILY MEDICINE

## 2024-04-23 PROCEDURE — 99214 OFFICE O/P EST MOD 30 MIN: CPT | Mod: S$GLB,,, | Performed by: FAMILY MEDICINE

## 2024-04-23 PROCEDURE — 81003 URINALYSIS AUTO W/O SCOPE: CPT | Mod: QW,S$GLB,, | Performed by: FAMILY MEDICINE

## 2024-04-23 RX ORDER — NAPROXEN 375 MG/1
375 TABLET ORAL 2 TIMES DAILY
Qty: 20 TABLET | Refills: 0 | Status: SHIPPED | OUTPATIENT
Start: 2024-04-23

## 2024-04-23 RX ORDER — METHOCARBAMOL 500 MG/1
500 TABLET, FILM COATED ORAL 3 TIMES DAILY
Qty: 20 TABLET | Refills: 0 | Status: SHIPPED | OUTPATIENT
Start: 2024-04-23 | End: 2024-04-30

## 2024-04-23 NOTE — PROGRESS NOTES
"Subjective:      Patient ID: Divya Salazar is a 38 y.o. female.    Vitals:  height is 5' 9" (1.753 m) and weight is 116.6 kg (257 lb). Her oral temperature is 98 °F (36.7 °C). Her blood pressure is 150/80 (abnormal) and her pulse is 65. Her respiration is 20 and oxygen saturation is 98%.     Chief Complaint: Low-back Pain    Patient presents with c/o back  pain Patient states twisted back while reaching across her sofa and  felt a twinge with immediate pain on last   Thursday Pain is relieved with standing. Pain when sitting or walking. Pain is located to right lower back, reports no radiation, intensity varies, at present 2/10, increased with back movements. Denies any radiating leg pain, paresthesias, weakness, saddle anesthesia or Bowel/Bladder dysfunction. Denies fever, dysuria, abdominal pain, N/V.  Blood pressure is found to be moderately elevated.  Patient is compliant with blood pressure medicines.  Denies any headache, chest pain, SOB.       Low-back Pain  This is a new problem. Episode onset: 4 days. The problem occurs constantly. The problem has been gradually worsening. The symptoms are aggravated by bending, walking, twisting and eating. Treatments tried: aleve today ibriprofen. The treatment provided mild relief.     ROS   Objective:     Physical Exam   Constitutional: She is oriented to person, place, and time. She appears well-developed. She is cooperative.   HENT:   Head: Normocephalic and atraumatic.   Ears:   Right Ear: Hearing normal.   Left Ear: Hearing normal.   Nose: Nose normal. No mucosal edema or nasal deformity. No epistaxis. Right sinus exhibits no maxillary sinus tenderness and no frontal sinus tenderness. Left sinus exhibits no maxillary sinus tenderness and no frontal sinus tenderness.   Mouth/Throat: Uvula is midline, oropharynx is clear and moist and mucous membranes are normal. No trismus in the jaw. Normal dentition. No uvula swelling.   Eyes: Conjunctivae and lids are normal. "   Neck: Trachea normal and phonation normal. Neck supple.   Cardiovascular: Normal rate, regular rhythm, normal heart sounds and normal pulses.   No murmur heard.  Pulmonary/Chest: Effort normal and breath sounds normal. No stridor. No respiratory distress. She has no wheezes. She has no rhonchi. She has no rales.   Abdominal: Normal appearance and bowel sounds are normal. She exhibits no distension. Soft. There is no abdominal tenderness. There is no left CVA tenderness and no right CVA tenderness.   Musculoskeletal: Normal range of motion.         General: Normal range of motion.      Comments:   Back exam:    No TTP   No redness, swelling, bruise   ROM:   Lateral flexion mildly limited secondary to muscle spasm,   Forward flexion mildly limited secondary to muscle spasm.  No sensory/motor deficit  SLR: WNL      Neurological: She is alert and oriented to person, place, and time. She displays no weakness. No cranial nerve deficit or sensory deficit. She exhibits normal muscle tone.   Skin: Skin is warm, dry and intact.   Psychiatric: Her speech is normal and behavior is normal. Judgment and thought content normal.   Nursing note and vitals reviewed.      Assessment:     1. Low back pain without sciatica, unspecified back pain laterality, unspecified chronicity    2. Spasm of muscle of lower back    3. Hypertension, unspecified type        Plan:   Discussed exam findings/results/diagnosis/plan with patient. Advised to f/u with PCP within 2-5 days. ER precautions given if symptoms get any worse. All questions answered. Patient verbally understood and agreed with treatment plan.  Educational materials and instructions regarding the visit diagnosis and management provided.     Low back pain without sciatica, unspecified back pain laterality, unspecified chronicity  -     Cancel: POCT Urinalysis, Dipstick, Automated, W/O Scope  -     POCT Urinalysis, Dipstick, Automated, W/O Scope  -     POCT urine pregnancy    Spasm of  muscle of lower back    Hypertension, unspecified type    Other orders  -     naproxen (EC-NAPROSYN) 375 MG TbEC EC tablet; Take 1 tablet (375 mg total) by mouth 2 (two) times daily.  Dispense: 20 tablet; Refill: 0  -     methocarbamoL (ROBAXIN) 500 MG Tab; Take 1 tablet (500 mg total) by mouth 3 (three) times daily. As needed for muscle spasm. May cause drowsiness for 7 days  Dispense: 20 tablet; Refill: 0

## 2024-11-21 ENCOUNTER — CLINICAL SUPPORT (OUTPATIENT)
Dept: OBSTETRICS AND GYNECOLOGY | Facility: CLINIC | Age: 39
End: 2024-11-21
Payer: COMMERCIAL

## 2024-11-21 ENCOUNTER — PATIENT MESSAGE (OUTPATIENT)
Dept: OBSTETRICS AND GYNECOLOGY | Facility: CLINIC | Age: 39
End: 2024-11-21

## 2024-11-21 DIAGNOSIS — Z71.9 COUNSELED BY NURSE: Primary | ICD-10-CM

## 2024-11-21 PROCEDURE — 99999 PR PBB SHADOW E&M-EST. PATIENT-LVL III: CPT | Mod: PBBFAC,,,

## 2024-11-21 RX ORDER — NAPROXEN SODIUM 220 MG/1
81 TABLET, FILM COATED ORAL DAILY
COMMUNITY

## 2024-11-21 RX ORDER — THIAMINE HCL 50 MG
50 TABLET ORAL DAILY
COMMUNITY

## 2024-11-21 RX ORDER — ERGOCALCIFEROL 1.25 MG/1
50000 CAPSULE ORAL
COMMUNITY

## 2024-11-21 NOTE — PROGRESS NOTES
Spoke with patient for a total of 30 minutes.  Updated chart to reflect up to date patient demographics.  Medication, pharmacy, and family history updated.  Patient was guided through expectations of OB/GYN care throughout history of pregnancy.    Transfer of care from  , Currently 13 week as of 11/21/24

## 2024-12-09 ENCOUNTER — PATIENT MESSAGE (OUTPATIENT)
Dept: MATERNAL FETAL MEDICINE | Facility: CLINIC | Age: 39
End: 2024-12-09
Payer: COMMERCIAL

## 2024-12-09 ENCOUNTER — PATIENT MESSAGE (OUTPATIENT)
Dept: OBSTETRICS AND GYNECOLOGY | Facility: CLINIC | Age: 39
End: 2024-12-09
Payer: COMMERCIAL

## 2024-12-09 DIAGNOSIS — O09.92 SUPERVISION OF HIGH RISK PREGNANCY IN SECOND TRIMESTER: Primary | ICD-10-CM

## 2025-01-02 ENCOUNTER — PROCEDURE VISIT (OUTPATIENT)
Dept: MATERNAL FETAL MEDICINE | Facility: CLINIC | Age: 40
End: 2025-01-02
Payer: COMMERCIAL

## 2025-01-02 DIAGNOSIS — Z36.2 ENCOUNTER FOR FOLLOW-UP ULTRASOUND OF FETAL ANATOMY: Primary | ICD-10-CM

## 2025-01-02 DIAGNOSIS — O09.92 SUPERVISION OF HIGH RISK PREGNANCY IN SECOND TRIMESTER: ICD-10-CM

## 2025-01-02 PROCEDURE — 76817 TRANSVAGINAL US OBSTETRIC: CPT | Mod: S$GLB,,, | Performed by: OBSTETRICS & GYNECOLOGY

## 2025-01-02 PROCEDURE — 76811 OB US DETAILED SNGL FETUS: CPT | Mod: S$GLB,,, | Performed by: OBSTETRICS & GYNECOLOGY

## 2025-01-10 ENCOUNTER — INITIAL PRENATAL (OUTPATIENT)
Dept: OBSTETRICS AND GYNECOLOGY | Facility: CLINIC | Age: 40
End: 2025-01-10
Payer: COMMERCIAL

## 2025-01-10 VITALS
DIASTOLIC BLOOD PRESSURE: 66 MMHG | SYSTOLIC BLOOD PRESSURE: 124 MMHG | WEIGHT: 279.13 LBS | BODY MASS INDEX: 41.22 KG/M2

## 2025-01-10 DIAGNOSIS — O09.92 SUPERVISION OF HIGH RISK PREGNANCY IN SECOND TRIMESTER: Primary | ICD-10-CM

## 2025-01-10 DIAGNOSIS — O16.2 HYPERTENSION DURING PREGNANCY IN SECOND TRIMESTER, UNSPECIFIED HYPERTENSION IN PREGNANCY TYPE: ICD-10-CM

## 2025-01-10 DIAGNOSIS — O99.210 OBESITY IN PREGNANCY: ICD-10-CM

## 2025-01-10 DIAGNOSIS — O09.812 PREGNANCY RESULTING FROM IN VITRO FERTILIZATION IN SECOND TRIMESTER: ICD-10-CM

## 2025-01-10 DIAGNOSIS — R73.03 PREDIABETES: ICD-10-CM

## 2025-01-10 PROCEDURE — 99999 PR PBB SHADOW E&M-EST. PATIENT-LVL III: CPT | Mod: PBBFAC,,, | Performed by: OBSTETRICS & GYNECOLOGY

## 2025-01-10 NOTE — PROGRESS NOTES
No FM, no LOF, Ctx, VB. YEIMI EJ.  IVF Dr. Hanks. NIPT neg, BOY. A1c 6.1 3 months ago. Has CHTN, on labetalol 200 BID. Has h/o abd surgeries for endo and adhesions. Wants to avoid C/s if possible because of it. Her MEREDITH is OBGYN (Dr. Zuñiga). She is taking asa. Has repeat US scheduled.     Given precautions.

## 2025-01-17 ENCOUNTER — PATIENT MESSAGE (OUTPATIENT)
Dept: OBSTETRICS AND GYNECOLOGY | Facility: CLINIC | Age: 40
End: 2025-01-17
Payer: COMMERCIAL

## (undated) DEVICE — GAUZE SPONGE 4X4 12PLY

## (undated) DEVICE — SEE MEDLINE ITEM 152530

## (undated) DEVICE — Device

## (undated) DEVICE — NDL 18GA X1 1/2 REG BEVEL

## (undated) DEVICE — DRAPE STERI-DRAPE 1000 17X11IN

## (undated) DEVICE — BIT DRILL CANNULATED 8.5MM

## (undated) DEVICE — SOL IRR NACL .9% 3000ML

## (undated) DEVICE — GOWN SURGICAL XX LARGE X LONG

## (undated) DEVICE — SYR 50CC LL

## (undated) DEVICE — TOURNIQUET SB QC DP 34X4IN

## (undated) DEVICE — BLADE SURG #15 CARBON STEEL

## (undated) DEVICE — SUT VICRYL PLUS 0 CT1 18IN

## (undated) DEVICE — BRACE KNEE T SCOPE PREMIER

## (undated) DEVICE — SEE MEDLINE ITEM 157117

## (undated) DEVICE — ELECTRODE REM PLYHSV RETURN 9

## (undated) DEVICE — COVER OVERHEAD SURG LT BLUE

## (undated) DEVICE — SEE MEDLINE ITEM 146231

## (undated) DEVICE — SEE MEDLINE ITEM 152622

## (undated) DEVICE — SUT 2/0 36IN COATED VICRYL

## (undated) DEVICE — GLOVE BIOGEL ORTHOPEDIC 8

## (undated) DEVICE — DRAPE EMERALD 87X114.75X113

## (undated) DEVICE — APPLICATOR CHLORAPREP ORN 26ML

## (undated) DEVICE — SEE MEDLINE ITEM 152523

## (undated) DEVICE — PACK ARTHROSCOPY

## (undated) DEVICE — BLADE SURG CARBON STEEL SZ11

## (undated) DEVICE — SUT ETHILON 2-0 FS 18IN BLK

## (undated) DEVICE — PACK BASIC

## (undated) DEVICE — PAD PERINEAL SUPINE

## (undated) DEVICE — SEE MEDLINE ITEM 156955

## (undated) DEVICE — SUT FIBERWIRE FIBER 2M

## (undated) DEVICE — COVER BACK TABLE 72X21

## (undated) DEVICE — GLOVE BIOGEL PI MICRO INDIC 8

## (undated) DEVICE — SEE MEDLINE ITEM 157116

## (undated) DEVICE — BLADE SHAVER 4.5 6/BX

## (undated) DEVICE — NDL SPINAL 18GX3.5 SPINOCAN

## (undated) DEVICE — SEE MEDLINE ITEM 146292

## (undated) DEVICE — DRAPE STERI INSTRUMENT 1018

## (undated) DEVICE — MAT SUCTION PUDDLEVAC ORANGE

## (undated) DEVICE — PAD CAST SPECIALIST STRL 6

## (undated) DEVICE — DRESSING AQUACEL FOAM 3 X 3

## (undated) DEVICE — SEE MEDLINE ITEM 146313

## (undated) DEVICE — DRESSING XEROFORM FOIL PK 1X8

## (undated) DEVICE — DRAPE PLASTIC U 60X72

## (undated) DEVICE — PAD ABDOMINAL 5X9 STERILE

## (undated) DEVICE — SUT VICRYL 1 CT-1 27 UNDIE

## (undated) DEVICE — ABLATOR EXTENDED LENGTH

## (undated) DEVICE — SUT ETHILON 3/0 18IN PS-1

## (undated) DEVICE — DRAPE STERI-DRAPE 83X125 IOBAN

## (undated) DEVICE — DRESSING XEROFORM 1X8IN

## (undated) DEVICE — DRESSING AQUACEL SACRAL 9 X 9

## (undated) DEVICE — MANIFOLD 4 PORT

## (undated) DEVICE — DRAPE C-ARM/MOBILE XRAY 44X80

## (undated) DEVICE — TUBE SET INFLOW/OUTFLOW

## (undated) DEVICE — SEE MEDLINE ITEM 157216

## (undated) DEVICE — SUT 38 FIBERWIRE #2

## (undated) DEVICE — DRAPE STERI U-SHAPED 47X51IN

## (undated) DEVICE — SEE MEDLINE ITEM 157131

## (undated) DEVICE — PAD INSERT BOOT DISPOSABLE

## (undated) DEVICE — SUT FIBERWIRE

## (undated) DEVICE — SEE MEDLINE ITEM 146271

## (undated) DEVICE — SPONGE LAP 18X18 PREWASHED